# Patient Record
Sex: MALE | Race: WHITE | NOT HISPANIC OR LATINO
[De-identification: names, ages, dates, MRNs, and addresses within clinical notes are randomized per-mention and may not be internally consistent; named-entity substitution may affect disease eponyms.]

---

## 2017-05-03 ENCOUNTER — NON-APPOINTMENT (OUTPATIENT)
Age: 82
End: 2017-05-03

## 2017-05-03 ENCOUNTER — APPOINTMENT (OUTPATIENT)
Dept: CARDIOLOGY | Facility: CLINIC | Age: 82
End: 2017-05-03

## 2017-05-03 VITALS
WEIGHT: 188 LBS | HEIGHT: 70 IN | HEART RATE: 55 BPM | DIASTOLIC BLOOD PRESSURE: 67 MMHG | BODY MASS INDEX: 26.92 KG/M2 | SYSTOLIC BLOOD PRESSURE: 146 MMHG

## 2017-05-03 DIAGNOSIS — R00.1 BRADYCARDIA, UNSPECIFIED: ICD-10-CM

## 2017-05-03 DIAGNOSIS — I49.3 VENTRICULAR PREMATURE DEPOLARIZATION: ICD-10-CM

## 2017-05-03 DIAGNOSIS — R55 SYNCOPE AND COLLAPSE: ICD-10-CM

## 2017-05-03 DIAGNOSIS — Z82.49 FAMILY HISTORY OF ISCHEMIC HEART DISEASE AND OTHER DISEASES OF THE CIRCULATORY SYSTEM: ICD-10-CM

## 2017-05-03 RX ORDER — ENALAPRIL MALEATE 20 MG/1
20 TABLET ORAL DAILY
Qty: 30 | Refills: 5 | Status: ACTIVE | COMMUNITY

## 2017-05-07 PROBLEM — R55 PRE-SYNCOPE: Status: ACTIVE | Noted: 2017-05-03

## 2017-05-07 PROBLEM — R00.1 SINUS BRADYCARDIA: Status: ACTIVE | Noted: 2017-05-03

## 2018-04-19 ENCOUNTER — APPOINTMENT (OUTPATIENT)
Dept: CARDIOLOGY | Facility: CLINIC | Age: 83
End: 2018-04-19
Payer: MEDICARE

## 2018-04-19 ENCOUNTER — NON-APPOINTMENT (OUTPATIENT)
Age: 83
End: 2018-04-19

## 2018-04-19 VITALS
HEART RATE: 50 BPM | WEIGHT: 188 LBS | OXYGEN SATURATION: 99 % | SYSTOLIC BLOOD PRESSURE: 167 MMHG | BODY MASS INDEX: 26.92 KG/M2 | RESPIRATION RATE: 14 BRPM | HEIGHT: 70 IN | DIASTOLIC BLOOD PRESSURE: 91 MMHG

## 2018-04-19 DIAGNOSIS — Z01.810 ENCOUNTER FOR PREPROCEDURAL CARDIOVASCULAR EXAMINATION: ICD-10-CM

## 2018-04-19 DIAGNOSIS — I10 ESSENTIAL (PRIMARY) HYPERTENSION: ICD-10-CM

## 2018-04-19 PROCEDURE — 99214 OFFICE O/P EST MOD 30 MIN: CPT

## 2018-04-19 PROCEDURE — 93000 ELECTROCARDIOGRAM COMPLETE: CPT

## 2018-04-19 RX ORDER — BIMATOPROST 0.1 MG/ML
0.01 SOLUTION/ DROPS OPHTHALMIC
Qty: 2 | Refills: 0 | Status: ACTIVE | COMMUNITY
Start: 2018-01-19

## 2018-04-19 RX ORDER — TAMSULOSIN HYDROCHLORIDE 0.4 MG/1
0.4 CAPSULE ORAL
Qty: 30 | Refills: 0 | Status: ACTIVE | COMMUNITY
Start: 2017-11-28

## 2018-05-01 ENCOUNTER — APPOINTMENT (OUTPATIENT)
Dept: CV DIAGNOSITCS | Facility: HOSPITAL | Age: 83
End: 2018-05-01
Payer: MEDICARE

## 2018-05-01 ENCOUNTER — OUTPATIENT (OUTPATIENT)
Dept: OUTPATIENT SERVICES | Facility: HOSPITAL | Age: 83
LOS: 1 days | End: 2018-05-01

## 2018-05-01 DIAGNOSIS — I10 ESSENTIAL (PRIMARY) HYPERTENSION: ICD-10-CM

## 2018-05-01 PROCEDURE — 93306 TTE W/DOPPLER COMPLETE: CPT | Mod: 26

## 2018-05-08 ENCOUNTER — APPOINTMENT (OUTPATIENT)
Dept: CV DIAGNOSTICS | Facility: HOSPITAL | Age: 83
End: 2018-05-08
Payer: MEDICARE

## 2018-05-08 ENCOUNTER — OUTPATIENT (OUTPATIENT)
Dept: OUTPATIENT SERVICES | Facility: HOSPITAL | Age: 83
LOS: 1 days | End: 2018-05-08

## 2018-05-08 DIAGNOSIS — Z01.810 ENCOUNTER FOR PREPROCEDURAL CARDIOVASCULAR EXAMINATION: ICD-10-CM

## 2018-05-08 DIAGNOSIS — I10 ESSENTIAL (PRIMARY) HYPERTENSION: ICD-10-CM

## 2018-05-08 PROCEDURE — 78452 HT MUSCLE IMAGE SPECT MULT: CPT | Mod: 26

## 2018-05-08 PROCEDURE — 93018 CV STRESS TEST I&R ONLY: CPT | Mod: GC

## 2018-05-08 PROCEDURE — 93016 CV STRESS TEST SUPVJ ONLY: CPT | Mod: GC

## 2018-06-01 ENCOUNTER — RECORD ABSTRACTING (OUTPATIENT)
Age: 83
End: 2018-06-01

## 2018-06-01 DIAGNOSIS — M19.029 PRIMARY OSTEOARTHRITIS, UNSPECIFIED ELBOW: ICD-10-CM

## 2018-06-01 DIAGNOSIS — Z82.49 FAMILY HISTORY OF ISCHEMIC HEART DISEASE AND OTHER DISEASES OF THE CIRCULATORY SYSTEM: ICD-10-CM

## 2018-06-01 DIAGNOSIS — M75.40 IMPINGEMENT SYNDROME OF UNSPECIFIED SHOULDER: ICD-10-CM

## 2018-06-01 DIAGNOSIS — Z87.39 PERSONAL HISTORY OF OTHER DISEASES OF THE MUSCULOSKELETAL SYSTEM AND CONNECTIVE TISSUE: ICD-10-CM

## 2018-06-01 DIAGNOSIS — S46.219A STRAIN OF MUSCLE, FASCIA AND TENDON OF OTHER PARTS OF BICEPS, UNSPECIFIED ARM, INITIAL ENCOUNTER: ICD-10-CM

## 2018-07-05 ENCOUNTER — OUTPATIENT (OUTPATIENT)
Dept: OUTPATIENT SERVICES | Facility: HOSPITAL | Age: 83
LOS: 1 days | End: 2018-07-05
Payer: MEDICARE

## 2018-07-05 VITALS
OXYGEN SATURATION: 99 % | HEIGHT: 67 IN | RESPIRATION RATE: 16 BRPM | DIASTOLIC BLOOD PRESSURE: 69 MMHG | WEIGHT: 175.05 LBS | SYSTOLIC BLOOD PRESSURE: 127 MMHG | HEART RATE: 68 BPM | TEMPERATURE: 98 F

## 2018-07-05 DIAGNOSIS — M17.11 UNILATERAL PRIMARY OSTEOARTHRITIS, RIGHT KNEE: ICD-10-CM

## 2018-07-05 DIAGNOSIS — Z96.652 PRESENCE OF LEFT ARTIFICIAL KNEE JOINT: Chronic | ICD-10-CM

## 2018-07-05 DIAGNOSIS — Z98.890 OTHER SPECIFIED POSTPROCEDURAL STATES: Chronic | ICD-10-CM

## 2018-07-05 DIAGNOSIS — Z01.818 ENCOUNTER FOR OTHER PREPROCEDURAL EXAMINATION: ICD-10-CM

## 2018-07-05 DIAGNOSIS — R25.1 TREMOR, UNSPECIFIED: ICD-10-CM

## 2018-07-05 LAB
ALBUMIN SERPL ELPH-MCNC: 4.1 G/DL — SIGNIFICANT CHANGE UP (ref 3.3–5)
ALP SERPL-CCNC: 88 U/L — SIGNIFICANT CHANGE UP (ref 30–120)
ALT FLD-CCNC: 23 U/L DA — SIGNIFICANT CHANGE UP (ref 10–60)
ANION GAP SERPL CALC-SCNC: 7 MMOL/L — SIGNIFICANT CHANGE UP (ref 5–17)
APTT BLD: 33.3 SEC — SIGNIFICANT CHANGE UP (ref 27.5–37.4)
AST SERPL-CCNC: 22 U/L — SIGNIFICANT CHANGE UP (ref 10–40)
BILIRUB SERPL-MCNC: 1.2 MG/DL — SIGNIFICANT CHANGE UP (ref 0.2–1.2)
BLD GP AB SCN SERPL QL: SIGNIFICANT CHANGE UP
BUN SERPL-MCNC: 34 MG/DL — HIGH (ref 7–23)
CALCIUM SERPL-MCNC: 9.9 MG/DL — SIGNIFICANT CHANGE UP (ref 8.4–10.5)
CHLORIDE SERPL-SCNC: 106 MMOL/L — SIGNIFICANT CHANGE UP (ref 96–108)
CO2 SERPL-SCNC: 29 MMOL/L — SIGNIFICANT CHANGE UP (ref 22–31)
CREAT SERPL-MCNC: 1.37 MG/DL — HIGH (ref 0.5–1.3)
GLUCOSE SERPL-MCNC: 103 MG/DL — HIGH (ref 70–99)
HCT VFR BLD CALC: 47 % — SIGNIFICANT CHANGE UP (ref 39–50)
HGB BLD-MCNC: 16.1 G/DL — SIGNIFICANT CHANGE UP (ref 13–17)
INR BLD: 1.09 RATIO — SIGNIFICANT CHANGE UP (ref 0.88–1.16)
MCHC RBC-ENTMCNC: 32.1 PG — SIGNIFICANT CHANGE UP (ref 27–34)
MCHC RBC-ENTMCNC: 34.3 GM/DL — SIGNIFICANT CHANGE UP (ref 32–36)
MCV RBC AUTO: 93.8 FL — SIGNIFICANT CHANGE UP (ref 80–100)
NRBC # BLD: 0 /100 WBCS — SIGNIFICANT CHANGE UP (ref 0–0)
PLATELET # BLD AUTO: 190 K/UL — SIGNIFICANT CHANGE UP (ref 150–400)
POTASSIUM SERPL-MCNC: 5.5 MMOL/L — HIGH (ref 3.5–5.3)
POTASSIUM SERPL-SCNC: 5.5 MMOL/L — HIGH (ref 3.5–5.3)
PROT SERPL-MCNC: 8.1 G/DL — SIGNIFICANT CHANGE UP (ref 6–8.3)
PROTHROM AB SERPL-ACNC: 11.9 SEC — SIGNIFICANT CHANGE UP (ref 9.8–12.7)
RBC # BLD: 5.01 M/UL — SIGNIFICANT CHANGE UP (ref 4.2–5.8)
RBC # FLD: 12.3 % — SIGNIFICANT CHANGE UP (ref 10.3–14.5)
SODIUM SERPL-SCNC: 142 MMOL/L — SIGNIFICANT CHANGE UP (ref 135–145)
WBC # BLD: 8.84 K/UL — SIGNIFICANT CHANGE UP (ref 3.8–10.5)
WBC # FLD AUTO: 8.84 K/UL — SIGNIFICANT CHANGE UP (ref 3.8–10.5)

## 2018-07-05 PROCEDURE — 85730 THROMBOPLASTIN TIME PARTIAL: CPT

## 2018-07-05 PROCEDURE — 87640 STAPH A DNA AMP PROBE: CPT

## 2018-07-05 PROCEDURE — 86900 BLOOD TYPING SEROLOGIC ABO: CPT

## 2018-07-05 PROCEDURE — 93005 ELECTROCARDIOGRAM TRACING: CPT

## 2018-07-05 PROCEDURE — 86850 RBC ANTIBODY SCREEN: CPT

## 2018-07-05 PROCEDURE — 80053 COMPREHEN METABOLIC PANEL: CPT

## 2018-07-05 PROCEDURE — 87641 MR-STAPH DNA AMP PROBE: CPT

## 2018-07-05 PROCEDURE — 85027 COMPLETE CBC AUTOMATED: CPT

## 2018-07-05 PROCEDURE — G0463: CPT

## 2018-07-05 PROCEDURE — 93010 ELECTROCARDIOGRAM REPORT: CPT | Mod: NC

## 2018-07-05 PROCEDURE — 86901 BLOOD TYPING SEROLOGIC RH(D): CPT

## 2018-07-05 PROCEDURE — 85610 PROTHROMBIN TIME: CPT

## 2018-07-05 NOTE — H&P PST ADULT - PSH
History of hernia surgery  X3 1950's-1970's  History of knee replacement, total, left  2007  History of shoulder surgery  right, 2012

## 2018-07-05 NOTE — H&P PST ADULT - PROBLEM SELECTOR PLAN 1
"Right total knee replacement" on 7/24/18  Pre op instructions were reviewed and signed  patient will obtain medical and cardiac clearance

## 2018-07-05 NOTE — H&P PST ADULT - MUSCULOSKELETAL
details… detailed exam no joint swelling/no joint erythema/no joint warmth/no calf tenderness/decreased ROM due to pain

## 2018-07-05 NOTE — H&P PST ADULT - HISTORY OF PRESENT ILLNESS
87 yo male is scheduled for "Right total knee replacement" on 7/24/18 with Bry Cuellar MD.  Patient with longstanding right knee pain for which he has tried HA injections without relief.  Complains of pain with swelling and locking, worst with activity that rates 9/10.

## 2018-07-05 NOTE — H&P PST ADULT - NEGATIVE ALLERGY TYPES
no reactions to medicines/no indoor environmental allergies/no outdoor environmental allergies/no reactions to food

## 2018-07-05 NOTE — H&P PST ADULT - NSANTHOSAYNRD_GEN_A_CORE
No. JAYRO screening performed.  STOP BANG Legend: 0-2 = LOW Risk; 3-4 = INTERMEDIATE Risk; 5-8 = HIGH Risk

## 2018-07-05 NOTE — H&P PST ADULT - NEGATIVE ENMT SYMPTOMS
no abnormal taste sensation/no dry mouth/no hearing difficulty/no sinus symptoms/no nasal discharge/no recurrent cold sores/no throat pain/no ear pain/no tinnitus/no gum bleeding/no dysphagia/no vertigo/no nasal congestion/no nasal obstruction/no post-nasal discharge/no nose bleeds

## 2018-07-06 LAB
MRSA PCR RESULT.: SIGNIFICANT CHANGE UP
S AUREUS DNA NOSE QL NAA+PROBE: SIGNIFICANT CHANGE UP

## 2018-07-17 PROBLEM — M17.11 UNILATERAL PRIMARY OSTEOARTHRITIS, RIGHT KNEE: Chronic | Status: ACTIVE | Noted: 2018-07-05

## 2018-07-17 PROBLEM — I10 ESSENTIAL (PRIMARY) HYPERTENSION: Chronic | Status: ACTIVE | Noted: 2018-07-05

## 2018-07-17 PROBLEM — R25.1 TREMOR, UNSPECIFIED: Chronic | Status: ACTIVE | Noted: 2018-07-05

## 2018-07-17 RX ORDER — CHLORHEXIDINE GLUCONATE 213 G/1000ML
1 SOLUTION TOPICAL ONCE
Qty: 0 | Refills: 0 | Status: COMPLETED | OUTPATIENT
Start: 2018-07-24 | End: 2018-07-24

## 2018-07-17 RX ORDER — APREPITANT 80 MG/1
40 CAPSULE ORAL ONCE
Qty: 0 | Refills: 0 | Status: COMPLETED | OUTPATIENT
Start: 2018-07-24 | End: 2018-07-24

## 2018-07-17 RX ORDER — SODIUM CHLORIDE 9 MG/ML
1000 INJECTION, SOLUTION INTRAVENOUS
Qty: 0 | Refills: 0 | Status: DISCONTINUED | OUTPATIENT
Start: 2018-07-24 | End: 2018-07-24

## 2018-07-19 ENCOUNTER — APPOINTMENT (OUTPATIENT)
Dept: ORTHOPEDIC SURGERY | Facility: CLINIC | Age: 83
End: 2018-07-19
Payer: MEDICARE

## 2018-07-19 VITALS — WEIGHT: 188 LBS | HEIGHT: 70 IN | BODY MASS INDEX: 26.92 KG/M2

## 2018-07-19 PROCEDURE — 99214 OFFICE O/P EST MOD 30 MIN: CPT

## 2018-07-23 ENCOUNTER — TRANSCRIPTION ENCOUNTER (OUTPATIENT)
Age: 83
End: 2018-07-23

## 2018-07-23 RX ORDER — DOCUSATE SODIUM 100 MG
100 CAPSULE ORAL THREE TIMES A DAY
Qty: 0 | Refills: 0 | Status: DISCONTINUED | OUTPATIENT
Start: 2018-07-24 | End: 2018-07-27

## 2018-07-23 RX ORDER — ONDANSETRON 8 MG/1
4 TABLET, FILM COATED ORAL EVERY 6 HOURS
Qty: 0 | Refills: 0 | Status: DISCONTINUED | OUTPATIENT
Start: 2018-07-24 | End: 2018-08-04

## 2018-07-23 RX ORDER — MAGNESIUM HYDROXIDE 400 MG/1
30 TABLET, CHEWABLE ORAL DAILY
Qty: 0 | Refills: 0 | Status: DISCONTINUED | OUTPATIENT
Start: 2018-07-24 | End: 2018-07-27

## 2018-07-23 RX ORDER — POLYETHYLENE GLYCOL 3350 17 G/17G
17 POWDER, FOR SOLUTION ORAL DAILY
Qty: 0 | Refills: 0 | Status: DISCONTINUED | OUTPATIENT
Start: 2018-07-24 | End: 2018-08-04

## 2018-07-23 RX ORDER — SODIUM CHLORIDE 9 MG/ML
1000 INJECTION, SOLUTION INTRAVENOUS
Qty: 0 | Refills: 0 | Status: DISCONTINUED | OUTPATIENT
Start: 2018-07-24 | End: 2018-07-25

## 2018-07-23 RX ORDER — SENNA PLUS 8.6 MG/1
2 TABLET ORAL AT BEDTIME
Qty: 0 | Refills: 0 | Status: DISCONTINUED | OUTPATIENT
Start: 2018-07-24 | End: 2018-07-27

## 2018-07-23 NOTE — PATIENT PROFILE ADULT. - PMH
Echo M-Mode/2D/Doppler (Routine)   Order: [id number]   Status:  Final result   Visible to patient:  No (Not Released) Dx:  Benign essential HTN   Notes Recorded by Mike Escobedo MD on 8/2/2017 at 8:12 PM CDT  Let patient know tests are normal       Details     Reading Physician Reading Date Result Priority   Vane Hughes MD 8/2/2017     Ref Range & Units 3d ago   Left Ventricular Internal Dimension in Diastole cm 4.3   Left Internal Dimenson in Systole cm 2.9   Ejection Fraction % 65.0   Interventricular Septum in End Diastole cm 0.8   Left Ventricular Posterior Wall in End Diastole cm 0.9   Ascending Aorta cm 2.7   LVOT 2D cm 2.0   AV Peak Velocity m/s 1.3   AV Peak Gradient mmHg 7.2   AV Mean Gradient mmHg 4.0   MV Peak E Velocity m/s 1.0   MV Peak A Velocity m/s 0.4   DOP Calc LVOT Peak Rian m/s 1.2   E Wave Decelaration Time ms 208.0   LVOT VTI cm 24.0   MV E Tissue Rian Med cm/s 13.8   MV E Wave Rian/E Tissue Rian Med  7.0   Tricuspid Valve Peak Regurgitation Velocity m/s 2.3   Aortic Valve Area cm2 2.6   TV Estimated Right Arterial Pressure mmHg 10.0   Est Right Vent Systolic Pressure by Tricuspid Regurgitation Jet mmHg 31.3   PV Peak Velocity m/s 1.0   Resulting Agency  [facility]           Left message for patient to return call to office   Hypertension    Osteoarthritis of right knee    Tremor of both hands

## 2018-07-23 NOTE — PATIENT PROFILE ADULT. - VISION (WITH CORRECTIVE LENSES IF THE PATIENT USUALLY WEARS THEM):
Normal vision: sees adequately in most situations; can see medication labels, newsprint to pacu/Normal vision: sees adequately in most situations; can see medication labels, newsprint

## 2018-07-24 ENCOUNTER — INPATIENT (INPATIENT)
Facility: HOSPITAL | Age: 83
LOS: 10 days | Discharge: NURSING, REHAB OR LONG-TERM CARE | DRG: 470 | End: 2018-08-04
Attending: SPECIALIST | Admitting: SPECIALIST
Payer: MEDICARE

## 2018-07-24 ENCOUNTER — RESULT REVIEW (OUTPATIENT)
Age: 83
End: 2018-07-24

## 2018-07-24 ENCOUNTER — APPOINTMENT (OUTPATIENT)
Dept: ORTHOPEDIC SURGERY | Facility: HOSPITAL | Age: 83
End: 2018-07-24

## 2018-07-24 VITALS
OXYGEN SATURATION: 100 % | RESPIRATION RATE: 9 BRPM | HEIGHT: 70 IN | DIASTOLIC BLOOD PRESSURE: 77 MMHG | SYSTOLIC BLOOD PRESSURE: 160 MMHG | TEMPERATURE: 98 F | HEART RATE: 53 BPM | WEIGHT: 174.83 LBS

## 2018-07-24 DIAGNOSIS — Z01.818 ENCOUNTER FOR OTHER PREPROCEDURAL EXAMINATION: ICD-10-CM

## 2018-07-24 DIAGNOSIS — M17.11 UNILATERAL PRIMARY OSTEOARTHRITIS, RIGHT KNEE: ICD-10-CM

## 2018-07-24 DIAGNOSIS — Z96.652 PRESENCE OF LEFT ARTIFICIAL KNEE JOINT: Chronic | ICD-10-CM

## 2018-07-24 DIAGNOSIS — Z98.890 OTHER SPECIFIED POSTPROCEDURAL STATES: Chronic | ICD-10-CM

## 2018-07-24 LAB
ABO RH CONFIRMATION: SIGNIFICANT CHANGE UP
ANION GAP SERPL CALC-SCNC: 8 MMOL/L — SIGNIFICANT CHANGE UP (ref 5–17)
BUN SERPL-MCNC: 22 MG/DL — SIGNIFICANT CHANGE UP (ref 7–23)
CALCIUM SERPL-MCNC: 9.1 MG/DL — SIGNIFICANT CHANGE UP (ref 8.4–10.5)
CHLORIDE SERPL-SCNC: 103 MMOL/L — SIGNIFICANT CHANGE UP (ref 96–108)
CO2 SERPL-SCNC: 27 MMOL/L — SIGNIFICANT CHANGE UP (ref 22–31)
CREAT SERPL-MCNC: 1.28 MG/DL — SIGNIFICANT CHANGE UP (ref 0.5–1.3)
GLUCOSE SERPL-MCNC: 237 MG/DL — HIGH (ref 70–99)
HCT VFR BLD CALC: 38.8 % — LOW (ref 39–50)
HGB BLD-MCNC: 13.8 G/DL — SIGNIFICANT CHANGE UP (ref 13–17)
POTASSIUM SERPL-MCNC: 5 MMOL/L — SIGNIFICANT CHANGE UP (ref 3.5–5.3)
POTASSIUM SERPL-MCNC: 5 MMOL/L — SIGNIFICANT CHANGE UP (ref 3.5–5.3)
POTASSIUM SERPL-SCNC: 5 MMOL/L — SIGNIFICANT CHANGE UP (ref 3.5–5.3)
POTASSIUM SERPL-SCNC: 5 MMOL/L — SIGNIFICANT CHANGE UP (ref 3.5–5.3)
SODIUM SERPL-SCNC: 138 MMOL/L — SIGNIFICANT CHANGE UP (ref 135–145)

## 2018-07-24 PROCEDURE — 99222 1ST HOSP IP/OBS MODERATE 55: CPT

## 2018-07-24 PROCEDURE — 73562 X-RAY EXAM OF KNEE 3: CPT | Mod: 26,RT

## 2018-07-24 PROCEDURE — 88305 TISSUE EXAM BY PATHOLOGIST: CPT | Mod: 26

## 2018-07-24 PROCEDURE — 12345: CPT

## 2018-07-24 PROCEDURE — 88311 DECALCIFY TISSUE: CPT | Mod: 26

## 2018-07-24 PROCEDURE — 27447 TOTAL KNEE ARTHROPLASTY: CPT | Mod: RT

## 2018-07-24 PROCEDURE — 93010 ELECTROCARDIOGRAM REPORT: CPT

## 2018-07-24 RX ORDER — ACETAMINOPHEN 500 MG
1000 TABLET ORAL ONCE
Qty: 0 | Refills: 0 | Status: COMPLETED | OUTPATIENT
Start: 2018-07-24 | End: 2018-07-24

## 2018-07-24 RX ORDER — HYDROMORPHONE HYDROCHLORIDE 2 MG/ML
0.5 INJECTION INTRAMUSCULAR; INTRAVENOUS; SUBCUTANEOUS
Qty: 0 | Refills: 0 | Status: DISCONTINUED | OUTPATIENT
Start: 2018-07-24 | End: 2018-07-26

## 2018-07-24 RX ORDER — OXYCODONE HYDROCHLORIDE 5 MG/1
5 TABLET ORAL
Qty: 0 | Refills: 0 | Status: DISCONTINUED | OUTPATIENT
Start: 2018-07-24 | End: 2018-07-26

## 2018-07-24 RX ORDER — TRANEXAMIC ACID 100 MG/ML
1000 INJECTION, SOLUTION INTRAVENOUS ONCE
Qty: 0 | Refills: 0 | Status: COMPLETED | OUTPATIENT
Start: 2018-07-24 | End: 2018-07-24

## 2018-07-24 RX ORDER — HYDROMORPHONE HYDROCHLORIDE 2 MG/ML
0.4 INJECTION INTRAMUSCULAR; INTRAVENOUS; SUBCUTANEOUS
Qty: 0 | Refills: 0 | Status: DISCONTINUED | OUTPATIENT
Start: 2018-07-24 | End: 2018-07-24

## 2018-07-24 RX ORDER — CELECOXIB 200 MG/1
200 CAPSULE ORAL
Qty: 0 | Refills: 0 | Status: DISCONTINUED | OUTPATIENT
Start: 2018-07-24 | End: 2018-07-26

## 2018-07-24 RX ORDER — CEFAZOLIN SODIUM 1 G
2000 VIAL (EA) INJECTION ONCE
Qty: 0 | Refills: 0 | Status: COMPLETED | OUTPATIENT
Start: 2018-07-24 | End: 2018-07-24

## 2018-07-24 RX ORDER — ONDANSETRON 8 MG/1
4 TABLET, FILM COATED ORAL ONCE
Qty: 0 | Refills: 0 | Status: DISCONTINUED | OUTPATIENT
Start: 2018-07-24 | End: 2018-07-24

## 2018-07-24 RX ORDER — OXYCODONE HYDROCHLORIDE 5 MG/1
10 TABLET ORAL
Qty: 0 | Refills: 0 | Status: DISCONTINUED | OUTPATIENT
Start: 2018-07-24 | End: 2018-07-26

## 2018-07-24 RX ORDER — ACETAMINOPHEN 500 MG
1000 TABLET ORAL EVERY 8 HOURS
Qty: 0 | Refills: 0 | Status: DISCONTINUED | OUTPATIENT
Start: 2018-07-25 | End: 2018-08-04

## 2018-07-24 RX ORDER — ACETAMINOPHEN 500 MG
1000 TABLET ORAL EVERY 6 HOURS
Qty: 0 | Refills: 0 | Status: COMPLETED | OUTPATIENT
Start: 2018-07-24 | End: 2018-07-25

## 2018-07-24 RX ORDER — PANTOPRAZOLE SODIUM 20 MG/1
40 TABLET, DELAYED RELEASE ORAL
Qty: 0 | Refills: 0 | Status: DISCONTINUED | OUTPATIENT
Start: 2018-07-24 | End: 2018-07-27

## 2018-07-24 RX ORDER — ASPIRIN/CALCIUM CARB/MAGNESIUM 324 MG
162 TABLET ORAL EVERY 12 HOURS
Qty: 0 | Refills: 0 | Status: DISCONTINUED | OUTPATIENT
Start: 2018-07-25 | End: 2018-08-04

## 2018-07-24 RX ORDER — CEFAZOLIN SODIUM 1 G
2000 VIAL (EA) INJECTION EVERY 8 HOURS
Qty: 0 | Refills: 0 | Status: COMPLETED | OUTPATIENT
Start: 2018-07-24 | End: 2018-07-25

## 2018-07-24 RX ORDER — SODIUM CHLORIDE 9 MG/ML
1000 INJECTION, SOLUTION INTRAVENOUS
Qty: 0 | Refills: 0 | Status: DISCONTINUED | OUTPATIENT
Start: 2018-07-24 | End: 2018-07-24

## 2018-07-24 RX ADMIN — HYDROMORPHONE HYDROCHLORIDE 0.5 MILLIGRAM(S): 2 INJECTION INTRAMUSCULAR; INTRAVENOUS; SUBCUTANEOUS at 22:06

## 2018-07-24 RX ADMIN — Medication 100 MILLIGRAM(S): at 19:09

## 2018-07-24 RX ADMIN — Medication 100 MILLIGRAM(S): at 22:11

## 2018-07-24 RX ADMIN — SENNA PLUS 2 TABLET(S): 8.6 TABLET ORAL at 22:11

## 2018-07-24 RX ADMIN — Medication 1000 MILLIGRAM(S): at 19:10

## 2018-07-24 RX ADMIN — APREPITANT 40 MILLIGRAM(S): 80 CAPSULE ORAL at 09:21

## 2018-07-24 RX ADMIN — HYDROMORPHONE HYDROCHLORIDE 0.5 MILLIGRAM(S): 2 INJECTION INTRAMUSCULAR; INTRAVENOUS; SUBCUTANEOUS at 22:31

## 2018-07-24 RX ADMIN — SODIUM CHLORIDE 75 MILLILITER(S): 9 INJECTION, SOLUTION INTRAVENOUS at 09:22

## 2018-07-24 RX ADMIN — Medication 400 MILLIGRAM(S): at 19:09

## 2018-07-24 RX ADMIN — CHLORHEXIDINE GLUCONATE 1 APPLICATION(S): 213 SOLUTION TOPICAL at 08:50

## 2018-07-24 NOTE — CONSULT NOTE ADULT - ASSESSMENT
The patient is an 86 year old male with a history of HTN, OA who is s/p right TKR.    Plan:  - Likely there is mild sinus node and AV node dysfunction  - Continue to monitor on telemetry for bradyarrhythmias  - Avoid rate lowering agents  - Minimize narcotics  - Assess heart rates during physical therapy

## 2018-07-24 NOTE — CHART NOTE - NSCHARTNOTEFT_GEN_A_CORE
Called for sinus bradycardia and frequent pauses (<3sec).  Patient is asymptomatic from the bradycardia and pauses.  Cardiology was consulted earlier in the day for bradycardia.  Likely has sinus +/- AV node dysfunction.  No further intervention needed at this time.  Continue to monitor.

## 2018-07-24 NOTE — CONSULT NOTE ADULT - SUBJECTIVE AND OBJECTIVE BOX
CC.  S/P Right Total knee replacement   HPI.  Patient reports right knee pain is controlled.  Offers no other complaints    87 yo male is s/p  "Right total knee replacement" Patient with longstanding right knee pain for which he has tried HA injections without relief.  Complains of pain with swelling and locking, worst with activity that rates 9/10.       Constitutional: No fever, fatigue or weight loss.  Skin: No rash.  Eyes: No recent vision problems or eye pain.  ENT: No congestion, ear pain, or sore throat.  Endocrine: No thyroid problems.  Cardiovascular: No chest pain or palpation.  Respiratory: No cough, shortness of breath, congestion, or wheezing.  Gastrointestinal: No abdominal pain, nausea, vomiting, or diarrhea.  Genitourinary: No dysuria.  Musculoskeletal: No joint swelling.  Neurologic: No headache.    Allergies/Medications:   Allergies:        Allergies:  	No Known Allergies:     Home Medications:   * Patient Currently Takes Medications as of 05-Jul-2018 12:46 documented in Structured Notes  · 	enalapril 20 mg oral tablet: Last Dose Taken:  , 1 tab(s) orally once a day    .    PMH/PSH/FH/SH:    Past Medical History:  Hypertension    Osteoarthritis of right knee    Tremor of both hands.     Past Surgical History:  History of hernia surgery  X3 1950's-1970's  History of knee replacement, total, left  2007  History of shoulder surgery  right, 2012.     Family History:  Father  Still living? No  Family history of heart disease, Age at diagnosis: Age Unknown.     Social History:  · Marital Status	  · Lives With	alone  · Notes	7 children  denies any ETOH/Tob/Illicit drug usage    Vital Signs Last 24 Hrs  T(C): 36.2 (24 Jul 2018 13:40), Max: 36.7 (24 Jul 2018 08:22)  T(F): 97.2 (24 Jul 2018 13:40), Max: 98.1 (24 Jul 2018 08:22)  HR: 42 (24 Jul 2018 14:40) (34 - 53)  BP: 116/56 (24 Jul 2018 14:40) (97/51 - 160/77)  BP(mean): --  RR: 18 (24 Jul 2018 14:40) (9 - 18)  SpO2: 100% (24 Jul 2018 14:40) (98% - 100%)      PHYSICAL EXAM-  GENERAL: NAD, well-groomed, well-developed  HEAD:  Atraumatic, Normocephalic  EYES: EOMI, PERRLA, conjunctiva and sclera clear  NECK: Supple, No JVD, Normal thyroid  NERVOUS SYSTEM:  Alert & Oriented X3, Motor Strength 5/5 B/L upper and lower extremities; DTRs 2+ intact and symmetric  CHEST/LUNG: Clear to percussion bilaterally; No rales, rhonchi, wheezing, or rubs  HEART: Regular rate and rhythm; No murmurs, rubs, or gallops  ABDOMEN: Soft, Nontender, Nondistended; Bowel sounds present  EXTREMITIES:  2+ Peripheral Pulses, No clubbing, cyanosis, or edema  SKIN: No rashes or lesions

## 2018-07-24 NOTE — BRIEF OPERATIVE NOTE - PROCEDURE
<<-----Click on this checkbox to enter Procedure Knee replacement  07/24/2018  Right  Active  Patrick San

## 2018-07-24 NOTE — CONSULT NOTE ADULT - SUBJECTIVE AND OBJECTIVE BOX
History of Present Illness: The patient is an 86 year old male with a history of HTN, OA who presents for scheduled right TKR. Post-operatively, he was noted to be bradycardic to 30s/40s. He feels well and denies dizziness, palpitations, chest pain, shortness of breath. He notes issues with bradycardia to the 40s at times. While he has had intermittent dizziness, he states he underwent event monitoring and never met indication for pacemaker.    Past Medical/Surgical History:  HTN, OA     Medications:  MEDICATIONS  (STANDING):  acetaminophen  IVPB. 1000 milliGRAM(s) IV Intermittent every 6 hours  ceFAZolin   IVPB 2000 milliGRAM(s) IV Intermittent every 8 hours  celecoxib 200 milliGRAM(s) Oral two times a day  docusate sodium 100 milliGRAM(s) Oral three times a day  lactated ringers. 1000 milliLiter(s) (100 mL/Hr) IV Continuous <Continuous>  pantoprazole    Tablet 40 milliGRAM(s) Oral before breakfast  senna 2 Tablet(s) Oral at bedtime    MEDICATIONS  (PRN):  aluminum hydroxide/magnesium hydroxide/simethicone Suspension 30 milliLiter(s) Oral four times a day PRN Indigestion  HYDROmorphone  Injectable 0.5 milliGRAM(s) IV Push every 3 hours PRN Severe Pain (7 - 10)  magnesium hydroxide Suspension 30 milliLiter(s) Oral daily PRN Constipation  ondansetron Injectable 4 milliGRAM(s) IV Push every 6 hours PRN Nausea and/or Vomiting  oxyCODONE    IR 5 milliGRAM(s) Oral every 3 hours PRN Mild Pain (1 - 3)  oxyCODONE    IR 10 milliGRAM(s) Oral every 3 hours PRN Moderate Pain (4 - 6)  polyethylene glycol 3350 17 Gram(s) Oral daily PRN Constipation      Family History: Non-contributory family history of premature cardiovascular atherosclerotic disease    Social History: No tobacco, alcohol or drug use    Review of Systems:  General: No fevers, chills, weight loss or gain  Skin: No rashes, color changes  Cardiovascular: No chest pain, orthopnea  Respiratory: No shortness of breath, cough  Gastrointestinal: No nausea, abdominal pain  Genitourinary: No incontinence, pain with urination  Musculoskeletal: No pain, swelling, decreased range of motion  Neurological: No headache, weakness  Psychiatric: No depression, anxiety  Endocrine: No weight loss or gain, increased thirst  All other systems are comprehensively negative.    Physical Exam:  Vitals:        Vital Signs Last 24 Hrs  T(C): 36.6 (24 Jul 2018 18:27), Max: 36.7 (24 Jul 2018 08:22)  T(F): 97.8 (24 Jul 2018 18:27), Max: 98.1 (24 Jul 2018 08:22)  HR: 47 (24 Jul 2018 18:27) (34 - 53)  BP: 129/57 (24 Jul 2018 18:27) (97/51 - 160/77)  BP(mean): --  RR: 16 (24 Jul 2018 18:27) (9 - 20)  SpO2: 99% (24 Jul 2018 18:27) (98% - 100%)  General: NAD  HEENT: MMM  Neck: No JVD, no carotid bruit  Lungs: CTAB  CV: RRR, nl S1/S2, no M/R/G  Abdomen: S/NT/ND, +BS  Extremities: No LE edema, no cyanosis  Neuro: AAOx3, non-focal  Skin: No rash    Labs:  Pending                ECG: Sinus bradycardia, LAD, 1st deg AVB, blocked PAC

## 2018-07-24 NOTE — CONSULT NOTE ADULT - ASSESSMENT
Patient is 85 yo male presenting with     1. S/P right Total knee replacement.  Continue with pain management, DVT proph, and wound care as per Ortho.  PT/OT  2. HTN.  Continue with home medications, and Monitor BP    Plan of care was discussed with patient in great details, All questions were answered to their satisfaction  Seems to understand, and in agreement Patient is 85 yo male presenting with     1. S/P right Total knee replacement.  Continue with pain management, DVT proph, and wound care as per Ortho.  PT/OT  2. HTN.  Continue with home medications, and Monitor BP  3.  Bradycardia.  Noticed in the PACU.  Asymptomatic.  HR increases to 68 with activity.  pre-op ekg shows Bradycardia.  Will check TSH, Continue with telemonitored and cardiology consult    Plan of care was discussed with patient in great details, All questions were answered to their satisfaction  Seems to understand, and in agreement

## 2018-07-24 NOTE — PHYSICAL THERAPY INITIAL EVALUATION ADULT - ADDITIONAL COMMENTS
Pt lives alone in a house w/ 4 steps to enter with rail.  There are 2 steps inside with no rail, 13 steps to bedroom with rail.

## 2018-07-25 ENCOUNTER — TRANSCRIPTION ENCOUNTER (OUTPATIENT)
Age: 83
End: 2018-07-25

## 2018-07-25 LAB
ANION GAP SERPL CALC-SCNC: 8 MMOL/L — SIGNIFICANT CHANGE UP (ref 5–17)
BUN SERPL-MCNC: 20 MG/DL — SIGNIFICANT CHANGE UP (ref 7–23)
CALCIUM SERPL-MCNC: 9.1 MG/DL — SIGNIFICANT CHANGE UP (ref 8.4–10.5)
CHLORIDE SERPL-SCNC: 103 MMOL/L — SIGNIFICANT CHANGE UP (ref 96–108)
CO2 SERPL-SCNC: 26 MMOL/L — SIGNIFICANT CHANGE UP (ref 22–31)
CREAT SERPL-MCNC: 1.15 MG/DL — SIGNIFICANT CHANGE UP (ref 0.5–1.3)
GLUCOSE SERPL-MCNC: 181 MG/DL — HIGH (ref 70–99)
HCT VFR BLD CALC: 35.1 % — LOW (ref 39–50)
HGB BLD-MCNC: 12.4 G/DL — LOW (ref 13–17)
MCHC RBC-ENTMCNC: 32.9 PG — SIGNIFICANT CHANGE UP (ref 27–34)
MCHC RBC-ENTMCNC: 35.3 GM/DL — SIGNIFICANT CHANGE UP (ref 32–36)
MCV RBC AUTO: 93.1 FL — SIGNIFICANT CHANGE UP (ref 80–100)
NRBC # BLD: 0 /100 WBCS — SIGNIFICANT CHANGE UP (ref 0–0)
PLATELET # BLD AUTO: 154 K/UL — SIGNIFICANT CHANGE UP (ref 150–400)
POTASSIUM SERPL-MCNC: 4.2 MMOL/L — SIGNIFICANT CHANGE UP (ref 3.5–5.3)
POTASSIUM SERPL-SCNC: 4.2 MMOL/L — SIGNIFICANT CHANGE UP (ref 3.5–5.3)
RBC # BLD: 3.77 M/UL — LOW (ref 4.2–5.8)
RBC # FLD: 12.2 % — SIGNIFICANT CHANGE UP (ref 10.3–14.5)
SODIUM SERPL-SCNC: 137 MMOL/L — SIGNIFICANT CHANGE UP (ref 135–145)
WBC # BLD: 13.37 K/UL — HIGH (ref 3.8–10.5)
WBC # FLD AUTO: 13.37 K/UL — HIGH (ref 3.8–10.5)

## 2018-07-25 PROCEDURE — 93971 EXTREMITY STUDY: CPT | Mod: 26,RT

## 2018-07-25 PROCEDURE — 12345: CPT

## 2018-07-25 PROCEDURE — 99233 SBSQ HOSP IP/OBS HIGH 50: CPT

## 2018-07-25 RX ORDER — LATANOPROST 0.05 MG/ML
1 SOLUTION/ DROPS OPHTHALMIC; TOPICAL AT BEDTIME
Qty: 0 | Refills: 0 | Status: DISCONTINUED | OUTPATIENT
Start: 2018-07-25 | End: 2018-08-04

## 2018-07-25 RX ORDER — SODIUM CHLORIDE 9 MG/ML
1000 INJECTION INTRAMUSCULAR; INTRAVENOUS; SUBCUTANEOUS ONCE
Qty: 0 | Refills: 0 | Status: COMPLETED | OUTPATIENT
Start: 2018-07-25 | End: 2018-07-26

## 2018-07-25 RX ADMIN — Medication 400 MILLIGRAM(S): at 01:10

## 2018-07-25 RX ADMIN — Medication 1000 MILLIGRAM(S): at 14:29

## 2018-07-25 RX ADMIN — Medication 100 MILLIGRAM(S): at 14:29

## 2018-07-25 RX ADMIN — LATANOPROST 1 DROP(S): 0.05 SOLUTION/ DROPS OPHTHALMIC; TOPICAL at 23:09

## 2018-07-25 RX ADMIN — Medication 1000 MILLIGRAM(S): at 01:10

## 2018-07-25 RX ADMIN — Medication 1000 MILLIGRAM(S): at 14:30

## 2018-07-25 RX ADMIN — Medication 162 MILLIGRAM(S): at 10:21

## 2018-07-25 RX ADMIN — OXYCODONE HYDROCHLORIDE 5 MILLIGRAM(S): 5 TABLET ORAL at 14:29

## 2018-07-25 RX ADMIN — OXYCODONE HYDROCHLORIDE 5 MILLIGRAM(S): 5 TABLET ORAL at 06:20

## 2018-07-25 RX ADMIN — Medication 1000 MILLIGRAM(S): at 22:49

## 2018-07-25 RX ADMIN — Medication 162 MILLIGRAM(S): at 22:48

## 2018-07-25 RX ADMIN — CELECOXIB 200 MILLIGRAM(S): 200 CAPSULE ORAL at 18:58

## 2018-07-25 RX ADMIN — CELECOXIB 200 MILLIGRAM(S): 200 CAPSULE ORAL at 18:43

## 2018-07-25 RX ADMIN — Medication 100 MILLIGRAM(S): at 05:30

## 2018-07-25 RX ADMIN — PANTOPRAZOLE SODIUM 40 MILLIGRAM(S): 20 TABLET, DELAYED RELEASE ORAL at 05:31

## 2018-07-25 RX ADMIN — Medication 400 MILLIGRAM(S): at 06:44

## 2018-07-25 RX ADMIN — Medication 1000 MILLIGRAM(S): at 06:45

## 2018-07-25 RX ADMIN — CELECOXIB 200 MILLIGRAM(S): 200 CAPSULE ORAL at 05:29

## 2018-07-25 RX ADMIN — Medication 100 MILLIGRAM(S): at 03:11

## 2018-07-25 RX ADMIN — SODIUM CHLORIDE 100 MILLILITER(S): 9 INJECTION, SOLUTION INTRAVENOUS at 03:12

## 2018-07-25 RX ADMIN — CELECOXIB 200 MILLIGRAM(S): 200 CAPSULE ORAL at 05:32

## 2018-07-25 RX ADMIN — OXYCODONE HYDROCHLORIDE 5 MILLIGRAM(S): 5 TABLET ORAL at 15:28

## 2018-07-25 RX ADMIN — Medication 1000 MILLIGRAM(S): at 22:51

## 2018-07-25 RX ADMIN — OXYCODONE HYDROCHLORIDE 5 MILLIGRAM(S): 5 TABLET ORAL at 05:30

## 2018-07-25 NOTE — PROGRESS NOTE ADULT - ASSESSMENT
The patient is an 86 year old male with a history of HTN, OA who is s/p right TKR.    Plan:  - Likely there is mild sinus node and AV node dysfunction  - Continue to monitor on telemetry for bradyarrhythmias  - Avoid rate lowering agents  - No significant pauses noted on telemetry  - No indication for pacemaker  - Minimize narcotics  - Assess heart rates during physical therapy

## 2018-07-25 NOTE — DISCHARGE NOTE ADULT - HOSPITAL COURSE
This patient was admitted to [facility] with a history of severe degenerative joint disease of the right knee  Patient went to Pre-Surgical Testing at [facility] and was medically cleared to undergo elective procedure.  Patient had right total knee replacement by Dr. Cuellar 7/24/18. No operative or thomas-operative complications arose during patients hospital course.  Patient received antibiotic according to SCIP guidelines for infection prevention.  Aspirin was given for DVT prophylaxis.  Anesthesia, Medical Hospitalist, Physical Therapy and Occupational Therapy were consulted. Patient is stable for discharge with a good prognosis.  Appropriate discharge instructions and medications are provided in this document. This patient was admitted to [facility] with a history of severe degenerative joint disease of the right knee  Patient went to Pre-Surgical Testing at [facility] and was medically cleared to undergo elective procedure.  Patient had right total knee replacement by Dr. Cuellar 7/24/18. No operative or thomas-operative complications arose during patients hospital course.  Patient received antibiotic according to SCIP guidelines for infection prevention.  Aspirin was given for DVT prophylaxis.  Anesthesia, Medical Hospitalist, Physical Therapy and Occupational Therapy were consulted. Patient is stable for discharge with a good prognosis.  Appropriate discharge instructions and medications are provided in this document.      Medical Attending note   85 yo male presenting with right total knee replacement day, complicated with diarrhea/dehydration/ARF secondary to c.diff collitis    Pancolitis secondary to c.diff collitis,    continue on po vanco tolerating regular    diarrhea subsiding    S/P right Total knee replacement.  Continue with pain management, DVT proph, and wound care as per Ortho.    Continue with PT/OT.      HTN.  resume enalipril     Bradycardia with few pauses.  Resolved.   Cardiology follow up appreciated.   Hypotension with acute renal failure.   secondary to volume depletion from c.diff collitis/diarrhea  Anemia.  due to post-operative blood loss.  Asymptomatic.  Monitor H/H.  Iron supplement  Urinary retention , passed TOV      PE  nad  chest s1, s2  lungs decrease air entr audrey the bases  abd:BS+  ext: no pedal agueda or cyanosis

## 2018-07-25 NOTE — OCCUPATIONAL THERAPY INITIAL EVALUATION ADULT - ADDITIONAL COMMENTS
Pt lives alone in a house w/ 4 steps to enter with rail.  There are 2 steps inside with no rail, 13 steps to bedroom with rail. + SAC Pt lives alone in a house w/ 4 steps to enter with rail.  There are 2 steps inside with no rail, 13 steps to bedroom with rail. + stall shower, + SAC

## 2018-07-25 NOTE — CHART NOTE - NSCHARTNOTEFT_GEN_A_CORE
Again had multiple asymptomatic sinus pauses (<3 sec) with bradycardia overnight.  No urgent interventions were needed.

## 2018-07-25 NOTE — DISCHARGE NOTE ADULT - NS AS ACTIVITY OBS
No Heavy lifting/straining/Showering allowed after post-op day 6 if no drainage/Do not make important decisions/Do not drive or operate machinery/Showering allowed

## 2018-07-25 NOTE — OCCUPATIONAL THERAPY INITIAL EVALUATION ADULT - TRANSFER TRAINING, PT EVAL
Patient will transfer to toilet with DME as needed with minimal assistance within 3-5 sessions Patient will transfer to toilet with DME as needed with CS within 3-5 sessions

## 2018-07-25 NOTE — DISCHARGE NOTE ADULT - CARE PROVIDER_API CALL
Bry Cuellar), Orthopaedic Surgery  [address] [place] [address]  Phone: [phone]  Fax: [phone]

## 2018-07-25 NOTE — PROGRESS NOTE ADULT - SUBJECTIVE AND OBJECTIVE BOX
ORTHOPEDIC ATTENDING PROGRESS NOTE  KAUSHIK SEARS      86y Male                                                                                                                               POD #   1     STATUS POST:               Pre-Op Dx: DJD (degenerative joint disease) of knee: Right    Post-Op Dx:  DJD (degenerative joint disease) of knee: Right    Procedure: Knee replacement: Right                                                Pain (0-10):  Pt reports  Current Pain Management:  [ ] PCA   [x ] Po Analgesics [ ] IM /IV Anagesics     T(F): 97.5  HR: 45  BP: 106/57  RR: 18  SpO2: 98%                         12.4   13.37 )-----------( 154      ( 25 Jul 2018 07:22 )             35.1         07-25    137  |  103  |  20  ----------------------------<  181<H>  4.2   |  26  |  1.15    Ca    9.1      25 Jul 2018 07:24      Physical Exam :    -   Dressing changed sterile.   -   Wound C/D/I.   -   Distal Neurvascular status intact grossly.   -   Warm well perfused; capillary refill <3 seconds   -   (+)EHL/FHL   -   (+) Sensation to light touch  -   (-) Calf tenderness Bilaterally    A/P: 86y Male s/p Knee replacement: Right     -   Ortho Stable  -   Pain control   -   Medicine to follow  -   DVT ppx:     [ ]SCDs     [x ] ASA     [ ] Eliquis     [ ] Lovenox  -   Weight bearing status:  WBAT [x ]        PWB    [ ]     TTWB  [ ]      NWB  [ ]   -  Dispo:     Home [x ]     Acute Rehab [ ]     GLORY [ ]     TBD [ ]

## 2018-07-25 NOTE — PROGRESS NOTE ADULT - SUBJECTIVE AND OBJECTIVE BOX
CC.  S/P Right Total knee replacement   HPI.  Patient reports right knee pain is controlled.  Offers no other complaints  Cardiac pauses noticed, discussed with cardiology.  Asymptomatic              Constitutional: No fever, fatigue or weight loss.  Skin: No rash.  Eyes: No recent vision problems or eye pain.  ENT: No congestion, ear pain, or sore throat.  Endocrine: No thyroid problems.  Cardiovascular: No chest pain or palpation.  Respiratory: No cough, shortness of breath, congestion, or wheezing.  Gastrointestinal: No abdominal pain, nausea, vomiting, or diarrhea.  Genitourinary: No dysuria.  Musculoskeletal: No joint swelling.  Neurologic: No headache.      Vital Signs Last 24 Hrs  T(C): 36.4 (07-25-18 @ 07:49), Max: 36.7 (07-25-18 @ 03:17)  T(F): 97.5 (07-25-18 @ 07:49), Max: 98 (07-25-18 @ 03:17)  HR: 45 (07-25-18 @ 07:49) (34 - 52)  BP: 106/57 (07-25-18 @ 07:49) (97/51 - 138/70)  BP(mean): --  RR: 18 (07-25-18 @ 07:49) (9 - 20)  SpO2: 98% (07-25-18 @ 07:49) (98% - 100%)        PHYSICAL EXAM-  GENERAL: NAD, well-groomed, well-developed  HEAD:  Atraumatic, Normocephalic  EYES: EOMI, PERRLA, conjunctiva and sclera clear  NECK: Supple, No JVD, Normal thyroid  NERVOUS SYSTEM:  Alert & Oriented X3, Motor Strength 5/5 B/L upper and lower extremities; DTRs 2+ intact and symmetric  CHEST/LUNG: Clear to percussion bilaterally; No rales, rhonchi, wheezing, or rubs  HEART: Regular rate and rhythm; No murmurs, rubs, or gallops  ABDOMEN: Soft, Nontender, Nondistended; Bowel sounds present  EXTREMITIES:  2+ Peripheral Pulses, No clubbing, cyanosis, or edema  SKIN: No rashes or lesions                                  12.4   13.37 )-----------( 154      ( 25 Jul 2018 07:22 )             35.1     07-25    137  |  103  |  20  ----------------------------<  181<H>  4.2   |  26  |  1.15    Ca    9.1      25 Jul 2018 07:24                      MEDICATIONS  (STANDING):  acetaminophen   Tablet. 1000 milliGRAM(s) Oral every 8 hours  aspirin enteric coated 162 milliGRAM(s) Oral every 12 hours  celecoxib 200 milliGRAM(s) Oral two times a day  docusate sodium 100 milliGRAM(s) Oral three times a day  lactated ringers. 1000 milliLiter(s) (100 mL/Hr) IV Continuous <Continuous>  pantoprazole    Tablet 40 milliGRAM(s) Oral before breakfast  senna 2 Tablet(s) Oral at bedtime    MEDICATIONS  (PRN):  aluminum hydroxide/magnesium hydroxide/simethicone Suspension 30 milliLiter(s) Oral four times a day PRN Indigestion  HYDROmorphone  Injectable 0.5 milliGRAM(s) IV Push every 3 hours PRN Severe Pain (7 - 10)  magnesium hydroxide Suspension 30 milliLiter(s) Oral daily PRN Constipation  ondansetron Injectable 4 milliGRAM(s) IV Push every 6 hours PRN Nausea and/or Vomiting  oxyCODONE    IR 5 milliGRAM(s) Oral every 3 hours PRN Mild Pain (1 - 3)  oxyCODONE    IR 10 milliGRAM(s) Oral every 3 hours PRN Moderate Pain (4 - 6)  polyethylene glycol 3350 17 Gram(s) Oral daily PRN Constipation          RADIOLOGY RESULTS:

## 2018-07-25 NOTE — DISCHARGE NOTE ADULT - PLAN OF CARE
Improvement of Activities of Daily Living Physical Therapy/Occupational Therapy for ambulation, transfers, stairs, ADL's, Range of Motion Exercises, Isometrics.  Full weight bearing as tolerated with rolling walker  Range of Motion Goals: Flexion 120 degrees; Extension 0 degrees  Keep incision clean and dry.  Suture/prineo dressing removal 14 days after surgery at rehab facility or Surgeon's office  May shower post-op day #6 if no drainage from incision secondary to c.diff colitis resolving, tolerating deit secondary to recent abx use prior to admission complete po vanco on linsinpril resolving, tolerating diet complete po vanco for full 10 day course as per ID  follow up with cardiologist as per medicine after discharge from rehab

## 2018-07-25 NOTE — DISCHARGE NOTE ADULT - INSTRUCTIONS
For Constipation :   • Increase your water intake. Drink at least 8 glasses of water daily.  • Try adding fiber to your diet by eating fruits, vegetables and foods that are rich in grains.  • If you do experience constipation, you may take an over-the-counter stool softener/laxative such as Fani Colace, Senekot or  Milk of Magnesia.

## 2018-07-25 NOTE — DISCHARGE NOTE ADULT - PATIENT PORTAL LINK FT
You can access the [facility] Portal, offered by [facility], by registering with the following website: [email or web address]

## 2018-07-25 NOTE — PROGRESS NOTE ADULT - SUBJECTIVE AND OBJECTIVE BOX
Chief Complaint: TKR    Interval Events: Episodes of bradycardia and short pauses while sleeping    Review of Systems:  General: No fevers, chills, weight loss or gain  Skin: No rashes, color changes  Cardiovascular: No chest pain, orthopnea  Respiratory: No shortness of breath, cough  Gastrointestinal: No nausea, abdominal pain  Genitourinary: No incontinence, pain with urination  Musculoskeletal: No pain, swelling, decreased range of motion  Neurological: No headache, weakness  Psychiatric: No depression, anxiety  Endocrine: No weight loss or gain, increased thirst  All other systems are comprehensively negative.    Physical Exam:  Vitals:        Vital Signs Last 24 Hrs  T(C): 36.4 (25 Jul 2018 07:49), Max: 36.7 (25 Jul 2018 03:17)  T(F): 97.5 (25 Jul 2018 07:49), Max: 98 (25 Jul 2018 03:17)  HR: 45 (25 Jul 2018 07:49) (34 - 52)  BP: 106/57 (25 Jul 2018 07:49) (97/51 - 138/70)  BP(mean): --  RR: 18 (25 Jul 2018 07:49) (9 - 20)  SpO2: 98% (25 Jul 2018 07:49) (98% - 100%)  General: NAD  HEENT: MMM  Neck: No JVD, no carotid bruit  Lungs: CTAB  CV: RRR, nl S1/S2, no M/R/G  Abdomen: S/NT/ND, +BS  Extremities: No LE edema, no cyanosis  Neuro: AAOx3, non-focal  Skin: No rash    Labs:                        12.4   13.37 )-----------( 154      ( 25 Jul 2018 07:22 )             35.1     07-25    137  |  103  |  20  ----------------------------<  181<H>  4.2   |  26  |  1.15    Ca    9.1      25 Jul 2018 07:24              Telemetry: Sinus bradycardia, 1st deg AVB, Mobitz 1 at times, blocked PACs

## 2018-07-25 NOTE — DISCHARGE NOTE ADULT - CARE PLAN
Principal Discharge DX:	History of knee replacement, total, left  Goal:	Improvement of Activities of Daily Living  Assessment and plan of treatment:	Physical Therapy/Occupational Therapy for ambulation, transfers, stairs, ADL's, Range of Motion Exercises, Isometrics.  Full weight bearing as tolerated with rolling walker  Range of Motion Goals: Flexion 120 degrees; Extension 0 degrees  Keep incision clean and dry.  Suture/prineo dressing removal 14 days after surgery at rehab facility or Surgeon's office  May shower post-op day #6 if no drainage from incision Principal Discharge DX:	History of knee replacement, total, left  Goal:	Improvement of Activities of Daily Living  Assessment and plan of treatment:	Physical Therapy/Occupational Therapy for ambulation, transfers, stairs, ADL's, Range of Motion Exercises, Isometrics.  Full weight bearing as tolerated with rolling walker  Range of Motion Goals: Flexion 120 degrees; Extension 0 degrees  Keep incision clean and dry.  Suture/prineo dressing removal 14 days after surgery at rehab facility or Surgeon's office  May shower post-op day #6 if no drainage from incision  Secondary Diagnosis:	Abdominal pain  Goal:	secondary to c.diff colitis  Assessment and plan of treatment:	resolving, tolerating deit  Secondary Diagnosis:	Clostridium difficile colitis  Goal:	secondary to recent abx use prior to admission  Assessment and plan of treatment:	complete po vanco  Secondary Diagnosis:	Hypertension  Goal:	on linsinpril Principal Discharge DX:	History of knee replacement, total, left  Goal:	Improvement of Activities of Daily Living  Assessment and plan of treatment:	Physical Therapy/Occupational Therapy for ambulation, transfers, stairs, ADL's, Range of Motion Exercises, Isometrics.  Full weight bearing as tolerated with rolling walker  Range of Motion Goals: Flexion 120 degrees; Extension 0 degrees  Keep incision clean and dry.  Suture/prineo dressing removal 14 days after surgery at rehab facility or Surgeon's office  May shower post-op day #6 if no drainage from incision  Secondary Diagnosis:	Abdominal pain  Goal:	secondary to c.diff colitis  Assessment and plan of treatment:	resolving, tolerating diet  Secondary Diagnosis:	Clostridium difficile colitis  Goal:	secondary to recent abx use prior to admission  Assessment and plan of treatment:	complete po vanco for full 10 day course as per ID  follow up with cardiologist as per medicine after discharge from rehab  Secondary Diagnosis:	Hypertension  Goal:	on linsinpril

## 2018-07-25 NOTE — DISCHARGE NOTE ADULT - ADDITIONAL INSTRUCTIONS
Follow up with Dr. Cuellar 10-14 days after surgery Follow up with Dr. Cuellar 10-14 days after surgery.   patient needs repeat blood test CBC , BMP next week in Rehab.   Follow up with cardiology as an outpatient.

## 2018-07-25 NOTE — OCCUPATIONAL THERAPY INITIAL EVALUATION ADULT - RANGE OF MOTION EXAMINATION, UPPER EXTREMITY
bilateral UE Active ROM was WFL  (within functional limits) bilateral UE Active Assistive ROM was WNL (within normal limits)

## 2018-07-25 NOTE — OCCUPATIONAL THERAPY INITIAL EVALUATION ADULT - ORIENTATION, REHAB EVAL
oriented to person, place, time and situation/Patient educated verbally regarding role of OT, d/c plan, DME needs, LE weight bearing status & pt. provided with education folder including functional exercises, TKR education & caregiver guide pamphlet.

## 2018-07-25 NOTE — PROGRESS NOTE ADULT - ASSESSMENT
Patient is 85 yo male presenting with     1. S/P right Total knee replacement.  Continue with pain management, DVT proph, and wound care as per Ortho.  PT/OT  2. HTN.  Continue with home medications, and Monitor BP  3.  Bradycardia with few pauses.  Asymptomatic.  HR increases to 68 with activity.  pre-op ekg shows Bradycardia.  TSH level pending, Continue with telemonitored and cardiology to follow up     Plan of care was discussed with patient in great details, All questions were answered to their satisfaction  Seems to understand, and in agreement

## 2018-07-25 NOTE — DISCHARGE NOTE ADULT - MEDICATION SUMMARY - MEDICATIONS TO TAKE
I will START or STAY ON the medications listed below when I get home from the hospital:    aspirin 81 mg oral delayed release tablet  -- 2 tab(s) by mouth every 12 hours  -- Indication: For DVT    enalapril 20 mg oral tablet  -- 1 tab(s) by mouth once a day  -- Indication: For Htn    aluminum hydroxide-magnesium hydroxide 200 mg-200 mg/5 mL oral suspension  -- 30 milliliter(s) by mouth 4 times a day, As needed, Indigestion  -- Indication: For Constipation    tamsulosin 0.4 mg oral capsule  -- 1 cap(s) by mouth once a day (at bedtime)  -- Indication: For bph    dicyclomine 10 mg oral capsule  -- 1 cap(s) by mouth 3 times a day (before meals)  -- Indication: For Collitis    vancomycin 125 mg oral capsule  -- 1 cap(s) by mouth every 6 hours   -- Finish all this medication unless otherwise directed by prescriber.    -- Indication: For Collitis    famotidine 20 mg oral tablet  -- Indication: For gerd    simethicone 80 mg oral tablet, chewable  -- 1 tab(s) by mouth every 6 hours, As needed, Gas  -- Indication: For Collitis    latanoprost 0.005% ophthalmic solution  -- 1 drop(s) to each affected eye once a day (at bedtime)  -- Indication: For eye drop    lactobacillus acidophilus oral capsule  -- 1 tab(s) by mouth 3 times a day  -- Indication: For Probiotic

## 2018-07-25 NOTE — OCCUPATIONAL THERAPY INITIAL EVALUATION ADULT - ASSISTIVE DEVICE FOR TRANSFER: SIT/STAND, REHAB EVAL
Pt performed functional mobility with RW  in hospital room & hallway, assist x 1 plus 1 inc time/rolling walker

## 2018-07-26 DIAGNOSIS — N17.9 ACUTE KIDNEY FAILURE, UNSPECIFIED: ICD-10-CM

## 2018-07-26 DIAGNOSIS — M17.11 UNILATERAL PRIMARY OSTEOARTHRITIS, RIGHT KNEE: ICD-10-CM

## 2018-07-26 DIAGNOSIS — J98.11 ATELECTASIS: ICD-10-CM

## 2018-07-26 DIAGNOSIS — I95.9 HYPOTENSION, UNSPECIFIED: ICD-10-CM

## 2018-07-26 DIAGNOSIS — D64.9 ANEMIA, UNSPECIFIED: ICD-10-CM

## 2018-07-26 LAB
ALBUMIN SERPL ELPH-MCNC: 2.3 G/DL — LOW (ref 3.3–5)
ALP SERPL-CCNC: 61 U/L — SIGNIFICANT CHANGE UP (ref 30–120)
ALT FLD-CCNC: 26 U/L DA — SIGNIFICANT CHANGE UP (ref 10–60)
ANION GAP SERPL CALC-SCNC: 11 MMOL/L — SIGNIFICANT CHANGE UP (ref 5–17)
ANION GAP SERPL CALC-SCNC: 6 MMOL/L — SIGNIFICANT CHANGE UP (ref 5–17)
ANION GAP SERPL CALC-SCNC: 7 MMOL/L — SIGNIFICANT CHANGE UP (ref 5–17)
ANION GAP SERPL CALC-SCNC: 9 MMOL/L — SIGNIFICANT CHANGE UP (ref 5–17)
APPEARANCE UR: CLEAR — SIGNIFICANT CHANGE UP
AST SERPL-CCNC: 31 U/L — SIGNIFICANT CHANGE UP (ref 10–40)
BASE EXCESS BLDV CALC-SCNC: -8.2 MMOL/L — LOW (ref -2–2)
BILIRUB SERPL-MCNC: 1.3 MG/DL — HIGH (ref 0.2–1.2)
BILIRUB UR-MCNC: NEGATIVE — SIGNIFICANT CHANGE UP
BUN SERPL-MCNC: 26 MG/DL — HIGH (ref 7–23)
BUN SERPL-MCNC: 29 MG/DL — HIGH (ref 7–23)
CALCIUM SERPL-MCNC: 7.7 MG/DL — LOW (ref 8.4–10.5)
CALCIUM SERPL-MCNC: 7.7 MG/DL — LOW (ref 8.4–10.5)
CALCIUM SERPL-MCNC: 7.9 MG/DL — LOW (ref 8.4–10.5)
CALCIUM SERPL-MCNC: 8.6 MG/DL — SIGNIFICANT CHANGE UP (ref 8.4–10.5)
CHLORIDE SERPL-SCNC: 106 MMOL/L — SIGNIFICANT CHANGE UP (ref 96–108)
CHLORIDE SERPL-SCNC: 107 MMOL/L — SIGNIFICANT CHANGE UP (ref 96–108)
CHLORIDE SERPL-SCNC: 108 MMOL/L — SIGNIFICANT CHANGE UP (ref 96–108)
CHLORIDE SERPL-SCNC: 109 MMOL/L — HIGH (ref 96–108)
CO2 SERPL-SCNC: 19 MMOL/L — LOW (ref 22–31)
CO2 SERPL-SCNC: 21 MMOL/L — LOW (ref 22–31)
CO2 SERPL-SCNC: 24 MMOL/L — SIGNIFICANT CHANGE UP (ref 22–31)
CO2 SERPL-SCNC: 24 MMOL/L — SIGNIFICANT CHANGE UP (ref 22–31)
COLOR SPEC: YELLOW — SIGNIFICANT CHANGE UP
CREAT SERPL-MCNC: 1.52 MG/DL — HIGH (ref 0.5–1.3)
CREAT SERPL-MCNC: 1.54 MG/DL — HIGH (ref 0.5–1.3)
CREAT SERPL-MCNC: 1.56 MG/DL — HIGH (ref 0.5–1.3)
CREAT SERPL-MCNC: 1.58 MG/DL — HIGH (ref 0.5–1.3)
DIFF PNL FLD: NEGATIVE — SIGNIFICANT CHANGE UP
GAS PNL BLDV: SIGNIFICANT CHANGE UP
GLUCOSE BLDC GLUCOMTR-MCNC: 103 MG/DL — HIGH (ref 70–99)
GLUCOSE SERPL-MCNC: 100 MG/DL — HIGH (ref 70–99)
GLUCOSE SERPL-MCNC: 101 MG/DL — HIGH (ref 70–99)
GLUCOSE SERPL-MCNC: 101 MG/DL — HIGH (ref 70–99)
GLUCOSE SERPL-MCNC: 98 MG/DL — SIGNIFICANT CHANGE UP (ref 70–99)
GLUCOSE UR QL: NEGATIVE MG/DL — SIGNIFICANT CHANGE UP
HCO3 BLDV-SCNC: 17 MMOL/L — LOW (ref 21–29)
HCT VFR BLD CALC: 27.7 % — LOW (ref 39–50)
HCT VFR BLD CALC: 28.1 % — LOW (ref 39–50)
HCT VFR BLD CALC: 28.7 % — LOW (ref 39–50)
HCT VFR BLD CALC: 31.8 % — LOW (ref 39–50)
HGB BLD-MCNC: 10.8 G/DL — LOW (ref 13–17)
HGB BLD-MCNC: 9.5 G/DL — LOW (ref 13–17)
HGB BLD-MCNC: 9.6 G/DL — LOW (ref 13–17)
HGB BLD-MCNC: 9.7 G/DL — LOW (ref 13–17)
HOROWITZ INDEX BLDV+IHG-RTO: 21 — SIGNIFICANT CHANGE UP
KETONES UR-MCNC: NEGATIVE — SIGNIFICANT CHANGE UP
LACTATE SERPL-SCNC: 2.4 MMOL/L — HIGH (ref 0.7–2)
LACTATE SERPL-SCNC: 3 MMOL/L — HIGH (ref 0.7–2)
LACTATE SERPL-SCNC: 3 MMOL/L — HIGH (ref 0.7–2)
LEUKOCYTE ESTERASE UR-ACNC: ABNORMAL
MAGNESIUM SERPL-MCNC: 1.2 MG/DL — LOW (ref 1.6–2.6)
MCHC RBC-ENTMCNC: 31.6 PG — SIGNIFICANT CHANGE UP (ref 27–34)
MCHC RBC-ENTMCNC: 31.8 PG — SIGNIFICANT CHANGE UP (ref 27–34)
MCHC RBC-ENTMCNC: 31.9 PG — SIGNIFICANT CHANGE UP (ref 27–34)
MCHC RBC-ENTMCNC: 31.9 PG — SIGNIFICANT CHANGE UP (ref 27–34)
MCHC RBC-ENTMCNC: 33.8 GM/DL — SIGNIFICANT CHANGE UP (ref 32–36)
MCHC RBC-ENTMCNC: 34 GM/DL — SIGNIFICANT CHANGE UP (ref 32–36)
MCHC RBC-ENTMCNC: 34.2 GM/DL — SIGNIFICANT CHANGE UP (ref 32–36)
MCHC RBC-ENTMCNC: 34.3 GM/DL — SIGNIFICANT CHANGE UP (ref 32–36)
MCV RBC AUTO: 92.6 FL — SIGNIFICANT CHANGE UP (ref 80–100)
MCV RBC AUTO: 93.4 FL — SIGNIFICANT CHANGE UP (ref 80–100)
MCV RBC AUTO: 93.5 FL — SIGNIFICANT CHANGE UP (ref 80–100)
MCV RBC AUTO: 93.8 FL — SIGNIFICANT CHANGE UP (ref 80–100)
NITRITE UR-MCNC: NEGATIVE — SIGNIFICANT CHANGE UP
NRBC # BLD: 0 /100 WBCS — SIGNIFICANT CHANGE UP (ref 0–0)
PCO2 BLDV: 42 MMHG — SIGNIFICANT CHANGE UP (ref 35–50)
PH BLDV: 7.25 — LOW (ref 7.35–7.45)
PH UR: 5 — SIGNIFICANT CHANGE UP (ref 5–8)
PHOSPHATE SERPL-MCNC: 3.4 MG/DL — SIGNIFICANT CHANGE UP (ref 2.5–4.5)
PLATELET # BLD AUTO: 115 K/UL — LOW (ref 150–400)
PLATELET # BLD AUTO: 92 K/UL — LOW (ref 150–400)
PLATELET # BLD AUTO: 94 K/UL — LOW (ref 150–400)
PLATELET # BLD AUTO: 96 K/UL — LOW (ref 150–400)
PO2 BLDV: 42 MMHG — SIGNIFICANT CHANGE UP (ref 25–45)
POTASSIUM SERPL-MCNC: 3.8 MMOL/L — SIGNIFICANT CHANGE UP (ref 3.5–5.3)
POTASSIUM SERPL-MCNC: 3.9 MMOL/L — SIGNIFICANT CHANGE UP (ref 3.5–5.3)
POTASSIUM SERPL-MCNC: 4 MMOL/L — SIGNIFICANT CHANGE UP (ref 3.5–5.3)
POTASSIUM SERPL-MCNC: 4.4 MMOL/L — SIGNIFICANT CHANGE UP (ref 3.5–5.3)
POTASSIUM SERPL-SCNC: 3.8 MMOL/L — SIGNIFICANT CHANGE UP (ref 3.5–5.3)
POTASSIUM SERPL-SCNC: 3.9 MMOL/L — SIGNIFICANT CHANGE UP (ref 3.5–5.3)
POTASSIUM SERPL-SCNC: 4 MMOL/L — SIGNIFICANT CHANGE UP (ref 3.5–5.3)
POTASSIUM SERPL-SCNC: 4.4 MMOL/L — SIGNIFICANT CHANGE UP (ref 3.5–5.3)
PROT SERPL-MCNC: 5.1 G/DL — LOW (ref 6–8.3)
PROT UR-MCNC: 30 MG/DL
RBC # BLD: 2.99 M/UL — LOW (ref 4.2–5.8)
RBC # BLD: 3.01 M/UL — LOW (ref 4.2–5.8)
RBC # BLD: 3.07 M/UL — LOW (ref 4.2–5.8)
RBC # BLD: 3.39 M/UL — LOW (ref 4.2–5.8)
RBC # FLD: 12.4 % — SIGNIFICANT CHANGE UP (ref 10.3–14.5)
RBC # FLD: 12.5 % — SIGNIFICANT CHANGE UP (ref 10.3–14.5)
RBC # FLD: 12.7 % — SIGNIFICANT CHANGE UP (ref 10.3–14.5)
RBC # FLD: 12.7 % — SIGNIFICANT CHANGE UP (ref 10.3–14.5)
SAO2 % BLDV: 66 % — LOW (ref 67–88)
SODIUM SERPL-SCNC: 137 MMOL/L — SIGNIFICANT CHANGE UP (ref 135–145)
SODIUM SERPL-SCNC: 137 MMOL/L — SIGNIFICANT CHANGE UP (ref 135–145)
SODIUM SERPL-SCNC: 138 MMOL/L — SIGNIFICANT CHANGE UP (ref 135–145)
SODIUM SERPL-SCNC: 139 MMOL/L — SIGNIFICANT CHANGE UP (ref 135–145)
SP GR SPEC: 1.02 — SIGNIFICANT CHANGE UP (ref 1.01–1.02)
TROPONIN I SERPL-MCNC: 0.02 NG/ML — SIGNIFICANT CHANGE UP (ref 0.02–0.06)
TROPONIN I SERPL-MCNC: 0.06 NG/ML — HIGH (ref 0.02–0.06)
TSH SERPL-MCNC: 0.45 UIU/ML — SIGNIFICANT CHANGE UP (ref 0.27–4.2)
UROBILINOGEN FLD QL: 1 MG/DL
WBC # BLD: 14.22 K/UL — HIGH (ref 3.8–10.5)
WBC # BLD: 15.64 K/UL — HIGH (ref 3.8–10.5)
WBC # BLD: 16.21 K/UL — HIGH (ref 3.8–10.5)
WBC # BLD: 18.69 K/UL — HIGH (ref 3.8–10.5)
WBC # FLD AUTO: 14.22 K/UL — HIGH (ref 3.8–10.5)
WBC # FLD AUTO: 15.64 K/UL — HIGH (ref 3.8–10.5)
WBC # FLD AUTO: 16.21 K/UL — HIGH (ref 3.8–10.5)
WBC # FLD AUTO: 18.69 K/UL — HIGH (ref 3.8–10.5)

## 2018-07-26 PROCEDURE — 71045 X-RAY EXAM CHEST 1 VIEW: CPT | Mod: 26,59

## 2018-07-26 PROCEDURE — 99233 SBSQ HOSP IP/OBS HIGH 50: CPT

## 2018-07-26 PROCEDURE — 76770 US EXAM ABDO BACK WALL COMP: CPT | Mod: 26

## 2018-07-26 PROCEDURE — 93306 TTE W/DOPPLER COMPLETE: CPT | Mod: 26

## 2018-07-26 PROCEDURE — 12345: CPT

## 2018-07-26 PROCEDURE — 74022 RADEX COMPL AQT ABD SERIES: CPT | Mod: 26

## 2018-07-26 RX ORDER — SODIUM CHLORIDE 9 MG/ML
1000 INJECTION INTRAMUSCULAR; INTRAVENOUS; SUBCUTANEOUS ONCE
Qty: 0 | Refills: 0 | Status: COMPLETED | OUTPATIENT
Start: 2018-07-26 | End: 2018-07-26

## 2018-07-26 RX ORDER — TRAMADOL HYDROCHLORIDE 50 MG/1
50 TABLET ORAL EVERY 4 HOURS
Qty: 0 | Refills: 0 | Status: DISCONTINUED | OUTPATIENT
Start: 2018-07-26 | End: 2018-08-02

## 2018-07-26 RX ORDER — SODIUM CHLORIDE 9 MG/ML
2000 INJECTION INTRAMUSCULAR; INTRAVENOUS; SUBCUTANEOUS ONCE
Qty: 0 | Refills: 0 | Status: COMPLETED | OUTPATIENT
Start: 2018-07-26 | End: 2018-07-26

## 2018-07-26 RX ORDER — SODIUM CHLORIDE 9 MG/ML
500 INJECTION INTRAMUSCULAR; INTRAVENOUS; SUBCUTANEOUS ONCE
Qty: 0 | Refills: 0 | Status: COMPLETED | OUTPATIENT
Start: 2018-07-26 | End: 2018-07-26

## 2018-07-26 RX ORDER — FERROUS SULFATE 325(65) MG
325 TABLET ORAL DAILY
Qty: 0 | Refills: 0 | Status: DISCONTINUED | OUTPATIENT
Start: 2018-07-26 | End: 2018-08-04

## 2018-07-26 RX ORDER — MIDODRINE HYDROCHLORIDE 2.5 MG/1
5 TABLET ORAL THREE TIMES A DAY
Qty: 0 | Refills: 0 | Status: DISCONTINUED | OUTPATIENT
Start: 2018-07-26 | End: 2018-07-26

## 2018-07-26 RX ORDER — SODIUM CHLORIDE 9 MG/ML
1000 INJECTION INTRAMUSCULAR; INTRAVENOUS; SUBCUTANEOUS
Qty: 0 | Refills: 0 | Status: DISCONTINUED | OUTPATIENT
Start: 2018-07-26 | End: 2018-07-26

## 2018-07-26 RX ORDER — TRAMADOL HYDROCHLORIDE 50 MG/1
25 TABLET ORAL EVERY 4 HOURS
Qty: 0 | Refills: 0 | Status: DISCONTINUED | OUTPATIENT
Start: 2018-07-26 | End: 2018-08-02

## 2018-07-26 RX ORDER — MAGNESIUM SULFATE 500 MG/ML
2 VIAL (ML) INJECTION ONCE
Qty: 0 | Refills: 0 | Status: COMPLETED | OUTPATIENT
Start: 2018-07-26 | End: 2018-07-26

## 2018-07-26 RX ADMIN — SODIUM CHLORIDE 1000 MILLILITER(S): 9 INJECTION INTRAMUSCULAR; INTRAVENOUS; SUBCUTANEOUS at 10:31

## 2018-07-26 RX ADMIN — SODIUM CHLORIDE 100 MILLILITER(S): 9 INJECTION INTRAMUSCULAR; INTRAVENOUS; SUBCUTANEOUS at 17:00

## 2018-07-26 RX ADMIN — Medication 50 GRAM(S): at 22:17

## 2018-07-26 RX ADMIN — PANTOPRAZOLE SODIUM 40 MILLIGRAM(S): 20 TABLET, DELAYED RELEASE ORAL at 05:21

## 2018-07-26 RX ADMIN — LATANOPROST 1 DROP(S): 0.05 SOLUTION/ DROPS OPHTHALMIC; TOPICAL at 22:17

## 2018-07-26 RX ADMIN — SODIUM CHLORIDE 500 MILLILITER(S): 9 INJECTION INTRAMUSCULAR; INTRAVENOUS; SUBCUTANEOUS at 17:00

## 2018-07-26 RX ADMIN — Medication 1000 MILLIGRAM(S): at 05:26

## 2018-07-26 RX ADMIN — Medication 100 MILLIGRAM(S): at 22:18

## 2018-07-26 RX ADMIN — CELECOXIB 200 MILLIGRAM(S): 200 CAPSULE ORAL at 05:26

## 2018-07-26 RX ADMIN — SODIUM CHLORIDE 2000 MILLILITER(S): 9 INJECTION INTRAMUSCULAR; INTRAVENOUS; SUBCUTANEOUS at 08:36

## 2018-07-26 RX ADMIN — Medication 30 MILLILITER(S): at 12:19

## 2018-07-26 RX ADMIN — CELECOXIB 200 MILLIGRAM(S): 200 CAPSULE ORAL at 05:21

## 2018-07-26 RX ADMIN — SODIUM CHLORIDE 1000 MILLILITER(S): 9 INJECTION INTRAMUSCULAR; INTRAVENOUS; SUBCUTANEOUS at 03:01

## 2018-07-26 RX ADMIN — Medication 1000 MILLIGRAM(S): at 21:04

## 2018-07-26 RX ADMIN — Medication 162 MILLIGRAM(S): at 21:04

## 2018-07-26 RX ADMIN — Medication 100 MILLIGRAM(S): at 05:23

## 2018-07-26 RX ADMIN — Medication 1000 MILLIGRAM(S): at 22:00

## 2018-07-26 RX ADMIN — SODIUM CHLORIDE 100 MILLILITER(S): 9 INJECTION INTRAMUSCULAR; INTRAVENOUS; SUBCUTANEOUS at 10:32

## 2018-07-26 RX ADMIN — Medication 1000 MILLIGRAM(S): at 05:21

## 2018-07-26 RX ADMIN — Medication 162 MILLIGRAM(S): at 09:22

## 2018-07-26 RX ADMIN — MIDODRINE HYDROCHLORIDE 5 MILLIGRAM(S): 2.5 TABLET ORAL at 13:10

## 2018-07-26 RX ADMIN — Medication 1000 MILLIGRAM(S): at 13:12

## 2018-07-26 RX ADMIN — Medication 325 MILLIGRAM(S): at 17:14

## 2018-07-26 RX ADMIN — SENNA PLUS 2 TABLET(S): 8.6 TABLET ORAL at 22:18

## 2018-07-26 RX ADMIN — SODIUM CHLORIDE 500 MILLILITER(S): 9 INJECTION INTRAMUSCULAR; INTRAVENOUS; SUBCUTANEOUS at 10:45

## 2018-07-26 RX ADMIN — SODIUM CHLORIDE 1000 MILLILITER(S): 9 INJECTION INTRAMUSCULAR; INTRAVENOUS; SUBCUTANEOUS at 00:00

## 2018-07-26 RX ADMIN — Medication 1000 MILLIGRAM(S): at 13:10

## 2018-07-26 NOTE — CHART NOTE - NSCHARTNOTEFT_GEN_A_CORE
Patient is complaining of abdominal discomfort now, reports a lot of gas.  Had similar episode to this after the [place] the [place], which resolved on it's own.  Patient reports poor appetide.  Denies any N/V/D, fever, chills, cough, chest pain, SOB, numbness, weakness, headache, blurry vision, dizziness, or blood per stool    BP improved with Multiple NS bolus currently BP is 128/71 with HR of 76  + multiple BS.  + Passing gas    Constitutional: No fever, fatigue or weight loss.  Skin: No rash.  Eyes: No recent vision problems or eye pain.  ENT: No congestion, ear pain, or sore throat.  Endocrine: No thyroid problems.  Cardiovascular: No chest pain or palpation.  Respiratory: No cough, shortness of breath, congestion, or wheezing.  Gastrointestinal: No nausea, vomiting, or diarrhea.  Genitourinary: No dysuria.  Musculoskeletal: No joint swelling.  Neurologic: No headache.      Vital Signs Last 24 Hrs  T(C): 36.4 (2018 16:10), Max: 37.2 (2018 19:00)  T(F): 97.6 (2018 16:10), Max: 98.9 (2018 19:00)  HR: 81 (2018 18:00) (55 - 81)  BP: 118/98 (2018 18:00) (76/52 - 118/98)  BP(mean): 105 (2018 18:00) (57 - 105)  RR: 28 (2018 18:00) (13 - 29)  SpO2: 99% (2018 18:00) (93% - 100%)        PHYSICAL EXAM-  GENERAL: NAD  HEAD:  Atraumatic, Normocephalic  EYES: EOMI, PERRLA, conjunctiva and sclera clear  NECK: Supple, No JVD, Normal thyroid  NERVOUS SYSTEM:  Alert & Oriented X3, Motor Strength 5/5 B/L upper and lower extremities; DTRs 2+ intact and symmetric  CHEST/LUNG: Clear to percussion bilaterally; No rales, rhonchi, wheezing, or rubs  HEART: Regular rate and rhythm; No murmurs, rubs, or gallops  ABDOMEN: Hyperactive BS.  Mild diffuse tenderness.  No rebounds or guarding.    EXTREMITIES:  2+ Peripheral Pulses, No clubbing, cyanosis, or edema  SKIN: No rashes or lesions                              9.6    15.64 )-----------( 92       ( 2018 16:55 )             28.1         138  |  108  |  29<H>  ----------------------------<  100<H>  3.8   |  19<L>  |  1.58<H>    Ca    7.7<L>      2018 16:55      CARDIAC MARKERS ( 2018 16:55 )  .059 ng/mL / x     / x     / x     / x      CARDIAC MARKERS ( 2018 07:12 )  .019 ng/mL / x     / x     / x     / x            Urinalysis Basic - ( 2018 14:25 )    Color: Yellow / Appearance: Clear / S.020 / pH: x  Gluc: x / Ketone: Negative  / Bili: Negative / Urobili: 1 mg/dL   Blood: x / Protein: 30 mg/dL / Nitrite: Negative   Leuk Esterase: Trace / RBC: 0-2 /HPF / WBC 3-5   Sq Epi: x / Non Sq Epi: Few / Bacteria: Few      A/P case of 85 yo male s/p right Total knee replacement presenting with hypotension, and now abdominal pain.  lactate level elevated.  X-ray of the abdm shows no acute process.  BP improved currently is 127/71.  No sign of infection.  BC/UC pending.  Will continue with IVF, and consult surgery to evaluate the patient.  Unable to obtain Abdm CT scan for now due to mechanical issues.  If needed as might be foreseen as per surgery, patient might need to be transferred  Will sign out the case to ICU pA, and nocturist.  Discussed case with Surgery who will evaluate the patient shortly  Left a message for Dr. Cuellar

## 2018-07-26 NOTE — CONSULT NOTE ADULT - PROBLEM SELECTOR RECOMMENDATION 3
anemia  post op  serial Hgb, expect drop due to blood loss and hemodillution  will consider transfusion if Hgb is less than 8.5  I and O  serial labs  monitor HD and VS and Sat

## 2018-07-26 NOTE — CHART NOTE - NSCHARTNOTEFT_GEN_A_CORE
RR was activated as patient got hypotensive on sitting on the commode, BP was 89/48 last night, received 2000 ml NS total bolus, BP improved to 98/66, but tis am when patient was seated on the bedside commode felt "little bit" dizzy, BP was found to be 66/35, bradycardic. seen & examined at the bedside, AAO X3, stating that he feels ok, pale, no diaphoresis, no JVD, Heart: normal S1 & S2, no murmurs or extra sounds Lungs: fair air entry B/L equal, no adventitious sounds, Abdomen: soft, lax, NT, ND, BS (+), no palpable masses or organomegaly, no abnormal swelling of knee or thigh muscles tenderness, urine output was 50 ml this am, bladder scan revealed another 150 ml, ordered 2000 ml NS bolus, CBC, BMP, and Troponin stat, endorsed to day time team hospitalist for follow up.

## 2018-07-26 NOTE — CONSULT NOTE ADULT - PROBLEM SELECTOR RECOMMENDATION 9
post op  wound care  I alexus  ortho follow up  PT as tolerated  pain regimen, caution with opioids,

## 2018-07-26 NOTE — PROGRESS NOTE ADULT - SUBJECTIVE AND OBJECTIVE BOX
CC.  S/P Right Total knee replacement   HPI.  Patient reports right knee pain is controlled.  Offers no other complaints  Overnight event noticed.  BP is still on the low side this morning.  Patient is asymptomatic              Constitutional: No fever, fatigue or weight loss.  Skin: No rash.  Eyes: No recent vision problems or eye pain.  ENT: No congestion, ear pain, or sore throat.  Endocrine: No thyroid problems.  Cardiovascular: No chest pain or palpation.  Respiratory: No cough, shortness of breath, congestion, or wheezing.  Gastrointestinal: No abdominal pain, nausea, vomiting, or diarrhea.  Genitourinary: No dysuria.  Musculoskeletal: No joint swelling.  Neurologic: No headache.      Vital Signs Last 24 Hrs  T(C): 36.4 (26 Jul 2018 03:15), Max: 37.2 (25 Jul 2018 19:00)  T(F): 97.5 (26 Jul 2018 03:15), Max: 98.9 (25 Jul 2018 19:00)  HR: 75 (26 Jul 2018 12:00) (55 - 79)  BP: 104/52 (26 Jul 2018 12:00) (76/52 - 126/63)  BP(mean): 68 (26 Jul 2018 12:00) (58 - 73)  RR: 16 (26 Jul 2018 12:00) (13 - 29)  SpO2: 95% (26 Jul 2018 12:00) (93% - 100%)        PHYSICAL EXAM-  GENERAL: NAD  HEAD:  Atraumatic, Normocephalic  EYES: EOMI, PERRLA, conjunctiva and sclera clear  NECK: Supple, No JVD, Normal thyroid  NERVOUS SYSTEM:  Alert & Oriented X3, Motor Strength 5/5 B/L upper and lower extremities; DTRs 2+ intact and symmetric  CHEST/LUNG: Clear to percussion bilaterally; No rales, rhonchi, wheezing, or rubs  HEART: Regular rate and rhythm; No murmurs, rubs, or gallops  ABDOMEN: Soft, Nontender, Nondistended; Bowel sounds present  EXTREMITIES:  2+ Peripheral Pulses, No clubbing, cyanosis, or edema  SKIN: No rashes or lesions                            9.7    14.22 )-----------( 94       ( 26 Jul 2018 10:09 )             28.7     07-26    137  |  107  |  29<H>  ----------------------------<  101<H>  3.9   |  24  |  1.56<H>    Ca    7.9<L>      26 Jul 2018 10:09      CARDIAC MARKERS ( 26 Jul 2018 07:12 )  .019 ng/mL / x     / x     / x     / x        MEDICATIONS  (STANDING):  acetaminophen   Tablet. 1000 milliGRAM(s) Oral every 8 hours  aspirin enteric coated 162 milliGRAM(s) Oral every 12 hours  docusate sodium 100 milliGRAM(s) Oral three times a day  latanoprost 0.005% Ophthalmic Solution 1 Drop(s) Both EYES at bedtime  midodrine 5 milliGRAM(s) Oral three times a day  pantoprazole    Tablet 40 milliGRAM(s) Oral before breakfast  senna 2 Tablet(s) Oral at bedtime  sodium chloride 0.9%. 1000 milliLiter(s) (100 mL/Hr) IV Continuous <Continuous>    MEDICATIONS  (PRN):  aluminum hydroxide/magnesium hydroxide/simethicone Suspension 30 milliLiter(s) Oral four times a day PRN Indigestion  bisacodyl Suppository 10 milliGRAM(s) Rectal daily PRN If no bowel movement by postoperative day #2  magnesium hydroxide Suspension 30 milliLiter(s) Oral daily PRN Constipation  ondansetron Injectable 4 milliGRAM(s) IV Push every 6 hours PRN Nausea and/or Vomiting  polyethylene glycol 3350 17 Gram(s) Oral daily PRN Constipation  traMADol 25 milliGRAM(s) Oral every 4 hours PRN Mild Pain (1 - 3)  traMADol 50 milliGRAM(s) Oral every 4 hours PRN Moderate Pain (4 - 6)                      RADIOLOGY RESULTS:

## 2018-07-26 NOTE — CONSULT NOTE ADULT - PROBLEM SELECTOR RECOMMENDATION 2
hypotension  etiology unclear  CE noted  TTE done, prelim no acute findings  will check Lactic Acid  will check VBG  will add Midodrine 5 mg PO TID  keep MAP > 60  I and O  IVF  SPCU monitoring for now  will check cortisol am level  discussed with daughter and PMD and Ortho  clinically - no signs of shock, mentating, making urine

## 2018-07-26 NOTE — PROGRESS NOTE ADULT - SUBJECTIVE AND OBJECTIVE BOX
Chief Complaint: TKR    Interval Events: Patient was noted to be hypotensive overnight. Transferred to [facility].    Review of Systems:  General: No fevers, chills, weight loss or gain  Skin: No rashes, color changes  Cardiovascular: No chest pain, orthopnea  Respiratory: No shortness of breath, cough  Gastrointestinal: No nausea, abdominal pain  Genitourinary: No incontinence, pain with urination  Musculoskeletal: No pain, swelling, decreased range of motion  Neurological: No headache, weakness  Psychiatric: No depression, anxiety  Endocrine: No weight loss or gain, increased thirst  All other systems are comprehensively negative.    Physical Exam:  Vital Signs Last 24 Hrs  T(C): 36.4 (26 Jul 2018 03:15), Max: 37.2 (25 Jul 2018 19:00)  T(F): 97.5 (26 Jul 2018 03:15), Max: 98.9 (25 Jul 2018 19:00)  HR: 66 (26 Jul 2018 08:12) (52 - 78)  BP: 82/52 (26 Jul 2018 08:42) (82/52 - 132/60)  BP(mean): 62 (26 Jul 2018 08:42) (58 - 62)  RR: 17 (26 Jul 2018 08:42) (16 - 18)  SpO2: 96% (26 Jul 2018 08:42) (94% - 100%)  General: NAD  HEENT: dry MM  Neck: No JVD, no carotid bruit  Lungs: CTAB  CV: RRR, nl S1/S2, no M/R/G  Abdomen: S/NT/ND, +BS  Extremities: No LE edema, no cyanosis  Neuro: AAOx3, non-focal  Skin: No rash    Labs:    07-26    137  |  106  |  29<H>  ----------------------------<  101<H>  4.0   |  24  |  1.52<H>    Ca    8.6      26 Jul 2018 07:11                          10.8   16.21 )-----------( 115      ( 26 Jul 2018 07:11 )             31.8       Telemetry: Sinus rhythm, PACs, PVCs, ventricular bigeminy

## 2018-07-26 NOTE — PROVIDER CONTACT NOTE (CRITICAL VALUE NOTIFICATION) - PERSON GIVING RESULT:
Dear Gillian, your recent test results are attached.  Influenza is negative  Feel free to contact me via the office or My Chart if you have any questions regarding the above.  Sincerely,  DO SATHISH [facility]  /[email or web address]

## 2018-07-26 NOTE — CONSULT NOTE ADULT - PROBLEM SELECTOR RECOMMENDATION 5
KARLENE  monitor labs  IVF  I and O  replete lytes  will follow  may need to consider US renal and or eval for obstruction

## 2018-07-26 NOTE — PROGRESS NOTE ADULT - SUBJECTIVE AND OBJECTIVE BOX
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   -   Wound C/D/I.   -   Distal Neurvascular status intact grossly.   -   Warm well perfused; capillary refill <3 seconds   -   (+)EHL/FHL   -   (+) Sensation to light touch  -   (-) Calf tenderness Bilaterally    A/P: 86y Male s/p Knee replacement: Right     -   Ortho Stable  -   Pain control   -   Medicine to follow  -   DVT ppx:     [ ]SCDs     [x ] ASA     [ ] Eliquis     [ ] Lovenox  -   Weight bearing status:  WBAT [x ]        PWB    [ ]     TTWB  [ ]      NWB  [ ]   -  Dispo:     Home [x ]     Acute Rehab [ ]     GLORY [ ]     TBD [ ]

## 2018-07-26 NOTE — CONSULT NOTE ADULT - ASSESSMENT
85 yo s/p R knee surgery 2 days ago with hypotension, increased lactate.  PT had decreased appetite and likely dehydrated at this time.       Unlikely to need acute surgical intervention      cont fluid hydration, possible lasix for xray becoming wet      repeat labs      will cont to follow

## 2018-07-26 NOTE — CONSULT NOTE ADULT - SUBJECTIVE AND OBJECTIVE BOX
87 yo male s/p R knee surgery 2 days ago presents with lactic acidosis.  PT states since surgery he has been having lost of appetite and some nausea. Denies vomiting but currently burping.  States passing a lot of gas and 2 BM today.  States a similar episode happened 11 years when had L knee.  No other complaints at this time    REVIEW OF SYSTEMS:    CONSTITUTIONAL: decreased appetite  EYES: No visual changes; No double vision,  No vertigo, eye pain  Ears: no otalgia, no otorhea, no hearing loss, tinnitus  Nose: no epistaxis, rhinorrhea, sinus pressure  Throat: no throat pain, no oral lesions  NECK: No pain or stiffness  RESPIRATORY: No cough (productive or dry), wheezing, hemoptysis; No shortness of breath  CARDIOVASCULAR: No chest pain or palpitations,    GASTROINTESTINAL: as above  GENITOURINARY: No dysuria, frequency, urgency or hematuria,    NEUROLOGICAL: No numbness or weakness, headache, memory loss,   SKIN: R knee pain  Psych: No anxiety, sadness, insomnia,    Endocrine: No Heat or Cold intolerance, polydipsia, polyphagia  Heme/Lymph: no LN enlargement, no easy bruising or bleeding       PAST MEDICAL & SURGICAL HISTORY:  Osteoarthritis of right knee  Hypertension  Tremor of both hands  History of hernia surgery: X3   History of shoulder surgery: right, 2012  History of knee replacement, total, left: 2007    Allergies    No Known Allergies    Intolerances    MEDICATIONS  (STANDING):  acetaminophen   Tablet. 1000 milliGRAM(s) Oral every 8 hours  aspirin enteric coated 162 milliGRAM(s) Oral every 12 hours  docusate sodium 100 milliGRAM(s) Oral three times a day  ferrous    sulfate 325 milliGRAM(s) Oral daily  latanoprost 0.005% Ophthalmic Solution 1 Drop(s) Both EYES at bedtime  midodrine 5 milliGRAM(s) Oral three times a day  pantoprazole    Tablet 40 milliGRAM(s) Oral before breakfast  senna 2 Tablet(s) Oral at bedtime  sodium chloride 0.9%. 1000 milliLiter(s) (80 mL/Hr) IV Continuous <Continuous>    MEDICATIONS  (PRN):  aluminum hydroxide/magnesium hydroxide/simethicone Suspension 30 milliLiter(s) Oral four times a day PRN Indigestion  bisacodyl Suppository 10 milliGRAM(s) Rectal daily PRN If no bowel movement by postoperative day #2  magnesium hydroxide Suspension 30 milliLiter(s) Oral daily PRN Constipation  ondansetron Injectable 4 milliGRAM(s) IV Push every 6 hours PRN Nausea and/or Vomiting  polyethylene glycol 3350 17 Gram(s) Oral daily PRN Constipation  traMADol 25 milliGRAM(s) Oral every 4 hours PRN Mild Pain (1 - 3)  traMADol 50 milliGRAM(s) Oral every 4 hours PRN Moderate Pain (4 - 6)      SH-negative x3, retired     .  VITAL SIGNS:  T(C): 36.4 (07-26-18 @ 16:10), Max: 36.7 (07-25-18 @ 23:21)  T(F): 97.6 (07-26-18 @ 16:10), Max: 98.1 (07-25-18 @ 23:21)  HR: 74 (07-26-18 @ 19:00) (55 - 81)  BP: 93/49 (07-26-18 @ 19:00) (76/52 - 128/71)  BP(mean): 63 (07-26-18 @ 19:00) (57 - 105)  RR: 20 (07-26-18 @ 19:00) (13 - 29)  SpO2: 99% (07-26-18 @ 19:00) (93% - 100%)  Wt(kg): --    PHYSICAL EXAM:    Constitutional:  NAD, resting comfortably in bed  Head: NC/AT  Eyes: PERRL b/l  ENT: MMM  Neck: supple; no JVD or thyromegaly  Respiratory: CTA B/L   Cardiac: +S1/S2; RRR   Gastrointestinal: soft, slight distension, nontender  Back: no CVA B/L  Extremities: WWP, no clubbing or cyanosis; no peripheral edema  Musculoskeletal: R knee wrapped  Vascular: 2+ radial, femoral, DP/PT pulses B/L  Dermatologic: skin warm, dry and intact; no rashes, wounds, or scars  Lymphatic: no submandibular, cervical or  LAD  Neurologic: AAOx3; CNII-XII grossly intact; no focal deficits  Psychiatric: affect and characteristics of appearance, verbalizations, behaviors are appropriate                            9.6    15.64 )-----------( 92       ( 26 Jul 2018 16:55 )             28.1   07-26    138  |  108  |  29<H>  ----------------------------<  100<H>  3.8   |  19<L>  |  1.58<H>    Ca    7.7<L>      26 Jul 2018 16:55

## 2018-07-26 NOTE — CHART NOTE - NSCHARTNOTEFT_GEN_A_CORE
Seen & examined at the bedside, AAO X3, offer no complaints, lying down 30 degrees supine without orthopnea, no JVD, Abdomen soft, mildly distended, lax, BS (++) allover, no palpable masses. afebrile, urine output 175 over the past 1 hour, normal urine colour, BP 97/52, HR 69/min, RR 22/min, SPO2 100% on NC at 2 L/min, labs are pending, will follow. Discussed with patient, agreed on blood transfusion if needed.

## 2018-07-26 NOTE — CONSULT NOTE ADULT - SUBJECTIVE AND OBJECTIVE BOX
Date/Time Patient Seen:  		  Referring MD:   Data Reviewed	       Patient is a 86y old  Male who presents with a chief complaint of "Dr Polo is going to replace my RIGHT knee" (25 Jul 2018 12:46)      Subjective/HPI    vs and meds reviewed  imaging and labs reviewed  alert  verbal  records reviewed  spoke with [facility]o [facility]    Consult Note:   · Provider Specialty	Cardiology    Referral/Consultation:    Initial Consult:  · Requested by Name:	MD  · Date/Time:	24-Jul-2018 18:47  · Reason for Referral/Consultation:	Bradycardia      · Subjective and Objective:   History of Present Illness: The patient is an 86 year old male with a history of HTN, OA who presents for scheduled right TKR. Post-operatively, he was noted to be bradycardic to 30s/40s. He feels well and denies dizziness, palpitations, chest pain, shortness of breath. He notes issues with bradycardia to the 40s at times. While he has had intermittent dizziness, he states he underwent event monitoring and never met indication for pacemaker.     PAST MEDICAL & SURGICAL HISTORY:  Osteoarthritis of right knee  Hypertension  Tremor of both hands  History of hernia surgery: X3 1950&#x27;s-1970&#x27;s  History of shoulder surgery: right, 2012  History of knee replacement, total, left: 2007        Medication list         MEDICATIONS  (STANDING):  acetaminophen   Tablet. 1000 milliGRAM(s) Oral every 8 hours  aspirin enteric coated 162 milliGRAM(s) Oral every 12 hours  docusate sodium 100 milliGRAM(s) Oral three times a day  latanoprost 0.005% Ophthalmic Solution 1 Drop(s) Both EYES at bedtime  midodrine 5 milliGRAM(s) Oral three times a day  pantoprazole    Tablet 40 milliGRAM(s) Oral before breakfast  senna 2 Tablet(s) Oral at bedtime  sodium chloride 0.9% Bolus 500 milliLiter(s) IV Bolus once  sodium chloride 0.9%. 1000 milliLiter(s) (100 mL/Hr) IV Continuous <Continuous>    MEDICATIONS  (PRN):  aluminum hydroxide/magnesium hydroxide/simethicone Suspension 30 milliLiter(s) Oral four times a day PRN Indigestion  bisacodyl Suppository 10 milliGRAM(s) Rectal daily PRN If no bowel movement by postoperative day #2  magnesium hydroxide Suspension 30 milliLiter(s) Oral daily PRN Constipation  ondansetron Injectable 4 milliGRAM(s) IV Push every 6 hours PRN Nausea and/or Vomiting  polyethylene glycol 3350 17 Gram(s) Oral daily PRN Constipation  traMADol 25 milliGRAM(s) Oral every 4 hours PRN Mild Pain (1 - 3)  traMADol 50 milliGRAM(s) Oral every 4 hours PRN Moderate Pain (4 - 6)         Vitals log        ICU Vital Signs Last 24 Hrs  T(C): 36.4 (26 Jul 2018 03:15), Max: 37.2 (25 Jul 2018 19:00)  T(F): 97.5 (26 Jul 2018 03:15), Max: 98.9 (25 Jul 2018 19:00)  HR: 69 (26 Jul 2018 10:30) (52 - 78)  BP: 85/46 (26 Jul 2018 10:30) (76/52 - 132/60)  BP(mean): 59 (26 Jul 2018 10:30) (58 - 62)  ABP: --  ABP(mean): --  RR: 19 (26 Jul 2018 10:30) (13 - 19)  SpO2: 98% (26 Jul 2018 10:30) (94% - 100%)           Input and Output:  I&O's Detail    25 Jul 2018 07:01  -  26 Jul 2018 07:00  --------------------------------------------------------  IN:  Total IN: 0 mL    OUT:    Accordian: 290 mL    Voided: 1350 mL  Total OUT: 1640 mL    Total NET: -1640 mL          Lab Data                        9.7    14.22 )-----------( x        ( 26 Jul 2018 10:09 )             28.7     07-26    137  |  107  |  29<H>  ----------------------------<  101<H>  3.9   |  24  |  1.56<H>    Ca    7.9<L>      26 Jul 2018 10:09        CARDIAC MARKERS ( 26 Jul 2018 07:12 )  .019 ng/mL / x     / x     / x     / x        non smoker  non drinker  lives at home  retired       Review of Systems	      Objective     Physical Examination    heart s1s2  lung dec BS  abd soft      Pertinent Lab findings & Imaging      [place]:  NO   Adequate UO     I&O's Detail    25 Jul 2018 07:01  -  26 Jul 2018 07:00  --------------------------------------------------------  IN:  Total IN: 0 mL    OUT:    Accordian: 290 mL    Voided: 1350 mL  Total OUT: 1640 mL    Total NET: -1640 mL               Discussed with:     Cultures:	        Radiology          cxr  atelectasis

## 2018-07-26 NOTE — PROGRESS NOTE ADULT - ASSESSMENT
The patient is an 86 year old male with a history of HTN, OA who is s/p right TKR.    Plan:  - Continue to monitor on telemetry  - Avoid rate lowering agents  - No significant pauses noted on telemetry  - No indication for pacemaker  - Minimize narcotics  - Likely dehydrated as noted by worsening renal function and dry on exam  - Continue IVF  - Hold antihypertensives

## 2018-07-26 NOTE — PROGRESS NOTE ADULT - ASSESSMENT
Patient is 85 yo male presenting with     1. S/P right Total knee replacement.  Continue with pain management, DVT proph, and wound care as per Ortho.  PT/OT  2. HTN.  Hold BP medication and Monitor BP  3.  Bradycardia with few pauses.  Resolved.    4. Hypotension with acute renal failure.  ??Possibly due to dehydration, and decrease po intake.  Will obtain TSH level, Blood culture, CXR, UA, TTE, renal US, critical care consult, and nephrology consult.  Continue with IVF, and Monitor renal function, and BP.  -Midodrine for BP support as per CC    -Continue with telemonitored and cardiology to follow up     5.  Anemia.  due to post-operative blood loss.  Asymptomatic.  Monitor H/H.  Iron supplement    Plan of care was discussed with patient in great details, All questions were answered to their satisfaction  Seems to understand, and in agreement

## 2018-07-26 NOTE — CONSULT NOTE ADULT - ASSESSMENT
86 white male with a history of HTN, OA, S/P RT TKR now hypotension and KARLENE with decreased urine out put.   KARLENE is most likely due to pre renal azotemia complicated by ATN due to hypotension.   will continue the IVF at the present rate and maintain a SBP of over a 100.  will repeat blood work and also a bladder scan to rule out retention.   medications reviewed. will follow.

## 2018-07-27 ENCOUNTER — OUTPATIENT (OUTPATIENT)
Dept: OUTPATIENT SERVICES | Facility: HOSPITAL | Age: 83
LOS: 1 days | End: 2018-07-27
Payer: COMMERCIAL

## 2018-07-27 DIAGNOSIS — Z98.890 OTHER SPECIFIED POSTPROCEDURAL STATES: Chronic | ICD-10-CM

## 2018-07-27 DIAGNOSIS — Z96.652 PRESENCE OF LEFT ARTIFICIAL KNEE JOINT: Chronic | ICD-10-CM

## 2018-07-27 DIAGNOSIS — Z96.652 PRESENCE OF LEFT ARTIFICIAL KNEE JOINT: ICD-10-CM

## 2018-07-27 DIAGNOSIS — M17.11 UNILATERAL PRIMARY OSTEOARTHRITIS, RIGHT KNEE: ICD-10-CM

## 2018-07-27 LAB
ALBUMIN SERPL ELPH-MCNC: 2.5 G/DL — LOW (ref 3.3–5)
ALP SERPL-CCNC: 81 U/L — SIGNIFICANT CHANGE UP (ref 30–120)
ALT FLD-CCNC: 23 U/L DA — SIGNIFICANT CHANGE UP (ref 10–60)
ANION GAP SERPL CALC-SCNC: 11 MMOL/L — SIGNIFICANT CHANGE UP (ref 5–17)
ANION GAP SERPL CALC-SCNC: 11 MMOL/L — SIGNIFICANT CHANGE UP (ref 5–17)
APPEARANCE UR: ABNORMAL
AST SERPL-CCNC: 30 U/L — SIGNIFICANT CHANGE UP (ref 10–40)
BACTERIA # UR AUTO: ABNORMAL
BASE EXCESS BLDV CALC-SCNC: -5.3 MMOL/L — LOW (ref -2–2)
BILIRUB SERPL-MCNC: 1.1 MG/DL — SIGNIFICANT CHANGE UP (ref 0.2–1.2)
BILIRUB UR-MCNC: NEGATIVE — SIGNIFICANT CHANGE UP
BUN SERPL-MCNC: 24 MG/DL — HIGH (ref 7–23)
BUN SERPL-MCNC: 24 MG/DL — HIGH (ref 7–23)
CALCIUM SERPL-MCNC: 8.5 MG/DL — SIGNIFICANT CHANGE UP (ref 8.4–10.5)
CALCIUM SERPL-MCNC: 8.6 MG/DL — SIGNIFICANT CHANGE UP (ref 8.4–10.5)
CHLORIDE SERPL-SCNC: 105 MMOL/L — SIGNIFICANT CHANGE UP (ref 96–108)
CHLORIDE SERPL-SCNC: 108 MMOL/L — SIGNIFICANT CHANGE UP (ref 96–108)
CO2 SERPL-SCNC: 20 MMOL/L — LOW (ref 22–31)
CO2 SERPL-SCNC: 21 MMOL/L — LOW (ref 22–31)
COLOR SPEC: YELLOW — SIGNIFICANT CHANGE UP
CREAT SERPL-MCNC: 1.3 MG/DL — SIGNIFICANT CHANGE UP (ref 0.5–1.3)
CREAT SERPL-MCNC: 1.33 MG/DL — HIGH (ref 0.5–1.3)
DIFF PNL FLD: ABNORMAL
EPI CELLS # UR: SIGNIFICANT CHANGE UP
GAS PNL BLDV: SIGNIFICANT CHANGE UP
GLUCOSE SERPL-MCNC: 106 MG/DL — HIGH (ref 70–99)
GLUCOSE SERPL-MCNC: 108 MG/DL — HIGH (ref 70–99)
GLUCOSE UR QL: NEGATIVE MG/DL — SIGNIFICANT CHANGE UP
HCO3 BLDV-SCNC: 19 MMOL/L — LOW (ref 21–29)
HCT VFR BLD CALC: 29.4 % — LOW (ref 39–50)
HCT VFR BLD CALC: 32.5 % — LOW (ref 39–50)
HGB BLD-MCNC: 10 G/DL — LOW (ref 13–17)
HGB BLD-MCNC: 11.2 G/DL — LOW (ref 13–17)
HOROWITZ INDEX BLDV+IHG-RTO: 21 — SIGNIFICANT CHANGE UP
KETONES UR-MCNC: ABNORMAL
LACTATE SERPL-SCNC: 2.3 MMOL/L — HIGH (ref 0.7–2)
LEUKOCYTE ESTERASE UR-ACNC: ABNORMAL
MAGNESIUM SERPL-MCNC: 2 MG/DL — SIGNIFICANT CHANGE UP (ref 1.6–2.6)
MCHC RBC-ENTMCNC: 31.7 PG — SIGNIFICANT CHANGE UP (ref 27–34)
MCHC RBC-ENTMCNC: 32.6 PG — SIGNIFICANT CHANGE UP (ref 27–34)
MCHC RBC-ENTMCNC: 34 GM/DL — SIGNIFICANT CHANGE UP (ref 32–36)
MCHC RBC-ENTMCNC: 34.5 GM/DL — SIGNIFICANT CHANGE UP (ref 32–36)
MCV RBC AUTO: 93.3 FL — SIGNIFICANT CHANGE UP (ref 80–100)
MCV RBC AUTO: 94.5 FL — SIGNIFICANT CHANGE UP (ref 80–100)
NITRITE UR-MCNC: NEGATIVE — SIGNIFICANT CHANGE UP
NRBC # BLD: 0 /100 WBCS — SIGNIFICANT CHANGE UP (ref 0–0)
NRBC # BLD: 0 /100 WBCS — SIGNIFICANT CHANGE UP (ref 0–0)
PCO2 BLDV: 35 MMHG — SIGNIFICANT CHANGE UP (ref 35–50)
PH BLDV: 7.36 — SIGNIFICANT CHANGE UP (ref 7.35–7.45)
PH UR: 5 — SIGNIFICANT CHANGE UP (ref 5–8)
PHOSPHATE SERPL-MCNC: 2.4 MG/DL — LOW (ref 2.5–4.5)
PLATELET # BLD AUTO: 112 K/UL — LOW (ref 150–400)
PLATELET # BLD AUTO: 131 K/UL — LOW (ref 150–400)
PO2 BLDV: 20 — LOW (ref 25–45)
POTASSIUM SERPL-MCNC: 3.7 MMOL/L — SIGNIFICANT CHANGE UP (ref 3.5–5.3)
POTASSIUM SERPL-MCNC: 4.4 MMOL/L — SIGNIFICANT CHANGE UP (ref 3.5–5.3)
POTASSIUM SERPL-SCNC: 3.7 MMOL/L — SIGNIFICANT CHANGE UP (ref 3.5–5.3)
POTASSIUM SERPL-SCNC: 4.4 MMOL/L — SIGNIFICANT CHANGE UP (ref 3.5–5.3)
PROCALCITONIN SERPL-MCNC: 3.21 NG/ML — HIGH (ref 0.02–0.1)
PROT SERPL-MCNC: 6.2 G/DL — SIGNIFICANT CHANGE UP (ref 6–8.3)
PROT UR-MCNC: 30 MG/DL
RBC # BLD: 3.15 M/UL — LOW (ref 4.2–5.8)
RBC # BLD: 3.44 M/UL — LOW (ref 4.2–5.8)
RBC # FLD: 12.7 % — SIGNIFICANT CHANGE UP (ref 10.3–14.5)
RBC # FLD: 12.8 % — SIGNIFICANT CHANGE UP (ref 10.3–14.5)
RBC CASTS # UR COMP ASSIST: ABNORMAL /HPF (ref 0–4)
SAO2 % BLDV: 25 % — LOW (ref 67–88)
SODIUM SERPL-SCNC: 137 MMOL/L — SIGNIFICANT CHANGE UP (ref 135–145)
SODIUM SERPL-SCNC: 139 MMOL/L — SIGNIFICANT CHANGE UP (ref 135–145)
SP GR SPEC: 1.02 — SIGNIFICANT CHANGE UP (ref 1.01–1.02)
TROPONIN I SERPL-MCNC: 0.21 NG/ML — HIGH (ref 0.02–0.06)
TSH SERPL-MCNC: 2.83 UIU/ML — SIGNIFICANT CHANGE UP (ref 0.27–4.2)
UROBILINOGEN FLD QL: NEGATIVE MG/DL — SIGNIFICANT CHANGE UP
WBC # BLD: 18.46 K/UL — HIGH (ref 3.8–10.5)
WBC # BLD: 20.46 K/UL — HIGH (ref 3.8–10.5)
WBC # FLD AUTO: 18.46 K/UL — HIGH (ref 3.8–10.5)
WBC # FLD AUTO: 20.46 K/UL — HIGH (ref 3.8–10.5)
WBC UR QL: SIGNIFICANT CHANGE UP

## 2018-07-27 PROCEDURE — 74176 CT ABD & PELVIS W/O CONTRAST: CPT

## 2018-07-27 PROCEDURE — 74176 CT ABD & PELVIS W/O CONTRAST: CPT | Mod: 26

## 2018-07-27 PROCEDURE — 99233 SBSQ HOSP IP/OBS HIGH 50: CPT

## 2018-07-27 PROCEDURE — 71250 CT THORAX DX C-: CPT | Mod: 26

## 2018-07-27 PROCEDURE — 71250 CT THORAX DX C-: CPT

## 2018-07-27 RX ORDER — PIPERACILLIN AND TAZOBACTAM 4; .5 G/20ML; G/20ML
3.38 INJECTION, POWDER, LYOPHILIZED, FOR SOLUTION INTRAVENOUS ONCE
Qty: 0 | Refills: 0 | Status: COMPLETED | OUTPATIENT
Start: 2018-07-27 | End: 2018-07-27

## 2018-07-27 RX ORDER — PIPERACILLIN AND TAZOBACTAM 4; .5 G/20ML; G/20ML
3.38 INJECTION, POWDER, LYOPHILIZED, FOR SOLUTION INTRAVENOUS EVERY 8 HOURS
Qty: 0 | Refills: 0 | Status: DISCONTINUED | OUTPATIENT
Start: 2018-07-27 | End: 2018-07-27

## 2018-07-27 RX ORDER — VANCOMYCIN HCL 1 G
125 VIAL (EA) INTRAVENOUS EVERY 6 HOURS
Qty: 0 | Refills: 0 | Status: DISCONTINUED | OUTPATIENT
Start: 2018-07-27 | End: 2018-08-04

## 2018-07-27 RX ORDER — VANCOMYCIN HCL 1 G
1000 VIAL (EA) INTRAVENOUS ONCE
Qty: 0 | Refills: 0 | Status: COMPLETED | OUTPATIENT
Start: 2018-07-27 | End: 2018-07-27

## 2018-07-27 RX ORDER — LACTOBACILLUS ACIDOPHILUS 100MM CELL
1 CAPSULE ORAL
Qty: 0 | Refills: 0 | Status: DISCONTINUED | OUTPATIENT
Start: 2018-07-27 | End: 2018-08-04

## 2018-07-27 RX ORDER — FAMOTIDINE 10 MG/ML
20 INJECTION INTRAVENOUS DAILY
Qty: 0 | Refills: 0 | Status: DISCONTINUED | OUTPATIENT
Start: 2018-07-27 | End: 2018-08-04

## 2018-07-27 RX ORDER — ALPRAZOLAM 0.25 MG
0.25 TABLET ORAL ONCE
Qty: 0 | Refills: 0 | Status: DISCONTINUED | OUTPATIENT
Start: 2018-07-27 | End: 2018-07-27

## 2018-07-27 RX ORDER — PIPERACILLIN AND TAZOBACTAM 4; .5 G/20ML; G/20ML
3.38 INJECTION, POWDER, LYOPHILIZED, FOR SOLUTION INTRAVENOUS EVERY 8 HOURS
Qty: 0 | Refills: 0 | Status: DISCONTINUED | OUTPATIENT
Start: 2018-07-27 | End: 2018-07-28

## 2018-07-27 RX ORDER — SODIUM,POTASSIUM PHOSPHATES 278-250MG
1 POWDER IN PACKET (EA) ORAL
Qty: 0 | Refills: 0 | Status: COMPLETED | OUTPATIENT
Start: 2018-07-27 | End: 2018-07-28

## 2018-07-27 RX ADMIN — Medication 1000 MILLIGRAM(S): at 16:50

## 2018-07-27 RX ADMIN — Medication 125 MILLIGRAM(S): at 23:41

## 2018-07-27 RX ADMIN — Medication 1000 MILLIGRAM(S): at 06:27

## 2018-07-27 RX ADMIN — Medication 1000 MILLIGRAM(S): at 15:50

## 2018-07-27 RX ADMIN — PIPERACILLIN AND TAZOBACTAM 200 GRAM(S): 4; .5 INJECTION, POWDER, LYOPHILIZED, FOR SOLUTION INTRAVENOUS at 11:01

## 2018-07-27 RX ADMIN — Medication 1 TABLET(S): at 22:06

## 2018-07-27 RX ADMIN — Medication 162 MILLIGRAM(S): at 21:01

## 2018-07-27 RX ADMIN — Medication 1000 MILLIGRAM(S): at 21:02

## 2018-07-27 RX ADMIN — Medication 0.25 MILLIGRAM(S): at 12:12

## 2018-07-27 RX ADMIN — PIPERACILLIN AND TAZOBACTAM 25 GRAM(S): 4; .5 INJECTION, POWDER, LYOPHILIZED, FOR SOLUTION INTRAVENOUS at 22:06

## 2018-07-27 RX ADMIN — Medication 325 MILLIGRAM(S): at 15:50

## 2018-07-27 RX ADMIN — Medication 1 TABLET(S): at 21:01

## 2018-07-27 RX ADMIN — Medication 1000 MILLIGRAM(S): at 05:53

## 2018-07-27 RX ADMIN — FAMOTIDINE 20 MILLIGRAM(S): 10 INJECTION INTRAVENOUS at 18:23

## 2018-07-27 RX ADMIN — Medication 125 MILLIGRAM(S): at 18:23

## 2018-07-27 RX ADMIN — ONDANSETRON 4 MILLIGRAM(S): 8 TABLET, FILM COATED ORAL at 15:38

## 2018-07-27 RX ADMIN — Medication 1000 MILLIGRAM(S): at 22:06

## 2018-07-27 RX ADMIN — LATANOPROST 1 DROP(S): 0.05 SOLUTION/ DROPS OPHTHALMIC; TOPICAL at 22:06

## 2018-07-27 RX ADMIN — PANTOPRAZOLE SODIUM 40 MILLIGRAM(S): 20 TABLET, DELAYED RELEASE ORAL at 06:27

## 2018-07-27 RX ADMIN — Medication 250 MILLIGRAM(S): at 11:01

## 2018-07-27 RX ADMIN — ONDANSETRON 4 MILLIGRAM(S): 8 TABLET, FILM COATED ORAL at 21:21

## 2018-07-27 NOTE — DIETITIAN INITIAL EVALUATION ADULT. - OTHER INFO
Pt assessed as per [facility] length of stay policy:  Hx htn, DJD s/p Knee sx and sent to [facility] after rapid response and found to have  lactic acidosis and r/o sepsis. Pt reports he doesn't have much appetite: He denies N/V or pain. Diet rx currently only full liquids. Pt states he had other knee replaced yrs ago and reports he had similar experience at that time. He reports about 10# weight loss but over course of about a year: No significant weight loss and pt does not appear underweight/malnourished. Abdominal MRI pending at [place].  Decreased intake appears to be result of surgery, hypotension. Once diet advanced to regular, pt will have much more options. If pt continues to have decreased appetite/suboptimal po intake then would consider supplements.

## 2018-07-27 NOTE — PROGRESS NOTE ADULT - SUBJECTIVE AND OBJECTIVE BOX
Date/Time Patient Seen:  		  Referring MD:   Data Reviewed	       Patient is a 86y old  Male who presents with a chief complaint of "Dr Cuellar is going to replace my RIGHT knee" (25 Jul 2018 12:46)  vs and meds reviewed  off IVF  s/p multiple Liters of IVF      Subjective/HPI     PAST MEDICAL & SURGICAL HISTORY:  Osteoarthritis of right knee  Hypertension  Tremor of both hands  History of hernia surgery: X3 1950&#x27;s-1970&#x27;s  History of shoulder surgery: right, 2012  History of knee replacement, total, left: 2007        Medication list         MEDICATIONS  (STANDING):  acetaminophen   Tablet. 1000 milliGRAM(s) Oral every 8 hours  aspirin enteric coated 162 milliGRAM(s) Oral every 12 hours  docusate sodium 100 milliGRAM(s) Oral three times a day  ferrous    sulfate 325 milliGRAM(s) Oral daily  latanoprost 0.005% Ophthalmic Solution 1 Drop(s) Both EYES at bedtime  pantoprazole    Tablet 40 milliGRAM(s) Oral before breakfast  senna 2 Tablet(s) Oral at bedtime    MEDICATIONS  (PRN):  aluminum hydroxide/magnesium hydroxide/simethicone Suspension 30 milliLiter(s) Oral four times a day PRN Indigestion  bisacodyl Suppository 10 milliGRAM(s) Rectal daily PRN If no bowel movement by postoperative day #2  magnesium hydroxide Suspension 30 milliLiter(s) Oral daily PRN Constipation  ondansetron Injectable 4 milliGRAM(s) IV Push every 6 hours PRN Nausea and/or Vomiting  polyethylene glycol 3350 17 Gram(s) Oral daily PRN Constipation  traMADol 25 milliGRAM(s) Oral every 4 hours PRN Mild Pain (1 - 3)  traMADol 50 milliGRAM(s) Oral every 4 hours PRN Moderate Pain (4 - 6)         Vitals log        ICU Vital Signs Last 24 Hrs  T(C): 37.2 (27 Jul 2018 04:27), Max: 37.2 (27 Jul 2018 04:27)  T(F): 98.9 (27 Jul 2018 04:27), Max: 98.9 (27 Jul 2018 04:27)  HR: 74 (27 Jul 2018 06:00) (59 - 81)  BP: 110/83 (27 Jul 2018 06:00) (76/52 - 137/66)  BP(mean): 89 (27 Jul 2018 06:00) (57 - 105)  ABP: --  ABP(mean): --  RR: 24 (27 Jul 2018 06:00) (11 - 29)  SpO2: 99% (27 Jul 2018 06:00) (93% - 100%)           Input and Output:  I&O's Detail    25 Jul 2018 07:01  -  26 Jul 2018 07:00  --------------------------------------------------------  IN:  Total IN: 0 mL    OUT:    Accordian: 290 mL    Voided: 1350 mL  Total OUT: 1640 mL    Total NET: -1640 mL      26 Jul 2018 07:01  -  27 Jul 2018 06:40  --------------------------------------------------------  IN:    IV PiggyBack: 50 mL    Oral Fluid: 100 mL    sodium chloride 0.9%: 250 mL    sodium chloride 0.9%: 700 mL    Sodium Chloride 0.9% IV Bolus: 4000 mL  Total IN: 5100 mL    OUT:    Indwelling Catheter - Urethral: 920 mL  Total OUT: 920 mL    Total NET: 4180 mL          Lab Data                        9.5    18.69 )-----------( 96       ( 26 Jul 2018 20:49 )             27.7     07-26    139  |  109<H>  |  26<H>  ----------------------------<  98  4.4   |  21<L>  |  1.54<H>    Ca    7.7<L>      26 Jul 2018 20:49  Phos  3.4     07-26  Mg     1.2     07-26    TPro  5.1<L>  /  Alb  2.3<L>  /  TBili  1.3<H>  /  DBili  x   /  AST  31  /  ALT  26  /  AlkPhos  61  07-26      CARDIAC MARKERS ( 26 Jul 2018 16:55 )  .059 ng/mL / x     / x     / x     / x      CARDIAC MARKERS ( 26 Jul 2018 07:12 )  .019 ng/mL / x     / x     / x     / x            Review of Systems	      Objective     Physical Examination    heart s1s2  lung dec BS  abd soft  cn grossly int  verbal      Pertinent Lab findings & Imaging      [place]:  EZEKIEL   Adequate UO     I&O's Detail    25 Jul 2018 07:01  -  26 Jul 2018 07:00  --------------------------------------------------------  IN:  Total IN: 0 mL    OUT:    Accordian: 290 mL    Voided: 1350 mL  Total OUT: 1640 mL    Total NET: -1640 mL      26 Jul 2018 07:01  -  27 Jul 2018 06:40  --------------------------------------------------------  IN:    IV PiggyBack: 50 mL    Oral Fluid: 100 mL    sodium chloride 0.9%: 250 mL    sodium chloride 0.9%: 700 mL    Sodium Chloride 0.9% IV Bolus: 4000 mL  Total IN: 5100 mL    OUT:    Indwelling Catheter - Urethral: 920 mL  Total OUT: 920 mL    Total NET: 4180 mL               Discussed with:     Cultures:	        Radiology

## 2018-07-27 NOTE — PROGRESS NOTE ADULT - SUBJECTIVE AND OBJECTIVE BOX
Chief Complaint: TKR    Interval Events: Abdominal pain yesterday. No appetite. BP improved.    Review of Systems:  General: No fevers, chills, weight loss or gain  Skin: No rashes, color changes  Cardiovascular: No chest pain, orthopnea  Respiratory: No shortness of breath, cough  Gastrointestinal: No nausea, abdominal pain  Genitourinary: No incontinence, pain with urination  Musculoskeletal: No pain, swelling, decreased range of motion  Neurological: No headache, weakness  Psychiatric: No depression, anxiety  Endocrine: No weight loss or gain, increased thirst  All other systems are comprehensively negative.    Physical Exam:  Vital Signs Last 24 Hrs  T(C): 36.9 (27 Jul 2018 08:25), Max: 37.2 (27 Jul 2018 04:27)  T(F): 98.5 (27 Jul 2018 08:25), Max: 98.9 (27 Jul 2018 04:27)  HR: 58 (27 Jul 2018 08:00) (58 - 81)  BP: 98/56 (27 Jul 2018 08:00) (78/50 - 137/66)  BP(mean): 70 (27 Jul 2018 08:00) (57 - 105)  RR: 16 (27 Jul 2018 08:00) (11 - 29)  SpO2: 99% (27 Jul 2018 08:00) (93% - 100%)  General: NAD  HEENT: dry MM  Neck: No JVD, no carotid bruit  Lungs: CTAB  CV: RRR, nl S1/S2, no M/R/G  Abdomen: S/NT/ND, +BS  Extremities: No LE edema, no cyanosis  Neuro: AAOx3, non-focal  Skin: No rash    Labs:    07-27    139  |  108  |  24<H>  ----------------------------<  106<H>  4.4   |  20<L>  |  1.33<H>    Ca    8.5      27 Jul 2018 06:52  Phos  3.4     07-26  Mg     1.2     07-26    TPro  5.1<L>  /  Alb  2.3<L>  /  TBili  1.3<H>  /  DBili  x   /  AST  31  /  ALT  26  /  AlkPhos  61  07-26                        10.0   18.46 )-----------( 112      ( 27 Jul 2018 06:52 )             29.4       Telemetry: Sinus rhythm, PACs, PVCs, bradycardia

## 2018-07-27 NOTE — PROGRESS NOTE ADULT - SUBJECTIVE AND OBJECTIVE BOX
POST OPERATIVE DAY #: 3    86y Male  s/p    Right  TKA         SUBJECTIVE: Patient seen and examined at bedside. yesterday was c/o abdominal pain, BM x4 over night, no diarrhea, + belching. some improvement of abdominal pain today. WBC increased.     Pain:  well controlled   Pain scale:  2 /10  Denies CP, SOB, N/V/D, weakness, numbness   No new complains     OBJECTIVE:     Vital Signs Last 24 Hrs  T(C): 36.9 (27 Jul 2018 08:25), Max: 37.2 (27 Jul 2018 04:27)  T(F): 98.5 (27 Jul 2018 08:25), Max: 98.9 (27 Jul 2018 04:27)  HR: 56 (27 Jul 2018 10:00) (56 - 81)  BP: 103/62 (27 Jul 2018 10:00) (78/50 - 137/66)  BP(mean): 75 (27 Jul 2018 10:00) (57 - 105)  RR: 19 (27 Jul 2018 10:00) (11 - 29)  SpO2: 100% (27 Jul 2018 10:00) (93% - 100%)    Affected extremity: Right LE         Dressing: clean/dry/intact            Sensation: intact to light touch          Motor exam:  5 / 5 Tibialis Anterior/Gastrocnemius-Soleus, EHL/FHL         warm, well-perfused; capillary refill < 3 seconds         negative calf tenderness B/L LE    LABS:                        10.0   18.46 ^ )-----------( 112      ( 27 Jul 2018 06:52 )             29.4     07-27    139  |  108  |  24<H>  ----------------------------<  106<H>  4.4   |  20<L>  |  1.33<H>    Ca    8.5      27 Jul 2018 06:52  Phos  3.4     07-26  Mg     1.2     07-26    TPro  5.1<L>  /  Alb  2.3<L>  /  TBili  1.3<H>  /  DBili  x   /  AST  31  /  ALT  26  /  AlkPhos  61  07-26        MEDICATIONS:  Infection prophylaxis:  piperacillin/tazobactam IVPB. 3.375 Gram(s) IV Intermittent once  piperacillin/tazobactam IVPB. 3.375 Gram(s) IV Intermittent every 8 hours  vancomycin  IVPB 1000 milliGRAM(s) IV Intermittent once    Pain management:  acetaminophen   Tablet. 1000 milliGRAM(s) Oral every 8 hours  ALPRAZolam 0.25 milliGRAM(s) Oral once PRN  ondansetron Injectable 4 milliGRAM(s) IV Push every 6 hours PRN  traMADol 25 milliGRAM(s) Oral every 4 hours PRN  traMADol 50 milliGRAM(s) Oral every 4 hours PRN    DVT prophylaxis:   aspirin enteric coated 162 milliGRAM(s) Oral every 12 hours      RADIOLOGY & ADDITIONAL STUDIES:    ASSESSMENT AND PLAN:     - Analgesic pain control  - DVT prophylaxis:  mg twice a day  SCDs       - Antibiotics: Zosyn/Vancomycin for elevated WBC   - PT/OT: Weight Bearing Status:  Weight bearing as tolerated, OOBTC         CPM: start 0-55 degrees, advance 5-10 degrees, max 110, continue exercises in bed.   -  Incentive spirometry  - IVF  - Bartholomew for close I&O  - Advance diet as tolerated  - Hospitalist is following  - CT chest/abdomen   - Anemia: improving   - Hypotension: Improving   - Hold stool softeners   -  Follow up labs  -  Disposition:GLORY

## 2018-07-27 NOTE — PROGRESS NOTE ADULT - SUBJECTIVE AND OBJECTIVE BOX
Patient is a 86y old  Male who presents with a chief complaint of "Dr Cuellar is going to replace my RIGHT knee" (2018 12:46)    PAST MEDICAL & SURGICAL HISTORY:  Osteoarthritis of right knee  Hypertension  Tremor of both hands  History of hernia surgery: X3 1950&#x27;s-1970&#x27;s  History of shoulder surgery: right, 2012  History of knee replacement, total, left:     KAUSHIK SEARS   86y    Male    BRIEF HOSPITAL COURSE:  R TKR    hypotensive pancolitis karlene yesterday.        Review of Systems:   crampy abdominal pain anorexia nausea      All other ROS are negative.    Allergies    No Known Allergies    Intolerance    ICU Vital Signs Last 24 Hrs  T(C): 37.7 (2018 16:08), Max: 37.7 (2018 16:08)  T(F): 99.8 (2018 16:08), Max: 99.8 (2018 16:08)  HR: 59 (2018 18:00) (56 - 74)  BP: 112/55 (2018 18:00) (90/56 - 160/59)  BP(mean): 73 (2018 18:00) (61 - 89)  ABP: --  ABP(mean): --  RR: 12 (2018 18:00) (11 - 24)  SpO2: 100% (2018 18:00) (96% - 100%)    Physical Examination:    General:     HEENT:  no JVD    PULM: bilateral BS     CVS: s1 s2 reg     ABD:   distended soft tender in lower abd to palpation.      EXT: no edema     SKIN: warm    Neuro:  non focal weak          LABS:                        11.2   20.46 )-----------( 131      ( 2018 16:59 )             32.5     07-27    137  |  105  |  24<H>  ----------------------------<  108<H>  3.7   |  21<L>  |  1.30    Ca    8.6      2018 16:59  Phos  2.4     07-27  Mg     2.0     -27    TPro  6.2  /  Alb  2.5<L>  /  TBili  1.1  /  DBili  x   /  AST  30  /  ALT  23  /  AlkPhos  81  07-27      CARDIAC MARKERS ( 2018 06:52 )  .214 ng/mL / x     / x     / x     / x      CARDIAC MARKERS ( 2018 16:55 )  .059 ng/mL / x     / x     / x     / x      CARDIAC MARKERS ( 2018 07:12 )  .019 ng/mL / x     / x     / x     / x          CAPILLARY BLOOD GLUCOSE      Urinalysis Basic - ( 2018 14:25 )    Color: Yellow / Appearance: Clear / S.020 / pH: x  Gluc: x / Ketone: Negative  / Bili: Negative / Urobili: 1 mg/dL   Blood: x / Protein: 30 mg/dL / Nitrite: Negative   Leuk Esterase: Trace / RBC: 0-2 /HPF / WBC 3-5   Sq Epi: x / Non Sq Epi: Few / Bacteria: Few      CULTURES:  Culture Results:   No growth to date. ( @ 15:20)  Culture Results:   No growth to date. ( @ 15:20)    Medications:  MEDICATIONS  (STANDING):  acetaminophen   Tablet. 1000 milliGRAM(s) Oral every 8 hours  aspirin enteric coated 162 milliGRAM(s) Oral every 12 hours  famotidine    Tablet 20 milliGRAM(s) Oral daily  ferrous    sulfate 325 milliGRAM(s) Oral daily  lactobacillus acidophilus 1 Tablet(s) Oral two times a day with meals  latanoprost 0.005% Ophthalmic Solution 1 Drop(s) Both EYES at bedtime  piperacillin/tazobactam IVPB. 3.375 Gram(s) IV Intermittent every 8 hours  potassium acid phosphate/sodium acid phosphate tablet (K-PHOS No. 2) 1 Tablet(s) Oral four times a day with meals  vancomycin    Solution 125 milliGRAM(s) Oral every 6 hours    MEDICATIONS  (PRN):  aluminum hydroxide/magnesium hydroxide/simethicone Suspension 30 milliLiter(s) Oral four times a day PRN Indigestion  magnesium hydroxide Suspension 30 milliLiter(s) Oral daily PRN Constipation  ondansetron Injectable 4 milliGRAM(s) IV Push every 6 hours PRN Nausea and/or Vomiting  polyethylene glycol 3350 17 Gram(s) Oral daily PRN Constipation  traMADol 25 milliGRAM(s) Oral every 4 hours PRN Mild Pain (1 - 3)  traMADol 50 milliGRAM(s) Oral every 4 hours PRN Moderate Pain (4 - 6)       @ 07:01  -   @ 07:00  --------------------------------------------------------  IN: 5100 mL / OUT: 920 mL / NET: 4180 mL     @ 07:01  -   @ 20:05  --------------------------------------------------------  IN: 350 mL / OUT: 650 mL / NET: -300 mL        RADIOLOGY/IMAGING/ECHO    < from: CT Chest No Cont (18 @ 14:16) >    Bibasilar dependent consolidation/atelectasis and small effusions.   Correlate clinically for active infection.  Mild pretracheal adenopathy.  Cholelithiasis.  Nonobstructive left nephrolithiasis.  Acute uncomplicated pancolitis.  Markedly enlarged prostate.  Additional findings as discussed.          Assessment/Plan:    86M hx HTN OA POD#3 R TKR.  Post op sinus pauses resolved.  Yesterday developed abd pain and volume responsive hypotension     Loose stools with pancolitis, leukemoid reaction,  KARLENE (improved and due to volume depletion from 3rd spacing +/- ATN when he was hypotensive), and lactic acidosis.  R/O C diff.  Recent ABX exposure.   Had amoxicillin last week with the dentist and clindamycin a few weeks ago for a toe infection.     Off IVF now, got 5 liters yesterday UOP OK now.  Sx following     Zosyn started by hospitalist.  Do not think patient has clinical PNA.  Defer to ID but would d/c zosyn.     Oral vancomycin              CRITICAL CARE TIME SPENT: 32 minutes assessing presenting problems of acute illness, which pose high probability of life threatening deterioration or end organ damage/dysfunction, as well as medical decision making including initiating plan of care, reviewing data, reviewing radiologic exams, discussing with multidisciplinary team,  discussing goals of care with patient/family, and writing this note.  Non-inclusive of procedures performed,

## 2018-07-27 NOTE — PROGRESS NOTE ADULT - ASSESSMENT
The patient is an 86 year old male with a history of HTN, OA who is s/p right TKR.    Plan:  - Continue to monitor on telemetry  - Avoid rate lowering agents  - No significant pauses noted on telemetry  - No indication for pacemaker  - Minimize narcotics  - Off IVF for now due to concern for volume overload  - Hold antihypertensives  - CT C/A/P ordered

## 2018-07-27 NOTE — CONSULT NOTE ADULT - SUBJECTIVE AND OBJECTIVE BOX
HPI  Patient is an 86 year old male with a history of HTN, OA who presents for scheduled right TKR  sp surgery on 7/24/18. Post-operatively, he was noted to be bradycardic while sleeping and   hypotension. Bradycardia resolved, Pt was transferred to [facility] last night due to hypotension.   Responded to IVF. Noted to have rising wbc. no fever chills n/v.  had diarrhea today, CT CAP  with atelectasis poss pna and acute pancolitis. Per daughter and pt he recent took amoxicillin  for dental procedure a week or two ago. + abd discomfort. + SOB.     Infectious Disease consult was called to evaluate pt and for antibiotic management.    Past Medical & Surgical Hx:  PAST MEDICAL & SURGICAL HISTORY:  Osteoarthritis of right knee  Hypertension  Tremor of both hands  History of hernia surgery: X3 1950&#x27;s-1970&#x27;s  History of shoulder surgery: right, 2012  History of knee replacement, total, left: 2007      Social History--  EtOH: denies   Tobacco: denies   Drug Use: denies       FAMILY HISTORY:  Family history of heart disease (Father)      Allergies  No Known Allergies    Home/ Out patient  Medications :    Current Inpatient Medications :    ANTIBIOTICS:   piperacillin/tazobactam IVPB. 3.375 Gram(s) IV Intermittent every 8 hours  vancomycin    Solution 125 milliGRAM(s) Oral every 6 hours      OTHER RELEVANT MEDICATIONS :  acetaminophen   Tablet. 1000 milliGRAM(s) Oral every 8 hours  aluminum hydroxide/magnesium hydroxide/simethicone Suspension 30 milliLiter(s) Oral four times a day PRN  aspirin enteric coated 162 milliGRAM(s) Oral every 12 hours  famotidine    Tablet 20 milliGRAM(s) Oral daily  ferrous    sulfate 325 milliGRAM(s) Oral daily  latanoprost 0.005% Ophthalmic Solution 1 Drop(s) Both EYES at bedtime  ondansetron Injectable 4 milliGRAM(s) IV Push every 6 hours PRN  polyethylene glycol 3350 17 Gram(s) Oral daily PRN  potassium acid phosphate/sodium acid phosphate tablet (K-PHOS No. 2) 1 Tablet(s) Oral four times a day with meals  traMADol 25 milliGRAM(s) Oral every 4 hours PRN  traMADol 50 milliGRAM(s) Oral every 4 hours PRN      ROS:  CONSTITUTIONAL:  Negative fever or chills  EYES:  Negative  blurry vision or double vision  CARDIOVASCULAR:  Negative for chest pain or palpitations  RESPIRATORY:  Negative for cough, wheezing, +SOB   GASTROINTESTINAL:  Negative for nausea, vomiting, constipation, + diarrhea, abdominal pain  GENITOURINARY:  Negative frequency, urgency , dysuria or hematuria   NEUROLOGIC:  No headache, confusion, dizziness, lightheadedness  All other systems were reviewed and are negative      I&O's Detail    26 Jul 2018 07:01  -  27 Jul 2018 07:00  --------------------------------------------------------  IN:    IV PiggyBack: 50 mL    Oral Fluid: 100 mL    sodium chloride 0.9%: 250 mL    sodium chloride 0.9%: 700 mL    Sodium Chloride 0.9% IV Bolus: 4000 mL  Total IN: 5100 mL    OUT:    Indwelling Catheter - Urethral: 920 mL  Total OUT: 920 mL    Total NET: 4180 mL      27 Jul 2018 07:01  -  27 Jul 2018 23:08  --------------------------------------------------------  IN:    IV PiggyBack: 450 mL  Total IN: 450 mL    OUT:    Indwelling Catheter - Urethral: 650 mL  Total OUT: 650 mL    Total NET: -200 mL    Physical Exam:  Vital Signs Last 24 Hrs  T(C): 36.8 (27 Jul 2018 20:03), Max: 37.7 (27 Jul 2018 16:08)  T(F): 98.3 (27 Jul 2018 20:03), Max: 99.8 (27 Jul 2018 16:08)  HR: 63 (27 Jul 2018 22:00) (56 - 76)  BP: 100/57 (27 Jul 2018 22:00) (96/54 - 160/59)  BP(mean): 70 (27 Jul 2018 22:00) (61 - 100)  RR: 14 (27 Jul 2018 22:00) (11 - 24)  SpO2: 100% (27 Jul 2018 22:00) (96% - 100%)      General: Acutely ill +SOB  Eyes: sclera anicteric, pupils equal and reactive to light  ENMT: buccal mucosa moist, pharynx not injected  Neck: supple, trachea midline  Lungs: Decreased no wheeze/rhonchi  Cardiovascular: regular rate and rhythm, S1 S2  Abdomen: soft, mildly echo, midl distention  bowel sounds normal  Neurological:  alert and oriented x3, Cranial Nerves II-XII grossly intact  Skin: no rash  Extremities: Right Knee surgical site c/d/i    Labs:                         11.2   20.46 )-----------( 131      ( 27 Jul 2018 16:59 )             32.5   07-27    137  |  105  |  24<H>  ----------------------------<  108<H>  3.7   |  21<L>  |  1.30    Ca    8.6      27 Jul 2018 16:59  Phos  2.4     07-27  Mg     2.0     07-27    TPro  6.2  /  Alb  2.5<L>  /  TBili  1.1  /  DBili  x   /  AST  30  /  ALT  23  /  AlkPhos  81  07-27    Procalcitonin, Serum (07.27.18 @ 18:33)    Procalcitonin, Serum: 3.21: This assay is intended for use to determine the change of PCT over time  as an aid in assessing the cumulative 28-day risk of all-cause mortality  for patients diagnosed with severe sepsis or septic shock in the ICU, or  when obtained in the emergency department or other medical wards prior to  ICU admission. This assay was performed by the Roche iGuiderss YumitS PCT  assay. ng/mL    	  RECENT CULTURES:    Culture - Blood (collected 26 Jul 2018 15:20)  Source: .Blood Blood-Peripheral  Preliminary Report (27 Jul 2018 16:01):    No growth to date.    Culture - Blood (collected 26 Jul 2018 15:20)  Source: .Blood Blood-Peripheral  Preliminary Report (27 Jul 2018 16:01):    No growth to date.      RADIOLOGY & ADDITIONAL STUDIES:    EXAM:  CT CHEST                            PROCEDURE DATE:  07/27/2018          INTERPRETATION:  History: Postop, high white count.    CT chest abdomen and pelvis no oral or IV contrast. Patchy bibasilar   dependent consolidation/atelectasis. Correlate clinically for active   infection. Small bilateral layering pleural effusions right slightly   larger than left.  Central airways patent. Enlarged pretracheal lymph node measures up to 2   cm . Nonaneurysmal thoracic aorta.  Mild cardiomegaly with coronary artery calcination. Decrease in cardiac   chamber blood pool attenuation suggests an anemic state. Trace   pericardial effusion.  Calcified gallstone. No biliary dilatation. Unenhanced liver pancreas   spleen adrenals not remarkable.  Nonaneurysmal abdominal aorta.  No suspicious retroperitoneal adenopathy.  There is a 2 mm nonobstructing left renal calculus. Multiple left renal   cortical hypodensities cannot be definitively characterized probably   represent cysts. Bilateral nonspecific perirenal stranding.  No evidence of appendicitis.  There is mild diffuse colonic wall thickening with associated fat   stranding consistent with pancolitis. Differential diagnosis would have   to include but is not limited to C. difficile colitis.Colonic   diverticula but no definite diverticulitis. No bowel obstruction or   extraluminal fluid or gas.  Prostate significantly enlarged (6.3 x 6 cm). Bladder decompressed with   Bartholomew. Clips left inguinal region.  Degenerative change lower lumbarspine. Nonspecific sclerotic foci right   ilium and sacrum.    Impression:    Bibasilar dependent consolidation/atelectasis and small effusions.   Correlate clinically for active infection.  Mild pretracheal adenopathy.  Cholelithiasis.  Nonobstructive left nephrolithiasis.  Acute uncomplicated pancolitis.  Markedly enlarged prostate.  Additional findings as discussed.    Assessment :     Patient is an 86 year old male with a history of HTN, OA sp right TKR 7/24/18  complicated by sepsis  Acute pancolitis  Rule out Cdiff  Doubt pna likely atelectasis  Leukocytosis   KARLENE resolved  +troponins      Plan :   Zosyn and po vanc   Fu cultures  Stool for Cdiff   Trend temp and wbc  If Cdiff positive dc Zosyn    D/w Dr Hernández    Continue with present regime .  Approptiate use of antibiotics and adverse effects reviewed.      I have discussed the above plan of care with patientt's family in detail. They expressed understanding of the treatment plan . Risks, benefits and alternatives discussed in detail. I have asked if they have any questions or concerns and appropriately addressed them to the best of my ability .      Critical care> 55 minutes spent in direct patient care reviewing  the notes, lab data/ imaging , discussion with multidisciplinary team. All questions were addressed and answered to the best of my capacity .    Thank you for allowing me to participate in the care of your patient .      Yoon Sepulveda MD  Infectious Disease  [phone]

## 2018-07-27 NOTE — PROVIDER CONTACT NOTE (CRITICAL VALUE NOTIFICATION) - ASSESSMENT
asymptomatic
pt axox3, denied chest discomforts  afebrile. bp 90/56, pulse 60, o2 sat 99% w/ 2liter nasal   no acute distress noted

## 2018-07-27 NOTE — PROGRESS NOTE ADULT - SUBJECTIVE AND OBJECTIVE BOX
pt seen  sleeping comfortable  CT done today  ICU Vital Signs Last 24 Hrs  T(C): 37.7 (27 Jul 2018 16:08), Max: 37.7 (27 Jul 2018 16:08)  T(F): 99.8 (27 Jul 2018 16:08), Max: 99.8 (27 Jul 2018 16:08)  HR: 59 (27 Jul 2018 18:00) (56 - 75)  BP: 112/55 (27 Jul 2018 18:00) (90/56 - 160/59)  BP(mean): 73 (27 Jul 2018 18:00) (61 - 89)  ABP: --  ABP(mean): --  RR: 12 (27 Jul 2018 18:00) (11 - 28)  SpO2: 100% (27 Jul 2018 18:00) (96% - 100%)  gen-NAD  resp-bases wet  RRR  abd-soft NT/ND                            11.2   20.46 )-----------( 131      ( 27 Jul 2018 16:59 )             32.5     < from: CT Chest No Cont (07.27.18 @ 14:16) >    Impression:    Bibasilar dependent consolidation/atelectasis and small effusions.   Correlate clinically for active infection.  Mild pretracheal adenopathy.  Cholelithiasis.  Nonobstructive left nephrolithiasis.  Acute uncomplicated pancolitis.  Markedly enlarged prostate.  Additional findings as discussed.    < end of copied text >  < from: CT Chest No Cont (07.27.18 @ 14:16) >    Impression:    Bibasilar dependent consolidation/atelectasis and small effusions.   Correlate clinically for active infection.  Mild pretracheal adenopathy.  Cholelithiasis.  Nonobstructive left nephrolithiasis.  Acute uncomplicated pancolitis.  Markedly enlarged prostate.  Additional findings as discussed.    < end of copied text >

## 2018-07-27 NOTE — PROGRESS NOTE ADULT - ASSESSMENT
87 yo s/p R knee surgery, now with pancolitis.  Poss c diff.         cont abx       am labs       no acute surgical intervention at this time.  Will follow

## 2018-07-27 NOTE — PROGRESS NOTE ADULT - ASSESSMENT
86 white male with a history of HTN, OA, S/P RT TKR now hypotension and KARLENE with decreased urine out put.   KARLENE is most likely due to pre renal azotemia complicated by ATN due to hypotension.   renal sonogram is negative.   off of IVF now.  renal indices are improving.   scheduled for CT scan of the abdomen and  pelvis.   spoke to family yesterday and today at bed side.   medications reviewed. will follow.

## 2018-07-27 NOTE — PROGRESS NOTE ADULT - ASSESSMENT
Patient is 85 yo male presenting with     1. S/P right Total knee replacement.  Continue with pain management, DVT proph, and wound care as per Ortho.  PT/OT  2. HTN.  Hold BP medication and Monitor BP  3.  Bradycardia with few pauses.  Resolved.    4. Hypotension with acute renal failure.  Improved with IVF.  BP stable.  Nephrology to follow up   5.  Pancolitis/Possible PNA.  Will send for Stool culture, sputum culture, C-diff.  BC/UC pending.  Will start patient on PO vanco, and IV zosyn as per ID.  Monitor clinical Pic.    -Critical care to follow up   -TSH level pending     6.  Anemia.  due to post-operative blood loss.  Asymptomatic.  Monitor H/H.  Iron supplement    Plan of care was discussed with patient and daughter/HCP in great details, All questions were answered to their satisfaction  Seems to understand, and in agreement

## 2018-07-27 NOTE — PROGRESS NOTE ADULT - SUBJECTIVE AND OBJECTIVE BOX
KAUSHIK SEARS  86y  Male    Patient is a 86y old  Male who presents with a chief complaint of " Polo is going to replace my RIGHT knee" (2018 12:46)      awake and alert.   family at bed side.  off of IVF now.  urine out put has improved.      PAST MEDICAL & SURGICAL HISTORY:  Osteoarthritis of right knee  Hypertension  Tremor of both hands  History of hernia surgery: X3 1950&#x27;s-1970&#x27;s  History of shoulder surgery: right, 2012  History of knee replacement, total, left:     PHYSICAL EXAM:    T(C): 37 (18 @ 13:06), Max: 37.2 (18 @ 04:27)  HR: 56 (18 @ 12:00) (56 - 81)  BP: 104/64 (18 @ 12:00) (78/50 - 137/66)  RR: 16 (18 @ 12:00) (11 - 28)  SpO2: 98% (18 @ 12:00) (96% - 100%)  Wt(kg): --    I&O's Detail    2018 07:  -  2018 07:00  --------------------------------------------------------  IN:    IV PiggyBack: 50 mL    Oral Fluid: 100 mL    sodium chloride 0.9%: 250 mL    sodium chloride 0.9%: 700 mL    Sodium Chloride 0.9% IV Bolus: 4000 mL  Total IN: 5100 mL    OUT:    Indwelling Catheter - Urethral: 920 mL  Total OUT: 920 mL    Total NET: 4180 mL      2018 07:  -  2018 13:09  --------------------------------------------------------  IN:    IV PiggyBack: 350 mL  Total IN: 350 mL    OUT:  Total OUT: 0 mL    Total NET: 350 mL    Respiratory: clear anteriorly, decreased BS at bases  Cardiovascular: S1 S2  Gastrointestinal: soft NT ND +BS  Extremities: edema   Neuro: Awake and alert    MEDICATIONS  (STANDING):  acetaminophen   Tablet. 1000 milliGRAM(s) Oral every 8 hours  aspirin enteric coated 162 milliGRAM(s) Oral every 12 hours  docusate sodium 100 milliGRAM(s) Oral three times a day  ferrous    sulfate 325 milliGRAM(s) Oral daily  latanoprost 0.005% Ophthalmic Solution 1 Drop(s) Both EYES at bedtime  pantoprazole    Tablet 40 milliGRAM(s) Oral before breakfast  piperacillin/tazobactam IVPB. 3.375 Gram(s) IV Intermittent every 8 hours  senna 2 Tablet(s) Oral at bedtime    MEDICATIONS  (PRN):  aluminum hydroxide/magnesium hydroxide/simethicone Suspension 30 milliLiter(s) Oral four times a day PRN Indigestion  bisacodyl Suppository 10 milliGRAM(s) Rectal daily PRN If no bowel movement by postoperative day #2  magnesium hydroxide Suspension 30 milliLiter(s) Oral daily PRN Constipation  ondansetron Injectable 4 milliGRAM(s) IV Push every 6 hours PRN Nausea and/or Vomiting  polyethylene glycol 3350 17 Gram(s) Oral daily PRN Constipation  traMADol 25 milliGRAM(s) Oral every 4 hours PRN Mild Pain (1 - 3)  traMADol 50 milliGRAM(s) Oral every 4 hours PRN Moderate Pain (4 - 6)                            10.0   18.46 )-----------( 112      ( 2018 06:52 )             29.4       07-27    139  |  108  |  24<H>  ----------------------------<  106<H>  4.4   |  20<L>  |  1.33<H>    Ca    8.5      2018 06:52  Phos  3.4     07-  Mg     1.2     -    TPro  5.1<L>  /  Alb  2.3<L>  /  TBili  1.3<H>  /  DBili  x   /  AST  31  /  ALT  26  /  AlkPhos  61  07-      Urinalysis Basic - ( 2018 14:25 )    Color: Yellow / Appearance: Clear / S.020 / pH: x  Gluc: x / Ketone: Negative  / Bili: Negative / Urobili: 1 mg/dL   Blood: x / Protein: 30 mg/dL / Nitrite: Negative   Leuk Esterase: Trace / RBC: 0-2 /HPF / WBC 3-5   Sq Epi: x / Non Sq Epi: Few / Bacteria: Few      < from: US Kidney and Bladder (18 @ 13:28) >  EXAM:  US KIDNEYS AND BLADDER                                  PROCEDURE DATE:  2018          INTERPRETATION:  History: Acute kidney injury.    Bilateral renal ultrasound.    Right kidney 12.3 cm long dimension the left 12.5 cm. No hydronephrosis,   shadowing calculus or solid/suspicious renal lesion bilaterally. There is   a simple cortical cyst left lower pole 2.6 x 3 cm.  Bladder appears unremarkable. Prostate is enlarged 5.3 x 5.1 cm.   Correlate with PSA levels, and findings on digital rectal exam.    Impression: No hydronephrosis. Simple left renal cortical cyst. Enlarged   prostate gland.

## 2018-07-27 NOTE — PROGRESS NOTE ADULT - SUBJECTIVE AND OBJECTIVE BOX
CC.  S/P Right Total knee replacement   HPI.  Patient reports right knee pain is controlled.  BP improved with hydration.  Currently Off fluid.  + watery diarrhea this morning.  Recent antibiotic for root canal.  + abdm discomfort.  Denies any Chest pain, SOB, palpitation, headache, dizziness, blurry vision, numbness or weakness                  Constitutional: No fever, fatigue or weight loss.  Skin: No rash.  Eyes: No recent vision problems or eye pain.  ENT: No congestion, ear pain, or sore throat.  Endocrine: No thyroid problems.  Cardiovascular: No chest pain or palpation.  Respiratory: No cough, shortness of breath, congestion, or wheezing.  Gastrointestinal: + N/D.  No Vomiting  Genitourinary: No dysuria.  Musculoskeletal: No joint swelling.  Neurologic: No headache.      Vital Signs Last 24 Hrs  T(C): 37.7 (2018 16:08), Max: 37.7 (2018 16:08)  T(F): 99.8 (2018 16:08), Max: 99.8 (2018 16:08)  HR: 74 (2018 16:00) (56 - 81)  BP: 160/59 (2018 16:00) (90/56 - 160/59)  BP(mean): 87 (2018 16:00) (61 - 105)  RR: 20 (2018 16:00) (11 - 28)  SpO2: 100% (2018 16:00) (96% - 100%)        PHYSICAL EXAM-  GENERAL: NAD  HEAD:  Atraumatic, Normocephalic  EYES: EOMI, PERRLA, conjunctiva and sclera clear  NECK: Supple, No JVD, Normal thyroid  NERVOUS SYSTEM:  Alert & Oriented X3, Moving all extremities  CHEST/LUNG: Clear to percussion bilaterally; No rales, rhonchi, wheezing, or rubs  HEART: Regular rate and rhythm; No murmurs, rubs, or gallops  ABDOMEN: + diffuse tenderness.  + distended.  hyperactive BS.  No rebound or guarding  EXTREMITIES:  No clubbing, cyanosis, or edema  SKIN: No rashes or lesions                                     10.0   18.46 )-----------( 112      ( 2018 06:52 )             29.4     07-27    139  |  108  |  24<H>  ----------------------------<  106<H>  4.4   |  20<L>  |  1.33<H>    Ca    8.5      2018 06:52  Phos  3.4       Mg     1.2         TPro  5.1<L>  /  Alb  2.3<L>  /  TBili  1.3<H>  /  DBili  x   /  AST  31  /  ALT  26  /  AlkPhos  61      CARDIAC MARKERS ( 2018 06:52 )  .214 ng/mL / x     / x     / x     / x      CARDIAC MARKERS ( 2018 16:55 )  .059 ng/mL / x     / x     / x     / x      CARDIAC MARKERS ( 2018 07:12 )  .019 ng/mL / x     / x     / x     / x            Urinalysis Basic - ( 2018 14:25 )    Color: Yellow / Appearance: Clear / S.020 / pH: x  Gluc: x / Ketone: Negative  / Bili: Negative / Urobili: 1 mg/dL   Blood: x / Protein: 30 mg/dL / Nitrite: Negative   Leuk Esterase: Trace / RBC: 0-2 /HPF / WBC 3-5   Sq Epi: x / Non Sq Epi: Few / Bacteria: Few      MEDICATIONS  (STANDING):  acetaminophen   Tablet. 1000 milliGRAM(s) Oral every 8 hours  aspirin enteric coated 162 milliGRAM(s) Oral every 12 hours  famotidine    Tablet 20 milliGRAM(s) Oral daily  ferrous    sulfate 325 milliGRAM(s) Oral daily  latanoprost 0.005% Ophthalmic Solution 1 Drop(s) Both EYES at bedtime  piperacillin/tazobactam IVPB. 3.375 Gram(s) IV Intermittent every 8 hours  vancomycin    Solution 125 milliGRAM(s) Oral every 6 hours    MEDICATIONS  (PRN):  aluminum hydroxide/magnesium hydroxide/simethicone Suspension 30 milliLiter(s) Oral four times a day PRN Indigestion  magnesium hydroxide Suspension 30 milliLiter(s) Oral daily PRN Constipation  ondansetron Injectable 4 milliGRAM(s) IV Push every 6 hours PRN Nausea and/or Vomiting  polyethylene glycol 3350 17 Gram(s) Oral daily PRN Constipation  traMADol 25 milliGRAM(s) Oral every 4 hours PRN Mild Pain (1 - 3)  traMADol 50 milliGRAM(s) Oral every 4 hours PRN Moderate Pain (4 - 6)

## 2018-07-27 NOTE — PROVIDER CONTACT NOTE (CRITICAL VALUE NOTIFICATION) - ACTION/TREATMENT ORDERED:
no new orders.fluid in progress.will repeat lactate level
no orders made
see new order
no order made
continue to current tx

## 2018-07-27 NOTE — PROVIDER CONTACT NOTE (CRITICAL VALUE NOTIFICATION) - BACKGROUND
s/p right total knee replacement
s/p Rt. TKR on july 24, 2018  transferred from  [facility] for hypotension today  previous serum lactate level was 3.0  total 5 liter of iv bolus was given

## 2018-07-28 DIAGNOSIS — R10.9 UNSPECIFIED ABDOMINAL PAIN: ICD-10-CM

## 2018-07-28 DIAGNOSIS — A04.72 ENTEROCOLITIS DUE TO CLOSTRIDIUM DIFFICILE, NOT SPECIFIED AS RECURRENT: ICD-10-CM

## 2018-07-28 LAB
ALBUMIN SERPL ELPH-MCNC: 2.2 G/DL — LOW (ref 3.3–5)
ALP SERPL-CCNC: 73 U/L — SIGNIFICANT CHANGE UP (ref 30–120)
ALT FLD-CCNC: 23 U/L DA — SIGNIFICANT CHANGE UP (ref 10–60)
ANION GAP SERPL CALC-SCNC: 9 MMOL/L — SIGNIFICANT CHANGE UP (ref 5–17)
AST SERPL-CCNC: 24 U/L — SIGNIFICANT CHANGE UP (ref 10–40)
BILIRUB SERPL-MCNC: 0.9 MG/DL — SIGNIFICANT CHANGE UP (ref 0.2–1.2)
BUN SERPL-MCNC: 20 MG/DL — SIGNIFICANT CHANGE UP (ref 7–23)
C DIFF BY PCR RESULT: DETECTED
C DIFF TOX GENS STL QL NAA+PROBE: SIGNIFICANT CHANGE UP
CALCIUM SERPL-MCNC: 8.3 MG/DL — LOW (ref 8.4–10.5)
CHLORIDE SERPL-SCNC: 108 MMOL/L — SIGNIFICANT CHANGE UP (ref 96–108)
CO2 SERPL-SCNC: 22 MMOL/L — SIGNIFICANT CHANGE UP (ref 22–31)
CORTIS AM PEAK SERPL-MCNC: 22.4 UG/DL — HIGH (ref 6–18.4)
CREAT SERPL-MCNC: 1.22 MG/DL — SIGNIFICANT CHANGE UP (ref 0.5–1.3)
CRP SERPL-MCNC: 31.12 MG/DL — HIGH (ref 0–0.4)
GLUCOSE SERPL-MCNC: 106 MG/DL — HIGH (ref 70–99)
HCT VFR BLD CALC: 31.8 % — LOW (ref 39–50)
HGB BLD-MCNC: 10.8 G/DL — LOW (ref 13–17)
MAGNESIUM SERPL-MCNC: 2.5 MG/DL — SIGNIFICANT CHANGE UP (ref 1.6–2.6)
MCHC RBC-ENTMCNC: 32.1 PG — SIGNIFICANT CHANGE UP (ref 27–34)
MCHC RBC-ENTMCNC: 34 GM/DL — SIGNIFICANT CHANGE UP (ref 32–36)
MCV RBC AUTO: 94.6 FL — SIGNIFICANT CHANGE UP (ref 80–100)
NRBC # BLD: 0 /100 WBCS — SIGNIFICANT CHANGE UP (ref 0–0)
PHOSPHATE SERPL-MCNC: 2.9 MG/DL — SIGNIFICANT CHANGE UP (ref 2.5–4.5)
PLATELET # BLD AUTO: 142 K/UL — LOW (ref 150–400)
POTASSIUM SERPL-MCNC: 3.6 MMOL/L — SIGNIFICANT CHANGE UP (ref 3.5–5.3)
POTASSIUM SERPL-SCNC: 3.6 MMOL/L — SIGNIFICANT CHANGE UP (ref 3.5–5.3)
PROT SERPL-MCNC: 5.5 G/DL — LOW (ref 6–8.3)
RBC # BLD: 3.36 M/UL — LOW (ref 4.2–5.8)
RBC # FLD: 12.6 % — SIGNIFICANT CHANGE UP (ref 10.3–14.5)
SODIUM SERPL-SCNC: 139 MMOL/L — SIGNIFICANT CHANGE UP (ref 135–145)
TROPONIN I SERPL-MCNC: 0.15 NG/ML — HIGH (ref 0.02–0.06)
WBC # BLD: 19.95 K/UL — HIGH (ref 3.8–10.5)
WBC # FLD AUTO: 19.95 K/UL — HIGH (ref 3.8–10.5)

## 2018-07-28 PROCEDURE — 99233 SBSQ HOSP IP/OBS HIGH 50: CPT

## 2018-07-28 RX ORDER — TAMSULOSIN HYDROCHLORIDE 0.4 MG/1
0.4 CAPSULE ORAL AT BEDTIME
Qty: 0 | Refills: 0 | Status: DISCONTINUED | OUTPATIENT
Start: 2018-07-28 | End: 2018-08-04

## 2018-07-28 RX ORDER — METRONIDAZOLE 500 MG
500 TABLET ORAL EVERY 8 HOURS
Qty: 0 | Refills: 0 | Status: COMPLETED | OUTPATIENT
Start: 2018-07-28 | End: 2018-08-01

## 2018-07-28 RX ORDER — ACETAMINOPHEN 500 MG
1000 TABLET ORAL ONCE
Qty: 0 | Refills: 0 | Status: COMPLETED | OUTPATIENT
Start: 2018-07-28 | End: 2018-07-28

## 2018-07-28 RX ORDER — SODIUM CHLORIDE 9 MG/ML
1000 INJECTION, SOLUTION INTRAVENOUS
Qty: 0 | Refills: 0 | Status: DISCONTINUED | OUTPATIENT
Start: 2018-07-28 | End: 2018-08-02

## 2018-07-28 RX ADMIN — Medication 1 TABLET(S): at 12:35

## 2018-07-28 RX ADMIN — Medication 1 TABLET(S): at 17:09

## 2018-07-28 RX ADMIN — Medication 125 MILLIGRAM(S): at 06:01

## 2018-07-28 RX ADMIN — Medication 125 MILLIGRAM(S): at 17:10

## 2018-07-28 RX ADMIN — Medication 100 MILLIGRAM(S): at 22:06

## 2018-07-28 RX ADMIN — Medication 400 MILLIGRAM(S): at 21:05

## 2018-07-28 RX ADMIN — Medication 325 MILLIGRAM(S): at 12:35

## 2018-07-28 RX ADMIN — Medication 1000 MILLIGRAM(S): at 21:35

## 2018-07-28 RX ADMIN — Medication 1000 MILLIGRAM(S): at 06:01

## 2018-07-28 RX ADMIN — Medication 125 MILLIGRAM(S): at 23:05

## 2018-07-28 RX ADMIN — FAMOTIDINE 20 MILLIGRAM(S): 10 INJECTION INTRAVENOUS at 12:35

## 2018-07-28 RX ADMIN — PIPERACILLIN AND TAZOBACTAM 25 GRAM(S): 4; .5 INJECTION, POWDER, LYOPHILIZED, FOR SOLUTION INTRAVENOUS at 06:01

## 2018-07-28 RX ADMIN — Medication 1000 MILLIGRAM(S): at 13:30

## 2018-07-28 RX ADMIN — SODIUM CHLORIDE 50 MILLILITER(S): 9 INJECTION, SOLUTION INTRAVENOUS at 17:09

## 2018-07-28 RX ADMIN — LATANOPROST 1 DROP(S): 0.05 SOLUTION/ DROPS OPHTHALMIC; TOPICAL at 21:06

## 2018-07-28 RX ADMIN — Medication 162 MILLIGRAM(S): at 21:06

## 2018-07-28 RX ADMIN — TAMSULOSIN HYDROCHLORIDE 0.4 MILLIGRAM(S): 0.4 CAPSULE ORAL at 21:06

## 2018-07-28 RX ADMIN — Medication 10 MILLIGRAM(S): at 16:23

## 2018-07-28 RX ADMIN — Medication 100 MILLIGRAM(S): at 13:20

## 2018-07-28 RX ADMIN — Medication 1 TABLET(S): at 08:10

## 2018-07-28 RX ADMIN — Medication 1 TABLET(S): at 21:06

## 2018-07-28 RX ADMIN — Medication 125 MILLIGRAM(S): at 12:35

## 2018-07-28 RX ADMIN — Medication 162 MILLIGRAM(S): at 08:10

## 2018-07-28 RX ADMIN — Medication 1000 MILLIGRAM(S): at 05:07

## 2018-07-28 RX ADMIN — Medication 1000 MILLIGRAM(S): at 12:35

## 2018-07-28 NOTE — PROGRESS NOTE ADULT - SUBJECTIVE AND OBJECTIVE BOX
HISTORY      24 HOUR EVENTS:  Patient C Diff positive. Still with complaints of abdominal pain minimally relieved with pain medications. Minimal appetite/PO intake. NO fevers. No CP, SOB, N/V. pain to leg is minimal. Complaining of dry mouth.     SUBJECTIVE/ROS:  [x ] A ten-point review of systems was otherwise negative except as noted.  [ ] Due to altered mental status/intubation, subjective information were not able to be obtained from the patient. History was obtained, to the extent possible, from review of the chart and collateral sources of information.      NEURO  RASS:0    GCS:  15   CAM ICU:Neg  Exam: A&O x 3, no focal deficits LOUIS. Incision to LLE c/d (staples)  Meds: acetaminophen   Tablet. 1000 milliGRAM(s) Oral every 8 hours  ondansetron Injectable 4 milliGRAM(s) IV Push every 6 hours PRN Nausea and/or Vomiting  traMADol 25 milliGRAM(s) Oral every 4 hours PRN Mild Pain (1 - 3)  traMADol 50 milliGRAM(s) Oral every 4 hours PRN Moderate Pain (4 - 6)    [x] Adequacy of sedation and pain control has been assessed and adjusted      RESPIRATORY  RR: 18 (07-28-18 @ 22:00) (9 - 19)  SpO2: 94% (07-28-18 @ 22:00) (94% - 100%)  Wt(kg): --  Exam: unlabored, clear to auscultation bilaterally  Mechanical Ventilation: NA  ABG - ( 27 Jul 2018 06:52 )  pH: x     /  pCO2: x     /  pO2: x     / HCO3: x     / Base Excess: x     /  SaO2: x       Lactate: 2.3              [ NA] Extubation Readiness Assessed  Meds:       CARDIOVASCULAR  HR: 55 (07-28-18 @ 22:00) (51 - 73)  BP: 111/53 (07-28-18 @ 22:00) (96/75 - 137/54)  BP(mean): 72 (07-28-18 @ 22:00) (70 - 91)  ABP: --  ABP(mean): --  Wt(kg): --  CVP(cm H2O): --  VBG - ( 27 Jul 2018 19:58 )  pH: 7.36  /  pCO2: 35    /  pO2: 20    / HCO3: 19    / Base Excess: -5.3  /  SaO2: 25     Lactate: x          Exam: S1S2  Cardiac Rhythm: NSR  Perfusion     [x ]Adequate   [ ]Inadequate  Mentation   [ x]Normal       [ ]Reduced  Extremities  [ x]Warm         [ ]Cool  Volume Status [ ]Hypervolemic [x ]Euvolemic [ ]Hypovolemic  Meds: tamsulosin 0.4 milliGRAM(s) Oral at bedtime        GI/NUTRITION  Exam: Soft, Moderately distended. No guarding or rebound.   Diet: Minimal PO  Meds: aluminum hydroxide/magnesium hydroxide/simethicone Suspension 30 milliLiter(s) Oral four times a day PRN Indigestion  dicyclomine 10 milliGRAM(s) Oral three times a day before meals  famotidine    Tablet 20 milliGRAM(s) Oral daily  polyethylene glycol 3350 17 Gram(s) Oral daily PRN Constipation      GENITOURINARY  I&O's Detail    07-27 @ 07:01  -  07-28 @ 07:00  --------------------------------------------------------  IN:    IV PiggyBack: 550 mL    Oral Fluid: 150 mL  Total IN: 700 mL    OUT:    Indwelling Catheter - Urethral: 1300 mL  Total OUT: 1300 mL    Total NET: -600 mL      07-28 @ 07:01 - 07-28 @ 23:23  --------------------------------------------------------  IN:    dextrose 5% + sodium chloride 0.45%.: 350 mL    IV PiggyBack: 200 mL    Oral Fluid: 120 mL  Total IN: 670 mL    OUT:    Indwelling Catheter - Urethral: 150 mL    Voided: 300 mL  Total OUT: 450 mL    Total NET: 220 mL          07-28    139  |  108  |  20  ----------------------------<  106<H>  3.6   |  22  |  1.22    Ca    8.3<L>      28 Jul 2018 06:43  Phos  2.9     07-28  Mg     2.5     07-28    TPro  5.5<L>  /  Alb  2.2<L>  /  TBili  0.9  /  DBili  x   /  AST  24  /  ALT  23  /  AlkPhos  73  07-28    [x ] Bartholomew catheter, indication: N/A  Meds: dextrose 5% + sodium chloride 0.45%. 1000 milliLiter(s) IV Continuous <Continuous>  ferrous    sulfate 325 milliGRAM(s) Oral daily        HEMATOLOGIC  Meds: aspirin enteric coated 162 milliGRAM(s) Oral every 12 hours    [x] VTE Prophylaxis                        10.8   19.95 )-----------( 142      ( 28 Jul 2018 06:43 )             31.8       Transfusion     [ ] PRBC   [ ] Platelets   [ ] FFP   [ ] Cryoprecipitate      INFECTIOUS DISEASES  T(C): 36.2 (07-28-18 @ 23:21), Max: 37.7 (07-27-18 @ 23:42)  Wt(kg): --  WBC Count: 19.95 K/uL (07-28 @ 06:43)    Recent Cultures:  Specimen Source: .Stool Feces, 07-27 @ 21:49; Results   No enteric pathogens to date: Final culture pending; Gram Stain: --; Organism: --  Specimen Source: .Blood Blood-Peripheral, 07-26 @ 15:20; Results   No growth to date.; Gram Stain: --; Organism: --    Meds: metroNIDAZOLE  IVPB 500 milliGRAM(s) IV Intermittent every 8 hours  vancomycin    Solution 125 milliGRAM(s) Oral every 6 hours        ENDOCRINE  Capillary Blood Glucose    Meds:       ACCESS DEVICES:  [x ] Peripheral IV  [ ] Central Venous Line	[ ] R	[ ] L	[ ] IJ	[ ] Fem	[ ] SC	Placed:   [ ] Arterial Line		[ ] R	[ ] L	[ ] Fem	[ ] Rad	[ ] Ax	Placed:   [ ] PICC:					[ ] Mediport  [ ] Urinary Catheter, Date Placed:   [ ] Necessity of urinary, arterial, and venous catheters discussed    OTHER MEDICATIONS:  lactobacillus acidophilus 1 Tablet(s) Oral two times a day with meals  latanoprost 0.005% Ophthalmic Solution 1 Drop(s) Both EYES at bedtime      CODE STATUS: Full    IMAGING:

## 2018-07-28 NOTE — PROGRESS NOTE ADULT - ASSESSMENT
87 yo M s/p R TKR now with :    1. C Diff colitis  2. Abdominal pain  3. ATN    -Continue with antibiotics for C Diff. Serial abdominal exams. No signs of toxic megacolon. No need for surgical intervention needed at present. Conservative therapy with Judicious use of IVF. Diet as tolerated. Will give IV Tylenol in lieu of PO Tylenol as patient having tough time tolerating by mouth.     -Avoid nephrotoxic agents. Follow up creatinine in the AM.     -WIll try and avoid narcotics with concern it may worsen GI symptoms.

## 2018-07-28 NOTE — PROGRESS NOTE ADULT - ASSESSMENT
86 white male with a history of HTN, OA, S/P RT TKR now hypotension and KARLENE with decreased urine out put.   KARLENE is most likely due to pre renal azotemia complicated by ATN due to hypotension.   renal sonogram is negative. Patient is now C. Diff positive. Continue supportive care.

## 2018-07-28 NOTE — PROGRESS NOTE ADULT - SUBJECTIVE AND OBJECTIVE BOX
pt seen  states feels worse today  more abdominal pain  burping, -n-v  +F+BM,   C diff +  ICU Vital Signs Last 24 Hrs  T(C): 37.1 (28 Jul 2018 08:13), Max: 37.7 (27 Jul 2018 16:08)  T(F): 98.7 (28 Jul 2018 08:13), Max: 99.9 (27 Jul 2018 23:42)  HR: 59 (28 Jul 2018 09:40) (57 - 76)  BP: 108/53 (28 Jul 2018 09:40) (100/57 - 160/59)  BP(mean): 80 (28 Jul 2018 08:00) (70 - 100)  ABP: --  ABP(mean): --  RR: 19 (28 Jul 2018 08:00) (9 - 21)  SpO2: 100% (28 Jul 2018 09:40) (100% - 100%)  gen-NAD  resp-clear  abd-soft, mild distension , tender throughout, -rebound, -guarding                          10.8   19.95 )-----------( 142      ( 28 Jul 2018 06:43 )             31.8   07-28    139  |  108  |  20  ----------------------------<  106<H>  3.6   |  22  |  1.22    Ca    8.3<L>      28 Jul 2018 06:43  Phos  2.9     07-28  Mg     2.5     07-28    TPro  5.5<L>  /  Alb  2.2<L>  /  TBili  0.9  /  DBili  x   /  AST  24  /  ALT  23  /  AlkPhos  73  07-28

## 2018-07-28 NOTE — PROGRESS NOTE ADULT - SUBJECTIVE AND OBJECTIVE BOX
Date/Time Patient Seen:  		  Referring MD:   Data Reviewed	       Patient is a 86y old  Male who presents with a chief complaint of " Polo is going to replace my RIGHT knee" (25 Jul 2018 12:46)  vs and meds reviewed  on emp ABX  ID eval noted  ct imaging reviewed        Subjective/HPI     PAST MEDICAL & SURGICAL HISTORY:  Osteoarthritis of right knee  Hypertension  Tremor of both hands  History of hernia surgery: X3 1950&#x27;s-1970&#x27;s  History of shoulder surgery: right, 2012  History of knee replacement, total, left: 2007        Medication list         MEDICATIONS  (STANDING):  acetaminophen   Tablet. 1000 milliGRAM(s) Oral every 8 hours  aspirin enteric coated 162 milliGRAM(s) Oral every 12 hours  famotidine    Tablet 20 milliGRAM(s) Oral daily  ferrous    sulfate 325 milliGRAM(s) Oral daily  lactobacillus acidophilus 1 Tablet(s) Oral two times a day with meals  latanoprost 0.005% Ophthalmic Solution 1 Drop(s) Both EYES at bedtime  piperacillin/tazobactam IVPB. 3.375 Gram(s) IV Intermittent every 8 hours  potassium acid phosphate/sodium acid phosphate tablet (K-PHOS No. 2) 1 Tablet(s) Oral four times a day with meals  vancomycin    Solution 125 milliGRAM(s) Oral every 6 hours    MEDICATIONS  (PRN):  aluminum hydroxide/magnesium hydroxide/simethicone Suspension 30 milliLiter(s) Oral four times a day PRN Indigestion  ondansetron Injectable 4 milliGRAM(s) IV Push every 6 hours PRN Nausea and/or Vomiting  polyethylene glycol 3350 17 Gram(s) Oral daily PRN Constipation  traMADol 25 milliGRAM(s) Oral every 4 hours PRN Mild Pain (1 - 3)  traMADol 50 milliGRAM(s) Oral every 4 hours PRN Moderate Pain (4 - 6)         Vitals log        ICU Vital Signs Last 24 Hrs  T(C): 36.7 (28 Jul 2018 04:46), Max: 37.7 (27 Jul 2018 16:08)  T(F): 98.1 (28 Jul 2018 04:46), Max: 99.9 (27 Jul 2018 23:42)  HR: 61 (28 Jul 2018 06:00) (56 - 76)  BP: 113/52 (28 Jul 2018 06:00) (98/56 - 160/59)  BP(mean): 71 (28 Jul 2018 06:00) (70 - 100)  ABP: --  ABP(mean): --  RR: 19 (28 Jul 2018 06:00) (9 - 21)  SpO2: 100% (28 Jul 2018 06:00) (98% - 100%)           Input and Output:  I&O's Detail    26 Jul 2018 07:01  -  27 Jul 2018 07:00  --------------------------------------------------------  IN:    IV PiggyBack: 50 mL    Oral Fluid: 100 mL    sodium chloride 0.9%: 250 mL    sodium chloride 0.9%: 700 mL    Sodium Chloride 0.9% IV Bolus: 4000 mL  Total IN: 5100 mL    OUT:    Indwelling Catheter - Urethral: 920 mL  Total OUT: 920 mL    Total NET: 4180 mL      27 Jul 2018 07:01  -  28 Jul 2018 06:45  --------------------------------------------------------  IN:    IV PiggyBack: 550 mL    Oral Fluid: 150 mL  Total IN: 700 mL    OUT:    Indwelling Catheter - Urethral: 1300 mL  Total OUT: 1300 mL    Total NET: -600 mL          Lab Data                        11.2   20.46 )-----------( 131      ( 27 Jul 2018 16:59 )             32.5     07-27    137  |  105  |  24<H>  ----------------------------<  108<H>  3.7   |  21<L>  |  1.30    Ca    8.6      27 Jul 2018 16:59  Phos  2.4     07-27  Mg     2.0     07-27    TPro  6.2  /  Alb  2.5<L>  /  TBili  1.1  /  DBili  x   /  AST  30  /  ALT  23  /  AlkPhos  81  07-27      CARDIAC MARKERS ( 27 Jul 2018 06:52 )  .214 ng/mL / x     / x     / x     / x      CARDIAC MARKERS ( 26 Jul 2018 16:55 )  .059 ng/mL / x     / x     / x     / x      CARDIAC MARKERS ( 26 Jul 2018 07:12 )  .019 ng/mL / x     / x     / x     / x            Review of Systems	      Objective     Physical Examination    heart s1s2  lung dec BS    Pertinent Lab findings & Imaging      [place]:  NO   Adequate UO     I&O's Detail    26 Jul 2018 07:01  -  27 Jul 2018 07:00  --------------------------------------------------------  IN:    IV PiggyBack: 50 mL    Oral Fluid: 100 mL    sodium chloride 0.9%: 250 mL    sodium chloride 0.9%: 700 mL    Sodium Chloride 0.9% IV Bolus: 4000 mL  Total IN: 5100 mL    OUT:    Indwelling Catheter - Urethral: 920 mL  Total OUT: 920 mL    Total NET: 4180 mL      27 Jul 2018 07:01  -  28 Jul 2018 06:45  --------------------------------------------------------  IN:    IV PiggyBack: 550 mL    Oral Fluid: 150 mL  Total IN: 700 mL    OUT:    Indwelling Catheter - Urethral: 1300 mL  Total OUT: 1300 mL    Total NET: -600 mL               Discussed with:     Cultures:	        Radiology

## 2018-07-28 NOTE — PROGRESS NOTE ADULT - ASSESSMENT
87 yo s/p R knee replacement, now with c diff      cont IVF      am labs      cont abx, consider dificid

## 2018-07-28 NOTE — PROGRESS NOTE ADULT - SUBJECTIVE AND OBJECTIVE BOX
ORTHOPEDIC PA PROGRESS NOTE  KAUSHIK SEARS      86y Male                                                                                                                               POD #    4d SPCU    STATUS POST:       Procedure: Knee replacement: Right           Complaints of abd pain, +BM, +Flatus, +Cdiff pcr  Pain (0-10):  Pt reports 3 in knee  Current Pain Management:  PRN    T(F): 98.7  HR: 59  BP: 108/53  RR: 19  SpO2: 100%                         10.8   19.95 )-----------( 142      ( 28 Jul 2018 06:43 )             31.8         07-28    139  |  108  |  20  ----------------------------<  106<H>  3.6   |  22  |  1.22    Ca    8.3<L>      28 Jul 2018 06:43  Phos  2.9     07-28  Mg     2.5     07-28    TPro  5.5<L>  /  Alb  2.2<L>  /  TBili  0.9  /  DBili  x   /  AST  24  /  ALT  23  /  AlkPhos  73  07-28    Physical Exam :    -   Dressing changed sterile.   -   Wound C/D/I.   -   Distal Neurvascular status intact grossly.   -   Warm well perfused; capillary refill <3 seconds   -   (+)EHL/FHL   -   (+) Sensation to light touch  -   (-) Calf tenderness Bilaterally    A/P: 86y Male s/p Knee replacement: Right     -   Ortho Stable  -   Pain control:  PRN  -   Medicine to follow CDIFF - Iso  -   DVT ppx:    PAS +  aspirin enteric coated: 162 milliGRAM(s) Oral, aspirin enteric coated: 162 milliGRAM(s) Oral  -   Weight bearing status: WBAT

## 2018-07-28 NOTE — PROGRESS NOTE ADULT - ASSESSMENT
The patient is an 86 year old male with a history of HTN, OA who is s/p right TKR.    7/28/18  Patient sitting in chair.  No cardiac complaints.  Complaining of some mild abdominal pain, diarrhea, lethargy    Plan:  - Continue to monitor on telemetry  - Avoid rate lowering agents  - No significant pauses noted on telemetry  - No indication for pacemaker  - Minimize narcotics  - Hold antihypertensives  - On IV antibiotics for C. diff.  - Being followed by ID, Pulmonary, Nephrology, Orthopedics and surgery.

## 2018-07-28 NOTE — PROGRESS NOTE ADULT - SUBJECTIVE AND OBJECTIVE BOX
Patient is a 86y old  Male who presents with a chief complaint of "Dr Cuellar is going to replace my RIGHT knee" (2018 12:46)        HPI: 87 y/o m with pmh of htn, prior to OR was taking abx for root canal, then presents for right total knee replacement started to have profuse diarrhea/ARF   found to have c.diff collitis,       SUBJECTIVE & OBJECTIVE: Pt seen and examined at bedside. complaining of dry retching     PHYSICAL EXAM:  T(C): 36.7 (18 @ 04:46), Max: 37.7 (18 @ 16:08)  HR: 61 (18 @ 06:00) (56 - 76)  BP: 113/52 (18 @ 06:00) (98/56 - 160/59)  RR: 19 (18 @ 06:00) (9 - 21)  SpO2: 100% (18 @ 06:00) (98% - 100%)  Wt(kg): --   GENERAL: NAD, well-groomed, well-developed  HEAD:  Atraumatic, Normocephalic  EYES: EOMI, PERRLA, conjunctiva and sclera clear  ENMT: Moist mucous membranes  NECK: Supple, No JVD  NERVOUS SYSTEM:  Alert & Oriented X3,  CHEST/LUNG: decrease air entry at the bases  HEART: Regular rate and rhythm; No murmurs, rubs, or gallops  ABDOMEN: Soft, Nontender, Nondistended; Bowel sounds present  EXTREMITIES:  2+ Peripheral Pulses, No clubbing, cyanosis, or edema        MEDICATIONS  (STANDING):  acetaminophen   Tablet. 1000 milliGRAM(s) Oral every 8 hours  aspirin enteric coated 162 milliGRAM(s) Oral every 12 hours  famotidine    Tablet 20 milliGRAM(s) Oral daily  ferrous    sulfate 325 milliGRAM(s) Oral daily  lactobacillus acidophilus 1 Tablet(s) Oral two times a day with meals  latanoprost 0.005% Ophthalmic Solution 1 Drop(s) Both EYES at bedtime  piperacillin/tazobactam IVPB. 3.375 Gram(s) IV Intermittent every 8 hours  potassium acid phosphate/sodium acid phosphate tablet (K-PHOS No. 2) 1 Tablet(s) Oral four times a day with meals  tamsulosin 0.4 milliGRAM(s) Oral at bedtime  vancomycin    Solution 125 milliGRAM(s) Oral every 6 hours    MEDICATIONS  (PRN):  aluminum hydroxide/magnesium hydroxide/simethicone Suspension 30 milliLiter(s) Oral four times a day PRN Indigestion  ondansetron Injectable 4 milliGRAM(s) IV Push every 6 hours PRN Nausea and/or Vomiting  polyethylene glycol 3350 17 Gram(s) Oral daily PRN Constipation  traMADol 25 milliGRAM(s) Oral every 4 hours PRN Mild Pain (1 - 3)  traMADol 50 milliGRAM(s) Oral every 4 hours PRN Moderate Pain (4 - 6)      LABS:                        10.8   19.95 )-----------( 142      ( 2018 06:43 )             31.8         139  |  108  |  20  ----------------------------<  106<H>  3.6   |  22  |  1.22    Ca    8.3<L>      2018 06:43  Phos  2.9       Mg     2.5         TPro  5.5<L>  /  Alb  2.2<L>  /  TBili  0.9  /  DBili  x   /  AST  24  /  ALT  23  /  AlkPhos  73        Urinalysis Basic - ( 2018 21:30 )    Color: Yellow / Appearance: Slightly Turbid / S.020 / pH: x  Gluc: x / Ketone: Small  / Bili: Negative / Urobili: Negative mg/dL   Blood: x / Protein: 30 mg/dL / Nitrite: Negative   Leuk Esterase: Trace / RBC: 3-5 /HPF / WBC 0-2   Sq Epi: x / Non Sq Epi: Few / Bacteria: Moderate      Magnesium, Serum: 2.5 mg/dL ( @ 06:43)  Magnesium, Serum: 2.0 mg/dL ( @ 16:59)    CAPILLARY BLOOD GLUCOSE          CAPILLARY BLOOD GLUCOSE        CAPILLARY BLOOD GLUCOSE          CARDIAC MARKERS ( 2018 06:43 )  .154 ng/mL / x     / x     / x     / x      CARDIAC MARKERS ( 2018 06:52 )  .214 ng/mL / x     / x     / x     / x      CARDIAC MARKERS ( 2018 16:55 )  .059 ng/mL / x     / x     / x     / x            RECENT CULTURES:      RADIOLOGY & ADDITIONAL TESTS:                        DVT/GI ppx  Discussed with pt @ bedside

## 2018-07-28 NOTE — PROGRESS NOTE ADULT - SUBJECTIVE AND OBJECTIVE BOX
KAUSHIK SEARS is a 86yMale , patient examined and chart reviewed.     INTERVAL HPI/ OVERNIGHT EVENTS:  Cdiff +. C/o abd cramping  + diarrhea. Afebrile.    Past Medical History--  PAST MEDICAL & SURGICAL HISTORY:  Osteoarthritis of right knee  Hypertension  Tremor of both hands  History of hernia surgery: X3 1950&#x27;s-1970&#x27;s  History of shoulder surgery: right, 2012  History of knee replacement, total, left:       For details regarding the patient's social history, family history, and other miscellaneous elements, please refer the initial infectious diseases consultation and/or the admitting history and physical examination for this admission.       ROS:  CONSTITUTIONAL:  Negative fever or chills  EYES:  Negative  blurry vision or double vision  CARDIOVASCULAR:  Negative for chest pain or palpitations  RESPIRATORY:  Negative for cough, wheezing, or SOB   GASTROINTESTINAL:  Negative for nausea, vomiting, constipation +abdominal pain diarrhea,  GENITOURINARY:  Negative frequency, urgency , dysuria or hematuria   NEUROLOGIC:  No headache, confusion, dizziness, lightheadedness  All other systems were reviewed and are negative     Current inpatient medications :    ANTIBIOTICS/RELEVANT:  lactobacillus acidophilus 1 Tablet(s) Oral two times a day with meals  metroNIDAZOLE  IVPB 500 milliGRAM(s) IV Intermittent every 8 hours  vancomycin    Solution 125 milliGRAM(s) Oral every 6 hours      acetaminophen   Tablet. 1000 milliGRAM(s) Oral every 8 hours  aluminum hydroxide/magnesium hydroxide/simethicone Suspension 30 milliLiter(s) Oral four times a day PRN  aspirin enteric coated 162 milliGRAM(s) Oral every 12 hours  dextrose 5% + sodium chloride 0.45%. 1000 milliLiter(s) IV Continuous <Continuous>  dicyclomine 10 milliGRAM(s) Oral three times a day before meals  famotidine    Tablet 20 milliGRAM(s) Oral daily  ferrous    sulfate 325 milliGRAM(s) Oral daily  latanoprost 0.005% Ophthalmic Solution 1 Drop(s) Both EYES at bedtime  ondansetron Injectable 4 milliGRAM(s) IV Push every 6 hours PRN  polyethylene glycol 3350 17 Gram(s) Oral daily PRN  tamsulosin 0.4 milliGRAM(s) Oral at bedtime  traMADol 25 milliGRAM(s) Oral every 4 hours PRN  traMADol 50 milliGRAM(s) Oral every 4 hours PRN      Objective:     @ 07:  -   @ 07:00  --------------------------------------------------------  IN: 700 mL / OUT: 1300 mL / NET: -600 mL     @ 07:01  -   @ 22:55  --------------------------------------------------------  IN: 620 mL / OUT: 450 mL / NET: 170 mL      T(C): 36.7 (18 @ 20:01), Max: 37.7 (18 @ 23:42)  HR: 55 (18 @ 22:00) (51 - 73)  BP: 111/53 (18 @ 22:00) (96/75 - 137/54)  RR: 18 (18 @ 22:00) (9 - 19)  SpO2: 94% (18 @ 22:00) (94% - 100%)  Wt(kg): --      Physical Exam:  GEN: NAD, pleasant  HEENT: normocephalic and atraumatic. EOMI. MIAH. Moist mucosa. Clear Posterior pharynx.  NECK: Supple. No carotid bruits.  No lymphadenopathy or thyromegaly.  LUNGS: Clear to auscultation.  HEART: Regular rate and rhythm without murmur.  ABDOMEN: Soft, tender, and mildly distended.  Positive bowel sounds.    EXTREMITIES: Right knee drsg c/d/i  NEUROLOGIC: A & O x3, No focal neurological deficits   SKIN: No ulceration or induration present.      LABS:                        10.8   19.95 )-----------( 142      ( 2018 06:43 )             31.8           139  |  108  |  20  ----------------------------<  106<H>  3.6   |  22  |  1.22    Ca    8.3<L>      2018 06:43  Phos  2.9       Mg     2.5         TPro  5.5<L>  /  Alb  2.2<L>  /  TBili  0.9  /  DBili  x   /  AST  24  /  ALT  23  /  AlkPhos  73          Urinalysis Basic - ( 2018 21:30 )    Color: Yellow / Appearance: Slightly Turbid / S.020 / pH: x  Gluc: x / Ketone: Small  / Bili: Negative / Urobili: Negative mg/dL   Blood: x / Protein: 30 mg/dL / Nitrite: Negative   Leuk Esterase: Trace / RBC: 3-5 /HPF / WBC 0-2   Sq Epi: x / Non Sq Epi: Few / Bacteria: Moderate    MICROBIOLOGY:    Culture - Stool (collected 2018 21:49)  Source: .Stool Feces  Preliminary Report (2018 18:12):    No enteric pathogens to date: Final culture pending    Culture - Blood (collected 2018 15:20)  Source: .Blood Blood-Peripheral  Preliminary Report (2018 16:01):    No growth to date.    Culture - Blood (collected 2018 15:20)  Source: .Blood Blood-Peripheral  Preliminary Report (2018 16:01):    No growth to date.    Clostridium difficile Toxin by PCR (18 @ 22:54)    C Diff by PCR Result: Detected      RADIOLOGY & ADDITIONAL STUDIES:  EXAM:  CT CHEST                            PROCEDURE DATE:  2018          INTERPRETATION:  History: Postop, high white count.    CT chest abdomen and pelvis no oral or IV contrast. Patchy bibasilar   dependent consolidation/atelectasis. Correlate clinically for active   infection. Small bilateral layering pleural effusions right slightly   larger than left.  Central airways patent. Enlarged pretracheal lymph node measures up to 2   cm . Nonaneurysmal thoracic aorta.  Mild cardiomegaly with coronary artery calcination. Decrease in cardiac   chamber blood pool attenuation suggests an anemic state. Trace   pericardial effusion.  Calcified gallstone. No biliary dilatation. Unenhanced liver pancreas   spleen adrenals not remarkable.  Nonaneurysmal abdominal aorta.  No suspicious retroperitoneal adenopathy.  There is a 2 mm nonobstructing left renal calculus. Multiple left renal   cortical hypodensities cannot be definitively characterized probably   represent cysts. Bilateral nonspecific perirenal stranding.  No evidence of appendicitis.  There is mild diffuse colonic wall thickening with associated fat   stranding consistent with pancolitis. Differential diagnosis would have   to include but is not limited to C. difficile colitis.Colonic   diverticula but no definite diverticulitis. No bowel obstruction or   extraluminal fluid or gas.  Prostate significantly enlarged (6.3 x 6 cm). Bladder decompressed with   Bartholomew. Clips left inguinal region.  Degenerative change lower lumbarspine. Nonspecific sclerotic foci right   ilium and sacrum.    Impression:    Bibasilar dependent consolidation/atelectasis and small effusions.   Correlate clinically for active infection.  Mild pretracheal adenopathy.  Cholelithiasis.  Nonobstructive left nephrolithiasis.  Acute uncomplicated pancolitis.  Markedly enlarged prostate.  Additional findings as discussed.    Assessment :   Patient is an 86 year old male with a history of HTN, OA sp right TKR 18  complicated by sepsis with acute pancolitis sec Cdiff colitis  Doubt pna likely atelectasis  Leukocytosis sec Cdiff  KARLENE resolved  +troponins      Plan :   Off Zosyn   Cont po vanc and IV Flagyl  Serial abd exams  Trend temp and wbc    Continue with present regiment.  Appropriate use of antibiotics and adverse effects reviewed.    I have discussed the above plan of care with patient/ family in detail. They expressed understanding of the the treatment plan . Risks, benefits and alternatives discussed in detail. I have asked if they have any questions or concerns and appropriately addressed them to the best of my ability .      Critical care time greater then 35 minutes reviewing notes, labs data/ imaging , discussion with multidisciplinary team.    Thank you for allowing me to participate in care of your patient .        Yoon Sepulveda MD  Infectious Disease  [phone]

## 2018-07-28 NOTE — PROGRESS NOTE ADULT - SUBJECTIVE AND OBJECTIVE BOX
ORTHOPEDIC ATTENDING PROGRESS NOTE  KAUSHIK SEARS      86y Male                                                                                                                               POD #        STATUS POST:               Pre-Op Dx: DJD (degenerative joint disease) of knee: Right    Post-Op Dx:  DJD (degenerative joint disease) of knee: Right    Procedure: Knee replacement: Right                                                Pain (0-10):  Pt reports  Current Pain Management:  [ ] PCA   [ ] Po Analgesics [ ] IM /IV Anagesics     T(F): 98.7  HR: 60  BP: 115/62  RR: 19  SpO2: 100%                         10.8   19.95 )-----------( 142      ( 28 Jul 2018 06:43 )             31.8         07-28    139  |  108  |  20  ----------------------------<  106<H>  3.6   |  22  |  1.22    Ca    8.3<L>      28 Jul 2018 06:43  Phos  2.9     07-28  Mg     2.5     07-28    TPro  5.5<L>  /  Alb  2.2<L>  /  TBili  0.9  /  DBili  x   /  AST  24  /  ALT  23  /  AlkPhos  73  07-28    Physical Exam :    -   Dressing changed sterile.   -   Wound C/D/I.   -   Distal Neurvascular status intact grossly.   -   Warm well perfused; capillary refill <3 seconds   -   (+)EHL/FHL   -   (+) Sensation to light touch  -   (-) Calf tenderness Bilaterally    A/P: 86y Male s/p Knee replacement: Right     -   Ortho Stable  -   Pain control   -   Medicine to follow  -   DVT ppx:     [ ]SCDs     [x ASA     [ ] Eliquis     [ ] Lovenox  -   Weight bearing status:  WBAT [x ]        PWB    [ ]     TTWB  [ ]      NWB  [ ]   -  Dispo:     Home [ ]     Acute Rehab [x ]     GLORY [ ]     TBD [ ]

## 2018-07-28 NOTE — PROGRESS NOTE ADULT - SUBJECTIVE AND OBJECTIVE BOX
KAUSHIK SEARS  86y  Male    Patient is a 86y old  Male who presents with a chief complaint of " Polo is going to replace my RIGHT knee" (2018 12:46)      feels much better.   denies any chest pain or shortness of breath.       PAST MEDICAL & SURGICAL HISTORY:  Osteoarthritis of right knee  Hypertension  Tremor of both hands  History of hernia surgery: X3 1950&#x27;s-1970&#x27;s  History of shoulder surgery: right, 2012  History of knee replacement, total, left:     PHYSICAL EXAM:    T(C): 37.2 (18 @ 12:41), Max: 37.7 (18 @ 16:08)  HR: 51 (18 @ 14:00) (51 - 76)  BP: 105/68 (18 @ 14:00) (96/75 - 160/59)  RR: 9 (18 @ 14:00) (9 - 21)  SpO2: 100% (18 @ 14:00) (100% - 100%)  Wt(kg): --    I&O's Detail    2018 07:  -  2018 07:00  --------------------------------------------------------  IN:    IV PiggyBack: 550 mL    Oral Fluid: 150 mL  Total IN: 700 mL    OUT:    Indwelling Catheter - Urethral: 1300 mL  Total OUT: 1300 mL    Total NET: -600 mL      2018 07:  -  2018 15:42  --------------------------------------------------------  IN:  Total IN: 0 mL    OUT:    Indwelling Catheter - Urethral: 150 mL  Total OUT: 150 mL    Total NET: -150 mL    Respiratory: clear anteriorly, decreased BS at bases  Cardiovascular: S1 S2  Gastrointestinal: soft NT ND +BS  Extremities: edema   Neuro: Awake and alert    MEDICATIONS  (STANDING):  acetaminophen   Tablet. 1000 milliGRAM(s) Oral every 8 hours  aspirin enteric coated 162 milliGRAM(s) Oral every 12 hours  famotidine    Tablet 20 milliGRAM(s) Oral daily  ferrous    sulfate 325 milliGRAM(s) Oral daily  lactobacillus acidophilus 1 Tablet(s) Oral two times a day with meals  latanoprost 0.005% Ophthalmic Solution 1 Drop(s) Both EYES at bedtime  metroNIDAZOLE  IVPB 500 milliGRAM(s) IV Intermittent every 8 hours  potassium acid phosphate/sodium acid phosphate tablet (K-PHOS No. 2) 1 Tablet(s) Oral four times a day with meals  tamsulosin 0.4 milliGRAM(s) Oral at bedtime  vancomycin    Solution 125 milliGRAM(s) Oral every 6 hours    MEDICATIONS  (PRN):  aluminum hydroxide/magnesium hydroxide/simethicone Suspension 30 milliLiter(s) Oral four times a day PRN Indigestion  ondansetron Injectable 4 milliGRAM(s) IV Push every 6 hours PRN Nausea and/or Vomiting  polyethylene glycol 3350 17 Gram(s) Oral daily PRN Constipation  traMADol 25 milliGRAM(s) Oral every 4 hours PRN Mild Pain (1 - 3)  traMADol 50 milliGRAM(s) Oral every 4 hours PRN Moderate Pain (4 - 6)                            10.8   19.95 )-----------( 142      ( 2018 06:43 )             31.8           139  |  108  |  20  ----------------------------<  106<H>  3.6   |  22  |  1.22    Ca    8.3<L>      2018 06:43  Phos  2.9       Mg     2.5         TPro  5.5<L>  /  Alb  2.2<L>  /  TBili  0.9  /  DBili  x   /  AST  24  /  ALT  23  /  AlkPhos  73        Urinalysis Basic - ( 2018 21:30 )    Color: Yellow / Appearance: Slightly Turbid / S.020 / pH: x  Gluc: x / Ketone: Small  / Bili: Negative / Urobili: Negative mg/dL   Blood: x / Protein: 30 mg/dL / Nitrite: Negative   Leuk Esterase: Trace / RBC: 3-5 /HPF / WBC 0-2   Sq Epi: x / Non Sq Epi: Few / Bacteria: Moderate

## 2018-07-28 NOTE — PROGRESS NOTE ADULT - SUBJECTIVE AND OBJECTIVE BOX
Patient is a 86y Male with a known history of :  History of knee replacement, total, left (Z96.652)  KARLENE (acute kidney injury) (N17.9)  Atelectasis (J98.11)  Anemia (D64.9)  Hypotension (I95.9)  Osteoarthritis of right knee (M17.11)    HPI:      REVIEW OF SYSTEMS:    CONSTITUTIONAL: No fever, weight loss, or fatigue  EYES: No eye pain, visual disturbances, or discharge  ENMT:  No difficulty hearing, tinnitus, vertigo; No sinus or throat pain  NECK: No pain or stiffness  BREASTS: No pain, masses, or nipple discharge  RESPIRATORY: No cough, wheezing, chills or hemoptysis; No shortness of breath  CARDIOVASCULAR: No chest pain, palpitations, dizziness, or leg swelling  GASTROINTESTINAL: No abdominal or epigastric pain. No nausea, vomiting, or hematemesis; No diarrhea or constipation. No melena or hematochezia.  GENITOURINARY: No dysuria, frequency, hematuria, or incontinence  NEUROLOGICAL: No headaches, memory loss, loss of strength, numbness, or tremors  SKIN: No itching, burning, rashes, or lesions   LYMPH NODES: No enlarged glands  ENDOCRINE: No heat or cold intolerance; No hair loss  MUSCULOSKELETAL: No joint pain or swelling; No muscle, back, or extremity pain  PSYCHIATRIC: No depression, anxiety, mood swings, or difficulty sleeping  HEME/LYMPH: No easy bruising, or bleeding gums  ALLERGY AND IMMUNOLOGIC: No hives or eczema    MEDICATIONS  (STANDING):  acetaminophen   Tablet. 1000 milliGRAM(s) Oral every 8 hours  aspirin enteric coated 162 milliGRAM(s) Oral every 12 hours  famotidine    Tablet 20 milliGRAM(s) Oral daily  ferrous    sulfate 325 milliGRAM(s) Oral daily  lactobacillus acidophilus 1 Tablet(s) Oral two times a day with meals  latanoprost 0.005% Ophthalmic Solution 1 Drop(s) Both EYES at bedtime  piperacillin/tazobactam IVPB. 3.375 Gram(s) IV Intermittent every 8 hours  potassium acid phosphate/sodium acid phosphate tablet (K-PHOS No. 2) 1 Tablet(s) Oral four times a day with meals  tamsulosin 0.4 milliGRAM(s) Oral at bedtime  vancomycin    Solution 125 milliGRAM(s) Oral every 6 hours    MEDICATIONS  (PRN):  aluminum hydroxide/magnesium hydroxide/simethicone Suspension 30 milliLiter(s) Oral four times a day PRN Indigestion  ondansetron Injectable 4 milliGRAM(s) IV Push every 6 hours PRN Nausea and/or Vomiting  polyethylene glycol 3350 17 Gram(s) Oral daily PRN Constipation  traMADol 25 milliGRAM(s) Oral every 4 hours PRN Mild Pain (1 - 3)  traMADol 50 milliGRAM(s) Oral every 4 hours PRN Moderate Pain (4 - 6)      ALLERGIES: No Known Allergies      FAMILY HISTORY:  Family history of heart disease (Father)      Social history:  Alochol:   Smoking:   Drug Use:   Marital Status:     PHYSICAL EXAMINATION:  -----------------------------  T(C): 37.1 (07-28-18 @ 08:13), Max: 37.7 (07-27-18 @ 16:08)  HR: 59 (07-28-18 @ 09:40) (56 - 76)  BP: 108/53 (07-28-18 @ 09:40) (100/57 - 160/59)  RR: 19 (07-28-18 @ 08:00) (9 - 21)  SpO2: 100% (07-28-18 @ 09:40) (98% - 100%)  Wt(kg): --    07-27 @ 07:01  -  07-28 @ 07:00  --------------------------------------------------------  IN:    IV PiggyBack: 550 mL    Oral Fluid: 150 mL  Total IN: 700 mL    OUT:    Indwelling Catheter - Urethral: 1300 mL  Total OUT: 1300 mL    Total NET: -600 mL      07-28 @ 07:01  -  07-28 @ 10:18  --------------------------------------------------------  IN:  Total IN: 0 mL    OUT:    Indwelling Catheter - Urethral: 150 mL  Total OUT: 150 mL    Total NET: -150 mL            Constitutional: well developed, normal appearance, well groomed, well nourished, no deformities and no acute distress.   Eyes: the conjunctiva exhibited no abnormalities and the eyelids demonstrated no xanthelasmas.   HEENT: normal oral mucosa, no oral pallor and no oral cyanosis.   Neck: normal jugular venous A waves present, normal jugular venous V waves present and no jugular venous guardado A waves.   Pulmonary: no respiratory distress, normal respiratory rhythm and effort, no accessory muscle use and lungs were clear to auscultation bilaterally. Anteriorly  Cardiovascular: heart rate and rhythm were normal, normal S1 and S2 and no murmur, gallop, rub, heave or thrill are present.    Musculoskeletal: the gait could not be assessed.   Extremities: no clubbing of the fingernails, no localized cyanosis, no petechial hemorrhages and no ischemic changes.   Skin: normal skin color and pigmentation, no rash, no venous stasis, no skin lesions, no skin ulcer and no xanthoma was observed.   Psychiatric: oriented to person, place, and time, the affect was normal, the mood was normal and not feeling anxious.     LABS:   --------  07-28    139  |  108  |  20  ----------------------------<  106<H>  3.6   |  22  |  1.22    Ca    8.3<L>      28 Jul 2018 06:43  Phos  2.9     07-28  Mg     2.5     07-28    TPro  5.5<L>  /  Alb  2.2<L>  /  TBili  0.9  /  DBili  x   /  AST  24  /  ALT  23  /  AlkPhos  73  07-28                         10.8   19.95 )-----------( 142      ( 28 Jul 2018 06:43 )             31.8         07-28 @ 06:43 CPK total:--, CKMB --, Troponin I - .154 ng/mL<H>  07-27 @ 06:52 CPK total:--, CKMB --, Troponin I - .214 ng/mL<H>  07-26 @ 16:55 CPK total:--, CKMB --, Troponin I - .059 ng/mL<H>      Culture Results:   No growth to date. (07-26 @ 15:20)  Culture Results:   No growth to date. (07-26 @ 15:20)    07-26 @ 15:20    Organism --   Gram Stain Blood -- Gram Stain --  Specimen Source .Blood Blood-Peripheral  Culture-Blood --        Radiology:    < from: US Transthoracic Echocardiogram w/Doppler Complete (07.26.18 @ 11:17) >    EXAM:  US TTE W DOPPLER COMPLETE                                  PROCEDURE DATE:  07/26/2018          INTERPRETATION:  Ordering Physician: CECIL VERONICA [id number]    Indication: Hypotension    Technician: Tin Rodriguez    Study Quality: Suboptimal; technically difficult study     Height: 5 feet 10 inches  Weight: 174 pounds  Blood Pressure: 85/46    MEASUREMENTS  IVS: 1.1 cm  PWT: 1.2 cm  LA: 5.5 cm  Aortic Root: 4.0 cm  LVIDd: 4.9 cm  LVIDs: 3.1 cm    LVEF: Approximately 65%    FINDINGS  Left Ventricle: Endocardium not well visualized. Grossly, normal left   ventricular size and overall systolic function. LVEF estimated at 65%.   Abnormal septal motion..  Right Ventricle: Grossly normal right ventricular size and function.  Left Atrium: Severe left atrial enlargement. Mildly aneurysmal atrial   septum.  Right Atrium: Normal right atrium. The IVC is normal in size but   collapses less than 50% with inspiration, suggesting elevated right   atrial pressure.  Mitral Valve: Normal mitral valve. Minimal mitral regurgitation.  Aortic Valve: Calcified aortic valve with decreased opening. The peak   transaortic velocity is 1.9 m/s, consistent with aortic valve sclerosis.  Tricuspid Valve: Normal tricuspid valve. Minimal tricuspid regurgitation.   Pulmonary artery systolic pressure estimated at 41 mmHg, assuming a right   atrial pressure of 8 mmHg, consistent with mild pulmonary hypertension.  Pulmonic Valve: Pulmonic valve leaflets not well visualized.  Pericardium/Pleura: No pericardial effusion.      CONCLUSIONS:  1. Endocardium not well visualized. Grossly, normal left ventricular size   and overall systolic function. LVEF estimated at 65%.   2. Severe left atrial enlargement.   3. Minimal mitral regurgitation.  4. Aortic valve sclerosis.  5. Mild pulmonary hypertension.                        KERRY MOREIRA M.D., ATTENDING CARDIOLOGIST  This document has been electronically signed. Jul 26 2018  1:30PM                < end of copied text >    < from: CT Chest No Cont (07.27.18 @ 14:16) >    EXAM:  CT CHEST                            PROCEDURE DATE:  07/27/2018          INTERPRETATION:  History: Postop, high white count.    CT chest abdomen and pelvis no oral or IV contrast. Patchy bibasilar   dependent consolidation/atelectasis. Correlate clinically for active   infection. Small bilateral layering pleural effusions right slightly   larger than left.  Central airways patent. Enlarged pretracheal lymph node measures up to 2   cm . Nonaneurysmal thoracic aorta.  Mild cardiomegaly with coronary artery calcination. Decrease in cardiac   chamber blood pool attenuation suggests an anemic state. Trace   pericardial effusion.  Calcified gallstone. No biliary dilatation. Unenhanced liver pancreas   spleen adrenals not remarkable.  Nonaneurysmal abdominal aorta.  No suspicious retroperitoneal adenopathy.  There is a 2 mm nonobstructing left renal calculus. Multiple left renal   cortical hypodensities cannot be definitively characterized probably   represent cysts. Bilateral nonspecific perirenal stranding.  No evidence of appendicitis.  There is mild diffuse colonic wall thickening with associated fat   stranding consistent with pancolitis. Differential diagnosis would have   to include but is not limited to C. difficile colitis.Colonic   diverticula but no definite diverticulitis. No bowel obstruction or   extraluminal fluid or gas.  Prostate significantly enlarged (6.3 x 6 cm). Bladder decompressed with   Bartholomew. Clips left inguinal region.  Degenerative change lower lumbarspine. Nonspecific sclerotic foci right   ilium and sacrum.    Impression:    Bibasilar dependent consolidation/atelectasis and small effusions.   Correlate clinically for active infection.  Mild pretracheal adenopathy.  Cholelithiasis.  Nonobstructive left nephrolithiasis.  Acute uncomplicated pancolitis.  Markedly enlarged prostate.  Additional findings as discussed.                VIVIENNE PEDRAZA M.D., ATTENDING RADIOLOGIST  This document has been electronically signed. Jul 27 2018  2:40PM                < end of copied text >

## 2018-07-29 PROCEDURE — 99233 SBSQ HOSP IP/OBS HIGH 50: CPT | Mod: GC

## 2018-07-29 RX ADMIN — Medication 10 MILLIGRAM(S): at 06:01

## 2018-07-29 RX ADMIN — Medication 125 MILLIGRAM(S): at 06:01

## 2018-07-29 RX ADMIN — Medication 1000 MILLIGRAM(S): at 06:30

## 2018-07-29 RX ADMIN — Medication 100 MILLIGRAM(S): at 13:08

## 2018-07-29 RX ADMIN — Medication 100 MILLIGRAM(S): at 06:01

## 2018-07-29 RX ADMIN — Medication 10 MILLIGRAM(S): at 17:25

## 2018-07-29 RX ADMIN — Medication 1 TABLET(S): at 17:25

## 2018-07-29 RX ADMIN — Medication 10 MILLIGRAM(S): at 12:06

## 2018-07-29 RX ADMIN — Medication 125 MILLIGRAM(S): at 12:06

## 2018-07-29 RX ADMIN — ONDANSETRON 4 MILLIGRAM(S): 8 TABLET, FILM COATED ORAL at 20:02

## 2018-07-29 RX ADMIN — SODIUM CHLORIDE 50 MILLILITER(S): 9 INJECTION, SOLUTION INTRAVENOUS at 12:04

## 2018-07-29 RX ADMIN — Medication 1000 MILLIGRAM(S): at 12:56

## 2018-07-29 RX ADMIN — Medication 125 MILLIGRAM(S): at 23:36

## 2018-07-29 RX ADMIN — Medication 100 MILLIGRAM(S): at 21:53

## 2018-07-29 RX ADMIN — FAMOTIDINE 20 MILLIGRAM(S): 10 INJECTION INTRAVENOUS at 12:05

## 2018-07-29 RX ADMIN — Medication 125 MILLIGRAM(S): at 17:25

## 2018-07-29 RX ADMIN — Medication 1000 MILLIGRAM(S): at 22:27

## 2018-07-29 RX ADMIN — Medication 325 MILLIGRAM(S): at 12:05

## 2018-07-29 RX ADMIN — Medication 1000 MILLIGRAM(S): at 12:09

## 2018-07-29 RX ADMIN — Medication 1 TABLET(S): at 08:36

## 2018-07-29 RX ADMIN — TAMSULOSIN HYDROCHLORIDE 0.4 MILLIGRAM(S): 0.4 CAPSULE ORAL at 21:53

## 2018-07-29 RX ADMIN — LATANOPROST 1 DROP(S): 0.05 SOLUTION/ DROPS OPHTHALMIC; TOPICAL at 21:55

## 2018-07-29 RX ADMIN — Medication 162 MILLIGRAM(S): at 08:36

## 2018-07-29 RX ADMIN — Medication 1000 MILLIGRAM(S): at 06:01

## 2018-07-29 RX ADMIN — Medication 162 MILLIGRAM(S): at 21:53

## 2018-07-29 RX ADMIN — Medication 1000 MILLIGRAM(S): at 21:53

## 2018-07-29 NOTE — PROGRESS NOTE ADULT - SUBJECTIVE AND OBJECTIVE BOX
Date/Time Patient Seen:  		  Referring MD:   Data Reviewed	       Patient is a 86y old  Male who presents with a chief complaint of " Polo is going to replace my RIGHT knee" (25 Jul 2018 12:46)  vs and meds reviewed      Subjective/HPI     PAST MEDICAL & SURGICAL HISTORY:  Osteoarthritis of right knee  Hypertension  Tremor of both hands  History of hernia surgery: X3 1950&#x27;s-1970&#x27;s  History of shoulder surgery: right, 2012  History of knee replacement, total, left: 2007        Medication list         MEDICATIONS  (STANDING):  acetaminophen   Tablet. 1000 milliGRAM(s) Oral every 8 hours  aspirin enteric coated 162 milliGRAM(s) Oral every 12 hours  dextrose 5% + sodium chloride 0.45%. 1000 milliLiter(s) (50 mL/Hr) IV Continuous <Continuous>  dicyclomine 10 milliGRAM(s) Oral three times a day before meals  famotidine    Tablet 20 milliGRAM(s) Oral daily  ferrous    sulfate 325 milliGRAM(s) Oral daily  lactobacillus acidophilus 1 Tablet(s) Oral two times a day with meals  latanoprost 0.005% Ophthalmic Solution 1 Drop(s) Both EYES at bedtime  metroNIDAZOLE  IVPB 500 milliGRAM(s) IV Intermittent every 8 hours  tamsulosin 0.4 milliGRAM(s) Oral at bedtime  vancomycin    Solution 125 milliGRAM(s) Oral every 6 hours    MEDICATIONS  (PRN):  aluminum hydroxide/magnesium hydroxide/simethicone Suspension 30 milliLiter(s) Oral four times a day PRN Indigestion  ondansetron Injectable 4 milliGRAM(s) IV Push every 6 hours PRN Nausea and/or Vomiting  polyethylene glycol 3350 17 Gram(s) Oral daily PRN Constipation  traMADol 25 milliGRAM(s) Oral every 4 hours PRN Mild Pain (1 - 3)  traMADol 50 milliGRAM(s) Oral every 4 hours PRN Moderate Pain (4 - 6)         Vitals log        ICU Vital Signs Last 24 Hrs  T(C): 36.4 (29 Jul 2018 04:43), Max: 37.2 (28 Jul 2018 12:41)  T(F): 97.6 (29 Jul 2018 04:43), Max: 98.9 (28 Jul 2018 12:41)  HR: 58 (29 Jul 2018 06:00) (51 - 73)  BP: 129/61 (29 Jul 2018 06:00) (96/75 - 137/54)  BP(mean): 82 (29 Jul 2018 06:00) (71 - 96)  ABP: --  ABP(mean): --  RR: 15 (29 Jul 2018 06:00) (9 - 19)  SpO2: 100% (29 Jul 2018 06:00) (93% - 100%)           Input and Output:  I&O's Detail    28 Jul 2018 07:01  -  29 Jul 2018 07:00  --------------------------------------------------------  IN:    dextrose 5% + sodium chloride 0.45%.: 750 mL    IV PiggyBack: 300 mL    Oral Fluid: 240 mL  Total IN: 1290 mL    OUT:    Indwelling Catheter - Urethral: 150 mL    Voided: 750 mL  Total OUT: 900 mL    Total NET: 390 mL          Lab Data                        10.8   19.95 )-----------( 142      ( 28 Jul 2018 06:43 )             31.8     07-28    139  |  108  |  20  ----------------------------<  106<H>  3.6   |  22  |  1.22    Ca    8.3<L>      28 Jul 2018 06:43  Phos  2.9     07-28  Mg     2.5     07-28    TPro  5.5<L>  /  Alb  2.2<L>  /  TBili  0.9  /  DBili  x   /  AST  24  /  ALT  23  /  AlkPhos  73  07-28      CARDIAC MARKERS ( 28 Jul 2018 06:43 )  .154 ng/mL / x     / x     / x     / x            Review of Systems	      Objective     Physical Examination    heart s1s2  lung dec BS    Pertinent Lab findings & Imaging      [place]:  NO   Adequate UO     I&O's Detail    28 Jul 2018 07:01  -  29 Jul 2018 07:00  --------------------------------------------------------  IN:    dextrose 5% + sodium chloride 0.45%.: 750 mL    IV PiggyBack: 300 mL    Oral Fluid: 240 mL  Total IN: 1290 mL    OUT:    Indwelling Catheter - Urethral: 150 mL    Voided: 750 mL  Total OUT: 900 mL    Total NET: 390 mL               Discussed with:     Cultures:	        Radiology

## 2018-07-29 NOTE — PROGRESS NOTE ADULT - SUBJECTIVE AND OBJECTIVE BOX
ORTHOPEDIC PA PROGRESS NOTE  KAUSHIK ORION      86y Male                                                                                                                               POD #    5d PACU    STATUS POST:       Procedure: Knee replacement: Right           Complaints of abdominal pain. +cDiff    Pain (0-10):  Pt reports 3 in Right Knee  Current Pain Management:  PRN    T(F): 97.9  HR: 52  BP: 125/61  RR: 20  SpO2: 100%               Physical Exam :    -   Dressing changed sterile.   -   Wound C/D/I.   -   Distal Neurvascular status intact grossly.   -   Warm well perfused; capillary refill <3 seconds   -   (+)EHL/FHL   -   (+) Sensation to light touch  -   (-) Calf tenderness Bilaterally    A/P: 86y Male s/p Knee replacement: Right     -   Ortho Stable  -   Pain control:  PRN  -   Medicine, ID, Surgery, ---IV Vanco and Flagyl  -   DVT ppx:    PAS +  aspirin enteric coated: 162 milliGRAM(s) Oral, aspirin enteric coated: 162 milliGRAM(s) Oral  -   Weight bearing status: WBAT

## 2018-07-29 NOTE — PROGRESS NOTE ADULT - ASSESSMENT
Pt is an 86yr old man with PMHx as above, now admitted with management of hypotension in the setting of c. diff.    -Per report from bedside RN, pt with 6 bowel movements during the day. Continue to closely monitor and document.  -Continue IV flagyl and po vanc  -KARLENE, likely from volume depletion, improving.   -Continue IVF and tolerated with continued I/Os.  -Continue Pt/Ot as able Pt is an 86yr old man with PMHx as above, now admitted with management of hypotension in the setting of c. diff.    -Per report from bedside RN, pt with 6 bowel movements during the day. Continue to closely monitor and document.  -Continue IV flagyl and po vanc  -KARLENE, likely from volume depletion, improving.   -Continue IVF and tolerated with continued I/Os.  -Continue Pt/Ot as able  -Monitor electrolytes and replete prn

## 2018-07-29 NOTE — PROGRESS NOTE ADULT - SUBJECTIVE AND OBJECTIVE BOX
pt seen  feeling slightly better  appetite slightly better, still pain on eating  2 loose BMs  ICU Vital Signs Last 24 Hrs  T(C): 36.6 (29 Jul 2018 08:08), Max: 37.2 (28 Jul 2018 12:41)  T(F): 97.9 (29 Jul 2018 08:08), Max: 98.9 (28 Jul 2018 12:41)  HR: 58 (29 Jul 2018 06:00) (51 - 73)  BP: 129/61 (29 Jul 2018 06:00) (96/75 - 137/54)  BP(mean): 82 (29 Jul 2018 06:00) (71 - 96)  ABP: --  ABP(mean): --  RR: 15 (29 Jul 2018 06:00) (9 - 18)  SpO2: 100% (29 Jul 2018 06:00) (93% - 100%)  gen-NAD  resp-clear   abd-minimal distension, soft, L sided abdominal pain  ext-soft, nontender      I&O's Detail    28 Jul 2018 07:01  -  29 Jul 2018 07:00  --------------------------------------------------------  IN:    dextrose 5% + sodium chloride 0.45%.: 750 mL    IV PiggyBack: 300 mL    Oral Fluid: 240 mL  Total IN: 1290 mL    OUT:    Indwelling Catheter - Urethral: 150 mL    Voided: 750 mL  Total OUT: 900 mL    Total NET: 390 mL

## 2018-07-29 NOTE — PROGRESS NOTE ADULT - ASSESSMENT
86 white male with a history of HTN, OA, S/P RT TKR now hypotension and KARLENE with decreased urine out put.   KARLENE is most likely due to pre renal azotemia complicated by ATN due to hypotension.   Renal indices are now improved.   renal sonogram is negative. Patient is now C. Diff positive. Continue supportive care.

## 2018-07-29 NOTE — PROGRESS NOTE ADULT - SUBJECTIVE AND OBJECTIVE BOX
ROS: Pt denies chest pain/headache/sob, endorses abdominal pain relieved with defecation     Gen: No apparent distress, resting in bed  Neuro: A&Ox3, alert, appropriately conversive  Pulm: Good inspiratory effort, no adventitious breath sounds appreciated  Cardiac: s1, s2, sbp 150s on tele, trace edema to extremities  Ab: Distended, hyperactive bowel sounds, diffusely tender  Ex: pt patella with overlaying sutures, abdominal pad/net dressing, clean/dry without malodor/purulent drainage    ICU Vital Signs Last 24 Hrs  T(C): 36.1 (2018 16:00), Max: 36.7 (2018 12:20)  T(F): 96.9 (2018 16:00), Max: 98.1 (2018 12:20)  HR: 50 (2018 18:00) (50 - 74)  BP: 150/73 (2018 18:00) (104/54 - 150/73)  BP(mean): 97 (2018 18:00) (71 - 108)  ABP: --  ABP(mean): --  RR: 18 (2018 18:00) (12 - 20)  SpO2: 99% (2018 18:00) (93% - 100%)    CBC Full  -  ( 2018 06:43 )  WBC Count : 19.95 K/uL  Hemoglobin : 10.8 g/dL  Hematocrit : 31.8 %  Platelet Count - Automated : 142 K/uL  Mean Cell Volume : 94.6 fl  Mean Cell Hemoglobin : 32.1 pg  Mean Cell Hemoglobin Concentration : 34.0 gm/dL  Auto Neutrophil # : x  Auto Lymphocyte # : x  Auto Monocyte # : x  Auto Eosinophil # : x  Auto Basophil # : x  Auto Neutrophil % : x  Auto Lymphocyte % : x  Auto Monocyte % : x  Auto Eosinophil % : x  Auto Basophil % : x    -    139  |  108  |  20  ----------------------------<  106<H>  3.6   |  22  |  1.22    Ca    8.3<L>      2018 06:43  Phos  2.9     07-  Mg     2.5     -28    TPro  5.5<L>  /  Alb  2.2<L>  /  TBili  0.9  /  DBili  x   /  AST  24  /  ALT  23  /  AlkPhos  73  07-28    CARDIAC MARKERS ( 2018 06:43 )  .154 ng/mL / x     / x     / x     / x          LIVER FUNCTIONS - ( 2018 06:43 )  Alb: 2.2 g/dL / Pro: 5.5 g/dL / ALK PHOS: 73 U/L / ALT: 23 U/L DA / AST: 24 U/L / GGT: x             Culture - Stool (collected 2018 21:49)  Source: .Stool Feces  Preliminary Report (2018 18:12):    No enteric pathogens to date: Final culture pending      Urinalysis Basic - ( 2018 21:30 )    Color: Yellow / Appearance: Slightly Turbid / S.020 / pH: x  Gluc: x / Ketone: Small  / Bili: Negative / Urobili: Negative mg/dL   Blood: x / Protein: 30 mg/dL / Nitrite: Negative   Leuk Esterase: Trace / RBC: 3-5 /HPF / WBC 0-2   Sq Epi: x / Non Sq Epi: Few / Bacteria: Moderate    MEDICATIONS  (STANDING):  acetaminophen   Tablet. 1000 milliGRAM(s) Oral every 8 hours  aspirin enteric coated 162 milliGRAM(s) Oral every 12 hours  dextrose 5% + sodium chloride 0.45%. 1000 milliLiter(s) (50 mL/Hr) IV Continuous <Continuous>  dicyclomine 10 milliGRAM(s) Oral three times a day before meals  famotidine    Tablet 20 milliGRAM(s) Oral daily  ferrous    sulfate 325 milliGRAM(s) Oral daily  lactobacillus acidophilus 1 Tablet(s) Oral two times a day with meals  latanoprost 0.005% Ophthalmic Solution 1 Drop(s) Both EYES at bedtime  metroNIDAZOLE  IVPB 500 milliGRAM(s) IV Intermittent every 8 hours  tamsulosin 0.4 milliGRAM(s) Oral at bedtime  vancomycin    Solution 125 milliGRAM(s) Oral every 6 hours    MEDICATIONS  (PRN):  aluminum hydroxide/magnesium hydroxide/simethicone Suspension 30 milliLiter(s) Oral four times a day PRN Indigestion  ondansetron Injectable 4 milliGRAM(s) IV Push every 6 hours PRN Nausea and/or Vomiting  polyethylene glycol 3350 17 Gram(s) Oral daily PRN Constipation  traMADol 25 milliGRAM(s) Oral every 4 hours PRN Mild Pain (1 - 3)  traMADol 50 milliGRAM(s) Oral every 4 hours PRN Moderate Pain (4 - 6) Pt is an 86yr old man with PMHx including HTN and osteoarthritis. Pt was admitted  for an elective right knee replacement. Pt hospital course was notable for asymptomatic sinus pauses with cardiology following, RRT on  for hypotension requiring transfer to the ICU with finding of C. Diff.     Pt with continued abdominal pain and diarrhea.     ROS: Pt denies chest pain/headache/sob, endorses abdominal pain relieved with defecation     Gen: No apparent distress, resting in bed  Neuro: A&Ox3, alert, appropriately conversive  Pulm: Good inspiratory effort, no adventitious breath sounds appreciated  Cardiac: s1, s2, sbp 150s on tele, trace edema to extremities  Ab: Distended, hyperactive bowel sounds, diffusely tender  Ex: Right patella with overlaying sutures, abdominal pad/net dressing, clean/dry without malodor/purulent drainage    ICU Vital Signs Last 24 Hrs  T(C): 36.1 (2018 16:00), Max: 36.7 (2018 12:20)  T(F): 96.9 (2018 16:00), Max: 98.1 (2018 12:20)  HR: 50 (2018 18:00) (50 - 74)  BP: 150/73 (2018 18:00) (104/54 - 150/73)  BP(mean): 97 (2018 18:00) (71 - 108)  ABP: --  ABP(mean): --  RR: 18 (2018 18:00) (12 - 20)  SpO2: 99% (2018 18:00) (93% - 100%)    CBC Full  -  ( 2018 06:43 )  WBC Count : 19.95 K/uL  Hemoglobin : 10.8 g/dL  Hematocrit : 31.8 %  Platelet Count - Automated : 142 K/uL  Mean Cell Volume : 94.6 fl  Mean Cell Hemoglobin : 32.1 pg  Mean Cell Hemoglobin Concentration : 34.0 gm/dL  Auto Neutrophil # : x  Auto Lymphocyte # : x  Auto Monocyte # : x  Auto Eosinophil # : x  Auto Basophil # : x  Auto Neutrophil % : x  Auto Lymphocyte % : x  Auto Monocyte % : x  Auto Eosinophil % : x  Auto Basophil % : x    07-28    139  |  108  |  20  ----------------------------<  106<H>  3.6   |  22  |  1.22    Ca    8.3<L>      2018 06:43  Phos  2.9     -  Mg     2.5     -    TPro  5.5<L>  /  Alb  2.2<L>  /  TBili  0.9  /  DBili  x   /  AST  24  /  ALT  23  /  AlkPhos  73  -    CARDIAC MARKERS ( 2018 06:43 )  .154 ng/mL / x     / x     / x     / x          LIVER FUNCTIONS - ( 2018 06:43 )  Alb: 2.2 g/dL / Pro: 5.5 g/dL / ALK PHOS: 73 U/L / ALT: 23 U/L DA / AST: 24 U/L / GGT: x             Culture - Stool (collected 2018 21:49)  Source: .Stool Feces  Preliminary Report (2018 18:12):    No enteric pathogens to date: Final culture pending      Urinalysis Basic - ( 2018 21:30 )    Color: Yellow / Appearance: Slightly Turbid / S.020 / pH: x  Gluc: x / Ketone: Small  / Bili: Negative / Urobili: Negative mg/dL   Blood: x / Protein: 30 mg/dL / Nitrite: Negative   Leuk Esterase: Trace / RBC: 3-5 /HPF / WBC 0-2   Sq Epi: x / Non Sq Epi: Few / Bacteria: Moderate    MEDICATIONS  (STANDING):  acetaminophen   Tablet. 1000 milliGRAM(s) Oral every 8 hours  aspirin enteric coated 162 milliGRAM(s) Oral every 12 hours  dextrose 5% + sodium chloride 0.45%. 1000 milliLiter(s) (50 mL/Hr) IV Continuous <Continuous>  dicyclomine 10 milliGRAM(s) Oral three times a day before meals  famotidine    Tablet 20 milliGRAM(s) Oral daily  ferrous    sulfate 325 milliGRAM(s) Oral daily  lactobacillus acidophilus 1 Tablet(s) Oral two times a day with meals  latanoprost 0.005% Ophthalmic Solution 1 Drop(s) Both EYES at bedtime  metroNIDAZOLE  IVPB 500 milliGRAM(s) IV Intermittent every 8 hours  tamsulosin 0.4 milliGRAM(s) Oral at bedtime  vancomycin    Solution 125 milliGRAM(s) Oral every 6 hours    MEDICATIONS  (PRN):  aluminum hydroxide/magnesium hydroxide/simethicone Suspension 30 milliLiter(s) Oral four times a day PRN Indigestion  ondansetron Injectable 4 milliGRAM(s) IV Push every 6 hours PRN Nausea and/or Vomiting  polyethylene glycol 3350 17 Gram(s) Oral daily PRN Constipation  traMADol 25 milliGRAM(s) Oral every 4 hours PRN Mild Pain (1 - 3)  traMADol 50 milliGRAM(s) Oral every 4 hours PRN Moderate Pain (4 - 6)

## 2018-07-29 NOTE — PROGRESS NOTE ADULT - SUBJECTIVE AND OBJECTIVE BOX
KAUSHIK SEARS  86y  Male    Patient is a 86y old  Male who presents with a chief complaint of " Polo is going to replace my RIGHT knee" (2018 12:46)      comfortable.     PAST MEDICAL & SURGICAL HISTORY:  Osteoarthritis of right knee  Hypertension  Tremor of both hands  History of hernia surgery: X3 1950&#x27;s-1970&#x27;s  History of shoulder surgery: right, 2012  History of knee replacement, total, left:     PHYSICAL EXAM:    T(C): 36.7 (18 @ 12:20), Max: 36.7 (18 @ 20:01)  HR: 74 (18 @ 12:00) (51 - 74)  BP: 108/65 (18 @ 12:00) (104/54 - 137/54)  RR: 17 (18 @ 12:00) (9 - 20)  SpO2: 100% (18 @ 12:00) (93% - 100%)  Wt(kg): --    I&O's Detail    2018 07:  -  2018 07:00  --------------------------------------------------------  IN:    dextrose 5% + sodium chloride 0.45%.: 750 mL    IV PiggyBack: 300 mL    Oral Fluid: 240 mL  Total IN: 1290 mL    OUT:    Indwelling Catheter - Urethral: 150 mL    Voided: 750 mL  Total OUT: 900 mL    Total NET: 390 mL      2018 07:  -  2018 13:56  --------------------------------------------------------  IN:    dextrose 5% + sodium chloride 0.45%.: 300 mL    IV PiggyBack: 100 mL    Oral Fluid: 240 mL  Total IN: 640 mL    OUT:    Voided: 200 mL  Total OUT: 200 mL    Total NET: 440 mL    Respiratory: clear anteriorly, decreased BS at bases  Cardiovascular: S1 S2  Gastrointestinal: soft NT ND +BS  Extremities: edema   Neuro: Awake and alert    MEDICATIONS  (STANDING):  acetaminophen   Tablet. 1000 milliGRAM(s) Oral every 8 hours  aspirin enteric coated 162 milliGRAM(s) Oral every 12 hours  dextrose 5% + sodium chloride 0.45%. 1000 milliLiter(s) (50 mL/Hr) IV Continuous <Continuous>  dicyclomine 10 milliGRAM(s) Oral three times a day before meals  famotidine    Tablet 20 milliGRAM(s) Oral daily  ferrous    sulfate 325 milliGRAM(s) Oral daily  lactobacillus acidophilus 1 Tablet(s) Oral two times a day with meals  latanoprost 0.005% Ophthalmic Solution 1 Drop(s) Both EYES at bedtime  metroNIDAZOLE  IVPB 500 milliGRAM(s) IV Intermittent every 8 hours  tamsulosin 0.4 milliGRAM(s) Oral at bedtime  vancomycin    Solution 125 milliGRAM(s) Oral every 6 hours    MEDICATIONS  (PRN):  aluminum hydroxide/magnesium hydroxide/simethicone Suspension 30 milliLiter(s) Oral four times a day PRN Indigestion  ondansetron Injectable 4 milliGRAM(s) IV Push every 6 hours PRN Nausea and/or Vomiting  polyethylene glycol 3350 17 Gram(s) Oral daily PRN Constipation  traMADol 25 milliGRAM(s) Oral every 4 hours PRN Mild Pain (1 - 3)  traMADol 50 milliGRAM(s) Oral every 4 hours PRN Moderate Pain (4 - 6)                            10.8   19.95 )-----------( 142      ( 2018 06:43 )             31.8       07-    139  |  108  |  20  ----------------------------<  106<H>  3.6   |  22  |  1.22    Ca    8.3<L>      2018 06:43  Phos  2.9     -  Mg     2.5         TPro  5.5<L>  /  Alb  2.2<L>  /  TBili  0.9  /  DBili  x   /  AST  24  /  ALT  23  /  AlkPhos  73        Urinalysis Basic - ( 2018 21:30 )    Color: Yellow / Appearance: Slightly Turbid / S.020 / pH: x  Gluc: x / Ketone: Small  / Bili: Negative / Urobili: Negative mg/dL   Blood: x / Protein: 30 mg/dL / Nitrite: Negative   Leuk Esterase: Trace / RBC: 3-5 /HPF / WBC 0-2   Sq Epi: x / Non Sq Epi: Few / Bacteria: Moderate

## 2018-07-29 NOTE — PROGRESS NOTE ADULT - SUBJECTIVE AND OBJECTIVE BOX
KAUSHIK SEARS is a 86yMale , patient examined and chart reviewed.    INTERVAL HPI/ OVERNIGHT EVENTS:  Diarrhea better. Abd pain improving.  Afebrile.    Past Medical History--  PAST MEDICAL & SURGICAL HISTORY:  Osteoarthritis of right knee  Hypertension  Tremor of both hands  History of hernia surgery: X3 1950&#x27;s-1970&#x27;s  History of shoulder surgery: right, 2012  History of knee replacement, total, left: 2007    For details regarding the patient's social history, family history, and other miscellaneous elements, please refer the initial infectious diseases consultation and/or the admitting history and physical examination for this admission.    ROS:  CONSTITUTIONAL:  Negative fever or chills  EYES:  Negative  blurry vision or double vision  CARDIOVASCULAR:  Negative for chest pain or palpitations  RESPIRATORY:  Negative for cough, wheezing, or SOB   GASTROINTESTINAL:  Negative for nausea, vomiting, constipation, +diarrhea, abdominal pain  GENITOURINARY:  Negative frequency, urgency , dysuria or hematuria   NEUROLOGIC:  No headache, confusion, dizziness, lightheadedness  All other systems were reviewed and are negative       Current inpatient medications :    ANTIBIOTICS/RELEVANT:  lactobacillus acidophilus 1 Tablet(s) Oral two times a day with meals  metroNIDAZOLE  IVPB 500 milliGRAM(s) IV Intermittent every 8 hours  vancomycin    Solution 125 milliGRAM(s) Oral every 6 hours      acetaminophen   Tablet. 1000 milliGRAM(s) Oral every 8 hours  aluminum hydroxide/magnesium hydroxide/simethicone Suspension 30 milliLiter(s) Oral four times a day PRN  aspirin enteric coated 162 milliGRAM(s) Oral every 12 hours  dextrose 5% + sodium chloride 0.45%. 1000 milliLiter(s) IV Continuous <Continuous>  dicyclomine 10 milliGRAM(s) Oral three times a day before meals  famotidine    Tablet 20 milliGRAM(s) Oral daily  ferrous    sulfate 325 milliGRAM(s) Oral daily  latanoprost 0.005% Ophthalmic Solution 1 Drop(s) Both EYES at bedtime  ondansetron Injectable 4 milliGRAM(s) IV Push every 6 hours PRN  polyethylene glycol 3350 17 Gram(s) Oral daily PRN  tamsulosin 0.4 milliGRAM(s) Oral at bedtime  traMADol 25 milliGRAM(s) Oral every 4 hours PRN  traMADol 50 milliGRAM(s) Oral every 4 hours PRN      Objective:    07-28 @ 07:01  -  07-29 @ 07:00  --------------------------------------------------------  IN: 1290 mL / OUT: 900 mL / NET: 390 mL    07-29 @ 07:01  -  07-30 @ 00:27  --------------------------------------------------------  IN: 1430 mL / OUT: 600 mL / NET: 830 mL      T(C): 36.9 (07-30-18 @ 00:07), Max: 37.1 (07-29-18 @ 20:43)  HR: 57 (07-29-18 @ 22:00) (50 - 74)  BP: 132/74 (07-29-18 @ 22:00) (104/54 - 155/70)  RR: 11 (07-29-18 @ 22:00) (11 - 20)  SpO2: 100% (07-29-18 @ 22:00) (93% - 100%)  Wt(kg): --      Physical Exam:  GEN: NAD,  HEENT: normocephalic and atraumatic. EOMI. MIAH. Moist mucosa. Clear Posterior pharynx.  NECK: Supple. No carotid bruits.  No lymphadenopathy or thyromegaly.  LUNGS: Clear to auscultation.  HEART: Regular rate and rhythm without murmur.  ABDOMEN: Soft, mildly tender, and nondistended.  Positive bowel sounds.   EXTREMITIES: Without any cyanosis, clubbing, rash, lesions or edema.  NEUROLOGIC: A & O x3, No focal neurological deficits   SKIN: No ulceration or induration present.      LABS:                        10.8   19.95 )-----------( 142      ( 28 Jul 2018 06:43 )             31.8       07-28    139  |  108  |  20  ----------------------------<  106<H>  3.6   |  22  |  1.22    Ca    8.3<L>      28 Jul 2018 06:43  Phos  2.9     07-28  Mg     2.5     07-28    TPro  5.5<L>  /  Alb  2.2<L>  /  TBili  0.9  /  DBili  x   /  AST  24  /  ALT  23  /  AlkPhos  73  07-28      MICROBIOLOGY:    Culture - Blood (collected 26 Jul 2018 15:20)  Source: .Blood Blood-Peripheral  Preliminary Report (27 Jul 2018 16:01):    No growth to date.    Culture - Blood (collected 26 Jul 2018 15:20)  Source: .Blood Blood-Peripheral  Preliminary Report (27 Jul 2018 16:01):    No growth to date.    Clostridium difficile Toxin by PCR (07.27.18 @ 22:54)    C Diff by PCR Result: Detected      RADIOLOGY & ADDITIONAL STUDIES:  EXAM:  CT CHEST                            PROCEDURE DATE:  07/27/2018          INTERPRETATION:  History: Postop, high white count.    CT chest abdomen and pelvis no oral or IV contrast. Patchy bibasilar   dependent consolidation/atelectasis. Correlate clinically for active   infection. Small bilateral layering pleural effusions right slightly   larger than left.  Central airways patent. Enlarged pretracheal lymph node measures up to 2   cm . Nonaneurysmal thoracic aorta.  Mild cardiomegaly with coronary artery calcination. Decrease in cardiac   chamber blood pool attenuation suggests an anemic state. Trace   pericardial effusion.  Calcified gallstone. No biliary dilatation. Unenhanced liver pancreas   spleen adrenals not remarkable.  Nonaneurysmal abdominal aorta.  No suspicious retroperitoneal adenopathy.  There is a 2 mm nonobstructing left renal calculus. Multiple left renal   cortical hypodensities cannot be definitively characterized probably   represent cysts. Bilateral nonspecific perirenal stranding.  No evidence of appendicitis.  There is mild diffuse colonic wall thickening with associated fat   stranding consistent with pancolitis. Differential diagnosis would have   to include but is not limited to C. difficile colitis.Colonic   diverticula but no definite diverticulitis. No bowel obstruction or   extraluminal fluid or gas.  Prostate significantly enlarged (6.3 x 6 cm). Bladder decompressed with   Bartholomew. Clips left inguinal region.  Degenerative change lower lumbarspine. Nonspecific sclerotic foci right   ilium and sacrum.    Impression:    Bibasilar dependent consolidation/atelectasis and small effusions.   Correlate clinically for active infection.  Mild pretracheal adenopathy.  Cholelithiasis.  Nonobstructive left nephrolithiasis.  Acute uncomplicated pancolitis.  Markedly enlarged prostate.  Additional findings as discussed.    Assessment :   Patient is an 86 year old male with a history of HTN, OA sp right TKR 7/24/18  complicated by sepsis with acute pancolitis sec Cdiff colitis  Doubt pna likely atelectasis  Leukocytosis sec Cdiff  KARLENE resolved  +troponins      Plan :   Cont po vanc and IV Flagyl  Cont Bacid  Serial abd exams  Trend temp and wbc  Labs in am    Continue with present regiment.  Appropriate use of antibiotics and adverse effects reviewed.    I have discussed the above plan of care with patient/ family in detail. They expressed understanding of the the treatment plan . Risks, benefits and alternatives discussed in detail. I have asked if they have any questions or concerns and appropriately addressed them to the best of my ability .      Critical care time greater then 35 minutes reviewing notes, labs data/ imaging , discussion with multidisciplinary team.    Thank you for allowing me to participate in care of your patient .        Yoon Sepulveda MD  Infectious Disease  [phone]

## 2018-07-29 NOTE — PROGRESS NOTE ADULT - SUBJECTIVE AND OBJECTIVE BOX
ORTHOPEDIC ATTENDING PROGRESS NOTE  KAUSHIK SEARS      86y Male                                                                                                                               POD #   5     STATUS POST:               Pre-Op Dx: DJD (degenerative joint disease) of knee: Right    Post-Op Dx:  DJD (degenerative joint disease) of knee: Right    Procedure: Knee replacement: Right                                                Pain (0-10):  Pt reports  Current Pain Management:  [ ] PCA   [x ] Po Analgesics [ ] IM /IV Anagesics     T(F): 98.8  HR: 72  BP: 155/70  RR: 18  SpO2: 96%               Physical Exam :    -   Dressing changed sterile.   -   Wound C/D/I.   -   Distal Neurvascular status intact grossly.   -   Warm well perfused; capillary refill <3 seconds   -   (+)EHL/FHL   -   (+) Sensation to light touch  -   (-) Calf tenderness Bilaterally    A/P: 86y Male s/p Knee replacement: Right     -   Ortho Stable  -   Pain control   -   Medicine to follow  -   DVT ppx:     [ ]SCDs     [x ] ASA     [ ] Eliquis     [ ] Lovenox  -   Weight bearing status:  WBAT [x ]        PWB    [ ]     TTWB  [ ]      NWB  [ ]   -  Dispo:     Home [x ]     Acute Rehab [ ]     GLORY [ ]     TBD [ ]

## 2018-07-29 NOTE — PROGRESS NOTE ADULT - ASSESSMENT
The patient is an 86 year old male with a history of HTN, OA who is s/p right TKR.    7/29/18  Patient lying flat, comfortably.  No cardiac complaints.  Still complaining of some mild abdominal pain, diarrhea.  Monitor compatible with sinus bradycardia, APC, VPC.  Patient appears to be getting frustrated.    Plan:  - Continue to monitor on telemetry  - Avoid rate lowering agents  - No significant pauses noted on telemetry  - No indication for pacemaker  - Minimize narcotics  - Hold antihypertensives  - On IV antibiotics for C. diff.  - Being followed by ID, Pulmonary, Nephrology, Orthopedics and surgery.

## 2018-07-29 NOTE — PROGRESS NOTE ADULT - SUBJECTIVE AND OBJECTIVE BOX
Patient is a 86y Male with a known history of :  Abdominal pain (R10.9)  Clostridium difficile colitis (A04.72)  History of knee replacement, total, left (Z96.652)  KARLENE (acute kidney injury) (N17.9)  Atelectasis (J98.11)  Anemia (D64.9)  Hypotension (I95.9)  Osteoarthritis of right knee (M17.11)    HPI:      REVIEW OF SYSTEMS:    CONSTITUTIONAL: No fever, weight loss, or fatigue  EYES: No eye pain, visual disturbances, or discharge  ENMT:  No difficulty hearing, tinnitus, vertigo; No sinus or throat pain  NECK: No pain or stiffness  BREASTS: No pain, masses, or nipple discharge  RESPIRATORY: No cough, wheezing, chills or hemoptysis; No shortness of breath  CARDIOVASCULAR: No chest pain, palpitations, dizziness, or leg swelling  GASTROINTESTINAL: No abdominal or epigastric pain. No nausea, vomiting, or hematemesis; No diarrhea or constipation. No melena or hematochezia.  GENITOURINARY: No dysuria, frequency, hematuria, or incontinence  NEUROLOGICAL: No headaches, memory loss, loss of strength, numbness, or tremors  SKIN: No itching, burning, rashes, or lesions   LYMPH NODES: No enlarged glands  ENDOCRINE: No heat or cold intolerance; No hair loss  MUSCULOSKELETAL: No joint pain or swelling; No muscle, back, or extremity pain  PSYCHIATRIC: No depression, anxiety, mood swings, or difficulty sleeping  HEME/LYMPH: No easy bruising, or bleeding gums  ALLERGY AND IMMUNOLOGIC: No hives or eczema    MEDICATIONS  (STANDING):  acetaminophen   Tablet. 1000 milliGRAM(s) Oral every 8 hours  aspirin enteric coated 162 milliGRAM(s) Oral every 12 hours  dextrose 5% + sodium chloride 0.45%. 1000 milliLiter(s) (50 mL/Hr) IV Continuous <Continuous>  dicyclomine 10 milliGRAM(s) Oral three times a day before meals  famotidine    Tablet 20 milliGRAM(s) Oral daily  ferrous    sulfate 325 milliGRAM(s) Oral daily  lactobacillus acidophilus 1 Tablet(s) Oral two times a day with meals  latanoprost 0.005% Ophthalmic Solution 1 Drop(s) Both EYES at bedtime  metroNIDAZOLE  IVPB 500 milliGRAM(s) IV Intermittent every 8 hours  tamsulosin 0.4 milliGRAM(s) Oral at bedtime  vancomycin    Solution 125 milliGRAM(s) Oral every 6 hours    MEDICATIONS  (PRN):  aluminum hydroxide/magnesium hydroxide/simethicone Suspension 30 milliLiter(s) Oral four times a day PRN Indigestion  ondansetron Injectable 4 milliGRAM(s) IV Push every 6 hours PRN Nausea and/or Vomiting  polyethylene glycol 3350 17 Gram(s) Oral daily PRN Constipation  traMADol 25 milliGRAM(s) Oral every 4 hours PRN Mild Pain (1 - 3)  traMADol 50 milliGRAM(s) Oral every 4 hours PRN Moderate Pain (4 - 6)      ALLERGIES: No Known Allergies      FAMILY HISTORY:  Family history of heart disease (Father)      Social history:  Alochol:   Smoking:   Drug Use:   Marital Status:     PHYSICAL EXAMINATION:  -----------------------------  T(C): 36.6 (07-29-18 @ 08:08), Max: 37.2 (07-28-18 @ 12:41)  HR: 52 (07-29-18 @ 08:00) (51 - 63)  BP: 125/61 (07-29-18 @ 08:00) (104/54 - 137/54)  RR: 20 (07-29-18 @ 08:00) (9 - 20)  SpO2: 100% (07-29-18 @ 08:00) (93% - 100%)  Wt(kg): --    07-28 @ 07:01  -  07-29 @ 07:00  --------------------------------------------------------  IN:    dextrose 5% + sodium chloride 0.45%.: 750 mL    IV PiggyBack: 300 mL    Oral Fluid: 240 mL  Total IN: 1290 mL    OUT:    Indwelling Catheter - Urethral: 150 mL    Voided: 750 mL  Total OUT: 900 mL    Total NET: 390 mL      07-29 @ 07:01  -  07-29 @ 10:10  --------------------------------------------------------  IN:    dextrose 5% + sodium chloride 0.45%.: 100 mL    Oral Fluid: 120 mL  Total IN: 220 mL    OUT:    Voided: 200 mL  Total OUT: 200 mL    Total NET: 20 mL            Constitutional: well developed, normal appearance, well groomed, well nourished, no deformities and no acute distress.   Eyes: the conjunctiva exhibited no abnormalities and the eyelids demonstrated no xanthelasmas.   HEENT: normal oral mucosa, no oral pallor and no oral cyanosis.   Neck: normal jugular venous A waves present, normal jugular venous V waves present and no jugular venous guardado A waves.   Pulmonary: no respiratory distress, normal respiratory rhythm and effort, no accessory muscle use and lungs were clear to auscultation bilaterally. Anteriorly  Cardiovascular: heart rate and rhythm were normal, normal S1 and S2 and no murmur, gallop, rub, heave or thrill are present.    Musculoskeletal: the gait could not be assessed.   Extremities: no clubbing of the fingernails, no localized cyanosis, no petechial hemorrhages and no ischemic changes.   Skin: normal skin color and pigmentation, no rash, no venous stasis, no skin lesions, no skin ulcer and no xanthoma was observed.   Psychiatric: oriented to person, place, and time, the affect was normal, the mood was normal and not feeling anxious.     LABS:   --------  07-28    139  |  108  |  20  ----------------------------<  106<H>  3.6   |  22  |  1.22    Ca    8.3<L>      28 Jul 2018 06:43  Phos  2.9     07-28  Mg     2.5     07-28    TPro  5.5<L>  /  Alb  2.2<L>  /  TBili  0.9  /  DBili  x   /  AST  24  /  ALT  23  /  AlkPhos  73  07-28                         10.8   19.95 )-----------( 142      ( 28 Jul 2018 06:43 )             31.8         07-28 @ 06:43 CPK total:--, CKMB --, Troponin I - .154 ng/mL<H>      Culture Results:   No enteric pathogens to date: Final culture pending (07-27 @ 21:49)    07-27 @ 21:49    Organism --   Gram Stain Blood -- Gram Stain --  Specimen Source .Stool Feces  Culture-Blood --        Radiology:

## 2018-07-29 NOTE — PROGRESS NOTE ADULT - ASSESSMENT
Patient is 85 yo male presenting with right total knee replacement day, complicated with diarrhea/dehydration/ARF secondary to c.diff collitis      1. Pancolitis secondary to c.diff collitis,   still has diarrhea, continue on po vanco,  and IV Metronidazole.   Continue with full liquids.  Continue with Contact isolation.       2. S/P right Total knee replacement.  Continue with pain management, DVT proph, and wound care as per Ortho.    Continue with PT/OT.    3. HTN.  Hold BP medication and Monitor BP  4.  Bradycardia with few pauses.  Resolved.   Cardiology follow up appreciated.   5. Hypotension with acute renal failure.  Improved with IVF.  BP stable.    Nephrology  follow up  appreciated.   6.  Anemia.  due to post-operative blood loss.  Asymptomatic.  Monitor H/H.  Iron supplement  7. Urinary retention , s/p bueno, will d/c bueno and do ToV and start on flomax    Plan of care was discussed with patient and RN .

## 2018-07-29 NOTE — PROGRESS NOTE ADULT - SUBJECTIVE AND OBJECTIVE BOX
Patient is a 86y old  Male who presents with a chief complaint of "Dr Cuellar is going to replace my RIGHT knee" (2018 12:46)      INTERVAL HPI/OVERNIGHT EVENTS: no acute overnight events. Pt seems frustated.     MEDICATIONS  (STANDING):  acetaminophen   Tablet. 1000 milliGRAM(s) Oral every 8 hours  aspirin enteric coated 162 milliGRAM(s) Oral every 12 hours  dextrose 5% + sodium chloride 0.45%. 1000 milliLiter(s) (50 mL/Hr) IV Continuous <Continuous>  dicyclomine 10 milliGRAM(s) Oral three times a day before meals  famotidine    Tablet 20 milliGRAM(s) Oral daily  ferrous    sulfate 325 milliGRAM(s) Oral daily  lactobacillus acidophilus 1 Tablet(s) Oral two times a day with meals  latanoprost 0.005% Ophthalmic Solution 1 Drop(s) Both EYES at bedtime  metroNIDAZOLE  IVPB 500 milliGRAM(s) IV Intermittent every 8 hours  tamsulosin 0.4 milliGRAM(s) Oral at bedtime  vancomycin    Solution 125 milliGRAM(s) Oral every 6 hours    MEDICATIONS  (PRN):  aluminum hydroxide/magnesium hydroxide/simethicone Suspension 30 milliLiter(s) Oral four times a day PRN Indigestion  ondansetron Injectable 4 milliGRAM(s) IV Push every 6 hours PRN Nausea and/or Vomiting  polyethylene glycol 3350 17 Gram(s) Oral daily PRN Constipation  traMADol 25 milliGRAM(s) Oral every 4 hours PRN Mild Pain (1 - 3)  traMADol 50 milliGRAM(s) Oral every 4 hours PRN Moderate Pain (4 - 6)      Allergies    No Known Allergies    Intolerances        REVIEW OF SYSTEMS:  CONSTITUTIONAL: No fever or chills  HEENT:  No headache, no sore throat  RESPIRATORY: No cough, wheezing, or shortness of breath  CARDIOVASCULAR: No chest pain, palpitations, or leg swelling  GASTROINTESTINAL: No nausea, vomiting, or diarrhea  GENITOURINARY: No dysuria, frequency, or hematuria  NEUROLOGICAL: no focal weakness or dizziness  SKIN:  No rashes or lesions   MUSCULOSKELETAL: no myalgias   PSYCHIATRIC: No depression or anxiety      Vital Signs Last 24 Hrs  T(C): 36.7 (2018 12:20), Max: 36.7 (2018 20:01)  T(F): 98.1 (2018 12:20), Max: 98.1 (2018 20:01)  HR: 63 (2018 14:00) (52 - 74)  BP: 110/59 (2018 14:00) (104/54 - 137/54)  BP(mean): 75 (2018 14:00) (71 - 108)  RR: 18 (2018 14:00) (12 - 20)  SpO2: 97% (2018 14:00) (93% - 100%)    PHYSICAL EXAM:  GENERAL: NAD  HEENT:  EOMI, mmm  CHEST/LUNG:  CTA b/l, no rales, wheezes, or rhonchi  HEART:  RRR, S1, S2, []murmur  ABDOMEN:  BS+, soft, NT, ND  EXTREMITIES: no edema or calf tenderness .   NERVOUS SYSTEM: AA&Ox3    DIET:     Cultures: Culture Results:   No enteric pathogens to date: Final culture pending ( @ 21:49)  Culture Results:   No growth to date. ( @ 15:20)  Culture Results:   No growth to date. ( @ 15:20)      LABS:    CBC Full  -  ( 2018 06:43 )  WBC Count : 19.95 K/uL  Hemoglobin : 10.8 g/dL  Hematocrit : 31.8 %  Platelet Count - Automated : 142 K/uL  Mean Cell Volume : 94.6 fl  Mean Cell Hemoglobin : 32.1 pg  Mean Cell Hemoglobin Concentration : 34.0 gm/dL  Auto Neutrophil # : x  Auto Lymphocyte # : x  Auto Monocyte # : x  Auto Eosinophil # : x  Auto Basophil # : x  Auto Neutrophil % : x  Auto Lymphocyte % : x  Auto Monocyte % : x  Auto Eosinophil % : x  Auto Basophil % : x      Ca    8.3        2018 06:43        Urinalysis Basic - ( 2018 21:30 )    Color: Yellow / Appearance: Slightly Turbid / S.020 / pH: x  Gluc: x / Ketone: Small  / Bili: Negative / Urobili: Negative mg/dL   Blood: x / Protein: 30 mg/dL / Nitrite: Negative   Leuk Esterase: Trace / RBC: 3-5 /HPF / WBC 0-2   Sq Epi: x / Non Sq Epi: Few / Bacteria: Moderate      CAPILLARY BLOOD GLUCOSE              RADIOLOGY & ADDITIONAL TESTS:    Personally reviewed.     Consultant(s) Notes Reviewed:  [x] YES  [ ] NO    Care Discussed with [ ] Consultants  [x] Patient  [ ] Family  [x]      [ ] Other; RN  DVT ppx  Advanced directive

## 2018-07-30 LAB
ANION GAP SERPL CALC-SCNC: 11 MMOL/L — SIGNIFICANT CHANGE UP (ref 5–17)
BUN SERPL-MCNC: 16 MG/DL — SIGNIFICANT CHANGE UP (ref 7–23)
CALCIUM SERPL-MCNC: 8 MG/DL — LOW (ref 8.4–10.5)
CHLORIDE SERPL-SCNC: 105 MMOL/L — SIGNIFICANT CHANGE UP (ref 96–108)
CO2 SERPL-SCNC: 24 MMOL/L — SIGNIFICANT CHANGE UP (ref 22–31)
CREAT SERPL-MCNC: 1.06 MG/DL — SIGNIFICANT CHANGE UP (ref 0.5–1.3)
CULTURE RESULTS: SIGNIFICANT CHANGE UP
GLUCOSE SERPL-MCNC: 120 MG/DL — HIGH (ref 70–99)
HCT VFR BLD CALC: 31.8 % — LOW (ref 39–50)
HGB BLD-MCNC: 11.1 G/DL — LOW (ref 13–17)
MAGNESIUM SERPL-MCNC: 1.6 MG/DL — SIGNIFICANT CHANGE UP (ref 1.6–2.6)
MCHC RBC-ENTMCNC: 31.9 PG — SIGNIFICANT CHANGE UP (ref 27–34)
MCHC RBC-ENTMCNC: 34.9 GM/DL — SIGNIFICANT CHANGE UP (ref 32–36)
MCV RBC AUTO: 91.4 FL — SIGNIFICANT CHANGE UP (ref 80–100)
NRBC # BLD: 0 /100 WBCS — SIGNIFICANT CHANGE UP (ref 0–0)
PHOSPHATE SERPL-MCNC: 3 MG/DL — SIGNIFICANT CHANGE UP (ref 2.5–4.5)
PLATELET # BLD AUTO: 177 K/UL — SIGNIFICANT CHANGE UP (ref 150–400)
POTASSIUM SERPL-MCNC: 3.4 MMOL/L — LOW (ref 3.5–5.3)
POTASSIUM SERPL-SCNC: 3.4 MMOL/L — LOW (ref 3.5–5.3)
RBC # BLD: 3.48 M/UL — LOW (ref 4.2–5.8)
RBC # FLD: 12.5 % — SIGNIFICANT CHANGE UP (ref 10.3–14.5)
SODIUM SERPL-SCNC: 140 MMOL/L — SIGNIFICANT CHANGE UP (ref 135–145)
SPECIMEN SOURCE: SIGNIFICANT CHANGE UP
WBC # BLD: 14.29 K/UL — HIGH (ref 3.8–10.5)
WBC # FLD AUTO: 14.29 K/UL — HIGH (ref 3.8–10.5)

## 2018-07-30 PROCEDURE — 99233 SBSQ HOSP IP/OBS HIGH 50: CPT

## 2018-07-30 RX ORDER — SIMETHICONE 80 MG/1
80 TABLET, CHEWABLE ORAL EVERY 6 HOURS
Qty: 0 | Refills: 0 | Status: DISCONTINUED | OUTPATIENT
Start: 2018-07-30 | End: 2018-08-04

## 2018-07-30 RX ORDER — POTASSIUM CHLORIDE 20 MEQ
40 PACKET (EA) ORAL ONCE
Qty: 0 | Refills: 0 | Status: COMPLETED | OUTPATIENT
Start: 2018-07-30 | End: 2018-07-30

## 2018-07-30 RX ORDER — POTASSIUM CHLORIDE 20 MEQ
40 PACKET (EA) ORAL ONCE
Qty: 0 | Refills: 0 | Status: DISCONTINUED | OUTPATIENT
Start: 2018-07-30 | End: 2018-07-30

## 2018-07-30 RX ADMIN — Medication 162 MILLIGRAM(S): at 21:23

## 2018-07-30 RX ADMIN — Medication 125 MILLIGRAM(S): at 05:48

## 2018-07-30 RX ADMIN — LATANOPROST 1 DROP(S): 0.05 SOLUTION/ DROPS OPHTHALMIC; TOPICAL at 21:23

## 2018-07-30 RX ADMIN — Medication 125 MILLIGRAM(S): at 23:25

## 2018-07-30 RX ADMIN — Medication 1000 MILLIGRAM(S): at 14:30

## 2018-07-30 RX ADMIN — Medication 10 MILLIGRAM(S): at 17:15

## 2018-07-30 RX ADMIN — Medication 1000 MILLIGRAM(S): at 05:48

## 2018-07-30 RX ADMIN — SIMETHICONE 80 MILLIGRAM(S): 80 TABLET, CHEWABLE ORAL at 18:03

## 2018-07-30 RX ADMIN — Medication 100 MILLIGRAM(S): at 05:48

## 2018-07-30 RX ADMIN — Medication 162 MILLIGRAM(S): at 08:33

## 2018-07-30 RX ADMIN — Medication 10 MILLIGRAM(S): at 11:30

## 2018-07-30 RX ADMIN — Medication 1000 MILLIGRAM(S): at 22:28

## 2018-07-30 RX ADMIN — Medication 1000 MILLIGRAM(S): at 21:22

## 2018-07-30 RX ADMIN — Medication 100 MILLIGRAM(S): at 21:24

## 2018-07-30 RX ADMIN — Medication 1 TABLET(S): at 17:15

## 2018-07-30 RX ADMIN — FAMOTIDINE 20 MILLIGRAM(S): 10 INJECTION INTRAVENOUS at 11:30

## 2018-07-30 RX ADMIN — Medication 1000 MILLIGRAM(S): at 13:40

## 2018-07-30 RX ADMIN — Medication 125 MILLIGRAM(S): at 11:30

## 2018-07-30 RX ADMIN — Medication 40 MILLIEQUIVALENT(S): at 09:51

## 2018-07-30 RX ADMIN — Medication 1000 MILLIGRAM(S): at 06:17

## 2018-07-30 RX ADMIN — TAMSULOSIN HYDROCHLORIDE 0.4 MILLIGRAM(S): 0.4 CAPSULE ORAL at 21:23

## 2018-07-30 RX ADMIN — SODIUM CHLORIDE 50 MILLILITER(S): 9 INJECTION, SOLUTION INTRAVENOUS at 10:36

## 2018-07-30 RX ADMIN — Medication 125 MILLIGRAM(S): at 17:16

## 2018-07-30 RX ADMIN — Medication 100 MILLIGRAM(S): at 13:40

## 2018-07-30 RX ADMIN — Medication 1 TABLET(S): at 08:33

## 2018-07-30 RX ADMIN — Medication 10 MILLIGRAM(S): at 06:05

## 2018-07-30 RX ADMIN — Medication 325 MILLIGRAM(S): at 11:30

## 2018-07-30 NOTE — PROGRESS NOTE ADULT - SUBJECTIVE AND OBJECTIVE BOX
Patient is a 86y old  Male who presents with a chief complaint of "Dr Cuellar is going to replace my RIGHT knee" (25 Jul 2018 12:46)    24 hour events: ***  PAST MEDICAL & SURGICAL HISTORY:  Osteoarthritis of right knee  Hypertension  Tremor of both hands  History of hernia surgery: X3 1950&#x27;s-1970&#x27;s  History of shoulder surgery: right, 2012  History of knee replacement, total, left: 2007      Review of Systems:  Constitutional: No fever, chills, fatigue  Neuro: No headache, numbness, weakness  Resp: No cough, wheezing, shortness of breath  CVS: No chest pain, palpitations, leg swelling  GI: No abdominal pain, nausea, vomiting, diarrhea   : No dysuria, frequency, incontinence  Skin: No itching, burning, rashes, or lesions   Msk: No joint pain or swelling  Psych: No depression, anxiety, mood swings    T(F): 97.1 (07-31-18 @ 04:13), Max: 98.3 (07-30-18 @ 07:30)  HR: 55 (07-31-18 @ 04:00) (48 - 64)  BP: 119/61 (07-31-18 @ 04:00) (100/66 - 151/81)  RR: 17 (07-31-18 @ 04:00) (14 - 20)  SpO2: 97% (07-31-18 @ 04:00) (91% - 99%)  Wt(kg): --        CAPILLARY BLOOD GLUCOSE          I&O's Summary    29 Jul 2018 07:01  -  30 Jul 2018 07:00  --------------------------------------------------------  IN: 2100 mL / OUT: 750 mL / NET: 1350 mL    30 Jul 2018 07:01  -  31 Jul 2018 05:39  --------------------------------------------------------  IN: 1450 mL / OUT: 0 mL / NET: 1450 mL        Physical Exam:     Gen:   Neuro: A&Ox3, non-focal  HEENT: NC/AT  Resp: CTA b/l  CVS: nl S1/S2, RRR  Abd: soft, nt, nd, +bs  Ext: no edema, +pulses  Skin: well perfused, warm    Meds:  metroNIDAZOLE  IVPB IV Intermittent  vancomycin    Solution Oral    tamsulosin Oral        acetaminophen   Tablet. Oral  ondansetron Injectable IV Push PRN  traMADol Oral PRN  traMADol Oral PRN      aspirin enteric coated Oral    aluminum hydroxide/magnesium hydroxide/simethicone Suspension Oral PRN  dicyclomine Oral  famotidine    Tablet Oral  polyethylene glycol 3350 Oral PRN  simethicone Chew PRN      dextrose 5% + sodium chloride 0.45%. IV Continuous  ferrous    sulfate Oral      latanoprost 0.005% Ophthalmic Solution Both EYES    lactobacillus acidophilus Oral                            11.1   14.29 )-----------( 177      ( 30 Jul 2018 06:46 )             31.8       07-30    140  |  105  |  16  ----------------------------<  120<H>  3.4<L>   |  24  |  1.06    Ca    8.0<L>      30 Jul 2018 06:46  Phos  3.0     07-30  Mg     1.6     07-30                .Stool Feces   No enteric pathogens isolated.  (Stool culture examined for Salmonella,  Shigella, Campylobacter, Aeromonas, Plesiomonas,  Vibrio, E.coli O157 and Yersinia) -- 07-27 @ 21:49  .Blood Blood-Peripheral   No growth to date. -- 07-26 @ 15:20    C Diff by PCR Result: Detected (07-27 @ 22:54)        Radiology: ***    Bedside lung ultrasound: ***    Bedside ECHO: ***    CENTRAL LINE: Y/N          DATE INSERTED:              REMOVE: Y/N    KAUFMAN: Y/N                        DATE INSERTED:              REMOVE: Y/N    A-LINE: Y/N                       DATE INSERTED:              REMOVE: Y/N    GLOBAL ISSUE/BEST PRACTICE:  Analgesia:  Sedation:  HOB elevation: yes  Stress ulcer prophylaxis:  VTE prophylaxis:  Glycemic control:  Nutrition:      CODE STATUS: ***  GOC discussion: Y  Critical care time spent (mins): ***  (Reviewing data, imaging, discussing with multidisciplinary team, non inclusive of procedures, discussing goals of care with patient/family) 86M with PMHx of OA right knee, htn, Bilateral hand tremors, Hernia s/p repair x3, s/p right shoulder repair, s/p left TKR, now s/p right TKR POD 6. Hospital course significant for asymptomatic sinus pauses. RRT called for hypotension in setting of C. Diff colitis, KARLENE. Pt upgraded to ICU for further management    24 hour events: Pt with 6 BM's in 24 hours. BP stable. No events     PAST MEDICAL & SURGICAL HISTORY:  Osteoarthritis of right knee  Hypertension  Tremor of both hands  History of hernia surgery: X3 1950&#x27;s-1970&#x27;s  History of shoulder surgery: right, 2012  History of knee replacement, total, left: 2007      Review of Systems:  Constitutional: No fever, chills, fatigue  Neuro: No headache, numbness, weakness  Resp: No cough, wheezing, shortness of breath  CVS: No chest pain, palpitations, leg swelling  GI: +abdominal pain, No nausea, vomiting, diarrhea   : No dysuria, frequency, incontinence  Skin: No itching, burning, rashes, or lesions   Msk: No joint pain or swelling  Psych: No depression, anxiety, mood swings    T(F): 97.1   HR: 55   BP: 119/61   RR: 17   SpO2: 97%   Wt(kg): --        CAPILLARY BLOOD GLUCOSE          I&O's Summary    29 Jul 2018 07:01  -  30 Jul 2018 07:00  --------------------------------------------------------  IN: 2100 mL / OUT: 750 mL / NET: 1350 mL            Physical Exam:     Gen: well developed, well nourished  Neuro: A&Ox3, non-focal  HEENT: NC/AT  Resp: CTA b/l  CVS: nl S1/S2, RRR  Abd: Diffuse tenderness, +BS, soft  Ext: R knee dressing C/D/I. No edema, +pulses  Skin: well perfused, warm    Meds:    metroNIDAZOLE  IVPB IV Intermittent  vancomycin    Solution Oral  tamsulosin Oral  acetaminophen   Tablet. Oral  ondansetron Injectable IV Push PRN  traMADol Oral PRN  traMADol Oral PRN  aspirin enteric coated Oral  aluminum hydroxide/magnesium hydroxide/simethicone Suspension Oral PRN  dicyclomine Oral  famotidine    Tablet Oral  polyethylene glycol 3350 Oral PRN  simethicone Chew PRN  dextrose 5% + sodium chloride 0.45%. IV Continuous  ferrous    sulfate Oral  latanoprost 0.005% Ophthalmic Solution Both EYES  lactobacillus acidophilus Oral                            11.1   14.29 )-----------( 177      ( 30 Jul 2018 06:46 )             31.8       07-30    140  |  105  |  16  ----------------------------<  120<H>  3.4<L>   |  24  |  1.06    Ca    8.0<L>      30 Jul 2018 06:46  Phos  3.0     07-30  Mg     1.6     07-30                .Stool Feces   No enteric pathogens isolated.  (Stool culture examined for Salmonella,  Shigella, Campylobacter, Aeromonas, Plesiomonas,  Vibrio, E.coli O157 and Yersinia) -- 07-27 @ 21:49  .Blood Blood-Peripheral   No growth to date. -- 07-26 @ 15:20    C Diff by PCR Result: Detected (07-27 @ 22:54)        CT Chest/Abd/P:  Impression:     Bibasilar dependent consolidation/atelectasis and small effusions. Correlate   clinically for active infection.   Mild pretracheal adenopathy.   Cholelithiasis.   Nonobstructive left nephrolithiasis.   Acute uncomplicated pancolitis.   Markedly enlarged prostate.   Additional findings as discussed.         CENTRAL LINE: no    KAUFMAN: no    A-LINE: no    GLOBAL ISSUE/BEST PRACTICE:  Analgesia: yes  Sedation: no  HOB elevation: yes  Stress ulcer prophylaxis: yes  VTE prophylaxis: yes  Glycemic control: no  Nutrition: yes      CODE STATUS: Full  GOC discussion: Y  Critical care time spent (mins): 35  (Reviewing data, imaging, discussing with multidisciplinary team, non inclusive of procedures, discussing goals of care with patient/family)

## 2018-07-30 NOTE — PROGRESS NOTE ADULT - SUBJECTIVE AND OBJECTIVE BOX
KAUSHIK SAERS is a 86yMale , patient examined and chart reviewed.     INTERVAL HPI/ OVERNIGHT EVENTS:  Afebrile. Diarrhea resolved.  No events.    Past Medical History--  PAST MEDICAL & SURGICAL HISTORY:  Osteoarthritis of right knee  Hypertension  Tremor of both hands  History of hernia surgery: X3 1950&#x27;s-1970&#x27;s  History of shoulder surgery: right, 2012  History of knee replacement, total, left: 2007      For details regarding the patient's social history, family history, and other miscellaneous elements, please refer the initial infectious diseases consultation and/or the admitting history and physical examination for this admission.      ROS:  CONSTITUTIONAL:  Negative fever or chills  EYES:  Negative  blurry vision or double vision  CARDIOVASCULAR:  Negative for chest pain or palpitations  RESPIRATORY:  Negative for cough, wheezing, or SOB   GASTROINTESTINAL:  Negative for nausea, vomiting, diarrhea, constipation, or abdominal pain  GENITOURINARY:  Negative frequency, urgency , dysuria or hematuria   NEUROLOGIC:  No headache, confusion, dizziness, lightheadedness  All other systems were reviewed and are negative     Current inpatient medications :    ANTIBIOTICS/RELEVANT:  lactobacillus acidophilus 1 Tablet(s) Oral two times a day with meals  metroNIDAZOLE  IVPB 500 milliGRAM(s) IV Intermittent every 8 hours  vancomycin    Solution 125 milliGRAM(s) Oral every 6 hours      acetaminophen   Tablet. 1000 milliGRAM(s) Oral every 8 hours  aluminum hydroxide/magnesium hydroxide/simethicone Suspension 30 milliLiter(s) Oral four times a day PRN  aspirin enteric coated 162 milliGRAM(s) Oral every 12 hours  dextrose 5% + sodium chloride 0.45%. 1000 milliLiter(s) IV Continuous <Continuous>  dicyclomine 10 milliGRAM(s) Oral three times a day before meals  famotidine    Tablet 20 milliGRAM(s) Oral daily  ferrous    sulfate 325 milliGRAM(s) Oral daily  latanoprost 0.005% Ophthalmic Solution 1 Drop(s) Both EYES at bedtime  ondansetron Injectable 4 milliGRAM(s) IV Push every 6 hours PRN  polyethylene glycol 3350 17 Gram(s) Oral daily PRN  simethicone 80 milliGRAM(s) Chew every 6 hours PRN  tamsulosin 0.4 milliGRAM(s) Oral at bedtime  traMADol 25 milliGRAM(s) Oral every 4 hours PRN  traMADol 50 milliGRAM(s) Oral every 4 hours PRN      Objective:    07-29 @ 07:01  -  07-30 @ 07:00  --------------------------------------------------------  IN: 2100 mL / OUT: 750 mL / NET: 1350 mL    07-30 @ 07:01  -  07-30 @ 17:55  --------------------------------------------------------  IN: 850 mL / OUT: 0 mL / NET: 850 mL      T(C): 36.1 (07-30-18 @ 16:00), Max: 37.4 (07-30-18 @ 04:15)  HR: 58 (07-30-18 @ 16:00) (50 - 72)  BP: 133/67 (07-30-18 @ 16:00) (100/66 - 155/70)  RR: 17 (07-30-18 @ 16:00) (11 - 19)  SpO2: 98% (07-30-18 @ 16:00) (95% - 100%)  Wt(kg): --      Physical Exam:  GEN: NAD, pleasant  HEENT: normocephalic and atraumatic. EOMI. MIAH. Moist mucosa. Clear Posterior pharynx.  NECK: Supple. No carotid bruits.  No lymphadenopathy or thyromegaly.  LUNGS: Clear to auscultation.  HEART: Regular rate and rhythm without murmur.  ABDOMEN: Soft, nontender, and nondistended.  Positive bowel sounds.  No hepatosplenomegaly was noted.  EXTREMITIES: Without any cyanosis, clubbing, rash, lesions or edema.  NEUROLOGIC: A & O x3, No focal neurological deficits   SKIN: No ulceration or induration present.    LABS:                        11.1   14.29 )-----------( 177      ( 30 Jul 2018 06:46 )             31.8       07-30    140  |  105  |  16  ----------------------------<  120<H>  3.4<L>   |  24  |  1.06    Ca    8.0<L>      30 Jul 2018 06:46  Phos  3.0     07-30  Mg     1.6     07-30        MICROBIOLOGY:  Culture - Blood (collected 26 Jul 2018 15:20)  Source: .Blood Blood-Peripheral  Preliminary Report (27 Jul 2018 16:01):    No growth to date.    Culture - Blood (collected 26 Jul 2018 15:20)  Source: .Blood Blood-Peripheral  Preliminary Report (27 Jul 2018 16:01):    No growth to date.    Clostridium difficile Toxin by PCR (07.27.18 @ 22:54)    C Diff by PCR Result: Detected      RADIOLOGY & ADDITIONAL STUDIES:  EXAM:  CT CHEST                            PROCEDURE DATE:  07/27/2018          INTERPRETATION:  History: Postop, high white count.    CT chest abdomen and pelvis no oral or IV contrast. Patchy bibasilar   dependent consolidation/atelectasis. Correlate clinically for active   infection. Small bilateral layering pleural effusions right slightly   larger than left.  Central airways patent. Enlarged pretracheal lymph node measures up to 2   cm . Nonaneurysmal thoracic aorta.  Mild cardiomegaly with coronary artery calcination. Decrease in cardiac   chamber blood pool attenuation suggests an anemic state. Trace   pericardial effusion.  Calcified gallstone. No biliary dilatation. Unenhanced liver pancreas   spleen adrenals not remarkable.  Nonaneurysmal abdominal aorta.  No suspicious retroperitoneal adenopathy.  There is a 2 mm nonobstructing left renal calculus. Multiple left renal   cortical hypodensities cannot be definitively characterized probably   represent cysts. Bilateral nonspecific perirenal stranding.  No evidence of appendicitis.  There is mild diffuse colonic wall thickening with associated fat   stranding consistent with pancolitis. Differential diagnosis would have   to include but is not limited to C. difficile colitis.Colonic   diverticula but no definite diverticulitis. No bowel obstruction or   extraluminal fluid or gas.  Prostate significantly enlarged (6.3 x 6 cm). Bladder decompressed with   Bartholomew. Clips left inguinal region.  Degenerative change lower lumbarspine. Nonspecific sclerotic foci right   ilium and sacrum.    Impression:    Bibasilar dependent consolidation/atelectasis and small effusions.   Correlate clinically for active infection.  Mild pretracheal adenopathy.  Cholelithiasis.  Nonobstructive left nephrolithiasis.  Acute uncomplicated pancolitis.  Markedly enlarged prostate.  Additional findings as discussed.    Assessment :   Patient is an 86 year old male with a history of HTN, OA sp right TKR 7/24/18  complicated by sepsis with acute pancolitis sec Cdiff colitis  Doubt pna likely atelectasis  Leukocytosis sec Cdiff  KARLENE resolved  +troponins  Overall better      Plan :   Cont po vanc x 10 days  Dc IV Flagyl in 24 hours  Cont Bacid  Serial abd exams  Trend temp and wbc  Increase activity    Continue with present regiment.  Appropriate use of antibiotics and adverse effects reviewed.    I have discussed the above plan of care with patient/ family in detail. They expressed understanding of the the treatment plan . Risks, benefits and alternatives discussed in detail. I have asked if they have any questions or concerns and appropriately addressed them to the best of my ability .      Critical care time greater then 35 minutes reviewing notes, labs data/ imaging , discussion with multidisciplinary team.    Thank you for allowing me to participate in care of your patient .        Yoon Sepulveda MD  Infectious Disease  [phone]

## 2018-07-30 NOTE — PROGRESS NOTE ADULT - SUBJECTIVE AND OBJECTIVE BOX
pt seen  feeling about same  eating more  some abdominal discomfort after eating with diarrhea  ICU Vital Signs Last 24 Hrs  T(C): 36.8 (30 Jul 2018 07:30), Max: 37.4 (30 Jul 2018 04:15)  T(F): 98.3 (30 Jul 2018 07:30), Max: 99.3 (30 Jul 2018 04:15)  HR: 62 (30 Jul 2018 08:00) (50 - 74)  BP: 134/68 (30 Jul 2018 08:00) (104/70 - 155/70)  BP(mean): 89 (30 Jul 2018 08:00) (71 - 97)  ABP: --  ABP(mean): --  RR: 16 (30 Jul 2018 08:00) (11 - 19)  SpO2: 98% (30 Jul 2018 08:00) (95% - 100%)  gen-sleeping in chair  resp-clear b/l  abd-soft, mild distension, nontender                          11.1   14.29 )-----------( 177      ( 30 Jul 2018 06:46 )             31.8   07-30    140  |  105  |  16  ----------------------------<  120<H>  3.4<L>   |  24  |  1.06    Ca    8.0<L>      30 Jul 2018 06:46  Phos  3.0     07-30  Mg     1.6     07-30

## 2018-07-30 NOTE — PROGRESS NOTE ADULT - SUBJECTIVE AND OBJECTIVE BOX
Patient is a 86y old  Male who presents with a chief complaint of " Polo is going to replace my RIGHT knee" (25 Jul 2018 12:46)        HPI:      SUBJECTIVE & OBJECTIVE: Pt seen and examined at bedside.     PHYSICAL EXAM:  T(C): 36.8 (07-30-18 @ 07:30), Max: 37.4 (07-30-18 @ 04:15)  HR: 64 (07-30-18 @ 12:00) (50 - 72)  BP: 100/66 (07-30-18 @ 12:00) (100/66 - 155/70)  RR: 15 (07-30-18 @ 12:00) (11 - 19)  SpO2: 98% (07-30-18 @ 12:00) (95% - 100%)  Wt(kg): --   GENERAL: NAD, well-groomed, well-developed  HEAD:  Atraumatic, Normocephalic  EYES: EOMI, PERRLA, conjunctiva and sclera clear  ENMT: Moist mucous membranes  NECK: Supple, No JVD  NERVOUS SYSTEM:  Alert & Oriented X3,   CHEST/LUNGdecrease air entr audrey the bases   HEART: Regular rate and rhythm; No murmurs, rubs, or gallops  ABDOMEN: Soft, Nontender, Nondistended; Bowel sounds present  EXTREMITIES:  2+ Peripheral Pulses, No clubbing, cyanosis, or edema        MEDICATIONS  (STANDING):  acetaminophen   Tablet. 1000 milliGRAM(s) Oral every 8 hours  aspirin enteric coated 162 milliGRAM(s) Oral every 12 hours  dextrose 5% + sodium chloride 0.45%. 1000 milliLiter(s) (50 mL/Hr) IV Continuous <Continuous>  dicyclomine 10 milliGRAM(s) Oral three times a day before meals  famotidine    Tablet 20 milliGRAM(s) Oral daily  ferrous    sulfate 325 milliGRAM(s) Oral daily  lactobacillus acidophilus 1 Tablet(s) Oral two times a day with meals  latanoprost 0.005% Ophthalmic Solution 1 Drop(s) Both EYES at bedtime  metroNIDAZOLE  IVPB 500 milliGRAM(s) IV Intermittent every 8 hours  tamsulosin 0.4 milliGRAM(s) Oral at bedtime  vancomycin    Solution 125 milliGRAM(s) Oral every 6 hours    MEDICATIONS  (PRN):  aluminum hydroxide/magnesium hydroxide/simethicone Suspension 30 milliLiter(s) Oral four times a day PRN Indigestion  ondansetron Injectable 4 milliGRAM(s) IV Push every 6 hours PRN Nausea and/or Vomiting  polyethylene glycol 3350 17 Gram(s) Oral daily PRN Constipation  traMADol 25 milliGRAM(s) Oral every 4 hours PRN Mild Pain (1 - 3)  traMADol 50 milliGRAM(s) Oral every 4 hours PRN Moderate Pain (4 - 6)      LABS:                        11.1   14.29 )-----------( 177      ( 30 Jul 2018 06:46 )             31.8     07-30    140  |  105  |  16  ----------------------------<  120<H>  3.4<L>   |  24  |  1.06    Ca    8.0<L>      30 Jul 2018 06:46  Phos  3.0     07-30  Mg     1.6     07-30          Magnesium, Serum: 1.6 mg/dL (07-30 @ 07:58)    CAPILLARY BLOOD GLUCOSE          CAPILLARY BLOOD GLUCOSE        CAPILLARY BLOOD GLUCOSE                RECENT CULTURES:      RADIOLOGY & ADDITIONAL TESTS:                        DVT/GI ppx  Discussed with pt @ bedside

## 2018-07-30 NOTE — PROGRESS NOTE ADULT - SUBJECTIVE AND OBJECTIVE BOX
Date/Time Patient Seen:  		  Referring MD:   Data Reviewed	       Patient is a 86y old  Male who presents with a chief complaint of " Polo is going to replace my RIGHT knee" (25 Jul 2018 12:46)  vs and meds reviewed      Subjective/HPI     PAST MEDICAL & SURGICAL HISTORY:  Osteoarthritis of right knee  Hypertension  Tremor of both hands  History of hernia surgery: X3 1950&#x27;s-1970&#x27;s  History of shoulder surgery: right, 2012  History of knee replacement, total, left: 2007        Medication list         MEDICATIONS  (STANDING):  acetaminophen   Tablet. 1000 milliGRAM(s) Oral every 8 hours  aspirin enteric coated 162 milliGRAM(s) Oral every 12 hours  dextrose 5% + sodium chloride 0.45%. 1000 milliLiter(s) (50 mL/Hr) IV Continuous <Continuous>  dicyclomine 10 milliGRAM(s) Oral three times a day before meals  famotidine    Tablet 20 milliGRAM(s) Oral daily  ferrous    sulfate 325 milliGRAM(s) Oral daily  lactobacillus acidophilus 1 Tablet(s) Oral two times a day with meals  latanoprost 0.005% Ophthalmic Solution 1 Drop(s) Both EYES at bedtime  metroNIDAZOLE  IVPB 500 milliGRAM(s) IV Intermittent every 8 hours  tamsulosin 0.4 milliGRAM(s) Oral at bedtime  vancomycin    Solution 125 milliGRAM(s) Oral every 6 hours    MEDICATIONS  (PRN):  aluminum hydroxide/magnesium hydroxide/simethicone Suspension 30 milliLiter(s) Oral four times a day PRN Indigestion  ondansetron Injectable 4 milliGRAM(s) IV Push every 6 hours PRN Nausea and/or Vomiting  polyethylene glycol 3350 17 Gram(s) Oral daily PRN Constipation  traMADol 25 milliGRAM(s) Oral every 4 hours PRN Mild Pain (1 - 3)  traMADol 50 milliGRAM(s) Oral every 4 hours PRN Moderate Pain (4 - 6)         Vitals log        ICU Vital Signs Last 24 Hrs  T(C): 37.4 (30 Jul 2018 04:15), Max: 37.4 (30 Jul 2018 04:15)  T(F): 99.3 (30 Jul 2018 04:15), Max: 99.3 (30 Jul 2018 04:15)  HR: 60 (30 Jul 2018 06:00) (50 - 74)  BP: 134/67 (30 Jul 2018 06:00) (104/70 - 155/70)  BP(mean): 89 (30 Jul 2018 06:00) (71 - 108)  ABP: --  ABP(mean): --  RR: 19 (30 Jul 2018 06:00) (11 - 20)  SpO2: 99% (30 Jul 2018 06:00) (95% - 100%)           Input and Output:  I&O's Detail    29 Jul 2018 07:01  -  30 Jul 2018 07:00  --------------------------------------------------------  IN:    dextrose 5% + sodium chloride 0.45%.: 1200 mL    IV PiggyBack: 300 mL    Oral Fluid: 600 mL  Total IN: 2100 mL    OUT:    Voided: 750 mL  Total OUT: 750 mL    Total NET: 1350 mL          Lab Data                        11.1   14.29 )-----------( 177      ( 30 Jul 2018 06:46 )             31.8                   Review of Systems	      Objective     Physical Examination    heart s1s2  lung dec BS  abd soft      Pertinent Lab findings & Imaging      [place]:  NO   Adequate UO     I&O's Detail    29 Jul 2018 07:01  -  30 Jul 2018 07:00  --------------------------------------------------------  IN:    dextrose 5% + sodium chloride 0.45%.: 1200 mL    IV PiggyBack: 300 mL    Oral Fluid: 600 mL  Total IN: 2100 mL    OUT:    Voided: 750 mL  Total OUT: 750 mL    Total NET: 1350 mL               Discussed with:     Cultures:	        Radiology

## 2018-07-30 NOTE — PROGRESS NOTE ADULT - ASSESSMENT
Patient is 87 yo male presenting with right total knee replacement day, complicated with diarrhea/dehydration/ARF secondary to c.diff collitis      1. Pancolitis secondary to c.diff collitis,   still has diarrhea, continue on po vanco,  and IV Metronidazole.   advance diet as tolerated   Continue with Contact isolation.       2. S/P right Total knee replacement.  Continue with pain management, DVT proph, and wound care as per Ortho.    Continue with PT/OT.    3. HTN.  Hold BP medication and Monitor BP  4.  Bradycardia with few pauses.  Resolved.   Cardiology follow up appreciated.   5. Hypotension with acute renal failure.  Improved with IVF.  BP stable.    Nephrology  follow up  appreciated.   6.  Anemia.  due to post-operative blood loss.  Asymptomatic.  Monitor H/H.  Iron supplement  7. Urinary retention , passed [facility] wei to medical floor

## 2018-07-30 NOTE — PROGRESS NOTE ADULT - SUBJECTIVE AND OBJECTIVE BOX
POD  #:  6  S/P  Right TKR                       SUBJECTIVE: Patient seen and examined.  Sitting up in chair.  Knee feels fine, but c/o GI discomfort. Using CPM, ambulating with PT.  Reported Pain Score = 0    OBJECTIVE:     Vital Signs Last 24 Hrs  T(C): 36.8 (30 Jul 2018 07:30), Max: 37.4 (30 Jul 2018 04:15)  T(F): 98.3 (30 Jul 2018 07:30), Max: 99.3 (30 Jul 2018 04:15)  HR: 62 (30 Jul 2018 08:00) (50 - 74)  BP: 134/68 (30 Jul 2018 08:00) (104/70 - 155/70)  BP(mean): 89 (30 Jul 2018 08:00) (71 - 97)  RR: 16 (30 Jul 2018 08:00) (11 - 19)  SpO2: 98% (30 Jul 2018 08:00) (95% - 100%)    Right Knee:          Incision clean/dry/intact; sutures in place  Bilateral LEs:         Sensation:  intact to light touch          Motor exam:  5/5 dorsiflexion/plantarflexion/EHL          2+ DP pulses          calf supple, NT    LABS:                        11.1   14.29 )-----------( 177      ( 30 Jul 2018 06:46 )             31.8     07-30    140  |  105  |  16  ----------------------------<  120<H>  3.4<L>   |  24  |  1.06    Ca    8.0<L>      30 Jul 2018 06:46  Phos  3.0     07-30  Mg     1.6     07-30            MEDICATIONS:  Anticoagulation:  aspirin enteric coated 162 milliGRAM(s) Oral every 12 hours      Pain medications:   acetaminophen   Tablet. 1000 milliGRAM(s) Oral every 8 hours  traMADol 25 milliGRAM(s) Oral every 4 hours PRN  traMADol 50 milliGRAM(s) Oral every 4 hours PRN        A/P : Patient stable  s/p Right TKR POD # 6  -    has C. Difficile diarrhea on Vanco and Flagyl  -    Pain control  -    DVT ppx: aspirin  -    Weight bearing status: WBAT   -    Physical Therapy  -    Occupational Therapy  -    Discharge plan: rehab when C. Diff under control

## 2018-07-30 NOTE — PROGRESS NOTE ADULT - ASSESSMENT
86M with PMHx as above, admitted for elective Right TKR POD 6. Hospital course c/b asymptomatic sinus pauses, s/p RRT for hypotension and upgraded to ICU, C. DIff pancolitis, KARLENE, hypokalemia, hypomagnesemia    -Pain management  -Monitor HD's. Improved BP  -Cardiology following  -Respiratory stable  -PO diet. PPI  -KARLENE improving. Monitor UOP. Replete K+/Mg+  -Continue Oral vanco/IV flagyl  -WBC downtrending  -DVT ppx  -Surgical care as per Ortho  -Supportive care

## 2018-07-30 NOTE — PROGRESS NOTE ADULT - SUBJECTIVE AND OBJECTIVE BOX
Subjective: C/o diarrhea, lack of appetite      MEDICATIONS  (STANDING):  acetaminophen   Tablet. 1000 milliGRAM(s) Oral every 8 hours  aspirin enteric coated 162 milliGRAM(s) Oral every 12 hours  dextrose 5% + sodium chloride 0.45%. 1000 milliLiter(s) (50 mL/Hr) IV Continuous <Continuous>  dicyclomine 10 milliGRAM(s) Oral three times a day before meals  famotidine    Tablet 20 milliGRAM(s) Oral daily  ferrous    sulfate 325 milliGRAM(s) Oral daily  lactobacillus acidophilus 1 Tablet(s) Oral two times a day with meals  latanoprost 0.005% Ophthalmic Solution 1 Drop(s) Both EYES at bedtime  metroNIDAZOLE  IVPB 500 milliGRAM(s) IV Intermittent every 8 hours  tamsulosin 0.4 milliGRAM(s) Oral at bedtime  vancomycin    Solution 125 milliGRAM(s) Oral every 6 hours    MEDICATIONS  (PRN):  aluminum hydroxide/magnesium hydroxide/simethicone Suspension 30 milliLiter(s) Oral four times a day PRN Indigestion  ondansetron Injectable 4 milliGRAM(s) IV Push every 6 hours PRN Nausea and/or Vomiting  polyethylene glycol 3350 17 Gram(s) Oral daily PRN Constipation  traMADol 25 milliGRAM(s) Oral every 4 hours PRN Mild Pain (1 - 3)  traMADol 50 milliGRAM(s) Oral every 4 hours PRN Moderate Pain (4 - 6)          T(C): 36.8 (07-30-18 @ 07:30), Max: 37.4 (07-30-18 @ 04:15)  HR: 62 (07-30-18 @ 08:00) (50 - 74)  BP: 134/68 (07-30-18 @ 08:00) (104/70 - 155/70)  RR: 16 (07-30-18 @ 08:00) (11 - 19)  SpO2: 98% (07-30-18 @ 08:00) (95% - 100%)  Wt(kg): --        I&O's Detail    29 Jul 2018 07:01  -  30 Jul 2018 07:00  --------------------------------------------------------  IN:    dextrose 5% + sodium chloride 0.45%.: 1200 mL    IV PiggyBack: 300 mL    Oral Fluid: 600 mL  Total IN: 2100 mL    OUT:    Voided: 750 mL  Total OUT: 750 mL    Total NET: 1350 mL               PHYSICAL EXAM:    GENERAL: anxious  EYES: EOMI, PERRLA, conjunctiva and sclera clear  NECK: Supple, no inc in JVP  CHEST/LUNG: Clear  HEART: S1S2  ABDOMEN: mild diffuse tenderness.   EXTREMITIES:  no edema  NEURO: no asterixis      LABS:  CBC Full  -  ( 30 Jul 2018 06:46 )  WBC Count : 14.29 K/uL  Hemoglobin : 11.1 g/dL  Hematocrit : 31.8 %  Platelet Count - Automated : 177 K/uL  Mean Cell Volume : 91.4 fl  Mean Cell Hemoglobin : 31.9 pg  Mean Cell Hemoglobin Concentration : 34.9 gm/dL  Auto Neutrophil # : x  Auto Lymphocyte # : x  Auto Monocyte # : x  Auto Eosinophil # : x  Auto Basophil # : x  Auto Neutrophil % : x  Auto Lymphocyte % : x  Auto Monocyte % : x  Auto Eosinophil % : x  Auto Basophil % : x    07-30    140  |  105  |  16  ----------------------------<  120<H>  3.4<L>   |  24  |  1.06    Ca    8.0<L>      30 Jul 2018 06:46  Phos  3.0     07-30  Mg     1.6     07-30          Culture Results:   No enteric pathogens to date: Final culture pending (07-27 @ 21:49)        Impression:  * KARLENE -- ischemic ATN. Nearly resolved  * Mild hypoK  * C.diff diarrhea  * S/p R TKR    Recommendations:  * May dc IVFs if tolerates diet  * Avoid hypotension  * KCL Rxed

## 2018-07-30 NOTE — PROGRESS NOTE ADULT - ASSESSMENT
The patient is an 86 year old male with a history of HTN, OA who is s/p right TKR.    Plan:  - Avoid rate lowering agents  - No significant pauses noted on telemetry  - No indication for pacemaker  - Hold antihypertensives  - On flagyl and PO vancomycin for C. diff

## 2018-07-30 NOTE — PROGRESS NOTE ADULT - SUBJECTIVE AND OBJECTIVE BOX
Chief Complaint: TKR    Interval Events: Having diarrhea. Tested positive for C. diff over weekend.    Review of Systems:  General: No fevers, chills, weight loss or gain  Skin: No rashes, color changes  Cardiovascular: No chest pain, orthopnea  Respiratory: No shortness of breath, cough  Gastrointestinal: No nausea, abdominal pain  Genitourinary: No incontinence, pain with urination  Musculoskeletal: No pain, swelling, decreased range of motion  Neurological: No headache, weakness  Psychiatric: No depression, anxiety  Endocrine: No weight loss or gain, increased thirst  All other systems are comprehensively negative.    Physical Exam:  Vital Signs Last 24 Hrs  T(C): 36.8 (30 Jul 2018 07:30), Max: 37.4 (30 Jul 2018 04:15)  T(F): 98.3 (30 Jul 2018 07:30), Max: 99.3 (30 Jul 2018 04:15)  HR: 62 (30 Jul 2018 08:00) (50 - 74)  BP: 134/68 (30 Jul 2018 08:00) (104/70 - 155/70)  BP(mean): 89 (30 Jul 2018 08:00) (71 - 108)  RR: 16 (30 Jul 2018 08:00) (11 - 19)  SpO2: 98% (30 Jul 2018 08:00) (95% - 100%)  General: NAD  HEENT: MMM  Neck: No JVD, no carotid bruit  Lungs: CTAB  CV: RRR, nl S1/S2, no M/R/G  Abdomen: S/NT/ND, +BS  Extremities: No LE edema, no cyanosis  Neuro: AAOx3, non-focal  Skin: No rash    Labs:    07-27    139  |  108  |  24<H>  ----------------------------<  106<H>  4.4   |  20<L>  |  1.33<H>    Ca    8.5      27 Jul 2018 06:52  Phos  3.4     07-26  Mg     1.2     07-26    TPro  5.1<L>  /  Alb  2.3<L>  /  TBili  1.3<H>  /  DBili  x   /  AST  31  /  ALT  26  /  AlkPhos  61  07-26                        10.0   18.46 )-----------( 112      ( 27 Jul 2018 06:52 )             29.4       Telemetry: Sinus rhythm, PACs, PVCs, bradycardia

## 2018-07-31 LAB
CULTURE RESULTS: SIGNIFICANT CHANGE UP
CULTURE RESULTS: SIGNIFICANT CHANGE UP
HCT VFR BLD CALC: 32.4 % — LOW (ref 39–50)
HGB BLD-MCNC: 11.4 G/DL — LOW (ref 13–17)
MCHC RBC-ENTMCNC: 32.2 PG — SIGNIFICANT CHANGE UP (ref 27–34)
MCHC RBC-ENTMCNC: 35.2 GM/DL — SIGNIFICANT CHANGE UP (ref 32–36)
MCV RBC AUTO: 91.5 FL — SIGNIFICANT CHANGE UP (ref 80–100)
NRBC # BLD: 0 /100 WBCS — SIGNIFICANT CHANGE UP (ref 0–0)
PLATELET # BLD AUTO: 213 K/UL — SIGNIFICANT CHANGE UP (ref 150–400)
RBC # BLD: 3.54 M/UL — LOW (ref 4.2–5.8)
RBC # FLD: 12.5 % — SIGNIFICANT CHANGE UP (ref 10.3–14.5)
SPECIMEN SOURCE: SIGNIFICANT CHANGE UP
SPECIMEN SOURCE: SIGNIFICANT CHANGE UP
WBC # BLD: 13.11 K/UL — HIGH (ref 3.8–10.5)
WBC # FLD AUTO: 13.11 K/UL — HIGH (ref 3.8–10.5)

## 2018-07-31 PROCEDURE — 99233 SBSQ HOSP IP/OBS HIGH 50: CPT

## 2018-07-31 RX ORDER — SODIUM CHLORIDE 0.65 %
1 AEROSOL, SPRAY (ML) NASAL EVERY 4 HOURS
Qty: 0 | Refills: 0 | Status: DISCONTINUED | OUTPATIENT
Start: 2018-07-31 | End: 2018-08-04

## 2018-07-31 RX ADMIN — Medication 1 TABLET(S): at 08:15

## 2018-07-31 RX ADMIN — Medication 125 MILLIGRAM(S): at 23:35

## 2018-07-31 RX ADMIN — Medication 10 MILLIGRAM(S): at 17:24

## 2018-07-31 RX ADMIN — Medication 1000 MILLIGRAM(S): at 05:30

## 2018-07-31 RX ADMIN — SIMETHICONE 80 MILLIGRAM(S): 80 TABLET, CHEWABLE ORAL at 11:23

## 2018-07-31 RX ADMIN — Medication 125 MILLIGRAM(S): at 11:21

## 2018-07-31 RX ADMIN — Medication 10 MILLIGRAM(S): at 11:21

## 2018-07-31 RX ADMIN — TAMSULOSIN HYDROCHLORIDE 0.4 MILLIGRAM(S): 0.4 CAPSULE ORAL at 21:32

## 2018-07-31 RX ADMIN — Medication 162 MILLIGRAM(S): at 08:15

## 2018-07-31 RX ADMIN — Medication 100 MILLIGRAM(S): at 05:30

## 2018-07-31 RX ADMIN — TRAMADOL HYDROCHLORIDE 50 MILLIGRAM(S): 50 TABLET ORAL at 12:24

## 2018-07-31 RX ADMIN — Medication 100 MILLIGRAM(S): at 21:28

## 2018-07-31 RX ADMIN — LATANOPROST 1 DROP(S): 0.05 SOLUTION/ DROPS OPHTHALMIC; TOPICAL at 21:28

## 2018-07-31 RX ADMIN — Medication 125 MILLIGRAM(S): at 17:24

## 2018-07-31 RX ADMIN — Medication 100 MILLIGRAM(S): at 12:09

## 2018-07-31 RX ADMIN — Medication 125 MILLIGRAM(S): at 05:30

## 2018-07-31 RX ADMIN — Medication 1000 MILLIGRAM(S): at 12:17

## 2018-07-31 RX ADMIN — Medication 1 SPRAY(S): at 11:23

## 2018-07-31 RX ADMIN — Medication 1000 MILLIGRAM(S): at 21:29

## 2018-07-31 RX ADMIN — Medication 1000 MILLIGRAM(S): at 06:30

## 2018-07-31 RX ADMIN — Medication 1 TABLET(S): at 17:24

## 2018-07-31 RX ADMIN — Medication 162 MILLIGRAM(S): at 21:32

## 2018-07-31 RX ADMIN — Medication 325 MILLIGRAM(S): at 11:21

## 2018-07-31 RX ADMIN — TRAMADOL HYDROCHLORIDE 50 MILLIGRAM(S): 50 TABLET ORAL at 11:24

## 2018-07-31 RX ADMIN — FAMOTIDINE 20 MILLIGRAM(S): 10 INJECTION INTRAVENOUS at 11:22

## 2018-07-31 RX ADMIN — Medication 10 MILLIGRAM(S): at 06:14

## 2018-07-31 RX ADMIN — Medication 1000 MILLIGRAM(S): at 12:09

## 2018-07-31 RX ADMIN — SIMETHICONE 80 MILLIGRAM(S): 80 TABLET, CHEWABLE ORAL at 17:25

## 2018-07-31 RX ADMIN — Medication 1000 MILLIGRAM(S): at 22:00

## 2018-07-31 RX ADMIN — SODIUM CHLORIDE 50 MILLILITER(S): 9 INJECTION, SOLUTION INTRAVENOUS at 08:15

## 2018-07-31 NOTE — PROGRESS NOTE ADULT - SUBJECTIVE AND OBJECTIVE BOX
POD  #:  7  S/P  Right TKR                       SUBJECTIVE: Patient seen and examined. Sitting up in chair.  Still experiencing diarrhea. Reports he is not being progressed on CPM. Reported Pain Score = 0    OBJECTIVE:     Vital Signs Last 24 Hrs  T(C): 36.1 (31 Jul 2018 08:34), Max: 36.8 (30 Jul 2018 12:19)  T(F): 97 (31 Jul 2018 08:34), Max: 98.3 (30 Jul 2018 12:19)  HR: 62 (31 Jul 2018 10:02) (48 - 62)  BP: 143/77 (31 Jul 2018 10:02) (117/60 - 151/81)  BP(mean): 90 (31 Jul 2018 10:02) (73 - 122)  RR: 15 (31 Jul 2018 10:02) (14 - 20)  SpO2: 100% (31 Jul 2018 10:02) (91% - 100%)    Right Knee:          Incision clean/dry/intact    Bilateral LEs:         Sensation:  intact to light touch          Motor exam:  5/5 dorsiflexion/plantarflexion/EHL          2+ DP pulses          calf supple, NT    LABS:                        11.1   14.29 )-----------( 177      ( 30 Jul 2018 06:46 )             31.8     07-30    140  |  105  |  16  ----------------------------<  120<H>  3.4<L>   |  24  |  1.06    Ca    8.0<L>      30 Jul 2018 06:46  Phos  3.0     07-30  Mg     1.6     07-30            MEDICATIONS:  Anticoagulation:  aspirin enteric coated 162 milliGRAM(s) Oral every 12 hours      Pain medications:   acetaminophen   Tablet. 1000 milliGRAM(s) Oral every 8 hours  traMADol 25 milliGRAM(s) Oral every 4 hours PRN  traMADol 50 milliGRAM(s) Oral every 4 hours PRN        A/P : Patient stable  s/p Right TKR POD # 7  -    Pain control  -    DVT ppx: aspirin  -    Weight bearing status: WBAT   -    Physical Therapy  -    Occupational Therapy  -    increase CPM (discussed with nurse)  -    Discharge plan: rehab when medically stable

## 2018-07-31 NOTE — PROGRESS NOTE ADULT - SUBJECTIVE AND OBJECTIVE BOX
KAUSHIK SEARS is a 86yMale , patient examined and chart reviewed.     INTERVAL HPI/ OVERNIGHT EVENTS:  Feeling better.  Afebrile. Diarrhea resolving.    Past Medical History--  PAST MEDICAL & SURGICAL HISTORY:  Osteoarthritis of right knee  Hypertension  Tremor of both hands  History of hernia surgery: X3 1950&#x27;s-1970&#x27;s  History of shoulder surgery: right, 2012  History of knee replacement, total, left: 2007      For details regarding the patient's social history, family history, and other miscellaneous elements, please refer the initial infectious diseases consultation and/or the admitting history and physical examination for this admission.      ROS:  CONSTITUTIONAL:  Negative fever or chills  EYES:  Negative  blurry vision or double vision  CARDIOVASCULAR:  Negative for chest pain or palpitations  RESPIRATORY:  Negative for cough, wheezing, or SOB   GASTROINTESTINAL:  Negative for nausea, vomiting, diarrhea, constipation, or abdominal pain  GENITOURINARY:  Negative frequency, urgency , dysuria or hematuria   NEUROLOGIC:  No headache, confusion, dizziness, lightheadedness  All other systems were reviewed and are negative       Current inpatient medications :    ANTIBIOTICS/RELEVANT:  lactobacillus acidophilus 1 Tablet(s) Oral two times a day with meals  metroNIDAZOLE  IVPB 500 milliGRAM(s) IV Intermittent every 8 hours  vancomycin    Solution 125 milliGRAM(s) Oral every 6 hours      acetaminophen   Tablet. 1000 milliGRAM(s) Oral every 8 hours  aluminum hydroxide/magnesium hydroxide/simethicone Suspension 30 milliLiter(s) Oral four times a day PRN  aspirin enteric coated 162 milliGRAM(s) Oral every 12 hours  dextrose 5% + sodium chloride 0.45%. 1000 milliLiter(s) IV Continuous <Continuous>  dicyclomine 10 milliGRAM(s) Oral three times a day before meals  famotidine    Tablet 20 milliGRAM(s) Oral daily  ferrous    sulfate 325 milliGRAM(s) Oral daily  latanoprost 0.005% Ophthalmic Solution 1 Drop(s) Both EYES at bedtime  ondansetron Injectable 4 milliGRAM(s) IV Push every 6 hours PRN  polyethylene glycol 3350 17 Gram(s) Oral daily PRN  simethicone 80 milliGRAM(s) Chew every 6 hours PRN  sodium chloride 0.65% Nasal 1 Spray(s) Both Nostrils every 4 hours PRN  tamsulosin 0.4 milliGRAM(s) Oral at bedtime  traMADol 25 milliGRAM(s) Oral every 4 hours PRN  traMADol 50 milliGRAM(s) Oral every 4 hours PRN      Objective:    07-30 @ 07:01  -  07-31 @ 07:00  --------------------------------------------------------  IN: 1500 mL / OUT: 0 mL / NET: 1500 mL    07-31 @ 07:01  -  07-31 @ 21:12  --------------------------------------------------------  IN: 940 mL / OUT: 200 mL / NET: 740 mL      T(C): 36.3 (07-31-18 @ 20:14), Max: 36.3 (07-30-18 @ 23:59)  HR: 58 (07-31-18 @ 20:00) (43 - 68)  BP: 104/55 (07-31-18 @ 20:00) (96/50 - 143/77)  RR: 16 (07-31-18 @ 20:00) (13 - 20)  SpO2: 96% (07-31-18 @ 20:00) (96% - 100%)  Wt(kg): --      Physical Exam:  GEN: NAD, pleasant  HEENT: normocephalic and atraumatic. EOMI. MIAH. Moist mucosa. Clear Posterior pharynx.  NECK: Supple. No carotid bruits.  No lymphadenopathy or thyromegaly.  LUNGS: Clear to auscultation.  HEART: Regular rate and rhythm without murmur.  ABDOMEN: Soft, nontender, and distended.  Positive bowel sounds.   EXTREMITIES: No edema. right knee drsg c/d/i  NEUROLOGIC: A & O x3, No focal neurological deficits   SKIN: No ulceration or induration present.      LABS:                        11.4   13.11 )-----------( 213      ( 31 Jul 2018 12:07 )             32.4       07-30    140  |  105  |  16  ----------------------------<  120<H>  3.4<L>   |  24  |  1.06    Ca    8.0<L>      30 Jul 2018 06:46  Phos  3.0     07-30  Mg     1.6     07-30    Assessment :  Patient is an 86 year old male with a history of HTN, OA sp right TKR 7/24/18  complicated by sepsis with acute pancolitis sec Cdiff colitis  Wbc resolving  KARLENE resolved  +troponins  Overall better      Plan :   Cont po vanc x 10 days  Dc IV Flagyl in 24 hours  Cont Bacid  Serial abd exams  Trend temp and wbc  Increase activity      Continue with present regiment.  Appropriate use of antibiotics and adverse effects reviewed.    I have discussed the above plan of care with patient/ family in detail. They expressed understanding of the the treatment plan . Risks, benefits and alternatives discussed in detail. I have asked if they have any questions or concerns and appropriately addressed them to the best of my ability .      Critical care time greater then 35 minutes reviewing notes, labs data/ imaging , discussion with multidisciplinary team.    Thank you for allowing me to participate in care of your patient .        Yoon Sepulveda MD  Infectious Disease  [phone]

## 2018-07-31 NOTE — PROGRESS NOTE ADULT - ASSESSMENT
The patient is an 86 year old male with a history of HTN, OA who is s/p right TKR.    Plan:  - Avoid rate lowering agents  - No significant pauses noted on telemetry  - No indication for pacemaker  - Hold antihypertensives  - On flagyl and PO vancomycin for C. diff  - PT

## 2018-07-31 NOTE — PROGRESS NOTE ADULT - ASSESSMENT
87 yo s/p R knee replacement with c diff now. WBC slowly improving, diarrhea persists but now with distension      cont abx      monitor nausea/vomiting

## 2018-07-31 NOTE — PROGRESS NOTE ADULT - SUBJECTIVE AND OBJECTIVE BOX
pt seen  states feeling better  slight nausea-vomiting  +F+BM, diarrhea  ICU Vital Signs Last 24 Hrs  T(C): 36.2 (31 Jul 2018 12:00), Max: 36.4 (30 Jul 2018 20:00)  T(F): 97.2 (31 Jul 2018 12:00), Max: 97.5 (30 Jul 2018 20:00)  HR: 65 (31 Jul 2018 12:00) (48 - 65)  BP: 97/68 (31 Jul 2018 12:00) (97/68 - 151/81)  BP(mean): 79 (31 Jul 2018 12:00) (78 - 122)  ABP: --  ABP(mean): --  RR: 14 (31 Jul 2018 12:00) (14 - 20)  SpO2: 98% (31 Jul 2018 12:00) (91% - 100%)  gen-NAD  resp-clear  cvs-irregular rate  abd-slight more distended, soft, NT                          11.4   13.11 )-----------( 213      ( 31 Jul 2018 12:07 )             32.4     07-30    140  |  105  |  16  ----------------------------<  120<H>  3.4<L>   |  24  |  1.06    Ca    8.0<L>      30 Jul 2018 06:46  Phos  3.0     07-30  Mg     1.6     07-30

## 2018-07-31 NOTE — PROGRESS NOTE ADULT - ASSESSMENT
Patient is 85 yo male presenting with right total knee replacement day, complicated with diarrhea/dehydration/ARF secondary to c.diff collitis      1. Pancolitis secondary to c.diff collitis,    continue on po vanco,  and IV Metronidazole.   advance diet as tolerated   if tolerating regular diet, d/c planning   Continue with Contact isolation.       2. S/P right Total knee replacement.  Continue with pain management, DVT proph, and wound care as per Ortho.    Continue with PT/OT.    3. HTN.  Hold BP medication and Monitor BP  4.  Bradycardia with few pauses.  Resolved.   Cardiology follow up appreciated.   5. Hypotension with acute renal failure.  Improved with IVF.  BP stable.    Nephrology  follow up  appreciated.   6.  Anemia.  due to post-operative blood loss.  Asymptomatic.  Monitor H/H.  Iron supplement  7. Urinary retention , passed [facility] wei to medical floor

## 2018-07-31 NOTE — PROGRESS NOTE ADULT - SUBJECTIVE AND OBJECTIVE BOX
ORTHOPEDIC ATTENDING PROGRESS NOTE  KAUSHIK SEARS      86y Male                                                                                                                               POD #    2    STATUS POST:               Pre-Op Dx: DJD (degenerative joint disease) of knee: Right    Post-Op Dx:  DJD (degenerative joint disease) of knee: Right    Procedure: Knee replacement: Right                                                Pain (0-10):  Pt reports  Current Pain Management:  [ ] PCA   [x ] Po Analgesics [ ] IM /IV Anagesics     T(F): 97.2  HR: 65  BP: 97/68  RR: 14  SpO2: 98%                         11.4   13.11 )-----------( 213      ( 31 Jul 2018 12:07 )             32.4               Physical Exam :    -   Dressing changed sterile.   -   Wound C/D/I.   -   Distal Neurvascular status intact grossly.   -   Warm well perfused; capillary refill <3 seconds   -   (+)EHL/FHL   -   (+) Sensation to light touch  -   (-) Calf tenderness Bilaterally    A/P: 86y Male s/p Knee replacement: Right     -   Ortho Stable  -   Pain control   -   Medicine to follow  -   DVT ppx:     [ ]SCDs     x ] ASA     [ ] Eliquis     [ ] Lovenox  -   Weight bearing status:  WBAT [x ]        PWB    [ ]     TTWB  [ ]      NWB  [ ]   -  Dispo:     Home [x ]     Acute Rehab [ ]     GLORY [ ]     TBD [ ]

## 2018-07-31 NOTE — PROGRESS NOTE ADULT - ATTENDING COMMENTS
Resting comfortably. Less loose stools. Doing well in Pt.
CT  scan showed pan colitis. Cultures positive for C diff. On IV Bx and oral   Vancomycin. To continue Pt if possible while recovering for C diff. Also encourage incentive spirometry.
Patient doing much better. Continue Pt and CPM
Patient dropped his BP t o 70/40. To be transferred to [facility] for fluid management and blood transfusion.

## 2018-07-31 NOTE — PROGRESS NOTE ADULT - SUBJECTIVE AND OBJECTIVE BOX
Date/Time Patient Seen:  		  Referring MD:   Data Reviewed	       Patient is a 86y old  Male who presents with a chief complaint of " Polo is going to replace my RIGHT knee" (25 Jul 2018 12:46)  vs and meds reviewed      Subjective/HPI     PAST MEDICAL & SURGICAL HISTORY:  Osteoarthritis of right knee  Hypertension  Tremor of both hands  History of hernia surgery: X3 1950&#x27;s-1970&#x27;s  History of shoulder surgery: right, 2012  History of knee replacement, total, left: 2007        Medication list         MEDICATIONS  (STANDING):  acetaminophen   Tablet. 1000 milliGRAM(s) Oral every 8 hours  aspirin enteric coated 162 milliGRAM(s) Oral every 12 hours  dextrose 5% + sodium chloride 0.45%. 1000 milliLiter(s) (50 mL/Hr) IV Continuous <Continuous>  dicyclomine 10 milliGRAM(s) Oral three times a day before meals  famotidine    Tablet 20 milliGRAM(s) Oral daily  ferrous    sulfate 325 milliGRAM(s) Oral daily  lactobacillus acidophilus 1 Tablet(s) Oral two times a day with meals  latanoprost 0.005% Ophthalmic Solution 1 Drop(s) Both EYES at bedtime  metroNIDAZOLE  IVPB 500 milliGRAM(s) IV Intermittent every 8 hours  tamsulosin 0.4 milliGRAM(s) Oral at bedtime  vancomycin    Solution 125 milliGRAM(s) Oral every 6 hours    MEDICATIONS  (PRN):  aluminum hydroxide/magnesium hydroxide/simethicone Suspension 30 milliLiter(s) Oral four times a day PRN Indigestion  ondansetron Injectable 4 milliGRAM(s) IV Push every 6 hours PRN Nausea and/or Vomiting  polyethylene glycol 3350 17 Gram(s) Oral daily PRN Constipation  simethicone 80 milliGRAM(s) Chew every 6 hours PRN Gas  traMADol 25 milliGRAM(s) Oral every 4 hours PRN Mild Pain (1 - 3)  traMADol 50 milliGRAM(s) Oral every 4 hours PRN Moderate Pain (4 - 6)         Vitals log        ICU Vital Signs Last 24 Hrs  T(C): 36.2 (31 Jul 2018 04:13), Max: 36.8 (30 Jul 2018 07:30)  T(F): 97.1 (31 Jul 2018 04:13), Max: 98.3 (30 Jul 2018 07:30)  HR: 54 (31 Jul 2018 06:22) (48 - 64)  BP: 128/62 (31 Jul 2018 06:22) (100/66 - 151/81)  BP(mean): 79 (31 Jul 2018 06:22) (73 - 122)  ABP: --  ABP(mean): --  RR: 17 (31 Jul 2018 06:22) (14 - 20)  SpO2: 99% (31 Jul 2018 06:22) (91% - 100%)           Input and Output:  I&O's Detail    30 Jul 2018 07:01  -  31 Jul 2018 07:00  --------------------------------------------------------  IN:    dextrose 5% + sodium chloride 0.45%.: 1200 mL    IV PiggyBack: 100 mL    Oral Fluid: 200 mL  Total IN: 1500 mL    OUT:  Total OUT: 0 mL    Total NET: 1500 mL          Lab Data                        11.1   14.29 )-----------( 177      ( 30 Jul 2018 06:46 )             31.8     07-30    140  |  105  |  16  ----------------------------<  120<H>  3.4<L>   |  24  |  1.06    Ca    8.0<L>      30 Jul 2018 06:46  Phos  3.0     07-30  Mg     1.6     07-30              Review of Systems	      Objective     Physical Examination    heart s1s2  lung dec BS  abd soft      Pertinent Lab findings & Imaging      [place]:  NO   Adequate UO     I&O's Detail    30 Jul 2018 07:01  -  31 Jul 2018 07:00  --------------------------------------------------------  IN:    dextrose 5% + sodium chloride 0.45%.: 1200 mL    IV PiggyBack: 100 mL    Oral Fluid: 200 mL  Total IN: 1500 mL    OUT:  Total OUT: 0 mL    Total NET: 1500 mL               Discussed with:     Cultures:	        Radiology

## 2018-07-31 NOTE — PROGRESS NOTE ADULT - SUBJECTIVE AND OBJECTIVE BOX
Subjective: diarrhea persists.       MEDICATIONS  (STANDING):  acetaminophen   Tablet. 1000 milliGRAM(s) Oral every 8 hours  aspirin enteric coated 162 milliGRAM(s) Oral every 12 hours  dextrose 5% + sodium chloride 0.45%. 1000 milliLiter(s) (50 mL/Hr) IV Continuous <Continuous>  dicyclomine 10 milliGRAM(s) Oral three times a day before meals  famotidine    Tablet 20 milliGRAM(s) Oral daily  ferrous    sulfate 325 milliGRAM(s) Oral daily  lactobacillus acidophilus 1 Tablet(s) Oral two times a day with meals  latanoprost 0.005% Ophthalmic Solution 1 Drop(s) Both EYES at bedtime  metroNIDAZOLE  IVPB 500 milliGRAM(s) IV Intermittent every 8 hours  tamsulosin 0.4 milliGRAM(s) Oral at bedtime  vancomycin    Solution 125 milliGRAM(s) Oral every 6 hours    MEDICATIONS  (PRN):  aluminum hydroxide/magnesium hydroxide/simethicone Suspension 30 milliLiter(s) Oral four times a day PRN Indigestion  ondansetron Injectable 4 milliGRAM(s) IV Push every 6 hours PRN Nausea and/or Vomiting  polyethylene glycol 3350 17 Gram(s) Oral daily PRN Constipation  simethicone 80 milliGRAM(s) Chew every 6 hours PRN Gas  traMADol 25 milliGRAM(s) Oral every 4 hours PRN Mild Pain (1 - 3)  traMADol 50 milliGRAM(s) Oral every 4 hours PRN Moderate Pain (4 - 6)          T(C): 36.2 (07-31-18 @ 04:13), Max: 36.8 (07-30-18 @ 12:19)  HR: 54 (07-31-18 @ 08:00) (48 - 64)  BP: 126/72 (07-31-18 @ 08:00) (100/66 - 151/81)  RR: 17 (07-31-18 @ 08:00) (14 - 20)  SpO2: 99% (07-31-18 @ 08:00) (91% - 100%)  Wt(kg): --        I&O's Detail    30 Jul 2018 07:01  -  31 Jul 2018 07:00  --------------------------------------------------------  IN:    dextrose 5% + sodium chloride 0.45%.: 1200 mL    IV PiggyBack: 100 mL    Oral Fluid: 200 mL  Total IN: 1500 mL    OUT:  Total OUT: 0 mL    Total NET: 1500 mL               PHYSICAL EXAM:    GENERAL: anxious  EYES: EOMI, PERRLA, conjunctiva and sclera clear  NECK: Supple, no inc in JVP  CHEST/LUNG: Clear  HEART: S1S2  ABDOMEN: mild diffuse tenderness.   EXTREMITIES:  no edema. R knee dressed, metal hardware.   NEURO: no asterixis        LABS:  CBC Full  -  ( 30 Jul 2018 06:46 )  WBC Count : 14.29 K/uL  Hemoglobin : 11.1 g/dL  Hematocrit : 31.8 %  Platelet Count - Automated : 177 K/uL  Mean Cell Volume : 91.4 fl  Mean Cell Hemoglobin : 31.9 pg  Mean Cell Hemoglobin Concentration : 34.9 gm/dL  Auto Neutrophil # : x  Auto Lymphocyte # : x  Auto Monocyte # : x  Auto Eosinophil # : x  Auto Basophil # : x  Auto Neutrophil % : x  Auto Lymphocyte % : x  Auto Monocyte % : x  Auto Eosinophil % : x  Auto Basophil % : x    07-30    140  |  105  |  16  ----------------------------<  120<H>  3.4<L>   |  24  |  1.06    Ca    8.0<L>      30 Jul 2018 06:46  Phos  3.0     07-30  Mg     1.6     07-30          Impression:  * KARLENE -- ischemic ATN. Nearly resolved  * Mild hypoK  * C.diff diarrhea  * S/p R TKR    Recommendations:  * May dc IVFs if tolerates diet  * Avoid hypotension  * Follow repeat K, PRN supplementation  * BMP daily

## 2018-07-31 NOTE — PROGRESS NOTE ADULT - SUBJECTIVE AND OBJECTIVE BOX
Chief Complaint: TKR    Interval Events: No events overnight.    Review of Systems:  General: No fevers, chills, weight loss or gain  Skin: No rashes, color changes  Cardiovascular: No chest pain, orthopnea  Respiratory: No shortness of breath, cough  Gastrointestinal: No nausea, abdominal pain  Genitourinary: No incontinence, pain with urination  Musculoskeletal: No pain, swelling, decreased range of motion  Neurological: No headache, weakness  Psychiatric: No depression, anxiety  Endocrine: No weight loss or gain, increased thirst  All other systems are comprehensively negative.    Physical Exam:  Vital Signs Last 24 Hrs  T(C): 36.1 (31 Jul 2018 08:34), Max: 36.8 (30 Jul 2018 12:19)  T(F): 97 (31 Jul 2018 08:34), Max: 98.3 (30 Jul 2018 12:19)  HR: 54 (31 Jul 2018 08:00) (48 - 64)  BP: 126/72 (31 Jul 2018 08:00) (100/66 - 151/81)  BP(mean): 90 (31 Jul 2018 08:00) (73 - 122)  RR: 17 (31 Jul 2018 08:00) (14 - 20)  SpO2: 99% (31 Jul 2018 08:00) (91% - 100%)  General: NAD  HEENT: MMM  Neck: No JVD, no carotid bruit  Lungs: CTAB  CV: RRR, nl S1/S2, no M/R/G  Abdomen: S/NT/ND, +BS  Extremities: No LE edema, no cyanosis  Neuro: AAOx3, non-focal  Skin: No rash    Labs:    07-30    140  |  105  |  16  ----------------------------<  120<H>  3.4<L>   |  24  |  1.06    Ca    8.0<L>      30 Jul 2018 06:46  Phos  3.0     07-30  Mg     1.6     07-30                          11.1   14.29 )-----------( 177      ( 30 Jul 2018 06:46 )             31.8       Telemetry: Sinus rhythm, PACs with compensatory pauses, PVCs, bradycardia

## 2018-08-01 LAB
ALBUMIN SERPL ELPH-MCNC: 1.9 G/DL — LOW (ref 3.3–5)
ALP SERPL-CCNC: 67 U/L — SIGNIFICANT CHANGE UP (ref 30–120)
ALT FLD-CCNC: 22 U/L DA — SIGNIFICANT CHANGE UP (ref 10–60)
ANION GAP SERPL CALC-SCNC: 8 MMOL/L — SIGNIFICANT CHANGE UP (ref 5–17)
AST SERPL-CCNC: 25 U/L — SIGNIFICANT CHANGE UP (ref 10–40)
BILIRUB SERPL-MCNC: 0.8 MG/DL — SIGNIFICANT CHANGE UP (ref 0.2–1.2)
BUN SERPL-MCNC: 13 MG/DL — SIGNIFICANT CHANGE UP (ref 7–23)
CALCIUM SERPL-MCNC: 7.9 MG/DL — LOW (ref 8.4–10.5)
CHLORIDE SERPL-SCNC: 104 MMOL/L — SIGNIFICANT CHANGE UP (ref 96–108)
CO2 SERPL-SCNC: 26 MMOL/L — SIGNIFICANT CHANGE UP (ref 22–31)
CREAT SERPL-MCNC: 0.93 MG/DL — SIGNIFICANT CHANGE UP (ref 0.5–1.3)
GLUCOSE SERPL-MCNC: 112 MG/DL — HIGH (ref 70–99)
HCT VFR BLD CALC: 32.1 % — LOW (ref 39–50)
HGB BLD-MCNC: 11.3 G/DL — LOW (ref 13–17)
MCHC RBC-ENTMCNC: 32.1 PG — SIGNIFICANT CHANGE UP (ref 27–34)
MCHC RBC-ENTMCNC: 35.2 GM/DL — SIGNIFICANT CHANGE UP (ref 32–36)
MCV RBC AUTO: 91.2 FL — SIGNIFICANT CHANGE UP (ref 80–100)
NRBC # BLD: 0 /100 WBCS — SIGNIFICANT CHANGE UP (ref 0–0)
PLATELET # BLD AUTO: 223 K/UL — SIGNIFICANT CHANGE UP (ref 150–400)
POTASSIUM SERPL-MCNC: 3 MMOL/L — LOW (ref 3.5–5.3)
POTASSIUM SERPL-SCNC: 3 MMOL/L — LOW (ref 3.5–5.3)
PROT SERPL-MCNC: 5 G/DL — LOW (ref 6–8.3)
RBC # BLD: 3.52 M/UL — LOW (ref 4.2–5.8)
RBC # FLD: 12.6 % — SIGNIFICANT CHANGE UP (ref 10.3–14.5)
SODIUM SERPL-SCNC: 138 MMOL/L — SIGNIFICANT CHANGE UP (ref 135–145)
WBC # BLD: 11.31 K/UL — HIGH (ref 3.8–10.5)
WBC # FLD AUTO: 11.31 K/UL — HIGH (ref 3.8–10.5)

## 2018-08-01 PROCEDURE — 99233 SBSQ HOSP IP/OBS HIGH 50: CPT

## 2018-08-01 RX ORDER — POTASSIUM CHLORIDE 20 MEQ
40 PACKET (EA) ORAL ONCE
Qty: 0 | Refills: 0 | Status: COMPLETED | OUTPATIENT
Start: 2018-08-01 | End: 2018-08-01

## 2018-08-01 RX ADMIN — Medication 1000 MILLIGRAM(S): at 06:12

## 2018-08-01 RX ADMIN — TAMSULOSIN HYDROCHLORIDE 0.4 MILLIGRAM(S): 0.4 CAPSULE ORAL at 21:29

## 2018-08-01 RX ADMIN — Medication 1000 MILLIGRAM(S): at 13:46

## 2018-08-01 RX ADMIN — LATANOPROST 1 DROP(S): 0.05 SOLUTION/ DROPS OPHTHALMIC; TOPICAL at 21:32

## 2018-08-01 RX ADMIN — SIMETHICONE 80 MILLIGRAM(S): 80 TABLET, CHEWABLE ORAL at 21:56

## 2018-08-01 RX ADMIN — Medication 125 MILLIGRAM(S): at 05:43

## 2018-08-01 RX ADMIN — SODIUM CHLORIDE 50 MILLILITER(S): 9 INJECTION, SOLUTION INTRAVENOUS at 10:38

## 2018-08-01 RX ADMIN — SIMETHICONE 80 MILLIGRAM(S): 80 TABLET, CHEWABLE ORAL at 08:50

## 2018-08-01 RX ADMIN — Medication 100 MILLIGRAM(S): at 13:45

## 2018-08-01 RX ADMIN — Medication 325 MILLIGRAM(S): at 11:19

## 2018-08-01 RX ADMIN — Medication 162 MILLIGRAM(S): at 21:29

## 2018-08-01 RX ADMIN — Medication 10 MILLIGRAM(S): at 06:07

## 2018-08-01 RX ADMIN — Medication 10 MILLIGRAM(S): at 16:41

## 2018-08-01 RX ADMIN — FAMOTIDINE 20 MILLIGRAM(S): 10 INJECTION INTRAVENOUS at 11:19

## 2018-08-01 RX ADMIN — Medication 10 MILLIGRAM(S): at 11:19

## 2018-08-01 RX ADMIN — Medication 100 MILLIGRAM(S): at 05:43

## 2018-08-01 RX ADMIN — Medication 1000 MILLIGRAM(S): at 14:35

## 2018-08-01 RX ADMIN — Medication 40 MILLIEQUIVALENT(S): at 16:41

## 2018-08-01 RX ADMIN — Medication 125 MILLIGRAM(S): at 17:34

## 2018-08-01 RX ADMIN — Medication 1000 MILLIGRAM(S): at 22:00

## 2018-08-01 RX ADMIN — Medication 125 MILLIGRAM(S): at 23:43

## 2018-08-01 RX ADMIN — Medication 100 MILLIGRAM(S): at 21:29

## 2018-08-01 RX ADMIN — Medication 1 TABLET(S): at 16:41

## 2018-08-01 RX ADMIN — Medication 1000 MILLIGRAM(S): at 21:29

## 2018-08-01 RX ADMIN — Medication 125 MILLIGRAM(S): at 11:19

## 2018-08-01 RX ADMIN — Medication 40 MILLIEQUIVALENT(S): at 10:39

## 2018-08-01 RX ADMIN — Medication 1 TABLET(S): at 08:50

## 2018-08-01 RX ADMIN — Medication 1000 MILLIGRAM(S): at 05:43

## 2018-08-01 RX ADMIN — Medication 162 MILLIGRAM(S): at 08:50

## 2018-08-01 NOTE — PROGRESS NOTE ADULT - SUBJECTIVE AND OBJECTIVE BOX
POD  #:  8  S/P  Right TKR                       SUBJECTIVE: Patient seen and examined. Pt has no knee pain. Pt reports abdominal pain getting slightly better but still has discomfort and frequent bowel movements. Pt has no appetite and says that eating exacerbates his stomach pain.      OBJECTIVE:     Vital Signs Last 24 Hrs  T(C): 36.7 (01 Aug 2018 08:12), Max: 37 (01 Aug 2018 04:36)  T(F): 98.1 (01 Aug 2018 08:12), Max: 98.6 (01 Aug 2018 04:36)  HR: 59 (01 Aug 2018 10:00) (43 - 75)  BP: 112/58 (01 Aug 2018 10:00) (96/50 - 147/74)  BP(mean): 75 (01 Aug 2018 10:00) (64 - 96)  RR: 16 (01 Aug 2018 10:00) (13 - 22)  SpO2: 100% (01 Aug 2018 10:00) (94% - 100%)    Right Knee:          Dressing: clean/dry/intact    Bilateral LEs:         Sensation:  intact to light touch          Motor exam:  /5 dorsiflexion/plantarflexion/EHL          2+ DP pulses          calf supple, NT    LABS:                        11.3   11.31 )-----------( 223      ( 01 Aug 2018 06:52 )             32.1     08-01    138  |  104  |  13  ----------------------------<  112<H>  3.0<L>   |  26  |  0.93    Ca    7.9<L>      01 Aug 2018 06:52    TPro  5.0<L>  /  Alb  1.9<L>  /  TBili  0.8  /  DBili  x   /  AST  25  /  ALT  22  /  AlkPhos  67  08-01          MEDICATIONS:  Anticoagulation:  aspirin enteric coated 162 milliGRAM(s) Oral every 12 hours      Pain medications:   acetaminophen   Tablet. 1000 milliGRAM(s) Oral every 8 hours  ondansetron Injectable 4 milliGRAM(s) IV Push every 6 hours PRN  traMADol 25 milliGRAM(s) Oral every 4 hours PRN  traMADol 50 milliGRAM(s) Oral every 4 hours PRN        A/P : Patient stable  s/p Right TKR POD # 8  -    Pain control  -    DVT ppx: aspirin enteric coated 162 milliGRAMS(s)  -    Weight bearing status: WBAT   -    Discharge plan: Rehab when medically cleared

## 2018-08-01 NOTE — PROGRESS NOTE ADULT - SUBJECTIVE AND OBJECTIVE BOX
Patient is a 86y old  Male who presents with a chief complaint of "Dr Cuellar is going to replace my RIGHT knee" (25 Jul 2018 12:46)      Patient seen in follow up for KARLENE. Improved renal function.     PAST MEDICAL HISTORY:  Osteoarthritis of right knee  Hypertension  Tremor of both hands    MEDICATIONS  (STANDING):  acetaminophen   Tablet. 1000 milliGRAM(s) Oral every 8 hours  aspirin enteric coated 162 milliGRAM(s) Oral every 12 hours  dextrose 5% + sodium chloride 0.45%. 1000 milliLiter(s) (50 mL/Hr) IV Continuous <Continuous>  dicyclomine 10 milliGRAM(s) Oral three times a day before meals  famotidine    Tablet 20 milliGRAM(s) Oral daily  ferrous    sulfate 325 milliGRAM(s) Oral daily  lactobacillus acidophilus 1 Tablet(s) Oral two times a day with meals  latanoprost 0.005% Ophthalmic Solution 1 Drop(s) Both EYES at bedtime  metroNIDAZOLE  IVPB 500 milliGRAM(s) IV Intermittent every 8 hours  potassium chloride    Tablet ER 40 milliEquivalent(s) Oral once  tamsulosin 0.4 milliGRAM(s) Oral at bedtime  vancomycin    Solution 125 milliGRAM(s) Oral every 6 hours    MEDICATIONS  (PRN):  aluminum hydroxide/magnesium hydroxide/simethicone Suspension 30 milliLiter(s) Oral four times a day PRN Indigestion  ondansetron Injectable 4 milliGRAM(s) IV Push every 6 hours PRN Nausea and/or Vomiting  polyethylene glycol 3350 17 Gram(s) Oral daily PRN Constipation  simethicone 80 milliGRAM(s) Chew every 6 hours PRN Gas  sodium chloride 0.65% Nasal 1 Spray(s) Both Nostrils every 4 hours PRN Nasal Congestion  traMADol 25 milliGRAM(s) Oral every 4 hours PRN Mild Pain (1 - 3)  traMADol 50 milliGRAM(s) Oral every 4 hours PRN Moderate Pain (4 - 6)    T(C): 37 (08-01-18 @ 12:02), Max: 37 (08-01-18 @ 04:36)  HR: 60 (08-01-18 @ 14:00) (43 - 75)  BP: 128/69 (08-01-18 @ 14:00) (96/50 - 147/74)  RR: 15 (08-01-18 @ 12:00) (13 - 22)  SpO2: 100% (08-01-18 @ 14:00) (94% - 100%)  Wt(kg): --  I&O's Detail    31 Jul 2018 07:01  -  01 Aug 2018 07:00  --------------------------------------------------------  IN:    dextrose 5% + sodium chloride 0.45%.: 1200 mL    IV PiggyBack: 200 mL    Oral Fluid: 480 mL  Total IN: 1880 mL    OUT:    Voided: 600 mL  Total OUT: 600 mL    Total NET: 1280 mL      01 Aug 2018 07:01  -  01 Aug 2018 15:15  --------------------------------------------------------  IN:    dextrose 5% + sodium chloride 0.45%.: 350 mL    IV PiggyBack: 100 mL  Total IN: 450 mL    OUT:    Voided: 200 mL  Total OUT: 200 mL    Total NET: 250 mL          PHYSICAL EXAM:  General: NAD  Respiratory: b/l air entry  Cardiovascular: S1 S2  Gastrointestinal: soft  Extremities:  no edema                          11.3   11.31 )-----------( 223      ( 01 Aug 2018 06:52 )             32.1     08-01    138  |  104  |  13  ----------------------------<  112<H>  3.0<L>   |  26  |  0.93    Ca    7.9<L>      01 Aug 2018 06:52    TPro  5.0<L>  /  Alb  1.9<L>  /  TBili  0.8  /  DBili  x   /  AST  25  /  ALT  22  /  AlkPhos  67  08-01        LIVER FUNCTIONS - ( 01 Aug 2018 06:52 )  Alb: 1.9 g/dL / Pro: 5.0 g/dL / ALK PHOS: 67 U/L / ALT: 22 U/L DA / AST: 25 U/L / GGT: x               Sodium, Serum: 138 (08-01 @ 06:52)  Sodium, Serum: 140 (07-30 @ 06:46)    Creatinine, Serum: 0.93 (08-01 @ 06:52)  Creatinine, Serum: 1.06 (07-30 @ 06:46)    Potassium, Serum: 3.0 (08-01 @ 06:52)  Potassium, Serum: 3.4 (07-30 @ 06:46)    Hemoglobin: 11.3 (08-01 @ 06:52)  Hemoglobin: 11.4 (07-31 @ 12:07)  Hemoglobin: 11.1 (07-30 @ 06:46)

## 2018-08-01 NOTE — PROGRESS NOTE ADULT - SUBJECTIVE AND OBJECTIVE BOX
Chief Complaint: TKR    Interval Events: No events overnight.    Review of Systems:  General: No fevers, chills, weight loss or gain  Skin: No rashes, color changes  Cardiovascular: No chest pain, orthopnea  Respiratory: No shortness of breath, cough  Gastrointestinal: No nausea, abdominal pain  Genitourinary: No incontinence, pain with urination  Musculoskeletal: No pain, swelling, decreased range of motion  Neurological: No headache, weakness  Psychiatric: No depression, anxiety  Endocrine: No weight loss or gain, increased thirst  All other systems are comprehensively negative.    Physical Exam:  Vital Signs Last 24 Hrs  T(C): 36.7 (01 Aug 2018 08:12), Max: 37 (01 Aug 2018 04:36)  T(F): 98.1 (01 Aug 2018 08:12), Max: 98.6 (01 Aug 2018 04:36)  HR: 63 (01 Aug 2018 06:00) (43 - 75)  BP: 144/65 (01 Aug 2018 06:00) (96/50 - 147/74)  BP(mean): 86 (01 Aug 2018 06:00) (64 - 96)  RR: 15 (01 Aug 2018 06:00) (13 - 22)  SpO2: 95% (01 Aug 2018 06:00) (94% - 100%)  General: NAD  HEENT: MMM  Neck: No JVD, no carotid bruit  Lungs: CTAB  CV: RRR, nl S1/S2, no M/R/G  Abdomen: S/NT/ND, +BS  Extremities: No LE edema, no cyanosis  Neuro: AAOx3, non-focal  Skin: No rash    Labs:    08-01    138  |  104  |  13  ----------------------------<  112<H>  3.0<L>   |  26  |  0.93    Ca    7.9<L>      01 Aug 2018 06:52    TPro  5.0<L>  /  Alb  1.9<L>  /  TBili  0.8  /  DBili  x   /  AST  25  /  ALT  22  /  AlkPhos  67  08-01                        11.3   11.31 )-----------( 223      ( 01 Aug 2018 06:52 )             32.1     Telemetry: Sinus rhythm, PACs with compensatory pauses, PVCs, bradycardia

## 2018-08-01 NOTE — PROGRESS NOTE ADULT - SUBJECTIVE AND OBJECTIVE BOX
KAUSHIK SEARS is a 86yMale , patient examined and chart reviewed.      INTERVAL HPI/ OVERNIGHT EVENTS:  Feeling better. Afebrile.  No events.    Past Medical History--  PAST MEDICAL & SURGICAL HISTORY:  Osteoarthritis of right knee  Hypertension  Tremor of both hands  History of hernia surgery: X3 1950&#x27;s-1970&#x27;s  History of shoulder surgery: right, 2012  History of knee replacement, total, left: 2007    For details regarding the patient's social history, family history, and other miscellaneous elements, please refer the initial infectious diseases consultation and/or the admitting history and physical examination for this admission.      ROS:  CONSTITUTIONAL:  Negative fever or chills  EYES:  Negative  blurry vision or double vision  CARDIOVASCULAR:  Negative for chest pain or palpitations  RESPIRATORY:  Negative for cough, wheezing, or SOB   GASTROINTESTINAL:  Negative for nausea, vomiting, diarrhea, constipation, or abdominal pain  GENITOURINARY:  Negative frequency, urgency , dysuria or hematuria   NEUROLOGIC:  No headache, confusion, dizziness, lightheadedness  All other systems were reviewed and are negative         Current inpatient medications :    ANTIBIOTICS/RELEVANT:  lactobacillus acidophilus 1 Tablet(s) Oral two times a day with meals  metroNIDAZOLE  IVPB 500 milliGRAM(s) IV Intermittent every 8 hours  vancomycin    Solution 125 milliGRAM(s) Oral every 6 hours      acetaminophen   Tablet. 1000 milliGRAM(s) Oral every 8 hours  aluminum hydroxide/magnesium hydroxide/simethicone Suspension 30 milliLiter(s) Oral four times a day PRN  aspirin enteric coated 162 milliGRAM(s) Oral every 12 hours  dextrose 5% + sodium chloride 0.45%. 1000 milliLiter(s) IV Continuous <Continuous>  dicyclomine 10 milliGRAM(s) Oral three times a day before meals  famotidine    Tablet 20 milliGRAM(s) Oral daily  ferrous    sulfate 325 milliGRAM(s) Oral daily  latanoprost 0.005% Ophthalmic Solution 1 Drop(s) Both EYES at bedtime  ondansetron Injectable 4 milliGRAM(s) IV Push every 6 hours PRN  polyethylene glycol 3350 17 Gram(s) Oral daily PRN  simethicone 80 milliGRAM(s) Chew every 6 hours PRN  sodium chloride 0.65% Nasal 1 Spray(s) Both Nostrils every 4 hours PRN  tamsulosin 0.4 milliGRAM(s) Oral at bedtime  traMADol 25 milliGRAM(s) Oral every 4 hours PRN  traMADol 50 milliGRAM(s) Oral every 4 hours PRN      Objective:    07-31 @ 07:01  -  08-01 @ 07:00  --------------------------------------------------------  IN: 1880 mL / OUT: 600 mL / NET: 1280 mL    08-01 @ 07:01  -  08-01 @ 19:07  --------------------------------------------------------  IN: 700 mL / OUT: 200 mL / NET: 500 mL      T(C): 37.1 (08-01-18 @ 16:06), Max: 37.1 (08-01-18 @ 16:06)  HR: 60 (08-01-18 @ 18:00) (52 - 75)  BP: 128/64 (08-01-18 @ 18:00) (104/55 - 147/74)  RR: 15 (08-01-18 @ 12:00) (15 - 22)  SpO2: 100% (08-01-18 @ 18:00) (94% - 100%)  Wt(kg): --      Physical Exam:  GEN: NAD, pleasant  HEENT: normocephalic and atraumatic. EOMI. MIAH. Moist mucosa. Clear Posterior pharynx.  NECK: Supple. No carotid bruits.  No lymphadenopathy or thyromegaly.  LUNGS: Clear to auscultation.  HEART: Regular rate and rhythm without murmur.  ABDOMEN: Soft, nontender, and nondistended.  Positive bowel sounds.   EXTREMITIES: Without any cyanosis, clubbing, rash, lesions or edema.  NEUROLOGIC: A & O x3, No focal neurological deficits   SKIN: No ulceration or induration present.      LABS:                        11.3   11.31 )-----------( 223      ( 01 Aug 2018 06:52 )             32.1       08-01    138  |  104  |  13  ----------------------------<  112<H>  3.0<L>   |  26  |  0.93    Ca    7.9<L>      01 Aug 2018 06:52    TPro  5.0<L>  /  Alb  1.9<L>  /  TBili  0.8  /  DBili  x   /  AST  25  /  ALT  22  /  AlkPhos  67  08-01    Assessment :  Patient is an 86 year old male with a history of HTN, OA sp right TKR 7/24/18  complicated by sepsis with acute pancolitis sec Cdiff colitis  Wbc resolving  KARLENE resolved  +troponins  Overall better      Plan :   Cont po vanc x 10 days  Dc IV Flagyl after tonight's dose  Cont Bacid  Trend temp and wbc  Increase activity    Continue with present regiment.  Appropriate use of antibiotics and adverse effects reviewed.    I have discussed the above plan of care with patient/ family in detail. They expressed understanding of the the treatment plan . Risks, benefits and alternatives discussed in detail. I have asked if they have any questions or concerns and appropriately addressed them to the best of my ability .      Critical care time greater then 35 minutes reviewing notes, labs data/ imaging , discussion with multidisciplinary team.    Thank you for allowing me to participate in care of your patient .        Yoon Sepulveda MD  Infectious Disease  [phone]

## 2018-08-01 NOTE — PROGRESS NOTE ADULT - ASSESSMENT
·	KARLENE  ·	Hypokalemia    Improved renal function. Potassium supps. Encouarge PO intake.   Will check magnesium. Will follow electrolytes and renal function trend.   D/w family at bedside.

## 2018-08-01 NOTE — CHART NOTE - NSCHARTNOTEFT_GEN_A_CORE
Assessment: Pt s/p R TKR, doing well c PT, however, hospital course complicated by c.diff colitis.  Pt on full liquid diet x ~7 days, c overall decreased appetite and intake, consuming yogurt at time of visit. Pt supplemented c lactobacillus acidophilus to help support gut microflora.  Pt reports he still is having frequent diarrhea, also has abdominal distension.  Diet upgraded to DASH/TLC today, pt agreeable to turkey sandwich, chicken noodle soup and rice at lunch time; discussed lower fiber/easily to digest foods for better GI tolerance.  D5NaCl@50ml/hr also infusing.  WBC slowly improving.     Factors impacting intake: [ ] none [ ] nausea  [ ] vomiting [ ] diarrhea [ ] constipation  [ ]chewing problems [ ] swallowing issues  [ ] other:     Diet Presciption: Diet, DASH/TLC:   Sodium & Cholesterol Restricted (08-01-18 @ 11:26)    Intake: inadequate on full liquid diet    Current Weight:   % Weight Change 8/1 202#, appears inaccurate/pts adm wt ~174#    Pertinent Medications: MEDICATIONS  (STANDING):  acetaminophen   Tablet. 1000 milliGRAM(s) Oral every 8 hours  aspirin enteric coated 162 milliGRAM(s) Oral every 12 hours  dextrose 5% + sodium chloride 0.45%. 1000 milliLiter(s) (50 mL/Hr) IV Continuous <Continuous>  dicyclomine 10 milliGRAM(s) Oral three times a day before meals  famotidine    Tablet 20 milliGRAM(s) Oral daily  ferrous    sulfate 325 milliGRAM(s) Oral daily  lactobacillus acidophilus 1 Tablet(s) Oral two times a day with meals  latanoprost 0.005% Ophthalmic Solution 1 Drop(s) Both EYES at bedtime  metroNIDAZOLE  IVPB 500 milliGRAM(s) IV Intermittent every 8 hours  tamsulosin 0.4 milliGRAM(s) Oral at bedtime  vancomycin    Solution 125 milliGRAM(s) Oral every 6 hours    MEDICATIONS  (PRN):  aluminum hydroxide/magnesium hydroxide/simethicone Suspension 30 milliLiter(s) Oral four times a day PRN Indigestion  ondansetron Injectable 4 milliGRAM(s) IV Push every 6 hours PRN Nausea and/or Vomiting  polyethylene glycol 3350 17 Gram(s) Oral daily PRN Constipation  simethicone 80 milliGRAM(s) Chew every 6 hours PRN Gas  sodium chloride 0.65% Nasal 1 Spray(s) Both Nostrils every 4 hours PRN Nasal Congestion  traMADol 25 milliGRAM(s) Oral every 4 hours PRN Mild Pain (1 - 3)  traMADol 50 milliGRAM(s) Oral every 4 hours PRN Moderate Pain (4 - 6)    Pertinent Labs: 08-01 Na138 mmol/L Glu 112 mg/dL<H> K+ 3.0 mmol/L<L> Cr  0.93 mg/dL BUN 13 mg/dL 07-30 Phos 3.0 mg/dL 08-01 Alb 1.9 g/dL<L>     CAPILLARY BLOOD GLUCOSE        Skin: intact c surgical incision     Estimated Needs:   [x ] no change since previous assessment  [ ] recalculated:     Previous Nutrition Diagnosis:   [x ] Inadequate Energy Intake [ ]Inadequate Oral Intake [ ] Excessive Energy Intake   [ ] Underweight [ ] Increased Nutrient Needs [ ] Overweight/Obesity   [ ] Altered GI Function [ ] Unintended Weight Loss [ ] Food & Nutrition Related Knowledge Deficit [ ] Malnutrition     Nutrition Diagnosis is [ ] ongoing  [ ] resolved [ ] not applicable     New Nutrition Diagnosis: [ ] not applicable       Interventions: c/w dash/tlc diet, encourage po; probiotic   Recommend  [ ] Change Diet To:  [ ] Nutrition Supplement  [ ] Nutrition Support  [ ] Other:     Monitoring and Evaluation:   [x ] PO intake [ x ] Tolerance to diet prescription [ x ] weights [ x ] labs[ x ] follow up per protocol  [ x] other: GI tolerance

## 2018-08-01 NOTE — PROGRESS NOTE ADULT - SUBJECTIVE AND OBJECTIVE BOX
pt seen  feeling better  states appetite slightly better  still with diarrhea  ICU Vital Signs Last 24 Hrs  T(C): 36.7 (01 Aug 2018 08:12), Max: 37 (01 Aug 2018 04:36)  T(F): 98.1 (01 Aug 2018 08:12), Max: 98.6 (01 Aug 2018 04:36)  HR: 59 (01 Aug 2018 10:00) (43 - 75)  BP: 112/58 (01 Aug 2018 10:00) (96/50 - 147/74)  BP(mean): 75 (01 Aug 2018 10:00) (64 - 96)  ABP: --  ABP(mean): --  RR: 16 (01 Aug 2018 10:00) (13 - 22)  SpO2: 100% (01 Aug 2018 10:00) (94% - 100%)  NAD  cvs-reg rate and rhythm  resp-clear   abd-less distended, softer, less tender  LE-no edema, nontender                          11.3   11.31 )-----------( 223      ( 01 Aug 2018 06:52 )             32.1

## 2018-08-01 NOTE — PROGRESS NOTE ADULT - SUBJECTIVE AND OBJECTIVE BOX
Date/Time Patient Seen:  		  Referring MD:   Data Reviewed	       Patient is a 86y old  Male who presents with a chief complaint of " Polo is going to replace my RIGHT knee" (25 Jul 2018 12:46)  vs and meds reviewed      Subjective/HPI     PAST MEDICAL & SURGICAL HISTORY:  Osteoarthritis of right knee  Hypertension  Tremor of both hands  History of hernia surgery: X3 1950&#x27;s-1970&#x27;s  History of shoulder surgery: right, 2012  History of knee replacement, total, left: 2007        Medication list         MEDICATIONS  (STANDING):  acetaminophen   Tablet. 1000 milliGRAM(s) Oral every 8 hours  aspirin enteric coated 162 milliGRAM(s) Oral every 12 hours  dextrose 5% + sodium chloride 0.45%. 1000 milliLiter(s) (50 mL/Hr) IV Continuous <Continuous>  dicyclomine 10 milliGRAM(s) Oral three times a day before meals  famotidine    Tablet 20 milliGRAM(s) Oral daily  ferrous    sulfate 325 milliGRAM(s) Oral daily  lactobacillus acidophilus 1 Tablet(s) Oral two times a day with meals  latanoprost 0.005% Ophthalmic Solution 1 Drop(s) Both EYES at bedtime  metroNIDAZOLE  IVPB 500 milliGRAM(s) IV Intermittent every 8 hours  tamsulosin 0.4 milliGRAM(s) Oral at bedtime  vancomycin    Solution 125 milliGRAM(s) Oral every 6 hours    MEDICATIONS  (PRN):  aluminum hydroxide/magnesium hydroxide/simethicone Suspension 30 milliLiter(s) Oral four times a day PRN Indigestion  ondansetron Injectable 4 milliGRAM(s) IV Push every 6 hours PRN Nausea and/or Vomiting  polyethylene glycol 3350 17 Gram(s) Oral daily PRN Constipation  simethicone 80 milliGRAM(s) Chew every 6 hours PRN Gas  sodium chloride 0.65% Nasal 1 Spray(s) Both Nostrils every 4 hours PRN Nasal Congestion  traMADol 25 milliGRAM(s) Oral every 4 hours PRN Mild Pain (1 - 3)  traMADol 50 milliGRAM(s) Oral every 4 hours PRN Moderate Pain (4 - 6)         Vitals log        ICU Vital Signs Last 24 Hrs  T(C): 37 (01 Aug 2018 04:36), Max: 37 (01 Aug 2018 04:36)  T(F): 98.6 (01 Aug 2018 04:36), Max: 98.6 (01 Aug 2018 04:36)  HR: 63 (01 Aug 2018 06:00) (43 - 75)  BP: 144/65 (01 Aug 2018 06:00) (96/50 - 147/74)  BP(mean): 86 (01 Aug 2018 06:00) (64 - 96)  ABP: --  ABP(mean): --  RR: 15 (01 Aug 2018 06:00) (13 - 22)  SpO2: 95% (01 Aug 2018 06:00) (94% - 100%)           Input and Output:  I&O's Detail    30 Jul 2018 07:01  -  31 Jul 2018 07:00  --------------------------------------------------------  IN:    dextrose 5% + sodium chloride 0.45%.: 1200 mL    IV PiggyBack: 100 mL    Oral Fluid: 200 mL  Total IN: 1500 mL    OUT:  Total OUT: 0 mL    Total NET: 1500 mL      31 Jul 2018 07:01  -  01 Aug 2018 06:58  --------------------------------------------------------  IN:    dextrose 5% + sodium chloride 0.45%.: 1200 mL    IV PiggyBack: 200 mL    Oral Fluid: 480 mL  Total IN: 1880 mL    OUT:    Voided: 600 mL  Total OUT: 600 mL    Total NET: 1280 mL          Lab Data                        11.3   11.31 )-----------( 223      ( 01 Aug 2018 06:52 )             32.1       Phos  3.0     07-30  Mg     1.6     07-30              Review of Systems	      Objective     Physical Examination    heart s1s2  lung dec BS  abd dist.       Pertinent Lab findings & Imaging      [place]:  NO   Adequate UO     I&O's Detail    30 Jul 2018 07:01  -  31 Jul 2018 07:00  --------------------------------------------------------  IN:    dextrose 5% + sodium chloride 0.45%.: 1200 mL    IV PiggyBack: 100 mL    Oral Fluid: 200 mL  Total IN: 1500 mL    OUT:  Total OUT: 0 mL    Total NET: 1500 mL      31 Jul 2018 07:01  -  01 Aug 2018 06:58  --------------------------------------------------------  IN:    dextrose 5% + sodium chloride 0.45%.: 1200 mL    IV PiggyBack: 200 mL    Oral Fluid: 480 mL  Total IN: 1880 mL    OUT:    Voided: 600 mL  Total OUT: 600 mL    Total NET: 1280 mL               Discussed with:     Cultures:	        Radiology

## 2018-08-01 NOTE — PROGRESS NOTE ADULT - SUBJECTIVE AND OBJECTIVE BOX
Patient is a 86y old  Male who presents with a chief complaint of "Dr Cuellar is going to replace my RIGHT knee" (25 Jul 2018 12:46)        HPI:87 yo male presenting with right total knee replacement day, complicated with diarrhea/dehydration/ARF secondary to c.diff collitis        SUBJECTIVE & OBJECTIVE: Pt seen and examined at bedside, complaining of heart burns  PHYSICAL EXAM:  T(C): 36.7 (08-01-18 @ 08:12), Max: 37 (08-01-18 @ 04:36)  HR: 59 (08-01-18 @ 10:00) (43 - 75)  BP: 112/58 (08-01-18 @ 10:00) (96/50 - 147/74)  RR: 16 (08-01-18 @ 10:00) (13 - 22)  SpO2: 100% (08-01-18 @ 10:00) (94% - 100%)  Wt(kg): --   GENERAL: NAD, well-groomed, well-developed  HEAD:  Atraumatic, Normocephalic  EYES: EOMI, PERRLA, conjunctiva and sclera clear  ENMT: Moist mucous membranes  NECK: Supple, No JVD  NERVOUS SYSTEM:  Alert & Oriented X3  CHEST/LUNG: Clear to auscultation bilaterally; No rales, rhonchi, wheezing, or rubs  HEART: Regular rate and rhythm; No murmurs, rubs, or gallops  ABDOMEN: Soft, Nontender, Nondistended; Bowel sounds present  EXTREMITIES:  2+ Peripheral Pulses, No clubbing, cyanosis, or edema        MEDICATIONS  (STANDING):  acetaminophen   Tablet. 1000 milliGRAM(s) Oral every 8 hours  aspirin enteric coated 162 milliGRAM(s) Oral every 12 hours  dextrose 5% + sodium chloride 0.45%. 1000 milliLiter(s) (50 mL/Hr) IV Continuous <Continuous>  dicyclomine 10 milliGRAM(s) Oral three times a day before meals  famotidine    Tablet 20 milliGRAM(s) Oral daily  ferrous    sulfate 325 milliGRAM(s) Oral daily  lactobacillus acidophilus 1 Tablet(s) Oral two times a day with meals  latanoprost 0.005% Ophthalmic Solution 1 Drop(s) Both EYES at bedtime  metroNIDAZOLE  IVPB 500 milliGRAM(s) IV Intermittent every 8 hours  tamsulosin 0.4 milliGRAM(s) Oral at bedtime  vancomycin    Solution 125 milliGRAM(s) Oral every 6 hours    MEDICATIONS  (PRN):  aluminum hydroxide/magnesium hydroxide/simethicone Suspension 30 milliLiter(s) Oral four times a day PRN Indigestion  ondansetron Injectable 4 milliGRAM(s) IV Push every 6 hours PRN Nausea and/or Vomiting  polyethylene glycol 3350 17 Gram(s) Oral daily PRN Constipation  simethicone 80 milliGRAM(s) Chew every 6 hours PRN Gas  sodium chloride 0.65% Nasal 1 Spray(s) Both Nostrils every 4 hours PRN Nasal Congestion  traMADol 25 milliGRAM(s) Oral every 4 hours PRN Mild Pain (1 - 3)  traMADol 50 milliGRAM(s) Oral every 4 hours PRN Moderate Pain (4 - 6)      LABS:                        11.3   11.31 )-----------( 223      ( 01 Aug 2018 06:52 )             32.1     08-01    138  |  104  |  13  ----------------------------<  112<H>  3.0<L>   |  26  |  0.93    Ca    7.9<L>      01 Aug 2018 06:52    TPro  5.0<L>  /  Alb  1.9<L>  /  TBili  0.8  /  DBili  x   /  AST  25  /  ALT  22  /  AlkPhos  67  08-01          CAPILLARY BLOOD GLUCOSE          CAPILLARY BLOOD GLUCOSE        CAPILLARY BLOOD GLUCOSE                RECENT CULTURES:      RADIOLOGY & ADDITIONAL TESTS:                        DVT/GI ppx  Discussed with pt @ bedside

## 2018-08-02 LAB
ANION GAP SERPL CALC-SCNC: 11 MMOL/L — SIGNIFICANT CHANGE UP (ref 5–17)
BUN SERPL-MCNC: 11 MG/DL — SIGNIFICANT CHANGE UP (ref 7–23)
CALCIUM SERPL-MCNC: 7.7 MG/DL — LOW (ref 8.4–10.5)
CHLORIDE SERPL-SCNC: 102 MMOL/L — SIGNIFICANT CHANGE UP (ref 96–108)
CO2 SERPL-SCNC: 24 MMOL/L — SIGNIFICANT CHANGE UP (ref 22–31)
CREAT SERPL-MCNC: 0.9 MG/DL — SIGNIFICANT CHANGE UP (ref 0.5–1.3)
GLUCOSE SERPL-MCNC: 108 MG/DL — HIGH (ref 70–99)
HCT VFR BLD CALC: 31.3 % — LOW (ref 39–50)
HGB BLD-MCNC: 10.7 G/DL — LOW (ref 13–17)
MAGNESIUM SERPL-MCNC: 1.4 MG/DL — LOW (ref 1.6–2.6)
MCHC RBC-ENTMCNC: 31.4 PG — SIGNIFICANT CHANGE UP (ref 27–34)
MCHC RBC-ENTMCNC: 34.2 GM/DL — SIGNIFICANT CHANGE UP (ref 32–36)
MCV RBC AUTO: 91.8 FL — SIGNIFICANT CHANGE UP (ref 80–100)
NRBC # BLD: 0 /100 WBCS — SIGNIFICANT CHANGE UP (ref 0–0)
PLATELET # BLD AUTO: 230 K/UL — SIGNIFICANT CHANGE UP (ref 150–400)
POTASSIUM SERPL-MCNC: 3.4 MMOL/L — LOW (ref 3.5–5.3)
POTASSIUM SERPL-SCNC: 3.4 MMOL/L — LOW (ref 3.5–5.3)
RBC # BLD: 3.41 M/UL — LOW (ref 4.2–5.8)
RBC # FLD: 12.5 % — SIGNIFICANT CHANGE UP (ref 10.3–14.5)
SODIUM SERPL-SCNC: 137 MMOL/L — SIGNIFICANT CHANGE UP (ref 135–145)
WBC # BLD: 10.14 K/UL — SIGNIFICANT CHANGE UP (ref 3.8–10.5)
WBC # FLD AUTO: 10.14 K/UL — SIGNIFICANT CHANGE UP (ref 3.8–10.5)

## 2018-08-02 PROCEDURE — 99232 SBSQ HOSP IP/OBS MODERATE 35: CPT

## 2018-08-02 RX ORDER — POTASSIUM CHLORIDE 20 MEQ
20 PACKET (EA) ORAL ONCE
Qty: 0 | Refills: 0 | Status: COMPLETED | OUTPATIENT
Start: 2018-08-02 | End: 2018-08-02

## 2018-08-02 RX ORDER — SIMETHICONE 80 MG/1
1 TABLET, CHEWABLE ORAL
Qty: 0 | Refills: 0 | COMMUNITY
Start: 2018-08-02

## 2018-08-02 RX ORDER — VANCOMYCIN HCL 1 G
1 VIAL (EA) INTRAVENOUS
Qty: 28 | Refills: 0 | OUTPATIENT
Start: 2018-08-02 | End: 2018-08-08

## 2018-08-02 RX ORDER — MAGNESIUM SULFATE 500 MG/ML
2 VIAL (ML) INJECTION ONCE
Qty: 0 | Refills: 0 | Status: COMPLETED | OUTPATIENT
Start: 2018-08-02 | End: 2018-08-02

## 2018-08-02 RX ORDER — POTASSIUM CHLORIDE 20 MEQ
40 PACKET (EA) ORAL ONCE
Qty: 0 | Refills: 0 | Status: COMPLETED | OUTPATIENT
Start: 2018-08-02 | End: 2018-08-02

## 2018-08-02 RX ORDER — TAMSULOSIN HYDROCHLORIDE 0.4 MG/1
1 CAPSULE ORAL
Qty: 0 | Refills: 0 | COMMUNITY
Start: 2018-08-02

## 2018-08-02 RX ORDER — LATANOPROST 0.05 MG/ML
1 SOLUTION/ DROPS OPHTHALMIC; TOPICAL
Qty: 0 | Refills: 0 | COMMUNITY
Start: 2018-08-02

## 2018-08-02 RX ORDER — ASPIRIN/CALCIUM CARB/MAGNESIUM 324 MG
2 TABLET ORAL
Qty: 0 | Refills: 0 | COMMUNITY
Start: 2018-08-02

## 2018-08-02 RX ORDER — LACTOBACILLUS ACIDOPHILUS 100MM CELL
1 CAPSULE ORAL
Qty: 0 | Refills: 0 | COMMUNITY
Start: 2018-08-02

## 2018-08-02 RX ORDER — MAGNESIUM SULFATE 500 MG/ML
1 VIAL (ML) INJECTION ONCE
Qty: 0 | Refills: 0 | Status: COMPLETED | OUTPATIENT
Start: 2018-08-02 | End: 2018-08-02

## 2018-08-02 RX ADMIN — Medication 125 MILLIGRAM(S): at 05:57

## 2018-08-02 RX ADMIN — Medication 125 MILLIGRAM(S): at 17:26

## 2018-08-02 RX ADMIN — Medication 125 MILLIGRAM(S): at 11:22

## 2018-08-02 RX ADMIN — Medication 1000 MILLIGRAM(S): at 06:29

## 2018-08-02 RX ADMIN — TRAMADOL HYDROCHLORIDE 50 MILLIGRAM(S): 50 TABLET ORAL at 22:06

## 2018-08-02 RX ADMIN — Medication 100 GRAM(S): at 16:48

## 2018-08-02 RX ADMIN — SIMETHICONE 80 MILLIGRAM(S): 80 TABLET, CHEWABLE ORAL at 15:22

## 2018-08-02 RX ADMIN — LATANOPROST 1 DROP(S): 0.05 SOLUTION/ DROPS OPHTHALMIC; TOPICAL at 21:39

## 2018-08-02 RX ADMIN — Medication 10 MILLIGRAM(S): at 06:29

## 2018-08-02 RX ADMIN — SIMETHICONE 80 MILLIGRAM(S): 80 TABLET, CHEWABLE ORAL at 08:14

## 2018-08-02 RX ADMIN — Medication 125 MILLIGRAM(S): at 23:34

## 2018-08-02 RX ADMIN — Medication 10 MILLIGRAM(S): at 16:48

## 2018-08-02 RX ADMIN — Medication 162 MILLIGRAM(S): at 08:13

## 2018-08-02 RX ADMIN — SIMETHICONE 80 MILLIGRAM(S): 80 TABLET, CHEWABLE ORAL at 21:36

## 2018-08-02 RX ADMIN — Medication 1000 MILLIGRAM(S): at 21:36

## 2018-08-02 RX ADMIN — Medication 1000 MILLIGRAM(S): at 22:00

## 2018-08-02 RX ADMIN — Medication 1 TABLET(S): at 16:48

## 2018-08-02 RX ADMIN — TRAMADOL HYDROCHLORIDE 25 MILLIGRAM(S): 50 TABLET ORAL at 15:22

## 2018-08-02 RX ADMIN — Medication 20 MILLIEQUIVALENT(S): at 21:38

## 2018-08-02 RX ADMIN — Medication 40 MILLIEQUIVALENT(S): at 08:13

## 2018-08-02 RX ADMIN — Medication 325 MILLIGRAM(S): at 11:22

## 2018-08-02 RX ADMIN — Medication 1000 MILLIGRAM(S): at 13:34

## 2018-08-02 RX ADMIN — Medication 162 MILLIGRAM(S): at 21:37

## 2018-08-02 RX ADMIN — Medication 1000 MILLIGRAM(S): at 05:57

## 2018-08-02 RX ADMIN — Medication 10 MILLIGRAM(S): at 11:22

## 2018-08-02 RX ADMIN — Medication 50 GRAM(S): at 08:14

## 2018-08-02 RX ADMIN — FAMOTIDINE 20 MILLIGRAM(S): 10 INJECTION INTRAVENOUS at 11:22

## 2018-08-02 RX ADMIN — Medication 1 TABLET(S): at 08:13

## 2018-08-02 RX ADMIN — TRAMADOL HYDROCHLORIDE 50 MILLIGRAM(S): 50 TABLET ORAL at 21:38

## 2018-08-02 RX ADMIN — TAMSULOSIN HYDROCHLORIDE 0.4 MILLIGRAM(S): 0.4 CAPSULE ORAL at 21:37

## 2018-08-02 RX ADMIN — TRAMADOL HYDROCHLORIDE 25 MILLIGRAM(S): 50 TABLET ORAL at 16:15

## 2018-08-02 NOTE — PROGRESS NOTE ADULT - ASSESSMENT
Patient is 85 yo male presenting with right total knee replacement day, complicated with diarrhea/dehydration/ARF secondary to c.diff collitis      1. Pancolitis secondary to c.diff collitis,    continue on po vanco,  d/c flagyl   advance diet as tolerated   pt only eats about 20 % of poration of food, has anxoeria  discussed with kassie, concerned if discharged would came back for dehydration  would encourage to eat today, and will d/c in am       2. S/P right Total knee replacement.  Continue with pain management, DVT proph, and wound care as per Ortho.    Continue with PT/OT.    3. HTN.  Hold BP medication and Monitor BP  4.  Bradycardia with few pauses.  Resolved.   Cardiology follow up appreciated.   5. Hypotension with acute renal failure.  Improved with IVF.  BP stable.    Nephrology  follow up  appreciated.   6.  Anemia.  due to post-operative blood loss.  Asymptomatic.  Monitor H/H.  Iron supplement  7. Urinary retention , passed [facility] wei to medical floor

## 2018-08-02 NOTE — PROGRESS NOTE ADULT - SUBJECTIVE AND OBJECTIVE BOX
Chief Complaint: TKR    Interval Events: No events overnight.    Review of Systems:  General: No fevers, chills, weight loss or gain  Skin: No rashes, color changes  Cardiovascular: No chest pain, orthopnea  Respiratory: No shortness of breath, cough  Gastrointestinal: No nausea, abdominal pain  Genitourinary: No incontinence, pain with urination  Musculoskeletal: No pain, swelling, decreased range of motion  Neurological: No headache, weakness  Psychiatric: No depression, anxiety  Endocrine: No weight loss or gain, increased thirst  All other systems are comprehensively negative.    Physical Exam:  Vital Signs Last 24 Hrs  T(C): 36.7 (02 Aug 2018 08:55), Max: 37.2 (01 Aug 2018 23:59)  T(F): 98.1 (02 Aug 2018 08:55), Max: 98.9 (01 Aug 2018 23:59)  HR: 56 (02 Aug 2018 08:00) (53 - 64)  BP: 144/65 (02 Aug 2018 08:00) (112/58 - 166/78)  BP(mean): 89 (02 Aug 2018 08:00) (75 - 103)  RR: 20 (02 Aug 2018 08:00) (15 - 22)  SpO2: 99% (02 Aug 2018 08:00) (93% - 100%)  General: NAD  HEENT: MMM  Neck: No JVD, no carotid bruit  Lungs: CTAB  CV: RRR, nl S1/S2, no M/R/G  Abdomen: S/NT/ND, +BS  Extremities: No LE edema, no cyanosis  Neuro: AAOx3, non-focal  Skin: No rash    Labs:    08-02    137  |  102  |  11  ----------------------------<  108<H>  3.4<L>   |  24  |  0.90    Ca    7.7<L>      02 Aug 2018 06:45  Mg     1.4     08-02    TPro  5.0<L>  /  Alb  1.9<L>  /  TBili  0.8  /  DBili  x   /  AST  25  /  ALT  22  /  AlkPhos  67  08-01                        10.7   10.14 )-----------( 230      ( 02 Aug 2018 06:45 )             31.3       Telemetry: Sinus rhythm, PACs with compensatory pauses, PVCs, bradycardia

## 2018-08-02 NOTE — PROGRESS NOTE ADULT - SUBJECTIVE AND OBJECTIVE BOX
Subjective: Pt improving, decreased diarrhea and tolerating po    Objective:  Vital Signs Last 24 Hrs  T(C): 36.7 (02 Aug 2018 12:44), Max: 37.2 (01 Aug 2018 23:59)  T(F): 98 (02 Aug 2018 12:44), Max: 98.9 (01 Aug 2018 23:59)  HR: 52 (02 Aug 2018 12:00) (52 - 70)  BP: 108/63 (02 Aug 2018 12:00) (108/63 - 166/78)  BP(mean): 78 (02 Aug 2018 12:00) (78 - 103)  RR: 14 (02 Aug 2018 12:00) (14 - 22)  SpO2: 100% (02 Aug 2018 12:00) (93% - 100%)    Heent: [place] LOVE  Pul:clear  Cor: RSR, without murmurs  Abdomen: normal bowel sounds, without distension, minimal tenderness on deep palpation  Extremities: without edema, motor/sensory intact, without calf pain, Homans sign negative.                        10.7   10.14 )-----------( 230      ( 02 Aug 2018 06:45 )             31.3       08-02    137  |  102  |  11  ----------------------------<  108<H>  3.4<L>   |  24  |  0.90    Ca    7.7<L>      02 Aug 2018 06:45  Mg     1.4     08-02    TPro  5.0<L>  /  Alb  1.9<L>  /  TBili  0.8  /  DBili  x   /  AST  25  /  ALT  22  /  AlkPhos  67  08-01 08-01 @ 07:01  -  08-02 @ 07:00  --------------------------------------------------------  IN:    dextrose 5% + sodium chloride 0.45%.: 1200 mL    IV PiggyBack: 200 mL    Oral Fluid: 480 mL  Total IN: 1880 mL    OUT:    Voided: 800 mL  Total OUT: 800 mL    Total NET: 1080 mL      08-02 @ 07:01  -  08-02 @ 13:11  --------------------------------------------------------  IN:    dextrose 5% + sodium chloride 0.45%.: 50 mL    IV PiggyBack: 100 mL    Oral Fluid: 550 mL  Total IN: 700 mL    OUT:  Total OUT: 0 mL    Total NET: 700 mL

## 2018-08-02 NOTE — PROGRESS NOTE ADULT - SUBJECTIVE AND OBJECTIVE BOX
Date/Time Patient Seen:  		  Referring MD:   Data Reviewed	       Patient is a 86y old  Male who presents with a chief complaint of " Polo is going to replace my RIGHT knee" (25 Jul 2018 12:46)    vs and meds reviewed  on ABX    Subjective/HPI     PAST MEDICAL & SURGICAL HISTORY:  Osteoarthritis of right knee  Hypertension  Tremor of both hands  History of hernia surgery: X3 1950&#x27;s-1970&#x27;s  History of shoulder surgery: right, 2012  History of knee replacement, total, left: 2007        Medication list         MEDICATIONS  (STANDING):  acetaminophen   Tablet. 1000 milliGRAM(s) Oral every 8 hours  aspirin enteric coated 162 milliGRAM(s) Oral every 12 hours  dextrose 5% + sodium chloride 0.45%. 1000 milliLiter(s) (50 mL/Hr) IV Continuous <Continuous>  dicyclomine 10 milliGRAM(s) Oral three times a day before meals  famotidine    Tablet 20 milliGRAM(s) Oral daily  ferrous    sulfate 325 milliGRAM(s) Oral daily  lactobacillus acidophilus 1 Tablet(s) Oral two times a day with meals  latanoprost 0.005% Ophthalmic Solution 1 Drop(s) Both EYES at bedtime  tamsulosin 0.4 milliGRAM(s) Oral at bedtime  vancomycin    Solution 125 milliGRAM(s) Oral every 6 hours    MEDICATIONS  (PRN):  aluminum hydroxide/magnesium hydroxide/simethicone Suspension 30 milliLiter(s) Oral four times a day PRN Indigestion  ondansetron Injectable 4 milliGRAM(s) IV Push every 6 hours PRN Nausea and/or Vomiting  polyethylene glycol 3350 17 Gram(s) Oral daily PRN Constipation  simethicone 80 milliGRAM(s) Chew every 6 hours PRN Gas  sodium chloride 0.65% Nasal 1 Spray(s) Both Nostrils every 4 hours PRN Nasal Congestion  traMADol 25 milliGRAM(s) Oral every 4 hours PRN Mild Pain (1 - 3)  traMADol 50 milliGRAM(s) Oral every 4 hours PRN Moderate Pain (4 - 6)         Vitals log        ICU Vital Signs Last 24 Hrs  T(C): 37.2 (01 Aug 2018 23:59), Max: 37.2 (01 Aug 2018 23:59)  T(F): 98.9 (01 Aug 2018 23:59), Max: 98.9 (01 Aug 2018 23:59)  HR: 56 (02 Aug 2018 04:00) (53 - 64)  BP: 138/65 (02 Aug 2018 04:00) (112/58 - 166/78)  BP(mean): 87 (02 Aug 2018 04:00) (73 - 103)  ABP: --  ABP(mean): --  RR: 19 (02 Aug 2018 04:00) (15 - 22)  SpO2: 98% (02 Aug 2018 04:00) (93% - 100%)           Input and Output:  I&O's Detail    31 Jul 2018 07:01  -  01 Aug 2018 07:00  --------------------------------------------------------  IN:    dextrose 5% + sodium chloride 0.45%.: 1200 mL    IV PiggyBack: 200 mL    Oral Fluid: 480 mL  Total IN: 1880 mL    OUT:    Voided: 600 mL  Total OUT: 600 mL    Total NET: 1280 mL      01 Aug 2018 07:01  -  02 Aug 2018 06:36  --------------------------------------------------------  IN:    dextrose 5% + sodium chloride 0.45%.: 1100 mL    IV PiggyBack: 200 mL    Oral Fluid: 240 mL  Total IN: 1540 mL    OUT:    Voided: 400 mL  Total OUT: 400 mL    Total NET: 1140 mL          Lab Data                        11.3   11.31 )-----------( 223      ( 01 Aug 2018 06:52 )             32.1     08-01    138  |  104  |  13  ----------------------------<  112<H>  3.0<L>   |  26  |  0.93    Ca    7.9<L>      01 Aug 2018 06:52    TPro  5.0<L>  /  Alb  1.9<L>  /  TBili  0.8  /  DBili  x   /  AST  25  /  ALT  22  /  AlkPhos  67  08-01            Review of Systems	      Objective     Physical Examination  heart s1s2  lung dec BS  abd soft        Pertinent Lab findings & Imaging      [place]:  NO   Adequate UO     I&O's Detail    31 Jul 2018 07:01  -  01 Aug 2018 07:00  --------------------------------------------------------  IN:    dextrose 5% + sodium chloride 0.45%.: 1200 mL    IV PiggyBack: 200 mL    Oral Fluid: 480 mL  Total IN: 1880 mL    OUT:    Voided: 600 mL  Total OUT: 600 mL    Total NET: 1280 mL      01 Aug 2018 07:01  -  02 Aug 2018 06:36  --------------------------------------------------------  IN:    dextrose 5% + sodium chloride 0.45%.: 1100 mL    IV PiggyBack: 200 mL    Oral Fluid: 240 mL  Total IN: 1540 mL    OUT:    Voided: 400 mL  Total OUT: 400 mL    Total NET: 1140 mL               Discussed with:     Cultures:	        Radiology

## 2018-08-02 NOTE — PROGRESS NOTE ADULT - SUBJECTIVE AND OBJECTIVE BOX
KAUSHIK ORION                                                               [id number]                                                       Allergies---No Known Allergies        Pt is a 86y year old Male s/p right TKR.   Pt. is A&O x 3, resting comfortably, with no complaints.   Pain is 1/10.   C-diff is controlled and abx changed to PO.   No solid BM as of yet.   Starting solids and fluids.   Denies chest pain / shortness of breath / dyspnea / nausea / vomiting / headaches or light headed ness.         Vital Signs Last 24 Hrs  T(F): 98.1 (08-02-18 @ 08:55), Max: 98.9 (08-01-18 @ 23:59)  HR: 70 (08-02-18 @ 10:33)  BP: 127/63 (08-02-18 @ 10:33)  RR: 18 (08-02-18 @ 10:33)  SpO2: 100% (08-02-18 @ 10:33)    I&O's Detail    01 Aug 2018 07:01  -  02 Aug 2018 07:00  --------------------------------------------------------  IN:    dextrose 5% + sodium chloride 0.45%.: 1200 mL    IV PiggyBack: 200 mL    Oral Fluid: 480 mL  Total IN: 1880 mL    OUT:    Voided: 800 mL  Total OUT: 800 mL    Total NET: 1080 mL      02 Aug 2018 07:01  -  02 Aug 2018 11:37  --------------------------------------------------------  IN:    dextrose 5% + sodium chloride 0.45%.: 50 mL    IV PiggyBack: 100 mL    Oral Fluid: 550 mL  Total IN: 700 mL    OUT:  Total OUT: 0 mL    Total NET: 700 mL        PE:   Right Lower Extremity:   Dressing is C/D/I.   Dressing changed.   Incision is clean and dry.   The wound is closed with sutures.   No redness, swelling, heat, discharge or other evidence of infection, superficial or deep.   Neurovascularly intact.   No gross evidence of motor or sensory deficit.   +2 DP/PT pulses.   EHL/FHL/TA intact.   Toes are pink and mobile.   Capillary refill < 2 seconds.   Negative calf tenderness.                                10.7   10.14 )--------------( 230                          08-02-18 @ 06:45               31.3         137   |  102  |  11  -----------------------------<  108                  08-02-18 @ 06:45  3.4    |  24    |  0.90        Ca    7.7<L>              Mg     1.4<L>           A:   Pt is a 86y year old Male S/P right total knee replacement, Post Op Day #9        Plan:    - Follow up with Medicine/GI    - OOB with PT/OT   - Pain control    - monitor closeley   - Incentive spirometry   - encourage PO food and liquids   - Discharge Planning for tomorrow?   - DVT ppx = PAS +  aspirin enteric coated 162 milliGRAM(s) Oral every 12 hours                                                                                                                                                                             Forest FLORES

## 2018-08-02 NOTE — PROGRESS NOTE ADULT - SUBJECTIVE AND OBJECTIVE BOX
Patient is a 86y old  Male who presents with a chief complaint of " Polo is going to replace my RIGHT knee" (25 Jul 2018 12:46)        HPI:Patient is 87 yo male presenting with right total knee replacement day, complicated with diarrhea/dehydration/ARF secondary to c.diff colliti, diarrhea resolving       SUBJECTIVE & OBJECTIVE: Pt seen and examined at bedside. still complaniing of anorexia and diarrhea     PHYSICAL EXAM:  T(C): 36.7 (08-02-18 @ 08:55), Max: 37.2 (08-01-18 @ 23:59)  HR: 56 (08-02-18 @ 08:00) (53 - 64)  BP: 144/65 (08-02-18 @ 08:00) (116/62 - 166/78)  RR: 20 (08-02-18 @ 08:00) (15 - 22)  SpO2: 99% (08-02-18 @ 08:00) (93% - 100%)  Wt(kg): --   GENERAL: NAD, well-groomed, well-developed  HEAD:  Atraumatic, Normocephalic  EYES: EOMI, PERRLA, conjunctiva and sclera clear  ENMT: Moist mucous membranes  NECK: Supple, No JVD  NERVOUS SYSTEM:  Alert & Oriented X3, Motor Strength 5/5 B/L upper and lower extremities; DTRs 2+ intact and symmetric  CHEST/LUNG: Clear to auscultation bilaterally; No rales, rhonchi, wheezing, or rubs  HEART: Regular rate and rhythm; No murmurs, rubs, or gallops  ABDOMEN: Soft, Nontender, Nondistended; Bowel sounds present  EXTREMITIES:  2+ Peripheral Pulses, No clubbing, cyanosis, or edema        MEDICATIONS  (STANDING):  acetaminophen   Tablet. 1000 milliGRAM(s) Oral every 8 hours  aspirin enteric coated 162 milliGRAM(s) Oral every 12 hours  dicyclomine 10 milliGRAM(s) Oral three times a day before meals  famotidine    Tablet 20 milliGRAM(s) Oral daily  ferrous    sulfate 325 milliGRAM(s) Oral daily  lactobacillus acidophilus 1 Tablet(s) Oral two times a day with meals  latanoprost 0.005% Ophthalmic Solution 1 Drop(s) Both EYES at bedtime  tamsulosin 0.4 milliGRAM(s) Oral at bedtime  vancomycin    Solution 125 milliGRAM(s) Oral every 6 hours    MEDICATIONS  (PRN):  aluminum hydroxide/magnesium hydroxide/simethicone Suspension 30 milliLiter(s) Oral four times a day PRN Indigestion  ondansetron Injectable 4 milliGRAM(s) IV Push every 6 hours PRN Nausea and/or Vomiting  polyethylene glycol 3350 17 Gram(s) Oral daily PRN Constipation  simethicone 80 milliGRAM(s) Chew every 6 hours PRN Gas  sodium chloride 0.65% Nasal 1 Spray(s) Both Nostrils every 4 hours PRN Nasal Congestion  traMADol 25 milliGRAM(s) Oral every 4 hours PRN Mild Pain (1 - 3)  traMADol 50 milliGRAM(s) Oral every 4 hours PRN Moderate Pain (4 - 6)      LABS:                        10.7   10.14 )-----------( 230      ( 02 Aug 2018 06:45 )             31.3     08-02    137  |  102  |  11  ----------------------------<  108<H>  3.4<L>   |  24  |  0.90    Ca    7.7<L>      02 Aug 2018 06:45  Mg     1.4     08-02    TPro  5.0<L>  /  Alb  1.9<L>  /  TBili  0.8  /  DBili  x   /  AST  25  /  ALT  22  /  AlkPhos  67  08-01        Magnesium, Serum: 1.4 mg/dL (08-02 @ 06:45)    CAPILLARY BLOOD GLUCOSE          CAPILLARY BLOOD GLUCOSE        CAPILLARY BLOOD GLUCOSE                RECENT CULTURES:      RADIOLOGY & ADDITIONAL TESTS:                        DVT/GI ppx  Discussed with pt @ bedside

## 2018-08-02 NOTE — CHART NOTE - NSCHARTNOTEFT_GEN_A_CORE
Nutrition note: Full nutrition f/u completed yesterday.  Pt tolerating diet progression to regular consistency, DASH/TLC diet c overall continued decreased intake.  Pt consumed small amount of egg at breakfast.  Discussed lunch and dinner meal changes to optimize po intake- pt tolerated ~100% of sandwich at lunch and package of cookies.  Discussed foods least likely to exacerbate diarrhea c pt and family, will provide lactaid milk going forward.  Also discussed adding Ensure Clear BID for addtl kcal/protein/vitamins- pt and family agreeable; order to be sent to MD.  Encouraged pt to inform staff/diet office of meal changes going forward. RD to f/u.     Factors impacting intake: [ ] none [ ] nausea  [ ] vomiting [ ] diarrhea [ ] constipation  [ ]chewing problems [ ] swallowing issues  [ ] other:     Diet Presciption: Diet, DASH/TLC:   Sodium & Cholesterol Restricted (08-01-18 @ 11:26)    Intake:     Current Weight:   % Weight Change    Pertinent Medications: MEDICATIONS  (STANDING):  acetaminophen   Tablet. 1000 milliGRAM(s) Oral every 8 hours  aspirin enteric coated 162 milliGRAM(s) Oral every 12 hours  dicyclomine 10 milliGRAM(s) Oral three times a day before meals  famotidine    Tablet 20 milliGRAM(s) Oral daily  ferrous    sulfate 325 milliGRAM(s) Oral daily  lactobacillus acidophilus 1 Tablet(s) Oral two times a day with meals  latanoprost 0.005% Ophthalmic Solution 1 Drop(s) Both EYES at bedtime  magnesium sulfate  IVPB 1 Gram(s) IV Intermittent once  potassium chloride    Tablet ER 20 milliEquivalent(s) Oral once  tamsulosin 0.4 milliGRAM(s) Oral at bedtime  vancomycin    Solution 125 milliGRAM(s) Oral every 6 hours    MEDICATIONS  (PRN):  aluminum hydroxide/magnesium hydroxide/simethicone Suspension 30 milliLiter(s) Oral four times a day PRN Indigestion  ondansetron Injectable 4 milliGRAM(s) IV Push every 6 hours PRN Nausea and/or Vomiting  polyethylene glycol 3350 17 Gram(s) Oral daily PRN Constipation  simethicone 80 milliGRAM(s) Chew every 6 hours PRN Gas  sodium chloride 0.65% Nasal 1 Spray(s) Both Nostrils every 4 hours PRN Nasal Congestion  traMADol 25 milliGRAM(s) Oral every 4 hours PRN Mild Pain (1 - 3)  traMADol 50 milliGRAM(s) Oral every 4 hours PRN Moderate Pain (4 - 6)    Pertinent Labs: 08-02 Na137 mmol/L Glu 108 mg/dL<H> K+ 3.4 mmol/L<L> Cr  0.90 mg/dL BUN 11 mg/dL 07-30 Phos 3.0 mg/dL 08-01 Alb 1.9 g/dL<L>     CAPILLARY BLOOD GLUCOSE        Skin:     Estimated Needs:   [ ] no change since previous assessment  [ ] recalculated:     Previous Nutrition Diagnosis:   [ ] Inadequate Energy Intake [ ]Inadequate Oral Intake [ ] Excessive Energy Intake   [ ] Underweight [ ] Increased Nutrient Needs [ ] Overweight/Obesity   [ ] Altered GI Function [ ] Unintended Weight Loss [ ] Food & Nutrition Related Knowledge Deficit [ ] Malnutrition     Nutrition Diagnosis is [ ] ongoing  [ ] resolved [ ] not applicable     New Nutrition Diagnosis: [ ] not applicable       Interventions:   Recommend  [ ] Change Diet To:  [ ] Nutrition Supplement  [ ] Nutrition Support  [ ] Other:     Monitoring and Evaluation:   [ ] PO intake [ x ] Tolerance to diet prescription [ x ] weights [ x ] labs[ x ] follow up per protocol  [ ] other:

## 2018-08-02 NOTE — PROGRESS NOTE ADULT - SUBJECTIVE AND OBJECTIVE BOX
KAUSHIK SEARS is a 86yMale , patient examined and chart reviewed.     INTERVAL HPI/ OVERNIGHT EVENTS:  Afebrile, No events. Denies pain.    Past Medical History--  PAST MEDICAL & SURGICAL HISTORY:  Osteoarthritis of right knee  Hypertension  Tremor of both hands  History of hernia surgery: X3 1950&#x27;s-1970&#x27;s  History of shoulder surgery: right, 2012  History of knee replacement, total, left: 2007      For details regarding the patient's social history, family history, and other miscellaneous elements, please refer the initial infectious diseases consultation and/or the admitting history and physical examination for this admission.    ROS:  CONSTITUTIONAL:  Negative fever or chills  EYES:  Negative  blurry vision or double vision  CARDIOVASCULAR:  Negative for chest pain or palpitations  RESPIRATORY:  Negative for cough, wheezing, or SOB   GASTROINTESTINAL:  Negative for nausea, vomiting, diarrhea, constipation, or abdominal pain  GENITOURINARY:  Negative frequency, urgency , dysuria or hematuria   NEUROLOGIC:  No headache, confusion, dizziness, lightheadedness  All other systems were reviewed and are negative         Current inpatient medications :    ANTIBIOTICS/RELEVANT:  lactobacillus acidophilus 1 Tablet(s) Oral two times a day with meals  vancomycin    Solution 125 milliGRAM(s) Oral every 6 hours      acetaminophen   Tablet. 1000 milliGRAM(s) Oral every 8 hours  aluminum hydroxide/magnesium hydroxide/simethicone Suspension 30 milliLiter(s) Oral four times a day PRN  aspirin enteric coated 162 milliGRAM(s) Oral every 12 hours  dicyclomine 10 milliGRAM(s) Oral three times a day before meals  famotidine    Tablet 20 milliGRAM(s) Oral daily  ferrous    sulfate 325 milliGRAM(s) Oral daily  latanoprost 0.005% Ophthalmic Solution 1 Drop(s) Both EYES at bedtime  ondansetron Injectable 4 milliGRAM(s) IV Push every 6 hours PRN  polyethylene glycol 3350 17 Gram(s) Oral daily PRN  simethicone 80 milliGRAM(s) Chew every 6 hours PRN  sodium chloride 0.65% Nasal 1 Spray(s) Both Nostrils every 4 hours PRN  tamsulosin 0.4 milliGRAM(s) Oral at bedtime  traMADol 25 milliGRAM(s) Oral every 4 hours PRN      Objective:    08-01 @ 07:01  -  08-02 @ 07:00  --------------------------------------------------------  IN: 1880 mL / OUT: 800 mL / NET: 1080 mL    08-02 @ 07:01  -  08-02 @ 22:18  --------------------------------------------------------  IN: 1320 mL / OUT: 0 mL / NET: 1320 mL      T(C): 36.4 (08-02-18 @ 16:04), Max: 37.2 (08-01-18 @ 23:59)  HR: 56 (08-02-18 @ 20:00) (52 - 71)  BP: 140/67 (08-02-18 @ 20:00) (108/63 - 166/78)  RR: 16 (08-02-18 @ 20:00) (14 - 22)  SpO2: 96% (08-02-18 @ 20:00) (93% - 100%)  Wt(kg): --      Physical Exam:  GEN: NAD, pleasant  HEENT: normocephalic and atraumatic. EOMI. MIAH. Moist mucosa. Clear Posterior pharynx.  NECK: Supple. No carotid bruits.  No lymphadenopathy or thyromegaly.  LUNGS: Clear to auscultation.  HEART: Regular rate and rhythm without murmur.  ABDOMEN: Soft, nontender, and nondistended.  Positive bowel sounds.  No hepatosplenomegaly was noted.  EXTREMITIES: Without any cyanosis, clubbing, rash, lesions or edema.  NEUROLOGIC: A & O x3, No focal neurological deficits   SKIN: No ulceration or induration present.      LABS:                        10.7   10.14 )-----------( 230      ( 02 Aug 2018 06:45 )             31.3       08-02    137  |  102  |  11  ----------------------------<  108<H>  3.4<L>   |  24  |  0.90    Ca    7.7<L>      02 Aug 2018 06:45  Mg     1.4     08-02    TPro  5.0<L>  /  Alb  1.9<L>  /  TBili  0.8  /  DBili  x   /  AST  25  /  ALT  22  /  AlkPhos  67  08-01        Assessment :  Patient is an 86 year old male with a history of HTN, OA sp right TKR 7/24/18  complicated by sepsis with acute pancolitis sec Cdiff colitis  Wbc resolving  KARLENE resolved  +troponins  Overall doing well      Plan :   Cont po vanc x 10 days  Off IV Flagyl   Cont Bacid  Trend temp and wbc  Increase activity    Continue with present regiment.  Appropriate use of antibiotics and adverse effects reviewed.    I have discussed the above plan of care with patient/ family in detail. They expressed understanding of the the treatment plan . Risks, benefits and alternatives discussed in detail. I have asked if they have any questions or concerns and appropriately addressed them to the best of my ability .      Critical care time greater then 35 minutes reviewing notes, labs data/ imaging , discussion with multidisciplinary team.    Thank you for allowing me to participate in care of your patient .        Yoon Sepulveda MD  Infectious Disease  [phone]

## 2018-08-02 NOTE — PROGRESS NOTE ADULT - ASSESSMENT
·	KARLENE  ·	Hypokalemia    Improved renal function. Potassium and Mg supps. Encourage PO intake as tolerated.   Will follow electrolytes and renal function trend. ·	KARLENE  ·	Hypo- K & Mg    Improved renal function. Potassium and Mg supps. Encourage PO intake as tolerated.   Will follow electrolytes and renal function trend.

## 2018-08-02 NOTE — PROGRESS NOTE ADULT - ASSESSMENT
The patient is an 86 year old male with a history of HTN, OA who is s/p right TKR.    Plan:  - Avoid rate lowering agents  - No significant pauses noted on telemetry  - No indication for pacemaker  - Hold antihypertensives  - On flagyl and PO vancomycin for C. diff  - Improving slowly

## 2018-08-02 NOTE — PROGRESS NOTE ADULT - SUBJECTIVE AND OBJECTIVE BOX
Patient is a 86y old  Male who presents with a chief complaint of "Dr Cuellar is going to replace my RIGHT knee" (25 Jul 2018 12:46)      Patient seen in follow up for KARLENE. Improved renal function. Low Mg, K.     PAST MEDICAL HISTORY:  Osteoarthritis of right knee  Hypertension  Tremor of both hands    MEDICATIONS  (STANDING):  acetaminophen   Tablet. 1000 milliGRAM(s) Oral every 8 hours  aspirin enteric coated 162 milliGRAM(s) Oral every 12 hours  dicyclomine 10 milliGRAM(s) Oral three times a day before meals  famotidine    Tablet 20 milliGRAM(s) Oral daily  ferrous    sulfate 325 milliGRAM(s) Oral daily  lactobacillus acidophilus 1 Tablet(s) Oral two times a day with meals  latanoprost 0.005% Ophthalmic Solution 1 Drop(s) Both EYES at bedtime  tamsulosin 0.4 milliGRAM(s) Oral at bedtime  vancomycin    Solution 125 milliGRAM(s) Oral every 6 hours    MEDICATIONS  (PRN):  aluminum hydroxide/magnesium hydroxide/simethicone Suspension 30 milliLiter(s) Oral four times a day PRN Indigestion  ondansetron Injectable 4 milliGRAM(s) IV Push every 6 hours PRN Nausea and/or Vomiting  polyethylene glycol 3350 17 Gram(s) Oral daily PRN Constipation  simethicone 80 milliGRAM(s) Chew every 6 hours PRN Gas  sodium chloride 0.65% Nasal 1 Spray(s) Both Nostrils every 4 hours PRN Nasal Congestion  traMADol 25 milliGRAM(s) Oral every 4 hours PRN Mild Pain (1 - 3)  traMADol 50 milliGRAM(s) Oral every 4 hours PRN Moderate Pain (4 - 6)    T(C): 36.7 (08-02-18 @ 12:44), Max: 37.2 (08-01-18 @ 23:59)  HR: 71 (08-02-18 @ 14:00) (43 - 75)  BP: 125/44 (08-02-18 @ 14:00) (104/55 - 166/78)  RR: 14 (08-02-18 @ 14:00)  SpO2: 100% (08-02-18 @ 14:00)  Wt(kg): --  I&O's Detail    01 Aug 2018 07:01  -  02 Aug 2018 07:00  --------------------------------------------------------  IN:    dextrose 5% + sodium chloride 0.45%.: 1200 mL    IV PiggyBack: 200 mL    Oral Fluid: 480 mL  Total IN: 1880 mL    OUT:    Voided: 800 mL  Total OUT: 800 mL    Total NET: 1080 mL      02 Aug 2018 07:01  -  02 Aug 2018 15:39  --------------------------------------------------------  IN:    dextrose 5% + sodium chloride 0.45%.: 50 mL    IV PiggyBack: 100 mL    Oral Fluid: 550 mL  Total IN: 700 mL    OUT:  Total OUT: 0 mL    Total NET: 700 mL              PHYSICAL EXAM:  General: NAD  Respiratory: b/l air entry  Cardiovascular: S1 S2  Gastrointestinal: soft  Extremities:  no edema    LABORATORY:                        10.7   10.14 )-----------( 230      ( 02 Aug 2018 06:45 )             31.3     08-02    137  |  102  |  11  ----------------------------<  108<H>  3.4<L>   |  24  |  0.90    Ca    7.7<L>      02 Aug 2018 06:45  Mg     1.4     08-02    TPro  5.0<L>  /  Alb  1.9<L>  /  TBili  0.8  /  DBili  x   /  AST  25  /  ALT  22  /  AlkPhos  67  08-01    Sodium, Serum: 137 mmol/L (08-02 @ 06:45)  Sodium, Serum: 138 mmol/L (08-01 @ 06:52)    Potassium, Serum: 3.4 mmol/L (08-02 @ 06:45)  Potassium, Serum: 3.0 mmol/L (08-01 @ 06:52)    Hemoglobin: 10.7 g/dL (08-02 @ 06:45)  Hemoglobin: 11.3 g/dL (08-01 @ 06:52)  Hemoglobin: 11.4 g/dL (07-31 @ 12:07)    Creatinine, Serum 0.90 (08-02 @ 06:45)  Creatinine, Serum 0.93 (08-01 @ 06:52)        LIVER FUNCTIONS - ( 01 Aug 2018 06:52 )  Alb: 1.9 g/dL / Pro: 5.0 g/dL / ALK PHOS: 67 U/L / ALT: 22 U/L DA / AST: 25 U/L / GGT: x

## 2018-08-03 LAB
ANION GAP SERPL CALC-SCNC: 8 MMOL/L — SIGNIFICANT CHANGE UP (ref 5–17)
BUN SERPL-MCNC: 16 MG/DL — SIGNIFICANT CHANGE UP (ref 7–23)
CALCIUM SERPL-MCNC: 8.2 MG/DL — LOW (ref 8.4–10.5)
CHLORIDE SERPL-SCNC: 103 MMOL/L — SIGNIFICANT CHANGE UP (ref 96–108)
CO2 SERPL-SCNC: 26 MMOL/L — SIGNIFICANT CHANGE UP (ref 22–31)
CREAT SERPL-MCNC: 0.94 MG/DL — SIGNIFICANT CHANGE UP (ref 0.5–1.3)
GLUCOSE SERPL-MCNC: 104 MG/DL — HIGH (ref 70–99)
HCT VFR BLD CALC: 32.6 % — LOW (ref 39–50)
HGB BLD-MCNC: 11.1 G/DL — LOW (ref 13–17)
MAGNESIUM SERPL-MCNC: 1.8 MG/DL — SIGNIFICANT CHANGE UP (ref 1.6–2.6)
MCHC RBC-ENTMCNC: 31.4 PG — SIGNIFICANT CHANGE UP (ref 27–34)
MCHC RBC-ENTMCNC: 34 GM/DL — SIGNIFICANT CHANGE UP (ref 32–36)
MCV RBC AUTO: 92.4 FL — SIGNIFICANT CHANGE UP (ref 80–100)
NRBC # BLD: 0 /100 WBCS — SIGNIFICANT CHANGE UP (ref 0–0)
PHOSPHATE SERPL-MCNC: 2.7 MG/DL — SIGNIFICANT CHANGE UP (ref 2.5–4.5)
PLATELET # BLD AUTO: 249 K/UL — SIGNIFICANT CHANGE UP (ref 150–400)
POTASSIUM SERPL-MCNC: 4 MMOL/L — SIGNIFICANT CHANGE UP (ref 3.5–5.3)
POTASSIUM SERPL-SCNC: 4 MMOL/L — SIGNIFICANT CHANGE UP (ref 3.5–5.3)
RBC # BLD: 3.53 M/UL — LOW (ref 4.2–5.8)
RBC # FLD: 12.9 % — SIGNIFICANT CHANGE UP (ref 10.3–14.5)
SODIUM SERPL-SCNC: 137 MMOL/L — SIGNIFICANT CHANGE UP (ref 135–145)
WBC # BLD: 12.31 K/UL — HIGH (ref 3.8–10.5)
WBC # FLD AUTO: 12.31 K/UL — HIGH (ref 3.8–10.5)

## 2018-08-03 PROCEDURE — 99232 SBSQ HOSP IP/OBS MODERATE 35: CPT

## 2018-08-03 RX ORDER — SODIUM CHLORIDE 9 MG/ML
1000 INJECTION, SOLUTION INTRAVENOUS
Qty: 0 | Refills: 0 | Status: COMPLETED | OUTPATIENT
Start: 2018-08-03 | End: 2018-08-03

## 2018-08-03 RX ADMIN — Medication 125 MILLIGRAM(S): at 17:08

## 2018-08-03 RX ADMIN — Medication 162 MILLIGRAM(S): at 21:22

## 2018-08-03 RX ADMIN — SIMETHICONE 80 MILLIGRAM(S): 80 TABLET, CHEWABLE ORAL at 11:29

## 2018-08-03 RX ADMIN — Medication 1000 MILLIGRAM(S): at 16:00

## 2018-08-03 RX ADMIN — LATANOPROST 1 DROP(S): 0.05 SOLUTION/ DROPS OPHTHALMIC; TOPICAL at 21:23

## 2018-08-03 RX ADMIN — Medication 125 MILLIGRAM(S): at 23:23

## 2018-08-03 RX ADMIN — Medication 10 MILLIGRAM(S): at 16:55

## 2018-08-03 RX ADMIN — Medication 125 MILLIGRAM(S): at 07:12

## 2018-08-03 RX ADMIN — Medication 125 MILLIGRAM(S): at 11:27

## 2018-08-03 RX ADMIN — SODIUM CHLORIDE 150 MILLILITER(S): 9 INJECTION, SOLUTION INTRAVENOUS at 21:22

## 2018-08-03 RX ADMIN — Medication 1000 MILLIGRAM(S): at 22:00

## 2018-08-03 RX ADMIN — Medication 325 MILLIGRAM(S): at 11:27

## 2018-08-03 RX ADMIN — Medication 1000 MILLIGRAM(S): at 21:22

## 2018-08-03 RX ADMIN — Medication 10 MILLIGRAM(S): at 10:51

## 2018-08-03 RX ADMIN — TAMSULOSIN HYDROCHLORIDE 0.4 MILLIGRAM(S): 0.4 CAPSULE ORAL at 21:22

## 2018-08-03 RX ADMIN — Medication 1000 MILLIGRAM(S): at 07:55

## 2018-08-03 RX ADMIN — Medication 162 MILLIGRAM(S): at 10:50

## 2018-08-03 RX ADMIN — Medication 1000 MILLIGRAM(S): at 07:12

## 2018-08-03 RX ADMIN — FAMOTIDINE 20 MILLIGRAM(S): 10 INJECTION INTRAVENOUS at 11:27

## 2018-08-03 RX ADMIN — Medication 1 TABLET(S): at 16:55

## 2018-08-03 RX ADMIN — Medication 1 TABLET(S): at 07:13

## 2018-08-03 RX ADMIN — Medication 1000 MILLIGRAM(S): at 15:12

## 2018-08-03 RX ADMIN — Medication 10 MILLIGRAM(S): at 07:12

## 2018-08-03 NOTE — CHART NOTE - NSCHARTNOTEFT_GEN_A_CORE
Assessment: Pt  s/p R TKR and pt was rapid response for hypotension and pt then developed c.diff. Today pt reports diarrhea improving. He reports he was able to consume english muffin c peanut butter this morning s trouble. He states today is first day that appetite has improved during hospitalization. Encouraged pt to consume adequate fluids as IVF now d/carlos. Also recommended continuing  nutrition supplement ensure clear. Plan is to go to [facility] within the next day or 2.     Factors impacting intake: [ ] none [ ] nausea  [ ] vomiting [x ] diarrhea [ ] constipation  [ ]chewing problems [ ] swallowing issues  [ ] other:     Diet Presciption: Diet, DASH/TLC:   Sodium & Cholesterol Restricted (08-01-18 @ 11:26)    Intake: only about 30% but improving     Current Weight:   % Weight Change    Pertinent Medications: MEDICATIONS  (STANDING):  acetaminophen   Tablet. 1000 milliGRAM(s) Oral every 8 hours  aspirin enteric coated 162 milliGRAM(s) Oral every 12 hours  dicyclomine 10 milliGRAM(s) Oral three times a day before meals  famotidine    Tablet 20 milliGRAM(s) Oral daily  ferrous    sulfate 325 milliGRAM(s) Oral daily  lactobacillus acidophilus 1 Tablet(s) Oral two times a day with meals  latanoprost 0.005% Ophthalmic Solution 1 Drop(s) Both EYES at bedtime  tamsulosin 0.4 milliGRAM(s) Oral at bedtime  vancomycin    Solution 125 milliGRAM(s) Oral every 6 hours    MEDICATIONS  (PRN):  aluminum hydroxide/magnesium hydroxide/simethicone Suspension 30 milliLiter(s) Oral four times a day PRN Indigestion  ondansetron Injectable 4 milliGRAM(s) IV Push every 6 hours PRN Nausea and/or Vomiting  polyethylene glycol 3350 17 Gram(s) Oral daily PRN Constipation  simethicone 80 milliGRAM(s) Chew every 6 hours PRN Gas  sodium chloride 0.65% Nasal 1 Spray(s) Both Nostrils every 4 hours PRN Nasal Congestion    Pertinent Labs: 08-03 Na137 mmol/L Glu 104 mg/dL<H> K+ 4.0 mmol/L Cr  0.94 mg/dL BUN 16 mg/dL 08-03 Phos 2.7 mg/dL 08-01 Alb 1.9 g/dL<L>     CAPILLARY BLOOD GLUCOSE        Skin:     Estimated Needs:   [x ] no change since previous assessment  [ ] recalculated:     Previous Nutrition Diagnosis:   [ x] Inadequate Energy Intake [ ]Inadequate Oral Intake [ ] Excessive Energy Intake   [ ] Underweight [ ] Increased Nutrient Needs [ ] Overweight/Obesity   [ ] Altered GI Function [ ] Unintended Weight Loss [ ] Food & Nutrition Related Knowledge Deficit [ ] Malnutrition     Nutrition Diagnosis is [x ] ongoing  [ ] resolved [ ] not applicable     New Nutrition Diagnosis: [ ] not applicable       Interventions:   Recommend  [ ] Change Diet To:  [ ] Nutrition Supplement  [ ] Nutrition Support  [x ] Other: Continue diet/supplements as ordered    Monitoring and Evaluation:   [ ] PO intake [ x ] Tolerance to diet prescription [ x ] weights [ x ] labs[ x ] follow up per protocol  [ ] other: Assessment: Pt  s/p R TKR and pt was rapid response for hypotension and pt then developed c.diff. Today pt reports diarrhea improving. He reports he was able to consume english muffin c peanut butter this morning s trouble. He states today is first day that appetite has improved during hospitalization. Encouraged pt to consume adequate fluids as IVF now d/carlos. Also recommended continuing  nutrition supplement ensure clear. Plan is to go to [facility] within the next day or 2.     Factors impacting intake: [ ] none [ ] nausea  [ ] vomiting [x ] diarrhea [ ] constipation  [ ]chewing problems [ ] swallowing issues  [ ] other:     Diet Presciption: Diet, DASH/TLC:   Sodium & Cholesterol Restricted (08-01-18 @ 11:26)    Intake: only about 30% but improving     Current Weight:  202# 8/2/18  Admit weight 174# recent weight appears to be error. Pt reports that PTA his usual weight was about 185#.  % Weight Change    Pertinent Medications: MEDICATIONS  (STANDING):  acetaminophen   Tablet. 1000 milliGRAM(s) Oral every 8 hours  aspirin enteric coated 162 milliGRAM(s) Oral every 12 hours  dicyclomine 10 milliGRAM(s) Oral three times a day before meals  famotidine    Tablet 20 milliGRAM(s) Oral daily  ferrous    sulfate 325 milliGRAM(s) Oral daily  lactobacillus acidophilus 1 Tablet(s) Oral two times a day with meals  latanoprost 0.005% Ophthalmic Solution 1 Drop(s) Both EYES at bedtime  tamsulosin 0.4 milliGRAM(s) Oral at bedtime  vancomycin    Solution 125 milliGRAM(s) Oral every 6 hours    MEDICATIONS  (PRN):  aluminum hydroxide/magnesium hydroxide/simethicone Suspension 30 milliLiter(s) Oral four times a day PRN Indigestion  ondansetron Injectable 4 milliGRAM(s) IV Push every 6 hours PRN Nausea and/or Vomiting  polyethylene glycol 3350 17 Gram(s) Oral daily PRN Constipation  simethicone 80 milliGRAM(s) Chew every 6 hours PRN Gas  sodium chloride 0.65% Nasal 1 Spray(s) Both Nostrils every 4 hours PRN Nasal Congestion    Pertinent Labs: 08-03 Na137 mmol/L Glu 104 mg/dL<H> K+ 4.0 mmol/L Cr  0.94 mg/dL BUN 16 mg/dL 08-03 Phos 2.7 mg/dL 08-01 Alb 1.9 g/dL<L>     CAPILLARY BLOOD GLUCOSE        Skin: r knee +1 edema  no pressure ulcers    Estimated Needs:   [x ] no change since previous assessment  [ ] recalculated:     Previous Nutrition Diagnosis:   [ x] Inadequate Energy Intake [ ]Inadequate Oral Intake [ ] Excessive Energy Intake   [ ] Underweight [ ] Increased Nutrient Needs [ ] Overweight/Obesity   [ ] Altered GI Function [ ] Unintended Weight Loss [ ] Food & Nutrition Related Knowledge Deficit [ ] Malnutrition     Nutrition Diagnosis is [x ] ongoing  [ ] resolved [ ] not applicable     New Nutrition Diagnosis: [ ] not applicable       Interventions:   Recommend  [ ] Change Diet To:  [ ] Nutrition Supplement  [ ] Nutrition Support  [x ] Other: Continue diet/supplements as ordered    Monitoring and Evaluation:   [ ] PO intake [ x ] Tolerance to diet prescription [ x ] weights [ x ] labs[ x ] follow up per protocol  [ ] other:

## 2018-08-03 NOTE — PROGRESS NOTE ADULT - SUBJECTIVE AND OBJECTIVE BOX
Patient is a 86y old  Male who presents with a chief complaint of "Dr Cuellar is going to replace my RIGHT knee" (25 Jul 2018 12:46)      BRIEF HOSPITAL COURSE:     86M S/P total right knee replacement, complicated by developement of C.diff colitis.     Events last 24 hours:   -pain controlled  -diarrhea improving     PAST MEDICAL & SURGICAL HISTORY:  Osteoarthritis of right knee  Hypertension  Tremor of both hands  History of hernia surgery: X3 1950&#x27;s-1970&#x27;s  History of shoulder surgery: right, 2012  History of knee replacement, total, left: 2007      Review of Systems:  CONSTITUTIONAL: No fever, chills, or fatigue  EYES: No eye pain, visual disturbances, or discharge  ENMT:  No difficulty hearing, tinnitus, vertigo; No sinus or throat pain  NECK: No pain or stiffness  RESPIRATORY: No cough, wheezing, chills or hemoptysis; No shortness of breath  CARDIOVASCULAR: No chest pain, palpitations, dizziness, or leg swelling  GASTROINTESTINAL: No abdominal or epigastric pain. No nausea, vomiting, or hematemesis; (+)diarrhea   GENITOURINARY: No dysuria, frequency, hematuria, or incontinence  NEUROLOGICAL: No headaches, memory loss, loss of strength, numbness, or tremors  SKIN: No itching, burning, rashes, or lesions   MUSCULOSKELETAL: No joint pain or swelling; No muscle, back, or extremity pain  PSYCHIATRIC: No depression, anxiety, mood swings, or difficulty sleeping      Medications:  vancomycin    Solution 125 milliGRAM(s) Oral every 6 hours    tamsulosin 0.4 milliGRAM(s) Oral at bedtime      acetaminophen   Tablet. 1000 milliGRAM(s) Oral every 8 hours  ondansetron Injectable 4 milliGRAM(s) IV Push every 6 hours PRN      aspirin enteric coated 162 milliGRAM(s) Oral every 12 hours    aluminum hydroxide/magnesium hydroxide/simethicone Suspension 30 milliLiter(s) Oral four times a day PRN  dicyclomine 10 milliGRAM(s) Oral three times a day before meals  famotidine    Tablet 20 milliGRAM(s) Oral daily  polyethylene glycol 3350 17 Gram(s) Oral daily PRN  simethicone 80 milliGRAM(s) Chew every 6 hours PRN        ferrous    sulfate 325 milliGRAM(s) Oral daily      latanoprost 0.005% Ophthalmic Solution 1 Drop(s) Both EYES at bedtime  sodium chloride 0.65% Nasal 1 Spray(s) Both Nostrils every 4 hours PRN    lactobacillus acidophilus 1 Tablet(s) Oral two times a day with meals          ICU Vital Signs Last 24 Hrs  T(C): 36.8 (03 Aug 2018 16:00), Max: 36.8 (03 Aug 2018 16:00)  T(F): 98.2 (03 Aug 2018 16:00), Max: 98.2 (03 Aug 2018 16:00)  HR: 67 (03 Aug 2018 18:19) (54 - 73)  BP: 109/68 (03 Aug 2018 18:19) (107/60 - 146/69)  BP(mean): 81 (03 Aug 2018 18:19) (67 - 92)  ABP: --  ABP(mean): --  RR: 16 (03 Aug 2018 18:19) (12 - 24)  SpO2: 98% (03 Aug 2018 18:19) (90% - 100%)          I&O's Detail    02 Aug 2018 07:01  -  03 Aug 2018 07:00  --------------------------------------------------------  IN:    dextrose 5% + sodium chloride 0.45%.: 50 mL    IV PiggyBack: 200 mL    Oral Fluid: 1490 mL  Total IN: 1740 mL    OUT:  Total OUT: 0 mL    Total NET: 1740 mL      03 Aug 2018 07:01  -  03 Aug 2018 20:08  --------------------------------------------------------  IN:    Oral Fluid: 910 mL  Total IN: 910 mL    OUT:    Estimated Blood Loss: 2 mL  Total OUT: 2 mL    Total NET: 908 mL            LABS:                        11.1   12.31 )-----------( 249      ( 03 Aug 2018 07:01 )             32.6     08-03    137  |  103  |  16  ----------------------------<  104<H>  4.0   |  26  |  0.94    Ca    8.2<L>      03 Aug 2018 07:01  Phos  2.7     08-03  Mg     1.8     08-03            CAPILLARY BLOOD GLUCOSE            CULTURES:  C Diff by PCR Result: Detected (07-27-18 @ 22:54)  Culture Results:   No enteric pathogens isolated.  (Stool culture examined for Salmonella,  Shigella, Campylobacter, Aeromonas, Plesiomonas,  Vibrio, E.coli O157 and Yersinia) (07-27-18 @ 21:49)      Physical Examination:    General: No acute distress.  Alert, oriented, interactive, nonfocal    HEENT: Pupils equal, reactive to light.  Symmetric.    PULM: Clear to auscultation bilaterally, no significant sputum production    CVS: Regular rate and rhythm, no murmurs, rubs, or gallops    ABD: Soft, nondistended, nontender, normoactive bowel sounds, no masses    EXT: right leg with post-op dressing in place     SKIN: Warm and well perfused, no rashes noted.    RADIOLOGY:   < from: CT Chest No Cont (07.27.18 @ 14:16) >  Impression:    Bibasilar dependent consolidation/atelectasis and small effusions.   Correlate clinically for active infection.  Mild pretracheal adenopathy.  Cholelithiasis.  Nonobstructive left nephrolithiasis.  Acute uncomplicated pancolitis.  Markedly enlarged prostate.    < end of copied text >

## 2018-08-03 NOTE — PROGRESS NOTE ADULT - ASSESSMENT
The patient is an 86 year old male with a history of HTN, OA who is s/p right TKR.    Plan:  - Avoid rate lowering agents  - No significant pauses noted on telemetry  - No indication for pacemaker  - Hold antihypertensives  - On vancomycin PO for C. diff  - Overall improved  - Discharge planning

## 2018-08-03 NOTE — PROGRESS NOTE ADULT - SUBJECTIVE AND OBJECTIVE BOX
Subjective: Pt reports decreased bowel movements.  Denies abdominal pain. Tolerating his diet.    Objective:  Vital Signs Last 24 Hrs  T(C): 36.8 (03 Aug 2018 16:00), Max: 36.8 (03 Aug 2018 16:00)  T(F): 98.2 (03 Aug 2018 16:00), Max: 98.2 (03 Aug 2018 16:00)  HR: 57 (03 Aug 2018 20:00) (54 - 73)  BP: 100/62 (03 Aug 2018 20:00) (100/62 - 146/69)  BP(mean): 72 (03 Aug 2018 20:00) (67 - 92)  RR: 14 (03 Aug 2018 20:00) (12 - 24)  SpO2: 97% (03 Aug 2018 20:00) (90% - 100%)    Heent: TOÑITO LOVE  Pul: clear  Cor: RSR, without murmurs  Abdomen:normal bowel sounds without distension or    Extremities: without edema, motor/sensory intact, without calf pain, Homans sign negative.                        11.1   12.31 )-----------( 249      ( 03 Aug 2018 07:01 )             32.6       08-03    137  |  103  |  16  ----------------------------<  104<H>  4.0   |  26  |  0.94    Ca    8.2<L>      03 Aug 2018 07:01  Phos  2.7     08-03  Mg     1.8     08-03 08-02 @ 07:01 - 08-03 @ 07:00  --------------------------------------------------------  IN:    dextrose 5% + sodium chloride 0.45%.: 50 mL    IV PiggyBack: 200 mL    Oral Fluid: 1490 mL  Total IN: 1740 mL    OUT:  Total OUT: 0 mL    Total NET: 1740 mL      08-03 @ 07:01  -  08-03 @ 22:36  --------------------------------------------------------  IN:    Oral Fluid: 910 mL  Total IN: 910 mL    OUT:    Estimated Blood Loss: 2 mL    Voided: 300 mL  Total OUT: 302 mL    Total NET: 608 mL

## 2018-08-03 NOTE — PROGRESS NOTE ADULT - ASSESSMENT
Patient is 85 yo male presenting with right total knee replacement day, complicated with diarrhea/dehydration/ARF secondary to c.diff collitis      1. Pancolitis secondary to c.diff collitis,    continue on po vanco,  d/c flagyl   advance diet as tolerated   pt eating improved   d/c planning in am       2. S/P right Total knee replacement.  Continue with pain management, DVT proph, and wound care as per Ortho.    Continue with PT/OT.    3. HTN.  Hold BP medication and Monitor BP  4.  Bradycardia with few pauses.  Resolved.   Cardiology follow up appreciated.   5. Hypotension with acute renal failure.  Improved with IVF.  BP stable.    Nephrology  follow up  appreciated.   6.  Anemia.  due to post-operative blood loss.  Asymptomatic.  Monitor H/H.  Iron supplement  7. Urinary retention , passed [facility] wei to medical floor

## 2018-08-03 NOTE — PROGRESS NOTE ADULT - ASSESSMENT
86M S/P total right knee replacement, complicated by developement of C.diff colitis.     Events last 24 hours:   -pain controlled  -diarrhea improving

## 2018-08-03 NOTE — PROGRESS NOTE ADULT - SUBJECTIVE AND OBJECTIVE BOX
KAUSHIK SEARS is a 86yMale , patient examined and chart reviewed.      INTERVAL HPI/ OVERNIGHT EVENTS:  Doing well. Afebrile.  No events.    Past Medical History--  PAST MEDICAL & SURGICAL HISTORY:  Osteoarthritis of right knee  Hypertension  Tremor of both hands  History of hernia surgery: X3 1950&#x27;s-1970&#x27;s  History of shoulder surgery: right, 2012  History of knee replacement, total, left: 2007    For details regarding the patient's social history, family history, and other miscellaneous elements, please refer the initial infectious diseases consultation and/or the admitting history and physical examination for this admission.    ROS:  CONSTITUTIONAL:  Negative fever or chills, feels well  EYES:  Negative  blurry vision or double vision  CARDIOVASCULAR:  Negative for chest pain or palpitations  RESPIRATORY:  Negative for cough, wheezing, or SOB   GASTROINTESTINAL:  Negative for nausea, vomiting, diarrhea, constipation, or abdominal pain  GENITOURINARY:  Negative frequency, urgency , dysuria or hematuria   NEUROLOGIC:  No headache, confusion, dizziness, lightheadedness  All other systems were reviewed and are negative       Current inpatient medications :    ANTIBIOTICS/RELEVANT:  lactobacillus acidophilus 1 Tablet(s) Oral two times a day with meals  vancomycin    Solution 125 milliGRAM(s) Oral every 6 hours      acetaminophen   Tablet. 1000 milliGRAM(s) Oral every 8 hours  aluminum hydroxide/magnesium hydroxide/simethicone Suspension 30 milliLiter(s) Oral four times a day PRN  aspirin enteric coated 162 milliGRAM(s) Oral every 12 hours  dicyclomine 10 milliGRAM(s) Oral three times a day before meals  famotidine    Tablet 20 milliGRAM(s) Oral daily  ferrous    sulfate 325 milliGRAM(s) Oral daily  latanoprost 0.005% Ophthalmic Solution 1 Drop(s) Both EYES at bedtime  ondansetron Injectable 4 milliGRAM(s) IV Push every 6 hours PRN  polyethylene glycol 3350 17 Gram(s) Oral daily PRN  simethicone 80 milliGRAM(s) Chew every 6 hours PRN  sodium chloride 0.65% Nasal 1 Spray(s) Both Nostrils every 4 hours PRN  tamsulosin 0.4 milliGRAM(s) Oral at bedtime      Objective:    08-02 @ 07:01  -  08-03 @ 07:00  --------------------------------------------------------  IN: 1740 mL / OUT: 0 mL / NET: 1740 mL    08-03 @ 07:01  -  08-03 @ 19:13  --------------------------------------------------------  IN: 910 mL / OUT: 2 mL / NET: 908 mL      T(C): 36.8 (08-03-18 @ 16:00), Max: 36.8 (08-03-18 @ 16:00)  HR: 67 (08-03-18 @ 18:19) (54 - 73)  BP: 109/68 (08-03-18 @ 18:19) (107/60 - 146/69)  RR: 16 (08-03-18 @ 18:19) (12 - 24)  SpO2: 98% (08-03-18 @ 18:19) (90% - 100%)  Wt(kg): --      Physical Exam:  GEN: NAD, pleasant  HEENT: normocephalic and atraumatic. EOMI. MIAH. Moist mucosa. Clear Posterior pharynx.  NECK: Supple. No carotid bruits.  No lymphadenopathy or thyromegaly.  LUNGS: Clear to auscultation.  HEART: Regular rate and rhythm without murmur.  ABDOMEN: Soft, nontender, and nondistended.  Positive bowel sounds.  No hepatosplenomegaly was noted.  EXTREMITIES: Without any cyanosis, clubbing, rash, lesions or edema. right knee c/d/i  NEUROLOGIC: A & O x3, No focal neurological deficits   SKIN: No ulceration or induration present.      LABS:                        11.1   12.31 )-----------( 249      ( 03 Aug 2018 07:01 )             32.6       08-03    137  |  103  |  16  ----------------------------<  104<H>  4.0   |  26  |  0.94    Ca    8.2<L>      03 Aug 2018 07:01  Phos  2.7     08-03  Mg     1.8     08-03      Assessment :  Patient is an 86 year old male with a history of HTN, OA sp right TKR 7/24/18  complicated by sepsis with acute pancolitis sec Cdiff colitis  Wbc resolving  KARLENE resolved  +troponins  Overall doing well    Plan :   Cont po vanc x 10 daysl   Cont Bacid  Increase activity  Dc to rehab      Continue with present regiment.  Appropriate use of antibiotics and adverse effects reviewed.    I have discussed the above plan of care with patient/ family in detail. They expressed understanding of the the treatment plan . Risks, benefits and alternatives discussed in detail. I have asked if they have any questions or concerns and appropriately addressed them to the best of my ability .      Critical care time greater then 35 minutes reviewing notes, labs data/ imaging , discussion with multidisciplinary team.    Thank you for allowing me to participate in care of your patient .        Yoon Sepulveda MD  Infectious Disease  [phone]

## 2018-08-03 NOTE — PROGRESS NOTE ADULT - SUBJECTIVE AND OBJECTIVE BOX
Chief Complaint: TKR    Interval Events: No events overnight. Diarrhea improving.    Review of Systems:  General: No fevers, chills, weight loss or gain  Skin: No rashes, color changes  Cardiovascular: No chest pain, orthopnea  Respiratory: No shortness of breath, cough  Gastrointestinal: No nausea, abdominal pain  Genitourinary: No incontinence, pain with urination  Musculoskeletal: No pain, swelling, decreased range of motion  Neurological: No headache, weakness  Psychiatric: No depression, anxiety  Endocrine: No weight loss or gain, increased thirst  All other systems are comprehensively negative.    Physical Exam:  Vital Signs Last 24 Hrs  T(C): 36.4 (03 Aug 2018 08:00), Max: 36.7 (02 Aug 2018 12:44)  T(F): 97.6 (03 Aug 2018 08:00), Max: 98.1 (02 Aug 2018 20:00)  HR: 59 (03 Aug 2018 08:00) (52 - 71)  BP: 146/69 (03 Aug 2018 08:00) (108/63 - 146/69)  BP(mean): 92 (03 Aug 2018 08:00) (66 - 101)  RR: 16 (03 Aug 2018 08:00) (12 - 20)  SpO2: 98% (03 Aug 2018 08:00) (90% - 100%)  General: NAD  HEENT: MMM  Neck: No JVD, no carotid bruit  Lungs: CTAB  CV: RRR, nl S1/S2, no M/R/G  Abdomen: S/NT/ND, +BS  Extremities: No LE edema, no cyanosis  Neuro: AAOx3, non-focal  Skin: No rash    Labs:    08-03    137  |  103  |  16  ----------------------------<  104<H>  4.0   |  26  |  0.94    Ca    8.2<L>      03 Aug 2018 07:01  Phos  2.7     08-03  Mg     1.8     08-03                          11.1   12.31 )-----------( 249      ( 03 Aug 2018 07:01 )             32.6         Telemetry: Sinus rhythm, PACs with compensatory pauses, PVCs, bradycardia

## 2018-08-03 NOTE — PROGRESS NOTE ADULT - SUBJECTIVE AND OBJECTIVE BOX
Patient is a 86y old  Male who presents with a chief complaint of " Polo is going to replace my RIGHT knee" (25 Jul 2018 12:46)        HPI:      SUBJECTIVE & OBJECTIVE: Pt seen and examined at bedside.     PHYSICAL EXAM:  T(C): 36.4 (08-03-18 @ 08:00), Max: 36.7 (08-02-18 @ 20:00)  HR: 73 (08-03-18 @ 10:52) (54 - 73)  BP: 114/57 (08-03-18 @ 10:52) (114/57 - 146/69)  RR: 15 (08-03-18 @ 10:52) (12 - 17)  SpO2: 99% (08-03-18 @ 10:52) (90% - 100%)  Wt(kg): --   GENERAL: NAD, well-groomed, well-developed  HEAD:  Atraumatic, Normocephalic  EYES: EOMI, PERRLA, conjunctiva and sclera clear  ENMT: Moist mucous membranes  NECK: Supple, No JVD  NERVOUS SYSTEM:  Alert & Oriented X3, Motor Strength 5/5 B/L upper and lower extremities; DTRs 2+ intact and symmetric  CHEST/LUNG: Clear to auscultation bilaterally; No rales, rhonchi, wheezing, or rubs  HEART: Regular rate and rhythm; No murmurs, rubs, or gallops  ABDOMEN: Soft, Nontender, Nondistended; Bowel sounds present  EXTREMITIES:  2+ Peripheral Pulses, No clubbing, cyanosis, or edema        MEDICATIONS  (STANDING):  acetaminophen   Tablet. 1000 milliGRAM(s) Oral every 8 hours  aspirin enteric coated 162 milliGRAM(s) Oral every 12 hours  dicyclomine 10 milliGRAM(s) Oral three times a day before meals  famotidine    Tablet 20 milliGRAM(s) Oral daily  ferrous    sulfate 325 milliGRAM(s) Oral daily  lactobacillus acidophilus 1 Tablet(s) Oral two times a day with meals  latanoprost 0.005% Ophthalmic Solution 1 Drop(s) Both EYES at bedtime  tamsulosin 0.4 milliGRAM(s) Oral at bedtime  vancomycin    Solution 125 milliGRAM(s) Oral every 6 hours    MEDICATIONS  (PRN):  aluminum hydroxide/magnesium hydroxide/simethicone Suspension 30 milliLiter(s) Oral four times a day PRN Indigestion  ondansetron Injectable 4 milliGRAM(s) IV Push every 6 hours PRN Nausea and/or Vomiting  polyethylene glycol 3350 17 Gram(s) Oral daily PRN Constipation  simethicone 80 milliGRAM(s) Chew every 6 hours PRN Gas  sodium chloride 0.65% Nasal 1 Spray(s) Both Nostrils every 4 hours PRN Nasal Congestion      LABS:                        11.1   12.31 )-----------( 249      ( 03 Aug 2018 07:01 )             32.6     08-03    137  |  103  |  16  ----------------------------<  104<H>  4.0   |  26  |  0.94    Ca    8.2<L>      03 Aug 2018 07:01  Phos  2.7     08-03  Mg     1.8     08-03          Magnesium, Serum: 1.8 mg/dL (08-03 @ 07:01)    CAPILLARY BLOOD GLUCOSE          CAPILLARY BLOOD GLUCOSE        CAPILLARY BLOOD GLUCOSE                RECENT CULTURES:      RADIOLOGY & ADDITIONAL TESTS:                        DVT/GI ppx  Discussed with pt @ bedside

## 2018-08-03 NOTE — PROGRESS NOTE ADULT - SUBJECTIVE AND OBJECTIVE BOX
POD  #:  10  S/P  Right TKR                       SUBJECTIVE: Patient seen and examined. Sitting up in chair. Continues to improve in terms of C. diff.  Reported Pain Score =  0    OBJECTIVE:     Vital Signs Last 24 Hrs  T(C): 36.4 (03 Aug 2018 08:00), Max: 36.7 (02 Aug 2018 12:44)  T(F): 97.6 (03 Aug 2018 08:00), Max: 98.1 (02 Aug 2018 20:00)  HR: 73 (03 Aug 2018 10:52) (52 - 73)  BP: 114/57 (03 Aug 2018 10:52) (108/63 - 146/69)  BP(mean): 67 (03 Aug 2018 10:52) (66 - 101)  RR: 15 (03 Aug 2018 10:52) (12 - 17)  SpO2: 99% (03 Aug 2018 10:52) (90% - 100%)    Right Knee:          Dressing: clean/dry/intact; incision CDI, sutures in place. No erythema    Bilateral LEs:         Sensation:  intact to light touch          Motor exam:  5/5 knee extension and flexion, ankle dorsiflexion/plantarflexion/EHL          2+ DP pulses          calf supple, NT    LABS:                        11.1   12.31 )-----------( 249      ( 03 Aug 2018 07:01 )             32.6     08-03    137  |  103  |  16  ----------------------------<  104<H>  4.0   |  26  |  0.94    Ca    8.2<L>      03 Aug 2018 07:01  Phos  2.7     08-03  Mg     1.8     08-03            MEDICATIONS:  Anticoagulation:  aspirin enteric coated 162 milliGRAM(s) Oral every 12 hours      Pain medications:   acetaminophen   Tablet. 1000 milliGRAM(s) Oral every 8 hours          A/P : Patient stable  s/p Right TKR POD # 10  -    Pain control  -    DVT ppx: aspirin  -    Weight bearing status: WBAT   -    Physical Therapy  -    Occupational Therapy  -    CPM  -    Encourage knee extension  -    Discharge plan: rehab when C. Diff resolved

## 2018-08-03 NOTE — PROGRESS NOTE ADULT - PROBLEM SELECTOR PLAN 2
-CT abd with signs iof colitis.  -lactate last checked on July 27th and was 2.3, follow up repeat in morning   -on IV fluids, will give 1L over shift and monitor UOP, is taking PO diet, solids and fluids  -received IV flagyl, and now maintained on PO vancomycin, ID is following  -diarrhea has improved since admission, no abdominal pain   -remains on isolation/contact precautions  -monitor lytes while having diarrhea
post op  oral and skin care  I [facility]  wound care  ortho follow up  PT as tolerated

## 2018-08-03 NOTE — PROGRESS NOTE ADULT - SUBJECTIVE AND OBJECTIVE BOX
Date/Time Patient Seen:  		  Referring MD:   Data Reviewed	       Patient is a 86y old  Male who presents with a chief complaint of " Polo is going to replace my RIGHT knee" (25 Jul 2018 12:46)  vs and meds reviewed      Subjective/HPI     PAST MEDICAL & SURGICAL HISTORY:  Osteoarthritis of right knee  Hypertension  Tremor of both hands  History of hernia surgery: X3 1950&#x27;s-1970&#x27;s  History of shoulder surgery: right, 2012  History of knee replacement, total, left: 2007        Medication list         MEDICATIONS  (STANDING):  acetaminophen   Tablet. 1000 milliGRAM(s) Oral every 8 hours  aspirin enteric coated 162 milliGRAM(s) Oral every 12 hours  dicyclomine 10 milliGRAM(s) Oral three times a day before meals  famotidine    Tablet 20 milliGRAM(s) Oral daily  ferrous    sulfate 325 milliGRAM(s) Oral daily  lactobacillus acidophilus 1 Tablet(s) Oral two times a day with meals  latanoprost 0.005% Ophthalmic Solution 1 Drop(s) Both EYES at bedtime  tamsulosin 0.4 milliGRAM(s) Oral at bedtime  vancomycin    Solution 125 milliGRAM(s) Oral every 6 hours    MEDICATIONS  (PRN):  aluminum hydroxide/magnesium hydroxide/simethicone Suspension 30 milliLiter(s) Oral four times a day PRN Indigestion  ondansetron Injectable 4 milliGRAM(s) IV Push every 6 hours PRN Nausea and/or Vomiting  polyethylene glycol 3350 17 Gram(s) Oral daily PRN Constipation  simethicone 80 milliGRAM(s) Chew every 6 hours PRN Gas  sodium chloride 0.65% Nasal 1 Spray(s) Both Nostrils every 4 hours PRN Nasal Congestion         Vitals log        ICU Vital Signs Last 24 Hrs  T(C): 36.7 (03 Aug 2018 04:29), Max: 36.7 (02 Aug 2018 08:55)  T(F): 98 (03 Aug 2018 04:29), Max: 98.1 (02 Aug 2018 08:55)  HR: 54 (03 Aug 2018 04:29) (52 - 71)  BP: 140/67 (03 Aug 2018 04:29) (108/63 - 140/67)  BP(mean): 84 (03 Aug 2018 04:29) (66 - 101)  ABP: --  ABP(mean): --  RR: 13 (03 Aug 2018 04:29) (12 - 20)  SpO2: 97% (03 Aug 2018 04:29) (90% - 100%)           Input and Output:  I&O's Detail    02 Aug 2018 07:01  -  03 Aug 2018 07:00  --------------------------------------------------------  IN:    dextrose 5% + sodium chloride 0.45%.: 50 mL    IV PiggyBack: 200 mL    Oral Fluid: 1490 mL  Total IN: 1740 mL    OUT:  Total OUT: 0 mL    Total NET: 1740 mL          Lab Data                        11.1   12.31 )-----------( 249      ( 03 Aug 2018 07:01 )             32.6     08-03    137  |  103  |  16  ----------------------------<  104<H>  4.0   |  26  |  0.94    Ca    8.2<L>      03 Aug 2018 07:01  Phos  2.7     08-03  Mg     1.8     08-03              Review of Systems	      Objective     Physical Examination      heart s1s2  lung dec BS  abd soft    Pertinent Lab findings & Imaging      [place]:  NO   Adequate UO     I&O's Detail    02 Aug 2018 07:01  -  03 Aug 2018 07:00  --------------------------------------------------------  IN:    dextrose 5% + sodium chloride 0.45%.: 50 mL    IV PiggyBack: 200 mL    Oral Fluid: 1490 mL  Total IN: 1740 mL    OUT:  Total OUT: 0 mL    Total NET: 1740 mL               Discussed with:     Cultures:	        Radiology

## 2018-08-03 NOTE — PROGRESS NOTE ADULT - PROBLEM SELECTOR PROBLEM 2
KARLENE (acute kidney injury)
Clostridium difficile colitis
History of knee replacement, total, left

## 2018-08-04 VITALS
RESPIRATION RATE: 18 BRPM | OXYGEN SATURATION: 98 % | HEART RATE: 48 BPM | SYSTOLIC BLOOD PRESSURE: 105 MMHG | DIASTOLIC BLOOD PRESSURE: 43 MMHG

## 2018-08-04 LAB — LACTATE SERPL-SCNC: 1.3 MMOL/L — SIGNIFICANT CHANGE UP (ref 0.7–2)

## 2018-08-04 PROCEDURE — C1776: CPT

## 2018-08-04 PROCEDURE — 93306 TTE W/DOPPLER COMPLETE: CPT

## 2018-08-04 PROCEDURE — 97535 SELF CARE MNGMENT TRAINING: CPT

## 2018-08-04 PROCEDURE — 84100 ASSAY OF PHOSPHORUS: CPT

## 2018-08-04 PROCEDURE — 97161 PT EVAL LOW COMPLEX 20 MIN: CPT

## 2018-08-04 PROCEDURE — 85027 COMPLETE CBC AUTOMATED: CPT

## 2018-08-04 PROCEDURE — 97165 OT EVAL LOW COMPLEX 30 MIN: CPT

## 2018-08-04 PROCEDURE — 80053 COMPREHEN METABOLIC PANEL: CPT

## 2018-08-04 PROCEDURE — 76770 US EXAM ABDO BACK WALL COMP: CPT

## 2018-08-04 PROCEDURE — 82803 BLOOD GASES ANY COMBINATION: CPT

## 2018-08-04 PROCEDURE — 84132 ASSAY OF SERUM POTASSIUM: CPT

## 2018-08-04 PROCEDURE — 86923 COMPATIBILITY TEST ELECTRIC: CPT

## 2018-08-04 PROCEDURE — 97110 THERAPEUTIC EXERCISES: CPT

## 2018-08-04 PROCEDURE — 88305 TISSUE EXAM BY PATHOLOGIST: CPT

## 2018-08-04 PROCEDURE — 86900 BLOOD TYPING SEROLOGIC ABO: CPT

## 2018-08-04 PROCEDURE — C1713: CPT

## 2018-08-04 PROCEDURE — 86140 C-REACTIVE PROTEIN: CPT

## 2018-08-04 PROCEDURE — 82962 GLUCOSE BLOOD TEST: CPT

## 2018-08-04 PROCEDURE — 88311 DECALCIFY TISSUE: CPT

## 2018-08-04 PROCEDURE — 83735 ASSAY OF MAGNESIUM: CPT

## 2018-08-04 PROCEDURE — 74022 RADEX COMPL AQT ABD SERIES: CPT

## 2018-08-04 PROCEDURE — 93005 ELECTROCARDIOGRAM TRACING: CPT

## 2018-08-04 PROCEDURE — 99239 HOSP IP/OBS DSCHRG MGMT >30: CPT

## 2018-08-04 PROCEDURE — 97530 THERAPEUTIC ACTIVITIES: CPT

## 2018-08-04 PROCEDURE — 87045 FECES CULTURE AEROBIC BACT: CPT

## 2018-08-04 PROCEDURE — 87040 BLOOD CULTURE FOR BACTERIA: CPT

## 2018-08-04 PROCEDURE — C1889: CPT

## 2018-08-04 PROCEDURE — 73562 X-RAY EXAM OF KNEE 3: CPT

## 2018-08-04 PROCEDURE — 83605 ASSAY OF LACTIC ACID: CPT

## 2018-08-04 PROCEDURE — 85018 HEMOGLOBIN: CPT

## 2018-08-04 PROCEDURE — 84484 ASSAY OF TROPONIN QUANT: CPT

## 2018-08-04 PROCEDURE — 93971 EXTREMITY STUDY: CPT

## 2018-08-04 PROCEDURE — 84443 ASSAY THYROID STIM HORMONE: CPT

## 2018-08-04 PROCEDURE — 80048 BASIC METABOLIC PNL TOTAL CA: CPT

## 2018-08-04 PROCEDURE — 84145 PROCALCITONIN (PCT): CPT

## 2018-08-04 PROCEDURE — 87493 C DIFF AMPLIFIED PROBE: CPT

## 2018-08-04 PROCEDURE — 86850 RBC ANTIBODY SCREEN: CPT

## 2018-08-04 PROCEDURE — 71045 X-RAY EXAM CHEST 1 VIEW: CPT

## 2018-08-04 PROCEDURE — 97116 GAIT TRAINING THERAPY: CPT

## 2018-08-04 PROCEDURE — 82533 TOTAL CORTISOL: CPT

## 2018-08-04 PROCEDURE — 85014 HEMATOCRIT: CPT

## 2018-08-04 PROCEDURE — 87046 STOOL CULTR AEROBIC BACT EA: CPT

## 2018-08-04 PROCEDURE — 36415 COLL VENOUS BLD VENIPUNCTURE: CPT

## 2018-08-04 PROCEDURE — 81001 URINALYSIS AUTO W/SCOPE: CPT

## 2018-08-04 PROCEDURE — 86901 BLOOD TYPING SEROLOGIC RH(D): CPT

## 2018-08-04 RX ADMIN — Medication 325 MILLIGRAM(S): at 11:16

## 2018-08-04 RX ADMIN — Medication 125 MILLIGRAM(S): at 05:59

## 2018-08-04 RX ADMIN — Medication 1000 MILLIGRAM(S): at 12:37

## 2018-08-04 RX ADMIN — Medication 10 MILLIGRAM(S): at 08:17

## 2018-08-04 RX ADMIN — Medication 125 MILLIGRAM(S): at 11:16

## 2018-08-04 RX ADMIN — Medication 1000 MILLIGRAM(S): at 05:59

## 2018-08-04 RX ADMIN — Medication 1 TABLET(S): at 08:17

## 2018-08-04 RX ADMIN — Medication 10 MILLIGRAM(S): at 11:16

## 2018-08-04 RX ADMIN — FAMOTIDINE 20 MILLIGRAM(S): 10 INJECTION INTRAVENOUS at 11:16

## 2018-08-04 RX ADMIN — Medication 162 MILLIGRAM(S): at 08:17

## 2018-08-04 NOTE — PROGRESS NOTE ADULT - SUBJECTIVE AND OBJECTIVE BOX
Patient is a 86y old  Male who presents with a chief complaint of "Dr Cuellar is going to replace my RIGHT knee" (25 Jul 2018 12:46)      INTERVAL HPI/OVERNIGHT EVENTS: no acute overnight events , patient is feeling fine . Stable for discharge to rehab.     MEDICATIONS  (STANDING):  acetaminophen   Tablet. 1000 milliGRAM(s) Oral every 8 hours  aspirin enteric coated 162 milliGRAM(s) Oral every 12 hours  dicyclomine 10 milliGRAM(s) Oral three times a day before meals  famotidine    Tablet 20 milliGRAM(s) Oral daily  ferrous    sulfate 325 milliGRAM(s) Oral daily  lactobacillus acidophilus 1 Tablet(s) Oral two times a day with meals  latanoprost 0.005% Ophthalmic Solution 1 Drop(s) Both EYES at bedtime  tamsulosin 0.4 milliGRAM(s) Oral at bedtime  vancomycin    Solution 125 milliGRAM(s) Oral every 6 hours    MEDICATIONS  (PRN):  aluminum hydroxide/magnesium hydroxide/simethicone Suspension 30 milliLiter(s) Oral four times a day PRN Indigestion  ondansetron Injectable 4 milliGRAM(s) IV Push every 6 hours PRN Nausea and/or Vomiting  polyethylene glycol 3350 17 Gram(s) Oral daily PRN Constipation  simethicone 80 milliGRAM(s) Chew every 6 hours PRN Gas  sodium chloride 0.65% Nasal 1 Spray(s) Both Nostrils every 4 hours PRN Nasal Congestion      Allergies    No Known Allergies    Intolerances        REVIEW OF SYSTEMS:  CONSTITUTIONAL: No fever or chills  HEENT:  No headache, no sore throat  RESPIRATORY: No cough, wheezing, or shortness of breath  CARDIOVASCULAR: No chest pain, palpitations, or leg swelling  GASTROINTESTINAL: No nausea, vomiting, or diarrhea  GENITOURINARY: No dysuria, frequency, or hematuria  NEUROLOGICAL: no focal weakness or dizziness  SKIN:  No rashes or lesions   MUSCULOSKELETAL: no myalgias       Vital Signs Last 24 Hrs  T(C): 36.8 (04 Aug 2018 00:00), Max: 36.9 (03 Aug 2018 20:00)  T(F): 98.3 (04 Aug 2018 00:00), Max: 98.4 (03 Aug 2018 20:00)  HR: 48 (04 Aug 2018 12:00) (48 - 67)  BP: 105/43 (04 Aug 2018 12:00) (100/62 - 129/47)  BP(mean): 60 (04 Aug 2018 12:00) (60 - 84)  RR: 18 (04 Aug 2018 12:00) (11 - 24)  SpO2: 98% (04 Aug 2018 12:00) (96% - 100%)    PHYSICAL EXAM:  GENERAL: NAD  HEENT:  EOMI, mmm  CHEST/LUNG:  CTA b/l, no rales, wheezes, or rhonchi  HEART:  RRR, S1, S2, []murmur  ABDOMEN:  BS+, soft, NT, ND  EXTREMITIES: no edema or calf tenderness , dressing dry and intact.   NERVOUS SYSTEM: AA&Ox3 , no focal neurological deficit.     DIET:     Cultures:     LABS:    CBC Full  -  ( 03 Aug 2018 07:01 )  WBC Count : 12.31 K/uL  Hemoglobin : 11.1 g/dL  Hematocrit : 32.6 %  Platelet Count - Automated : 249 K/uL  Mean Cell Volume : 92.4 fl  Mean Cell Hemoglobin : 31.4 pg  Mean Cell Hemoglobin Concentration : 34.0 gm/dL  Auto Neutrophil # : x  Auto Lymphocyte # : x  Auto Monocyte # : x  Auto Eosinophil # : x  Auto Basophil # : x  Auto Neutrophil % : x  Auto Lymphocyte % : x  Auto Monocyte % : x  Auto Eosinophil % : x  Auto Basophil % : x      Ca    8.2        03 Aug 2018 07:01          CAPILLARY BLOOD GLUCOSE              RADIOLOGY & ADDITIONAL TESTS:    Personally reviewed.     Consultant(s) Notes Reviewed:  [x] YES  [ ] NO    Care Discussed with [ ] Consultants  [x] Patient  [ ] Family  [x]      [ ] Other; RN  DVT ppx  Advanced directive

## 2018-08-04 NOTE — PROGRESS NOTE ADULT - PROVIDER SPECIALTY LIST ADULT
Cardiology
Critical Care
Hospitalist
Infectious Disease
Nephrology
Orthopedics
Pulmonology
Surgery
Orthopedics
Pulmonology
Nephrology
Orthopedics
Critical Care
Critical Care
Hospitalist

## 2018-08-04 NOTE — PROGRESS NOTE ADULT - PROBLEM SELECTOR PROBLEM 1
History of knee replacement, total, left
Clostridium difficile colitis
History of knee replacement, total, left
Hypotension
Clostridium difficile colitis
Osteoarthritis of right knee
Hypotension

## 2018-08-04 NOTE — PROGRESS NOTE ADULT - SUBJECTIVE AND OBJECTIVE BOX
Date/Time Patient Seen:  		  Referring MD:   Data Reviewed	       Patient is a 86y old  Male who presents with a chief complaint of " Polo is going to replace my RIGHT knee" (25 Jul 2018 12:46)  vs and meds reviewed      Subjective/HPI     PAST MEDICAL & SURGICAL HISTORY:  Osteoarthritis of right knee  Hypertension  Tremor of both hands  History of hernia surgery: X3 1950&#x27;s-1970&#x27;s  History of shoulder surgery: right, 2012  History of knee replacement, total, left: 2007        Medication list         MEDICATIONS  (STANDING):  acetaminophen   Tablet. 1000 milliGRAM(s) Oral every 8 hours  aspirin enteric coated 162 milliGRAM(s) Oral every 12 hours  dicyclomine 10 milliGRAM(s) Oral three times a day before meals  famotidine    Tablet 20 milliGRAM(s) Oral daily  ferrous    sulfate 325 milliGRAM(s) Oral daily  lactobacillus acidophilus 1 Tablet(s) Oral two times a day with meals  latanoprost 0.005% Ophthalmic Solution 1 Drop(s) Both EYES at bedtime  tamsulosin 0.4 milliGRAM(s) Oral at bedtime  vancomycin    Solution 125 milliGRAM(s) Oral every 6 hours    MEDICATIONS  (PRN):  aluminum hydroxide/magnesium hydroxide/simethicone Suspension 30 milliLiter(s) Oral four times a day PRN Indigestion  ondansetron Injectable 4 milliGRAM(s) IV Push every 6 hours PRN Nausea and/or Vomiting  polyethylene glycol 3350 17 Gram(s) Oral daily PRN Constipation  simethicone 80 milliGRAM(s) Chew every 6 hours PRN Gas  sodium chloride 0.65% Nasal 1 Spray(s) Both Nostrils every 4 hours PRN Nasal Congestion         Vitals log        ICU Vital Signs Last 24 Hrs  T(C): 36.8 (04 Aug 2018 00:00), Max: 36.9 (03 Aug 2018 20:00)  T(F): 98.3 (04 Aug 2018 00:00), Max: 98.4 (03 Aug 2018 20:00)  HR: 49 (04 Aug 2018 04:00) (49 - 73)  BP: 118/43 (04 Aug 2018 04:00) (100/62 - 146/69)  BP(mean): 66 (04 Aug 2018 04:00) (66 - 92)  ABP: --  ABP(mean): --  RR: 11 (04 Aug 2018 04:00) (11 - 24)  SpO2: 97% (04 Aug 2018 04:00) (96% - 100%)           Input and Output:  I&O's Detail    02 Aug 2018 07:01  -  03 Aug 2018 07:00  --------------------------------------------------------  IN:    dextrose 5% + sodium chloride 0.45%.: 50 mL    IV PiggyBack: 200 mL    Oral Fluid: 1490 mL  Total IN: 1740 mL    OUT:  Total OUT: 0 mL    Total NET: 1740 mL      03 Aug 2018 07:01  -  04 Aug 2018 06:45  --------------------------------------------------------  IN:    lactated ringers.: 750 mL    Oral Fluid: 910 mL  Total IN: 1660 mL    OUT:    Estimated Blood Loss: 2 mL    Voided: 300 mL  Total OUT: 302 mL    Total NET: 1358 mL          Lab Data                        11.1   12.31 )-----------( 249      ( 03 Aug 2018 07:01 )             32.6     08-03    137  |  103  |  16  ----------------------------<  104<H>  4.0   |  26  |  0.94    Ca    8.2<L>      03 Aug 2018 07:01  Phos  2.7     08-03  Mg     1.8     08-03              Review of Systems	      Objective     Physical Examination    heart s1s2  lung dec BS  abd soft      Pertinent Lab findings & Imaging      [place]:  NO   Adequate UO     I&O's Detail    02 Aug 2018 07:01  -  03 Aug 2018 07:00  --------------------------------------------------------  IN:    dextrose 5% + sodium chloride 0.45%.: 50 mL    IV PiggyBack: 200 mL    Oral Fluid: 1490 mL  Total IN: 1740 mL    OUT:  Total OUT: 0 mL    Total NET: 1740 mL      03 Aug 2018 07:01  -  04 Aug 2018 06:45  --------------------------------------------------------  IN:    lactated ringers.: 750 mL    Oral Fluid: 910 mL  Total IN: 1660 mL    OUT:    Estimated Blood Loss: 2 mL    Voided: 300 mL  Total OUT: 302 mL    Total NET: 1358 mL               Discussed with:     Cultures:	        Radiology

## 2018-08-04 NOTE — PROGRESS NOTE ADULT - ASSESSMENT
The patient is an 86 year old male with a history of HTN, OA who is s/p right TKR.    Plan:  - Avoid rate lowering agents  - No significant pauses noted on telemetry  - No indication for pacemaker  - Hold antihypertensives. This can be resumed as outpatient as BPs improved.  - On vancomycin PO for C. diff  - Overall improved  - Discharge planning

## 2018-08-04 NOTE — PROGRESS NOTE ADULT - SUBJECTIVE AND OBJECTIVE BOX
Chief Complaint: TKR    Interval Events: No events overnight.    Review of Systems:  General: No fevers, chills, weight loss or gain  Skin: No rashes, color changes  Cardiovascular: No chest pain, orthopnea  Respiratory: No shortness of breath, cough  Gastrointestinal: No nausea, abdominal pain  Genitourinary: No incontinence, pain with urination  Musculoskeletal: No pain, swelling, decreased range of motion  Neurological: No headache, weakness  Psychiatric: No depression, anxiety  Endocrine: No weight loss or gain, increased thirst  All other systems are comprehensively negative.    Physical Exam:  Vital Signs Last 24 Hrs  T(C): 36.8 (04 Aug 2018 00:00), Max: 36.9 (03 Aug 2018 20:00)  T(F): 98.3 (04 Aug 2018 00:00), Max: 98.4 (03 Aug 2018 20:00)  HR: 63 (04 Aug 2018 08:00) (49 - 73)  BP: 124/53 (04 Aug 2018 08:00) (100/62 - 129/47)  BP(mean): 72 (04 Aug 2018 08:00) (66 - 84)  RR: 14 (04 Aug 2018 08:00) (11 - 24)  SpO2: 98% (04 Aug 2018 08:00) (96% - 100%)  General: NAD  HEENT: MMM  Neck: No JVD, no carotid bruit  Lungs: CTAB  CV: RRR, nl S1/S2, no M/R/G  Abdomen: S/NT/ND, +BS  Extremities: No LE edema, no cyanosis  Neuro: AAOx3, non-focal  Skin: No rash    Labs:    08-03    137  |  103  |  16  ----------------------------<  104<H>  4.0   |  26  |  0.94    Ca    8.2<L>      03 Aug 2018 07:01  Phos  2.7     08-03  Mg     1.8     08-03                          11.1   12.31 )-----------( 249      ( 03 Aug 2018 07:01 )             32.6         Telemetry: Sinus rhythm, PACs with compensatory pauses, PVCs, bradycardia

## 2018-08-04 NOTE — PROGRESS NOTE ADULT - SUBJECTIVE AND OBJECTIVE BOX
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   -   Wound C/D/I.   -   Distal Neurvascular status intact grossly.   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

## 2018-08-04 NOTE — PROGRESS NOTE ADULT - PROBLEM SELECTOR PLAN 1
c diff colitis  sepsis  on ABX  oral and skin care  I and O  serial labs  replete lytes  nutrition  post op care  I alexus  PT as tolerated  wound care  ortho follow up  will need GLORY when optimized medically and cleared by ID  pt is on C Diff isolation precs.
post op  oa and op  c diff  anemia  oral and skin care  abx for c diff  HOB elev  I alexus  monitor HD and VS and Sat  serial labs  replete lytes  ID following  Ortho following  Surgery following  pain regimen, caution with Opioids
post op  I alexus  oral and skin care  monitor lytes  renal following  supp lytes as needed  serial PE  on ABX for C Diff, ID follow up noted  PT as tolerated  dc planning once off Isolation  overall better  will follow
post op  hypotension - sepsis work up under way  ID eval noted  oral and skin care  doubt PNA, however, work up is still under way, colitis etiology unclear, c diff pending  supportive ICU care  I alexus  keep MAP > 60  am WBC pending  pain assessment  dvt p  caution with IVF  replete lytes  will follow
post op care  I alexus  PT and ortho follow up  oral and skin care  wound care  serial labs  dvt p  c diff colitis, abx, id follow up noted  out of bed as tolerated  monitor labs, monitor vs and HD and Sat  will follow  eventually will need GLORY
post op care, I alexus, oral and skin care, wound care, ortho follow up, PT  dvt p  anemia  PRBC as needed to keep Hgb > 7  monitor vs and HD and Sat  on ABX for C Diff, serial labs, serial lytes, replete as needed  will follow
post op, PT and I alexus, and ortho follow up  serial PE and serial LABS  pain regimen  C Diff - ABX, Sugery follow up noted  oral and skin care  wound care  dc planning once off Isolation  Diarrhea subsided  will follow
sepsis  colitis  post op  atelectasis  I alexus  oral and skin care  ABX  ID follow up  serial labs  replete lytes  isolation precs  will follow  PT as tolerated  pain regimen  will follow
-S/P R TKR on July 24th.  -pain controlled  -ortho following  -PT/OT  -ASA prophylaxis per ortho team
monitor HD  off IVF  off Midodrine - bradycardia concern  keep MAP > 60  I and O  serial labs  must eval and rule out sepsis, will check CRP and Procalcitonin and Lytes   AM WBC pending  source of infection if such is present is not obvious, may need ID eval  work up under way  supportive ICU measures  abd soft  will need ct abd and chest when medically optimized

## 2018-08-10 ENCOUNTER — APPOINTMENT (OUTPATIENT)
Dept: ORTHOPEDIC SURGERY | Facility: CLINIC | Age: 83
End: 2018-08-10
Payer: MEDICARE

## 2018-08-10 VITALS — HEIGHT: 70 IN | WEIGHT: 188 LBS | BODY MASS INDEX: 26.92 KG/M2

## 2018-08-10 DIAGNOSIS — M17.10 UNILATERAL PRIMARY OSTEOARTHRITIS, UNSPECIFIED KNEE: ICD-10-CM

## 2018-08-10 PROCEDURE — 73560 X-RAY EXAM OF KNEE 1 OR 2: CPT | Mod: RT

## 2018-08-10 PROCEDURE — 99024 POSTOP FOLLOW-UP VISIT: CPT

## 2018-08-10 PROCEDURE — 20610 DRAIN/INJ JOINT/BURSA W/O US: CPT | Mod: 58,RT

## 2018-09-01 ENCOUNTER — INPATIENT (INPATIENT)
Facility: HOSPITAL | Age: 83
LOS: 11 days | Discharge: HOME | DRG: 371 | End: 2018-09-13
Attending: FAMILY MEDICINE | Admitting: FAMILY MEDICINE
Payer: MEDICARE

## 2018-09-01 VITALS
DIASTOLIC BLOOD PRESSURE: 63 MMHG | TEMPERATURE: 98 F | OXYGEN SATURATION: 96 % | HEART RATE: 69 BPM | SYSTOLIC BLOOD PRESSURE: 103 MMHG | RESPIRATION RATE: 18 BRPM

## 2018-09-01 DIAGNOSIS — Z98.890 OTHER SPECIFIED POSTPROCEDURAL STATES: Chronic | ICD-10-CM

## 2018-09-01 DIAGNOSIS — Z96.652 PRESENCE OF LEFT ARTIFICIAL KNEE JOINT: Chronic | ICD-10-CM

## 2018-09-01 LAB
ALBUMIN SERPL ELPH-MCNC: 4 G/DL — SIGNIFICANT CHANGE UP (ref 3.3–5)
ALP SERPL-CCNC: 78 U/L — SIGNIFICANT CHANGE UP (ref 40–120)
ALT FLD-CCNC: 11 U/L — SIGNIFICANT CHANGE UP (ref 10–45)
ANION GAP SERPL CALC-SCNC: 15 MMOL/L — SIGNIFICANT CHANGE UP (ref 5–17)
APTT BLD: 29.2 SEC — SIGNIFICANT CHANGE UP (ref 27.5–37.4)
AST SERPL-CCNC: 12 U/L — SIGNIFICANT CHANGE UP (ref 10–40)
BASE EXCESS BLDV CALC-SCNC: -1.1 MMOL/L — SIGNIFICANT CHANGE UP (ref -2–2)
BASOPHILS # BLD AUTO: 0.1 K/UL — SIGNIFICANT CHANGE UP (ref 0–0.2)
BASOPHILS NFR BLD AUTO: 0.3 % — SIGNIFICANT CHANGE UP (ref 0–2)
BILIRUB SERPL-MCNC: 1.3 MG/DL — HIGH (ref 0.2–1.2)
BUN SERPL-MCNC: 33 MG/DL — HIGH (ref 7–23)
CA-I SERPL-SCNC: 1.2 MMOL/L — SIGNIFICANT CHANGE UP (ref 1.12–1.3)
CALCIUM SERPL-MCNC: 10 MG/DL — SIGNIFICANT CHANGE UP (ref 8.4–10.5)
CHLORIDE BLDV-SCNC: 107 MMOL/L — SIGNIFICANT CHANGE UP (ref 96–108)
CHLORIDE SERPL-SCNC: 101 MMOL/L — SIGNIFICANT CHANGE UP (ref 96–108)
CO2 BLDV-SCNC: 24 MMOL/L — SIGNIFICANT CHANGE UP (ref 22–30)
CO2 SERPL-SCNC: 22 MMOL/L — SIGNIFICANT CHANGE UP (ref 22–31)
CREAT SERPL-MCNC: 1.3 MG/DL — SIGNIFICANT CHANGE UP (ref 0.5–1.3)
EOSINOPHIL # BLD AUTO: 0.1 K/UL — SIGNIFICANT CHANGE UP (ref 0–0.5)
EOSINOPHIL NFR BLD AUTO: 0.3 % — SIGNIFICANT CHANGE UP (ref 0–6)
GAS PNL BLDV: 133 MMOL/L — LOW (ref 136–145)
GAS PNL BLDV: SIGNIFICANT CHANGE UP
GAS PNL BLDV: SIGNIFICANT CHANGE UP
GLUCOSE BLDV-MCNC: 135 MG/DL — HIGH (ref 70–99)
GLUCOSE SERPL-MCNC: 147 MG/DL — HIGH (ref 70–99)
HCO3 BLDV-SCNC: 23 MMOL/L — SIGNIFICANT CHANGE UP (ref 21–29)
HCT VFR BLD CALC: 37.5 % — LOW (ref 39–50)
HCT VFR BLDA CALC: 35 % — LOW (ref 39–50)
HGB BLD CALC-MCNC: 11.3 G/DL — LOW (ref 13–17)
HGB BLD-MCNC: 12.8 G/DL — LOW (ref 13–17)
INR BLD: 1.24 RATIO — HIGH (ref 0.88–1.16)
LACTATE BLDV-MCNC: 2.2 MMOL/L — HIGH (ref 0.7–2)
LYMPHOCYTES # BLD AUTO: 1.3 K/UL — SIGNIFICANT CHANGE UP (ref 1–3.3)
LYMPHOCYTES # BLD AUTO: 6.2 % — LOW (ref 13–44)
MCHC RBC-ENTMCNC: 31.6 PG — SIGNIFICANT CHANGE UP (ref 27–34)
MCHC RBC-ENTMCNC: 34.2 GM/DL — SIGNIFICANT CHANGE UP (ref 32–36)
MCV RBC AUTO: 92.5 FL — SIGNIFICANT CHANGE UP (ref 80–100)
MONOCYTES # BLD AUTO: 1 K/UL — HIGH (ref 0–0.9)
MONOCYTES NFR BLD AUTO: 4.6 % — SIGNIFICANT CHANGE UP (ref 2–14)
NEUTROPHILS # BLD AUTO: 18.8 K/UL — HIGH (ref 1.8–7.4)
NEUTROPHILS NFR BLD AUTO: 88.6 % — HIGH (ref 43–77)
PCO2 BLDV: 39 MMHG — SIGNIFICANT CHANGE UP (ref 35–50)
PH BLDV: 7.39 — SIGNIFICANT CHANGE UP (ref 7.35–7.45)
PLATELET # BLD AUTO: 287 K/UL — SIGNIFICANT CHANGE UP (ref 150–400)
PO2 BLDV: 19 MMHG — LOW (ref 25–45)
POTASSIUM BLDV-SCNC: 3.4 MMOL/L — LOW (ref 3.5–5.3)
POTASSIUM SERPL-MCNC: 4.6 MMOL/L — SIGNIFICANT CHANGE UP (ref 3.5–5.3)
POTASSIUM SERPL-SCNC: 4.6 MMOL/L — SIGNIFICANT CHANGE UP (ref 3.5–5.3)
PROT SERPL-MCNC: 7.6 G/DL — SIGNIFICANT CHANGE UP (ref 6–8.3)
PROTHROM AB SERPL-ACNC: 13.5 SEC — HIGH (ref 9.8–12.7)
RBC # BLD: 4.05 M/UL — LOW (ref 4.2–5.8)
RBC # FLD: 12.8 % — SIGNIFICANT CHANGE UP (ref 10.3–14.5)
SAO2 % BLDV: 24 % — LOW (ref 67–88)
SODIUM SERPL-SCNC: 138 MMOL/L — SIGNIFICANT CHANGE UP (ref 135–145)
WBC # BLD: 21.2 K/UL — HIGH (ref 3.8–10.5)
WBC # FLD AUTO: 21.2 K/UL — HIGH (ref 3.8–10.5)

## 2018-09-01 PROCEDURE — 99285 EMERGENCY DEPT VISIT HI MDM: CPT | Mod: GC

## 2018-09-01 RX ORDER — SODIUM CHLORIDE 9 MG/ML
1000 INJECTION, SOLUTION INTRAVENOUS ONCE
Qty: 0 | Refills: 0 | Status: COMPLETED | OUTPATIENT
Start: 2018-09-01 | End: 2018-09-01

## 2018-09-01 RX ADMIN — SODIUM CHLORIDE 2000 MILLILITER(S): 9 INJECTION, SOLUTION INTRAVENOUS at 23:11

## 2018-09-01 NOTE — ED ADULT NURSE NOTE - NSIMPLEMENTINTERV_GEN_ALL_ED
Implemented All Fall with Harm Risk Interventions:  [place] to call system. Call bell, personal items and telephone within reach. Instruct patient to call for assistance. Room bathroom lighting operational. Non-slip footwear when patient is off stretcher. Physically safe environment: no spills, clutter or unnecessary equipment. Stretcher in lowest position, wheels locked, appropriate side rails in place. Provide visual cue, wrist band, yellow gown, etc. Monitor gait and stability. Monitor for mental status changes and reorient to person, place, and time. Review medications for side effects contributing to fall risk. Reinforce activity limits and safety measures with patient and family. Provide visual clues: red socks.

## 2018-09-01 NOTE — ED PROVIDER NOTE - CARE PLAN
Principal Discharge DX:	Diarrhea of presumed infectious origin  Secondary Diagnosis:	Leukocytosis  Secondary Diagnosis:	Acute kidney injury

## 2018-09-01 NOTE — ED ADULT NURSE NOTE - OBJECTIVE STATEMENT
87 y/o male presenting to ED c/o diarrhea since late July. Per family at bedside pt. was diagnosed with cdiff at that time, and the diarrhea never really went away. Pt was treated with abx. Per daughter at the bedside pt. over the last week has become intermittently altered at times. At this time denies chest pain, sob, ha, n/v, abdominal pain, f/c, urinary symptoms, hematuria, numbness, tingling, weakness, dizziness. A&Ox4 gross neuro intact, lungs cta bilaterally, no difficulty speaking in complete sentences, s1s2 heart sounds heard, pulses x 4, marroquin x4, abdomen soft nontender nondistended, skin intact. Safety and comfort measures maintained. Patient undressed and placed into gown, call bell in hand and side rails up for safety. warm blanket provided, vital signs stable, pt in no acute distress.

## 2018-09-01 NOTE — ED PROVIDER NOTE - OBJECTIVE STATEMENT
86M presenting with diarrhea. He has had persistent diarrhea for several weeks since a recent hospitalization during which he was diagnosed with C-diff. He has been treated w/ PO vancomycin. The diarrhea has questionably improved minimally, but family reports that in the last couple weeks he has had waxing and waning confusion that was worse the past few days, described as difficulty w/ short-term memory. He has also had decreased appetite, eating very little the past couple days. Denies vomiting, fever, or abdominal pain.

## 2018-09-01 NOTE — ED CLERICAL - NS ED CLERK NOTE PRE-ARRIVAL INFORMATION; ADDITIONAL PRE-ARRIVAL INFORMATION
This patient is enrolled in the comprehensive joint replacement (CJR) program and has active care navigation.  This patient can be followed up by the care navigation team within 24 hours. To arrange close follow-up or to obtain additional clinical information about this patient, please call the contact number above.

## 2018-09-01 NOTE — ED PROVIDER NOTE - ATTENDING CONTRIBUTION TO CARE
Patient presenting with diarrhea.  Recently treated for C. diff after surgery, completed vanco 2 weeks ago.  Continuing to have diarrhea and decreased appetite.  No abdominal pains/nausea/vomiting.  Family worried he is a little more confused than normal and has not been drinking much liquids.  Had normal labs approx 2 weeks ago at [facility] office, was never rechecked for resolution of C diff.    On exam patient non toxic appearing, vital signs within normal limits, RRR S1/S2, lungs clear to ascultation bilaterally, abdomen soft, non tender, non distended, mildly DMM.    Will recheck labs for dehydration, intravenous fluids bolus, repeat stool testing, dispo to be determined by workup.

## 2018-09-01 NOTE — ED PROVIDER NOTE - MEDICAL DECISION MAKING DETAILS
Jonathan Weil, PGY2 - likely failed outpatient c-diff management. Will assess for leukocytosis, renal function, repeat C-diff if he has a bowel movement. No s/s of toxic megacolon. Will admit.

## 2018-09-01 NOTE — ED PROVIDER NOTE - PROGRESS NOTE DETAILS
Jonathan Weil, PGY2 - worsened leukocytosis and renal function compared to historicals in system. Will speak with Dr. Tin Shah about admission.

## 2018-09-02 DIAGNOSIS — D72.829 ELEVATED WHITE BLOOD CELL COUNT, UNSPECIFIED: ICD-10-CM

## 2018-09-02 DIAGNOSIS — R19.7 DIARRHEA, UNSPECIFIED: ICD-10-CM

## 2018-09-02 DIAGNOSIS — I10 ESSENTIAL (PRIMARY) HYPERTENSION: ICD-10-CM

## 2018-09-02 DIAGNOSIS — G93.40 ENCEPHALOPATHY, UNSPECIFIED: ICD-10-CM

## 2018-09-02 DIAGNOSIS — N17.9 ACUTE KIDNEY FAILURE, UNSPECIFIED: ICD-10-CM

## 2018-09-02 DIAGNOSIS — D72.828 OTHER ELEVATED WHITE BLOOD CELL COUNT: ICD-10-CM

## 2018-09-02 DIAGNOSIS — B96.89 OTHER SPECIFIED BACTERIAL AGENTS AS THE CAUSE OF DISEASES CLASSIFIED ELSEWHERE: ICD-10-CM

## 2018-09-02 LAB
ANION GAP SERPL CALC-SCNC: 12 MMOL/L — SIGNIFICANT CHANGE UP (ref 5–17)
BASOPHILS # BLD AUTO: 0.04 K/UL — SIGNIFICANT CHANGE UP (ref 0–0.2)
BASOPHILS NFR BLD AUTO: 0.3 % — SIGNIFICANT CHANGE UP (ref 0–2)
BUN SERPL-MCNC: 30 MG/DL — HIGH (ref 7–23)
CALCIUM SERPL-MCNC: 9.3 MG/DL — SIGNIFICANT CHANGE UP (ref 8.4–10.5)
CHLORIDE SERPL-SCNC: 102 MMOL/L — SIGNIFICANT CHANGE UP (ref 96–108)
CO2 SERPL-SCNC: 22 MMOL/L — SIGNIFICANT CHANGE UP (ref 22–31)
CREAT SERPL-MCNC: 1.21 MG/DL — SIGNIFICANT CHANGE UP (ref 0.5–1.3)
EOSINOPHIL # BLD AUTO: 0.18 K/UL — SIGNIFICANT CHANGE UP (ref 0–0.5)
EOSINOPHIL NFR BLD AUTO: 1.2 % — SIGNIFICANT CHANGE UP (ref 0–6)
GLUCOSE SERPL-MCNC: 112 MG/DL — HIGH (ref 70–99)
HCT VFR BLD CALC: 31 % — LOW (ref 39–50)
HGB BLD-MCNC: 10 G/DL — LOW (ref 13–17)
IMM GRANULOCYTES NFR BLD AUTO: 0.3 % — SIGNIFICANT CHANGE UP (ref 0–1.5)
LYMPHOCYTES # BLD AUTO: 1.45 K/UL — SIGNIFICANT CHANGE UP (ref 1–3.3)
LYMPHOCYTES # BLD AUTO: 9.4 % — LOW (ref 13–44)
MCHC RBC-ENTMCNC: 30.1 PG — SIGNIFICANT CHANGE UP (ref 27–34)
MCHC RBC-ENTMCNC: 32.3 GM/DL — SIGNIFICANT CHANGE UP (ref 32–36)
MCV RBC AUTO: 93.4 FL — SIGNIFICANT CHANGE UP (ref 80–100)
MONOCYTES # BLD AUTO: 0.86 K/UL — SIGNIFICANT CHANGE UP (ref 0–0.9)
MONOCYTES NFR BLD AUTO: 5.6 % — SIGNIFICANT CHANGE UP (ref 2–14)
NEUTROPHILS # BLD AUTO: 12.87 K/UL — HIGH (ref 1.8–7.4)
NEUTROPHILS NFR BLD AUTO: 83.2 % — HIGH (ref 43–77)
PLATELET # BLD AUTO: 233 K/UL — SIGNIFICANT CHANGE UP (ref 150–400)
POTASSIUM SERPL-MCNC: 3.3 MMOL/L — LOW (ref 3.5–5.3)
POTASSIUM SERPL-SCNC: 3.3 MMOL/L — LOW (ref 3.5–5.3)
RBC # BLD: 3.32 M/UL — LOW (ref 4.2–5.8)
RBC # FLD: 13.8 % — SIGNIFICANT CHANGE UP (ref 10.3–14.5)
SODIUM SERPL-SCNC: 136 MMOL/L — SIGNIFICANT CHANGE UP (ref 135–145)
WBC # BLD: 15.44 K/UL — HIGH (ref 3.8–10.5)
WBC # FLD AUTO: 15.44 K/UL — HIGH (ref 3.8–10.5)

## 2018-09-02 PROCEDURE — 99223 1ST HOSP IP/OBS HIGH 75: CPT

## 2018-09-02 RX ORDER — SODIUM CHLORIDE 9 MG/ML
1000 INJECTION INTRAMUSCULAR; INTRAVENOUS; SUBCUTANEOUS
Qty: 0 | Refills: 0 | Status: DISCONTINUED | OUTPATIENT
Start: 2018-09-02 | End: 2018-09-09

## 2018-09-02 RX ORDER — ONDANSETRON 8 MG/1
4 TABLET, FILM COATED ORAL EVERY 6 HOURS
Qty: 0 | Refills: 0 | Status: DISCONTINUED | OUTPATIENT
Start: 2018-09-02 | End: 2018-09-13

## 2018-09-02 RX ORDER — SODIUM CHLORIDE 9 MG/ML
1000 INJECTION, SOLUTION INTRAVENOUS
Qty: 0 | Refills: 0 | Status: DISCONTINUED | OUTPATIENT
Start: 2018-09-02 | End: 2018-09-02

## 2018-09-02 RX ORDER — VANCOMYCIN HCL 1 G
125 VIAL (EA) INTRAVENOUS EVERY 6 HOURS
Qty: 0 | Refills: 0 | Status: DISCONTINUED | OUTPATIENT
Start: 2018-09-02 | End: 2018-09-11

## 2018-09-02 RX ORDER — VANCOMYCIN HCL 1 G
125 VIAL (EA) INTRAVENOUS ONCE
Qty: 0 | Refills: 0 | Status: COMPLETED | OUTPATIENT
Start: 2018-09-02 | End: 2018-09-02

## 2018-09-02 RX ORDER — POTASSIUM CHLORIDE 20 MEQ
40 PACKET (EA) ORAL EVERY 4 HOURS
Qty: 0 | Refills: 0 | Status: COMPLETED | OUTPATIENT
Start: 2018-09-02 | End: 2018-09-02

## 2018-09-02 RX ORDER — SIMETHICONE 80 MG/1
80 TABLET, CHEWABLE ORAL DAILY
Qty: 0 | Refills: 0 | Status: DISCONTINUED | OUTPATIENT
Start: 2018-09-02 | End: 2018-09-02

## 2018-09-02 RX ORDER — TAMSULOSIN HYDROCHLORIDE 0.4 MG/1
0.4 CAPSULE ORAL AT BEDTIME
Qty: 0 | Refills: 0 | Status: DISCONTINUED | OUTPATIENT
Start: 2018-09-02 | End: 2018-09-13

## 2018-09-02 RX ORDER — LATANOPROST 0.05 MG/ML
1 SOLUTION/ DROPS OPHTHALMIC; TOPICAL AT BEDTIME
Qty: 0 | Refills: 0 | Status: DISCONTINUED | OUTPATIENT
Start: 2018-09-02 | End: 2018-09-13

## 2018-09-02 RX ORDER — METRONIDAZOLE 500 MG
500 TABLET ORAL ONCE
Qty: 0 | Refills: 0 | Status: COMPLETED | OUTPATIENT
Start: 2018-09-02 | End: 2018-09-02

## 2018-09-02 RX ORDER — ASPIRIN/CALCIUM CARB/MAGNESIUM 324 MG
81 TABLET ORAL DAILY
Qty: 0 | Refills: 0 | Status: DISCONTINUED | OUTPATIENT
Start: 2018-09-02 | End: 2018-09-05

## 2018-09-02 RX ORDER — LACTOBACILLUS ACIDOPHILUS 100MM CELL
1 CAPSULE ORAL
Qty: 0 | Refills: 0 | Status: DISCONTINUED | OUTPATIENT
Start: 2018-09-02 | End: 2018-09-13

## 2018-09-02 RX ORDER — FAMOTIDINE 10 MG/ML
20 INJECTION INTRAVENOUS DAILY
Qty: 0 | Refills: 0 | Status: DISCONTINUED | OUTPATIENT
Start: 2018-09-02 | End: 2018-09-13

## 2018-09-02 RX ORDER — SIMETHICONE 80 MG/1
80 TABLET, CHEWABLE ORAL EVERY 6 HOURS
Qty: 0 | Refills: 0 | Status: DISCONTINUED | OUTPATIENT
Start: 2018-09-02 | End: 2018-09-13

## 2018-09-02 RX ORDER — INFLUENZA VIRUS VACCINE 15; 15; 15; 15 UG/.5ML; UG/.5ML; UG/.5ML; UG/.5ML
0.5 SUSPENSION INTRAMUSCULAR ONCE
Qty: 0 | Refills: 0 | Status: DISCONTINUED | OUTPATIENT
Start: 2018-09-02 | End: 2018-09-13

## 2018-09-02 RX ORDER — HEPARIN SODIUM 5000 [USP'U]/ML
5000 INJECTION INTRAVENOUS; SUBCUTANEOUS EVERY 12 HOURS
Qty: 0 | Refills: 0 | Status: DISCONTINUED | OUTPATIENT
Start: 2018-09-02 | End: 2018-09-02

## 2018-09-02 RX ORDER — HEPARIN SODIUM 5000 [USP'U]/ML
5000 INJECTION INTRAVENOUS; SUBCUTANEOUS EVERY 12 HOURS
Qty: 0 | Refills: 0 | Status: DISCONTINUED | OUTPATIENT
Start: 2018-09-02 | End: 2018-09-13

## 2018-09-02 RX ADMIN — FAMOTIDINE 20 MILLIGRAM(S): 10 INJECTION INTRAVENOUS at 12:53

## 2018-09-02 RX ADMIN — SODIUM CHLORIDE 70 MILLILITER(S): 9 INJECTION, SOLUTION INTRAVENOUS at 05:12

## 2018-09-02 RX ADMIN — SODIUM CHLORIDE 100 MILLILITER(S): 9 INJECTION INTRAMUSCULAR; INTRAVENOUS; SUBCUTANEOUS at 12:52

## 2018-09-02 RX ADMIN — Medication 125 MILLIGRAM(S): at 12:36

## 2018-09-02 RX ADMIN — Medication 10 MILLIGRAM(S): at 14:20

## 2018-09-02 RX ADMIN — TAMSULOSIN HYDROCHLORIDE 0.4 MILLIGRAM(S): 0.4 CAPSULE ORAL at 22:18

## 2018-09-02 RX ADMIN — Medication 125 MILLIGRAM(S): at 07:14

## 2018-09-02 RX ADMIN — Medication 40 MILLIEQUIVALENT(S): at 16:04

## 2018-09-02 RX ADMIN — Medication 20 MILLIGRAM(S): at 14:20

## 2018-09-02 RX ADMIN — Medication 1 TABLET(S): at 17:06

## 2018-09-02 RX ADMIN — Medication 10 MILLIGRAM(S): at 16:08

## 2018-09-02 RX ADMIN — LATANOPROST 1 DROP(S): 0.05 SOLUTION/ DROPS OPHTHALMIC; TOPICAL at 22:09

## 2018-09-02 RX ADMIN — HEPARIN SODIUM 5000 UNIT(S): 5000 INJECTION INTRAVENOUS; SUBCUTANEOUS at 17:10

## 2018-09-02 RX ADMIN — Medication 81 MILLIGRAM(S): at 12:52

## 2018-09-02 RX ADMIN — Medication 125 MILLIGRAM(S): at 17:06

## 2018-09-02 RX ADMIN — Medication 40 MILLIEQUIVALENT(S): at 12:53

## 2018-09-02 RX ADMIN — Medication 100 MILLIGRAM(S): at 00:41

## 2018-09-02 NOTE — H&P ADULT - PROBLEM SELECTOR PLAN 1
- recurrent c diff infection, patient continues to have loose watery bowel movements; at least 5 in the last 12 hours   - Given recurrence, will start pulse tapered regimen 125 mg po q6h for 14 days, then 125 mg BID x 7 days, then 125 mg po once daily and then 125 mg every other day for 2 (upto to 8 weeks pending response) weeks  - c diff battery re-ordered  - c/w  cc/hr until diarrhea resolves   - c/w lactobacillus  - c/w simethicone for gas  - would recommend ID consult given recurrence of infection  - hypokalemia 2' diarrhea, repleted; recheck bmp in the am

## 2018-09-02 NOTE — H&P ADULT - NSHPPHYSICALEXAM_GEN_ALL_CORE
Vital Signs Last 24 Hrs  T(C): 36.9 (02 Sep 2018 04:47), Max: 36.9 (02 Sep 2018 04:47)  T(F): 98.4 (02 Sep 2018 04:47), Max: 98.4 (02 Sep 2018 04:47)  HR: 54 (02 Sep 2018 04:47) (54 - 69)  BP: 109/58 (02 Sep 2018 04:47) (103/63 - 130/65)  BP(mean): --  RR: 16 (02 Sep 2018 04:47) (16 - 18)  SpO2: 99% (02 Sep 2018 04:47) (96% - 99%)    GENERAL: NAD, well-developed  HEAD:  Atraumatic, Normocephalic  EYES: EOMI, PERRLA, conjunctiva and sclera clear  ENT: Pharynx not erythematous  PULMONARY: Clear to auscultation bilaterally; No wheeze  CARDIOVASCULAR: Regular rate and rhythm; No murmurs, rubs, or gallops  ABDOMEN: Soft, mild generalized tenderness to palpation, Nondistended; Bowel sounds present  EXTREMITIES:  2+ Peripheral Pulses, No clubbing, cyanosis, or edema  PSYCH: AAOx3, normal affect- mentation at baseline  SKIN: warm and dry, No rashes or lesions

## 2018-09-02 NOTE — H&P ADULT - NSHPLABSRESULTS_GEN_ALL_CORE
.  LABS:                         10.0   15.44 )-----------( 233      ( 02 Sep 2018 08:10 )             31.0     09-02    136  |  102  |  30<H>  ----------------------------<  112<H>  3.3<L>   |  22  |  1.21    Ca    9.3      02 Sep 2018 07:01    TPro  7.6  /  Alb  4.0  /  TBili  1.3<H>  /  DBili  x   /  AST  12  /  ALT  11  /  AlkPhos  78  09-01    PT/INR - ( 01 Sep 2018 23:20 )   PT: 13.5 sec;   INR: 1.24 ratio         PTT - ( 01 Sep 2018 23:20 )  PTT:29.2 sec          RADIOLOGY, EKG & ADDITIONAL TESTS: Reviewed.     blood cultures sent

## 2018-09-02 NOTE — H&P ADULT - HISTORY OF PRESENT ILLNESS
Patient is an 86 year old male with HTN, BPH, recent completion of vancomycin for a cdiff infection (complete 14 days ago) presents to the ED because of recurrent several loose watery stools for the last 2-3 days and also waxing and waning of mentation for the last week, which has become progressively worse in the last 48 hours prior to admission. Patient reports that he continues to have loose watery stools. In the last 12 hours, hes had at least 5 bowel movements. Denies blood in stool. Abdomen is crampy and he is having gas, but denies distension or any abdominal pain. Denies fevers, chills. No cough, sputum production, pain/burning with urination, no rashes.     In the ED, he has been given 1L of fluid and a dose of po vancomycin and iv flagyl.

## 2018-09-02 NOTE — H&P ADULT - PROBLEM SELECTOR PLAN 5
- 2' diarrhea as a result of the c diff infection  - c/w NS at 100 cc/hr until diarrhea improves  - repeat bmp in the am

## 2018-09-02 NOTE — H&P ADULT - ASSESSMENT
Patient is an 86 year old male with HTN, BPH, recent completion of vancomycin for a cdiff infection (complete 14 days ago) presents to the ED because of recurrent several loose watery stools for the last 2-3 days and also waxing and waning of mentation for the last week, which has become progressively worse in the last 48 hours prior to admission, admitted for metabolic encephalopathy 2' recurrent c diff infection.

## 2018-09-02 NOTE — H&P ADULT - NSHPREVIEWOFSYSTEMS_GEN_ALL_CORE
GENERAL: No fevers, no chills.  EYES: No blurry vision,  No photophobia  ENT: No sore throat.  No dysphagia  Cardiovascular: No chest pain, palpitations, orthopnea  Pulmonary: No cough, no wheezing. No shortness of breath  Gastrointestinal: No abdominal pain, no diarrhea, no constipation.  No melena.  No hematochezia  : No dysuria.  No hematuria  Musculoskeletal: No weakness.  No myalgias.  Dermatology:  No rashes.  Neuro: No Headache.  No vertigo.  No dizziness.  Psych: No anxiety, no depression.  Denies suicidal thoughts.

## 2018-09-03 LAB
ANION GAP SERPL CALC-SCNC: 11 MMOL/L — SIGNIFICANT CHANGE UP (ref 5–17)
BASOPHILS # BLD AUTO: 0.04 K/UL — SIGNIFICANT CHANGE UP (ref 0–0.2)
BASOPHILS NFR BLD AUTO: 0.5 % — SIGNIFICANT CHANGE UP (ref 0–2)
BUN SERPL-MCNC: 24 MG/DL — HIGH (ref 7–23)
CALCIUM SERPL-MCNC: 9.1 MG/DL — SIGNIFICANT CHANGE UP (ref 8.4–10.5)
CHLORIDE SERPL-SCNC: 105 MMOL/L — SIGNIFICANT CHANGE UP (ref 96–108)
CO2 SERPL-SCNC: 22 MMOL/L — SIGNIFICANT CHANGE UP (ref 22–31)
CREAT SERPL-MCNC: 0.97 MG/DL — SIGNIFICANT CHANGE UP (ref 0.5–1.3)
EOSINOPHIL # BLD AUTO: 0.18 K/UL — SIGNIFICANT CHANGE UP (ref 0–0.5)
EOSINOPHIL NFR BLD AUTO: 2.2 % — SIGNIFICANT CHANGE UP (ref 0–6)
GLUCOSE SERPL-MCNC: 92 MG/DL — SIGNIFICANT CHANGE UP (ref 70–99)
HCT VFR BLD CALC: 32.3 % — LOW (ref 39–50)
HGB BLD-MCNC: 10.4 G/DL — LOW (ref 13–17)
IMM GRANULOCYTES NFR BLD AUTO: 0.2 % — SIGNIFICANT CHANGE UP (ref 0–1.5)
LYMPHOCYTES # BLD AUTO: 1.69 K/UL — SIGNIFICANT CHANGE UP (ref 1–3.3)
LYMPHOCYTES # BLD AUTO: 20.7 % — SIGNIFICANT CHANGE UP (ref 13–44)
MCHC RBC-ENTMCNC: 30.5 PG — SIGNIFICANT CHANGE UP (ref 27–34)
MCHC RBC-ENTMCNC: 32.2 GM/DL — SIGNIFICANT CHANGE UP (ref 32–36)
MCV RBC AUTO: 94.7 FL — SIGNIFICANT CHANGE UP (ref 80–100)
MONOCYTES # BLD AUTO: 0.45 K/UL — SIGNIFICANT CHANGE UP (ref 0–0.9)
MONOCYTES NFR BLD AUTO: 5.5 % — SIGNIFICANT CHANGE UP (ref 2–14)
NEUTROPHILS # BLD AUTO: 5.8 K/UL — SIGNIFICANT CHANGE UP (ref 1.8–7.4)
NEUTROPHILS NFR BLD AUTO: 70.9 % — SIGNIFICANT CHANGE UP (ref 43–77)
PLATELET # BLD AUTO: 249 K/UL — SIGNIFICANT CHANGE UP (ref 150–400)
POTASSIUM SERPL-MCNC: 3.9 MMOL/L — SIGNIFICANT CHANGE UP (ref 3.5–5.3)
POTASSIUM SERPL-SCNC: 3.9 MMOL/L — SIGNIFICANT CHANGE UP (ref 3.5–5.3)
RBC # BLD: 3.41 M/UL — LOW (ref 4.2–5.8)
RBC # FLD: 13.9 % — SIGNIFICANT CHANGE UP (ref 10.3–14.5)
SODIUM SERPL-SCNC: 138 MMOL/L — SIGNIFICANT CHANGE UP (ref 135–145)
WBC # BLD: 8.18 K/UL — SIGNIFICANT CHANGE UP (ref 3.8–10.5)
WBC # FLD AUTO: 8.18 K/UL — SIGNIFICANT CHANGE UP (ref 3.8–10.5)

## 2018-09-03 PROCEDURE — 99222 1ST HOSP IP/OBS MODERATE 55: CPT | Mod: GC

## 2018-09-03 RX ORDER — HALOPERIDOL DECANOATE 100 MG/ML
1 INJECTION INTRAMUSCULAR ONCE
Qty: 0 | Refills: 0 | Status: COMPLETED | OUTPATIENT
Start: 2018-09-03 | End: 2018-09-03

## 2018-09-03 RX ADMIN — HALOPERIDOL DECANOATE 1 MILLIGRAM(S): 100 INJECTION INTRAMUSCULAR at 22:57

## 2018-09-03 RX ADMIN — SODIUM CHLORIDE 100 MILLILITER(S): 9 INJECTION INTRAMUSCULAR; INTRAVENOUS; SUBCUTANEOUS at 05:37

## 2018-09-03 RX ADMIN — Medication 125 MILLIGRAM(S): at 05:36

## 2018-09-03 RX ADMIN — HEPARIN SODIUM 5000 UNIT(S): 5000 INJECTION INTRAVENOUS; SUBCUTANEOUS at 05:37

## 2018-09-03 RX ADMIN — Medication 81 MILLIGRAM(S): at 11:55

## 2018-09-03 RX ADMIN — Medication 10 MILLIGRAM(S): at 11:56

## 2018-09-03 RX ADMIN — Medication 125 MILLIGRAM(S): at 01:07

## 2018-09-03 RX ADMIN — Medication 20 MILLIGRAM(S): at 05:36

## 2018-09-03 RX ADMIN — Medication 10 MILLIGRAM(S): at 17:22

## 2018-09-03 RX ADMIN — Medication 1 TABLET(S): at 17:22

## 2018-09-03 RX ADMIN — Medication 125 MILLIGRAM(S): at 11:55

## 2018-09-03 RX ADMIN — SODIUM CHLORIDE 100 MILLILITER(S): 9 INJECTION INTRAMUSCULAR; INTRAVENOUS; SUBCUTANEOUS at 12:00

## 2018-09-03 RX ADMIN — FAMOTIDINE 20 MILLIGRAM(S): 10 INJECTION INTRAVENOUS at 11:55

## 2018-09-03 RX ADMIN — Medication 10 MILLIGRAM(S): at 08:29

## 2018-09-03 RX ADMIN — Medication 1 TABLET(S): at 08:31

## 2018-09-03 RX ADMIN — HEPARIN SODIUM 5000 UNIT(S): 5000 INJECTION INTRAVENOUS; SUBCUTANEOUS at 17:23

## 2018-09-03 RX ADMIN — TAMSULOSIN HYDROCHLORIDE 0.4 MILLIGRAM(S): 0.4 CAPSULE ORAL at 21:21

## 2018-09-03 RX ADMIN — Medication 125 MILLIGRAM(S): at 17:23

## 2018-09-03 RX ADMIN — Medication 125 MILLIGRAM(S): at 21:21

## 2018-09-03 RX ADMIN — HALOPERIDOL DECANOATE 1 MILLIGRAM(S): 100 INJECTION INTRAMUSCULAR at 21:13

## 2018-09-03 NOTE — PROGRESS NOTE ADULT - SUBJECTIVE AND OBJECTIVE BOX
Patient is a 86y old  Male who presents with a chief complaint of recurrent c diff (02 Sep 2018 10:12)      INTERVAL HPI/OVERNIGHT EVENTS:    MEDICATIONS  (STANDING):  aspirin enteric coated 81 milliGRAM(s) Oral daily  dicyclomine 10 milliGRAM(s) Oral three times a day before meals  enalapril 20 milliGRAM(s) Oral daily  famotidine    Tablet 20 milliGRAM(s) Oral daily  haloperidol    Injectable 1 milliGRAM(s) IntraMuscular once  heparin  Injectable 5000 Unit(s) SubCutaneous every 12 hours  influenza   Vaccine 0.5 milliLiter(s) IntraMuscular once  lactobacillus acidophilus 1 Tablet(s) Oral two times a day with meals  latanoprost 0.005% Ophthalmic Solution 1 Drop(s) Both EYES at bedtime  sodium chloride 0.9%. 1000 milliLiter(s) (100 mL/Hr) IV Continuous <Continuous>  tamsulosin 0.4 milliGRAM(s) Oral at bedtime  vancomycin    Solution 125 milliGRAM(s) Oral every 6 hours    MEDICATIONS  (PRN):  aluminum hydroxide/magnesium hydroxide/simethicone Suspension 30 milliLiter(s) Oral four times a day PRN Indigestion  ondansetron Injectable 4 milliGRAM(s) IV Push every 6 hours PRN Nausea  simethicone 80 milliGRAM(s) Chew every 6 hours PRN Gas      Allergies    No Known Allergies    Intolerances        Vital Signs Last 24 Hrs  T(C): 36.8 (03 Sep 2018 21:02), Max: 36.8 (03 Sep 2018 21:02)  T(F): 98.2 (03 Sep 2018 21:02), Max: 98.2 (03 Sep 2018 21:02)  HR: 80 (03 Sep 2018 21:02) (80 - 85)  BP: 110/69 (03 Sep 2018 21:02) (110/69 - 114/64)  BP(mean): --  RR: 18 (03 Sep 2018 21:02) (16 - 18)  SpO2: 98% (03 Sep 2018 21:02) (95% - 98%)    LABS:                        10.4   8.18  )-----------( 249      ( 03 Sep 2018 09:43 )             32.3     09-03    138  |  105  |  24<H>  ----------------------------<  92  3.9   |  22  |  0.97    Ca    9.1      03 Sep 2018 07:11    TPro  7.6  /  Alb  4.0  /  TBili  1.3<H>  /  DBili  x   /  AST  12  /  ALT  11  /  AlkPhos  78  09-01    PT/INR - ( 01 Sep 2018 23:20 )   PT: 13.5 sec;   INR: 1.24 ratio         PTT - ( 01 Sep 2018 23:20 )  PTT:29.2 sec      RADIOLOGY & ADDITIONAL TESTS:        Dr Wesley [phone]

## 2018-09-03 NOTE — PROGRESS NOTE ADULT - ASSESSMENT
c dif- htn- ekg a flutter-will  review office ekg-agitation- psych called- will see pt in am-  dw dr orta- rec flavio

## 2018-09-03 NOTE — CONSULT NOTE ADULT - ASSESSMENT
86 year old male with HTN, BPH, recent completion of vancomycin for a cdiff infection (complete 14 days ago) presents to the ED because of recurrent several loose watery stools for the last 2-3 days and also waxing and waning of mentation for the last week, which has become progressively worse in the last 48 hours prior to admission, admitted for metabolic encephalopathy 2' recurrent c diff infection.     Suggest:   send stool for c.diff   follow up on blood cultures   pt will likely needs  pulse tapered regimen 125 mg po q6h for 14 days, then 125 mg BID x 7 days, then 125 mg po once daily and then 125 mg every other day for 2 (upto to 8 weeks pending response) weeks 86 year old male with HTN, BPH, recent completion of vancomycin for a cdiff infection (complete 14 days ago) presents to the ED because of recurrent several loose watery stools for the last 2-3 days and also waxing and waning of mentation for the last week, which has become progressively worse in the last 48 hours prior to admission, admitted for recurrent c diff infection.     Suggest:   send stool for c.diff   follow up on blood cultures   pt will likely needs  pulse tapered regimen 125 mg po q6h for 14 days, then 125 mg BID x 7 days, then 125 mg po once daily and then 125 mg every other day for 2 (upto to 8 weeks pending response) weeks  will continue to follow 86 year old male with HTN, BPH, recent completion of vancomycin for a cdiff infection (complete 14 days ago) presents to the ED because of recurrent several loose watery stools for the last 2-3 days and also waxing and waning of mentation for the last week, which has become progressively worse in the last 48 hours prior to admission, admitted for recurrent c diff infection.   Overall Recurrent C diff colitis, leucocytosis resolved.     Suggest:   send stool for c.diff   follow up on blood cultures   pt will likely needs  pulse tapered regimen 125 mg po q6h for 14 days, then 125 mg BID x 7 days, then 125 mg po once daily and then 125 mg every other day for 2 (upto to 8 weeks pending response) weeks  will continue to follow

## 2018-09-03 NOTE — CONSULT NOTE ADULT - SUBJECTIVE AND OBJECTIVE BOX
Patient is a 86y old  Male who presents with a chief complaint of recurrent c diff (02 Sep 2018 10:12)      HPI:  Patient is an 86 year old male with HTN, BPH, recent completion of vancomycin for a cdiff infection (complete 14 days ago) presents to the ED because of recurrent several loose watery stools for the last 2-3 days and also waxing and waning of mentation for the last week, which has become progressively worse in the last 48 hours prior to admission. Patient reports that he continues to have loose watery stools. In the last 12 hours, hes had at least 5 bowel movements. Denies blood in stool. Abdomen is crampy and he is having gas, but denies distension or any abdominal pain. Denies fevers, chills. No cough, sputum production, pain/burning with urination, no rashes.     In the ED, he has been given 1L of fluid and a dose of po vancomycin and iv flagyl. (02 Sep 2018 10:12)      PAST MEDICAL & SURGICAL HISTORY:  Osteoarthritis of right knee  Hypertension  Tremor of both hands  History of hernia surgery: X3 1950&#x27;s-1970&#x27;s  History of shoulder surgery: right, 2012  History of knee replacement, total, left: 2007      Allergies  No Known Allergies        ANTIMICROBIALS:  vancomycin    Solution 125 every 6 hours      OTHER MEDS: MEDICATIONS  (STANDING):  aluminum hydroxide/magnesium hydroxide/simethicone Suspension 30 four times a day PRN  aspirin enteric coated 81 daily  dicyclomine 10 three times a day before meals  enalapril 20 daily  famotidine    Tablet 20 daily  heparin  Injectable 5000 every 12 hours  influenza   Vaccine 0.5 once  ondansetron Injectable 4 every 6 hours PRN  simethicone 80 every 6 hours PRN  tamsulosin 0.4 at bedtime      SOCIAL HISTORY:     denies drug use  	denies alcohol use  	denies tobacco use    FAMILY HISTORY:  Family history of heart disease (Father)      REVIEW OF SYSTEMS   [x  ] All other systems negative except as noted below:	    Constitutional:  [ ] fever [ ] chills  [ ] weight loss  [ ] weakness  Skin:  [ ] rash [ ] phlebitis	  Eyes: [ ] icterus [ ] pain  [ ] discharge	  ENMT: [ ] sore throat  [ ] thrush [ ] ulcers [ ] exudates  Respiratory: [ ] dyspnea [ ] hemoptysis [ ] cough [ ] sputum	  Cardiovascular:  [ ] chest pain [ ] palpitations [ ] edema	  Gastrointestinal:  [ ] nausea [ ] vomiting [x ] diarrhea [ ] constipation [x ] pain	  Genitourinary:  [ ] dysuria [ ] frequency [ ] hematuria [ ] discharge [ ] flank pain  [ ] incontinence  Musculoskeletal:  [ ] myalgias [ ] arthralgias [ ] arthritis  [ ] back pain  Neurological:  [ ] headache [ ] seizures  [ ] confusion/altered mental status  Psychiatric:  [ ] anxiety [ ] depression	  Hematology/Lymphatics:  [ ] lymphadenopathy  Endocrine:  [ ] adrenal [ ] thyroid  Allergic/Immunologic:	 [ ] transplant [ ] seasonal    Vital Signs Last 24 Hrs  T(F): 97.4 (09-03-18 @ 08:34), Max: 98.4 (09-02-18 @ 04:47)    Vital Signs Last 24 Hrs  HR: 85 (09-03-18 @ 08:34) (54 - 85)  BP: 114/64 (09-03-18 @ 08:34) (114/64 - 133/70)  RR: 16 (09-03-18 @ 08:34)  SpO2: 95% (09-03-18 @ 08:34) (95% - 98%)  Wt(kg): --    PHYSICAL EXAM:  General: non-toxic  HEAD/EYES: anicteric, PERRL  ENT:  supple  Cardiovascular:   S1, S2  Respiratory:  clear bilaterally  GI:  soft, non-tender, normal bowel sounds  :  no CVA tenderness   Musculoskeletal:  no synovitis  Neurologic:  grossly non-focal  Skin:  no rash  Lymph: no lymphadenopathy  Psychiatric:  appropriate affect  Vascular:  no phlebitis                          10.4   8.18  )-----------( 249      ( 03 Sep 2018 09:43 )             32.3       09-03    138  |  105  |  24<H>  ----------------------------<  92  3.9   |  22  |  0.97    Ca    9.1      03 Sep 2018 07:11    TPro  7.6  /  Alb  4.0  /  TBili  1.3<H>  /  DBili  x   /  AST  12  /  ALT  11  /  AlkPhos  78  09-01          MICROBIOLOGY:    Clostridium difficile Toxin by PCR (07.27.18 @ 22:54)    Clostridium difficile Toxin by PCR: The results of this test should be interpreted with consideration of all  clinical and laboratory findings. This test determines the presence of  the C. difficile tcdB gene at a given time and is not intended to  identify antibiotic associated disease or C. difficile infection without  clinical context. Successful treatment is based on the resolution of  clinical symptoms. This test should not be used as a "test of cure"  because C. difficile DNA will persist after successful treatment. Repeat  testing will not be permitted.    This test is performed on the BD MAX system using Real-Time PCR and  fluorogenic target-specific hybridization.    C Diff by PCR Result: Detected      RADIOLOGY:    < from: CT Chest No Cont (07.27.18 @ 14:16) >  Impression:    Bibasilar dependent consolidation/atelectasis and small effusions.   Correlate clinically for active infection.  Mild pretracheal adenopathy.  Cholelithiasis.  Nonobstructive left nephrolithiasis.  Acute uncomplicated pancolitis.  Markedly enlarged prostate.  Additional findings as discussed.    < end of copied text >    imaging below personally reviewed Patient is a 86y old  Male who presents with a chief complaint of recurrent c diff (02 Sep 2018 10:12)      HPI:  Patient is an 86 year old male with HTN, BPH, recent completion of vancomycin for a cdiff infection (complete 14 days ago) presents to the ED because of recurrent several loose watery stools for the last 2-3 days and also waxing and waning of mentation for the last week, which has become progressively worse in the last 48 hours prior to admission. Patient reports that he continues to have loose watery stools. In the last 12 hours, hes had at least 5 bowel movements. Denies blood in stool. Abdomen is crampy and he is having gas, but denies distension or any abdominal pain. Denies fevers, chills. No cough, sputum production, pain/burning with urination, no rashes.     In the ED, he has been given 1L of fluid and a dose of po vancomycin and iv flagyl. (02 Sep 2018 10:12)      PAST MEDICAL & SURGICAL HISTORY:  Osteoarthritis of right knee  Hypertension  Tremor of both hands  History of hernia surgery: X3 1950&#x27;s-1970&#x27;s  History of shoulder surgery: right, 2012  History of knee replacement, total, left: 2007      Allergies  No Known Allergies        ANTIMICROBIALS:  vancomycin    Solution 125 every 6 hours      OTHER MEDS: MEDICATIONS  (STANDING):  aluminum hydroxide/magnesium hydroxide/simethicone Suspension 30 four times a day PRN  aspirin enteric coated 81 daily  dicyclomine 10 three times a day before meals  enalapril 20 daily  famotidine    Tablet 20 daily  heparin  Injectable 5000 every 12 hours  influenza   Vaccine 0.5 once  ondansetron Injectable 4 every 6 hours PRN  simethicone 80 every 6 hours PRN  tamsulosin 0.4 at bedtime      SOCIAL HISTORY:     denies drug use  	denies alcohol use  	denies tobacco use    FAMILY HISTORY:  Family history of heart disease (Father)      REVIEW OF SYSTEMS   [x  ] All other systems negative except as noted below:	    Constitutional:  [ ] fever [ ] chills  [ ] weight loss  [ ] weakness  Skin:  [ ] rash [ ] phlebitis	  Eyes: [ ] icterus [ ] pain  [ ] discharge	  ENMT: [ ] sore throat  [ ] thrush [ ] ulcers [ ] exudates  Respiratory: [ ] dyspnea [ ] hemoptysis [ ] cough [ ] sputum	  Cardiovascular:  [ ] chest pain [ ] palpitations [ ] edema	  Gastrointestinal:  [ ] nausea [ ] vomiting [x ] diarrhea [ ] constipation [x ] pain	  Genitourinary:  [ ] dysuria [ ] frequency [ ] hematuria [ ] discharge [ ] flank pain  [ ] incontinence  Musculoskeletal:  [ ] myalgias [ ] arthralgias [ ] arthritis  [ ] back pain  Neurological:  [ ] headache [ ] seizures  [ ] confusion/altered mental status  Psychiatric:  [ ] anxiety [ ] depression	  Hematology/Lymphatics:  [ ] lymphadenopathy  Endocrine:  [ ] adrenal [ ] thyroid  Allergic/Immunologic:	 [ ] transplant [ ] seasonal    Vital Signs Last 24 Hrs  T(F): 97.4 (09-03-18 @ 08:34), Max: 98.4 (09-02-18 @ 04:47)    Vital Signs Last 24 Hrs  HR: 85 (09-03-18 @ 08:34) (54 - 85)  BP: 114/64 (09-03-18 @ 08:34) (114/64 - 133/70)  RR: 16 (09-03-18 @ 08:34)  SpO2: 95% (09-03-18 @ 08:34) (95% - 98%)  Wt(kg): --    PHYSICAL EXAM:  General: non-toxic  HEAD/EYES: anicteric, PERRL  ENT:  supple  Cardiovascular:   S1, S2  Respiratory:  clear bilaterally  GI:  soft, non-tender, normal bowel sounds  :  no CVA tenderness   Musculoskeletal:  right knee well healed scar with increased swelling and mild increased warmth but nontender, no fluctuance, no discharge   Neurologic:  grossly non-focal  Skin:  no rash  Lymph: no lymphadenopathy  Psychiatric:  appropriate affect  Vascular:  no phlebitis                          10.4   8.18  )-----------( 249      ( 03 Sep 2018 09:43 )             32.3       09-03    138  |  105  |  24<H>  ----------------------------<  92  3.9   |  22  |  0.97    Ca    9.1      03 Sep 2018 07:11    TPro  7.6  /  Alb  4.0  /  TBili  1.3<H>  /  DBili  x   /  AST  12  /  ALT  11  /  AlkPhos  78  09-01          MICROBIOLOGY:    Clostridium difficile Toxin by PCR (07.27.18 @ 22:54)    Clostridium difficile Toxin by PCR: The results of this test should be interpreted with consideration of all  clinical and laboratory findings. This test determines the presence of  the C. difficile tcdB gene at a given time and is not intended to  identify antibiotic associated disease or C. difficile infection without  clinical context. Successful treatment is based on the resolution of  clinical symptoms. This test should not be used as a "test of cure"  because C. difficile DNA will persist after successful treatment. Repeat  testing will not be permitted.    This test is performed on the BD MAX system using Real-Time PCR and  fluorogenic target-specific hybridization.    C Diff by PCR Result: Detected      RADIOLOGY:    < from: CT Chest No Cont (07.27.18 @ 14:16) >  Impression:    Bibasilar dependent consolidation/atelectasis and small effusions.   Correlate clinically for active infection.  Mild pretracheal adenopathy.  Cholelithiasis.  Nonobstructive left nephrolithiasis.  Acute uncomplicated pancolitis.  Markedly enlarged prostate.  Additional findings as discussed.    < end of copied text >    imaging below personally reviewed Patient is a 86y old  Male who presents with a chief complaint of recurrent c diff (02 Sep 2018 10:12)      HPI:  Patient is an 86 year old male with HTN, BPH, recent completion of vancomycin for a cdiff infection (complete 14 days ago) presents to the ED because of recurrent several loose watery stools for the last 2-3 days and also waxing and waning of mentation for the last week, which has become progressively worse in the last 48 hours prior to admission. Patient reports that he continues to have loose watery stools. In the last 12 hours, hes had at least 5 bowel movements. Denies blood in stool. Abdomen is crampy and he is having gas, but denies distension or any abdominal pain. Denies fevers, chills. No cough, sputum production, pain/burning with urination, no rashes.     In the ED, he has been given 1L of fluid and a dose of po vancomycin and iv flagyl. (02 Sep 2018 10:12)  Above reviewed;   Pt improved, diarrhea not as frequent, no cramping or abdominal pain, appetite coming back.     PAST MEDICAL & SURGICAL HISTORY:  Osteoarthritis of right knee  Hypertension  Tremor of both hands  History of hernia surgery: X3 1950&#x27;s-1970&#x27;s  History of shoulder surgery: right, 2012  History of knee replacement, total, left: 2007      Allergies  No Known Allergies      ANTIMICROBIALS:  vancomycin    Solution 125 every 6 hours      OTHER MEDS: MEDICATIONS  (STANDING):  aluminum hydroxide/magnesium hydroxide/simethicone Suspension 30 four times a day PRN  aspirin enteric coated 81 daily  dicyclomine 10 three times a day before meals  enalapril 20 daily  famotidine    Tablet 20 daily  heparin  Injectable 5000 every 12 hours  influenza   Vaccine 0.5 once  ondansetron Injectable 4 every 6 hours PRN  simethicone 80 every 6 hours PRN  tamsulosin 0.4 at bedtime      SOCIAL HISTORY:     Single, never smoker.     FAMILY HISTORY:  Family history of heart disease (Father)      REVIEW OF SYSTEMS   [x  ] All other systems negative except as noted below:	    Constitutional:  [ ] fever [ ] chills  [ ] weight loss  [ ] weakness  Skin:  [ ] rash [ ] phlebitis	  Eyes: [ ] icterus [ ] pain  [ ] discharge	  ENMT: [ ] sore throat  [ ] thrush [ ] ulcers [ ] exudates  Respiratory: [ ] dyspnea [ ] hemoptysis [ ] cough [ ] sputum	  Cardiovascular:  [ ] chest pain [ ] palpitations [ ] edema	  Gastrointestinal:  [ ] nausea [ ] vomiting [x ] diarrhea [ ] constipation [x ] pain	  Genitourinary:  [ ] dysuria [ ] frequency [ ] hematuria [ ] discharge [ ] flank pain  [ ] incontinence  Musculoskeletal:  [ ] myalgias [ ] arthralgias [ ] arthritis  [ ] back pain  Neurological:  [ ] headache [ ] seizures  [ ] confusion/altered mental status  Psychiatric:  [ ] anxiety [ ] depression	  Hematology/Lymphatics:  [ ] lymphadenopathy  Endocrine:  [ ] adrenal [ ] thyroid  Allergic/Immunologic:	 [ ] transplant [ ] seasonal    Vital Signs Last 24 Hrs  T(F): 97.4 (09-03-18 @ 08:34), Max: 98.4 (09-02-18 @ 04:47)    Vital Signs Last 24 Hrs  HR: 85 (09-03-18 @ 08:34) (54 - 85)  BP: 114/64 (09-03-18 @ 08:34) (114/64 - 133/70)  RR: 16 (09-03-18 @ 08:34)  SpO2: 95% (09-03-18 @ 08:34) (95% - 98%)  Wt(kg): --    PHYSICAL EXAM:  General: non-toxic  HEAD/EYES: anicteric, PERRL  ENT:  supple  Cardiovascular:   S1, S2 normal   Respiratory:  clear bilaterally  GI:  soft, non-tender, normal bowel sounds  :  no CVA tenderness   Musculoskeletal:  right knee well healed scar with increased swelling and mild increased warmth but nontender, no fluctuance, no discharge   Neurologic:  grossly non-focal  Skin:  no rash  Psychiatric:  appropriate affect  Vascular:  no phlebitis                          10.4   8.18  )-----------( 249      ( 03 Sep 2018 09:43 )             32.3       09-03    138  |  105  |  24<H>  ----------------------------<  92  3.9   |  22  |  0.97    Ca    9.1      03 Sep 2018 07:11    TPro  7.6  /  Alb  4.0  /  TBili  1.3<H>  /  DBili  x   /  AST  12  /  ALT  11  /  AlkPhos  78  09-01      MICROBIOLOGY:    Clostridium difficile Toxin by PCR (07.27.18 @ 22:54)    Clostridium difficile Toxin by PCR: The results of this test should be interpreted with consideration of all  clinical and laboratory findings. This test determines the presence of  the C. difficile tcdB gene at a given time and is not intended to  identify antibiotic associated disease or C. difficile infection without  clinical context. Successful treatment is based on the resolution of  clinical symptoms. This test should not be used as a "test of cure"  because C. difficile DNA will persist after successful treatment. Repeat  testing will not be permitted.    This test is performed on the BD MAX system using Real-Time PCR and  fluorogenic target-specific hybridization.    C Diff by PCR Result: Detected      RADIOLOGY: No imaging performed this admission.     < from: CT Chest No Cont (07.27.18 @ 14:16) >  Impression:    Bibasilar dependent consolidation/atelectasis and small effusions.   Correlate clinically for active infection.  Mild pretracheal adenopathy.  Cholelithiasis.  Nonobstructive left nephrolithiasis.  Acute uncomplicated pancolitis.  Markedly enlarged prostate.  Additional findings as discussed.

## 2018-09-04 DIAGNOSIS — F05 DELIRIUM DUE TO KNOWN PHYSIOLOGICAL CONDITION: ICD-10-CM

## 2018-09-04 LAB
ALBUMIN SERPL ELPH-MCNC: 3.3 G/DL — SIGNIFICANT CHANGE UP (ref 3.3–5)
ALP SERPL-CCNC: 58 U/L — SIGNIFICANT CHANGE UP (ref 40–120)
ALT FLD-CCNC: 9 U/L — LOW (ref 10–45)
ANION GAP SERPL CALC-SCNC: 11 MMOL/L — SIGNIFICANT CHANGE UP (ref 5–17)
ANION GAP SERPL CALC-SCNC: 14 MMOL/L — SIGNIFICANT CHANGE UP (ref 5–17)
APPEARANCE UR: CLEAR — SIGNIFICANT CHANGE UP
AST SERPL-CCNC: 16 U/L — SIGNIFICANT CHANGE UP (ref 10–40)
BASOPHILS # BLD AUTO: 0.02 K/UL — SIGNIFICANT CHANGE UP (ref 0–0.2)
BASOPHILS NFR BLD AUTO: 0.4 % — SIGNIFICANT CHANGE UP (ref 0–2)
BILIRUB SERPL-MCNC: 0.3 MG/DL — SIGNIFICANT CHANGE UP (ref 0.2–1.2)
BILIRUB UR-MCNC: NEGATIVE — SIGNIFICANT CHANGE UP
BUN SERPL-MCNC: 16 MG/DL — SIGNIFICANT CHANGE UP (ref 7–23)
BUN SERPL-MCNC: 16 MG/DL — SIGNIFICANT CHANGE UP (ref 7–23)
C DIFF GDH STL QL: NEGATIVE — SIGNIFICANT CHANGE UP
C DIFF GDH STL QL: SIGNIFICANT CHANGE UP
CALCIUM SERPL-MCNC: 8.9 MG/DL — SIGNIFICANT CHANGE UP (ref 8.4–10.5)
CALCIUM SERPL-MCNC: 9 MG/DL — SIGNIFICANT CHANGE UP (ref 8.4–10.5)
CHLORIDE SERPL-SCNC: 105 MMOL/L — SIGNIFICANT CHANGE UP (ref 96–108)
CHLORIDE SERPL-SCNC: 107 MMOL/L — SIGNIFICANT CHANGE UP (ref 96–108)
CO2 SERPL-SCNC: 19 MMOL/L — LOW (ref 22–31)
CO2 SERPL-SCNC: 20 MMOL/L — LOW (ref 22–31)
COLOR SPEC: YELLOW — SIGNIFICANT CHANGE UP
CREAT SERPL-MCNC: 0.89 MG/DL — SIGNIFICANT CHANGE UP (ref 0.5–1.3)
CREAT SERPL-MCNC: 0.9 MG/DL — SIGNIFICANT CHANGE UP (ref 0.5–1.3)
DIFF PNL FLD: NEGATIVE — SIGNIFICANT CHANGE UP
EOSINOPHIL # BLD AUTO: 0.08 K/UL — SIGNIFICANT CHANGE UP (ref 0–0.5)
EOSINOPHIL NFR BLD AUTO: 1.4 % — SIGNIFICANT CHANGE UP (ref 0–6)
FOLATE SERPL-MCNC: 8.7 NG/ML — SIGNIFICANT CHANGE UP
GLUCOSE BLDC GLUCOMTR-MCNC: 160 MG/DL — HIGH (ref 70–99)
GLUCOSE SERPL-MCNC: 89 MG/DL — SIGNIFICANT CHANGE UP (ref 70–99)
GLUCOSE SERPL-MCNC: 96 MG/DL — SIGNIFICANT CHANGE UP (ref 70–99)
GLUCOSE UR QL: NEGATIVE MG/DL — SIGNIFICANT CHANGE UP
HCT VFR BLD CALC: 29.7 % — LOW (ref 39–50)
HGB BLD-MCNC: 10.1 G/DL — LOW (ref 13–17)
IMM GRANULOCYTES NFR BLD AUTO: 0.2 % — SIGNIFICANT CHANGE UP (ref 0–1.5)
KETONES UR-MCNC: NEGATIVE — SIGNIFICANT CHANGE UP
LACTATE SERPL-SCNC: 1.4 MMOL/L — SIGNIFICANT CHANGE UP (ref 0.7–2)
LEUKOCYTE ESTERASE UR-ACNC: NEGATIVE — SIGNIFICANT CHANGE UP
LYMPHOCYTES # BLD AUTO: 1.04 K/UL — SIGNIFICANT CHANGE UP (ref 1–3.3)
LYMPHOCYTES # BLD AUTO: 18.5 % — SIGNIFICANT CHANGE UP (ref 13–44)
MAGNESIUM SERPL-MCNC: 1.6 MG/DL — SIGNIFICANT CHANGE UP (ref 1.6–2.6)
MCHC RBC-ENTMCNC: 31.2 PG — SIGNIFICANT CHANGE UP (ref 27–34)
MCHC RBC-ENTMCNC: 34 GM/DL — SIGNIFICANT CHANGE UP (ref 32–36)
MCV RBC AUTO: 91.7 FL — SIGNIFICANT CHANGE UP (ref 80–100)
MONOCYTES # BLD AUTO: 0.36 K/UL — SIGNIFICANT CHANGE UP (ref 0–0.9)
MONOCYTES NFR BLD AUTO: 6.4 % — SIGNIFICANT CHANGE UP (ref 2–14)
NEUTROPHILS # BLD AUTO: 4.11 K/UL — SIGNIFICANT CHANGE UP (ref 1.8–7.4)
NEUTROPHILS NFR BLD AUTO: 73.1 % — SIGNIFICANT CHANGE UP (ref 43–77)
NITRITE UR-MCNC: NEGATIVE — SIGNIFICANT CHANGE UP
PH UR: 5.5 — SIGNIFICANT CHANGE UP (ref 5–8)
PHOSPHATE SERPL-MCNC: 3.2 MG/DL — SIGNIFICANT CHANGE UP (ref 2.5–4.5)
PLATELET # BLD AUTO: 218 K/UL — SIGNIFICANT CHANGE UP (ref 150–400)
POTASSIUM SERPL-MCNC: 3.5 MMOL/L — SIGNIFICANT CHANGE UP (ref 3.5–5.3)
POTASSIUM SERPL-MCNC: 3.5 MMOL/L — SIGNIFICANT CHANGE UP (ref 3.5–5.3)
POTASSIUM SERPL-SCNC: 3.5 MMOL/L — SIGNIFICANT CHANGE UP (ref 3.5–5.3)
POTASSIUM SERPL-SCNC: 3.5 MMOL/L — SIGNIFICANT CHANGE UP (ref 3.5–5.3)
PROT SERPL-MCNC: 6.1 G/DL — SIGNIFICANT CHANGE UP (ref 6–8.3)
PROT UR-MCNC: NEGATIVE MG/DL — SIGNIFICANT CHANGE UP
RBC # BLD: 3.24 M/UL — LOW (ref 4.2–5.8)
RBC # FLD: 13.6 % — SIGNIFICANT CHANGE UP (ref 10.3–14.5)
SODIUM SERPL-SCNC: 138 MMOL/L — SIGNIFICANT CHANGE UP (ref 135–145)
SODIUM SERPL-SCNC: 138 MMOL/L — SIGNIFICANT CHANGE UP (ref 135–145)
SP GR SPEC: 1.01 — SIGNIFICANT CHANGE UP (ref 1.01–1.02)
T PALLIDUM AB TITR SER: NEGATIVE — SIGNIFICANT CHANGE UP
TSH SERPL-MCNC: 1.25 UIU/ML — SIGNIFICANT CHANGE UP (ref 0.27–4.2)
UROBILINOGEN FLD QL: NEGATIVE MG/DL — SIGNIFICANT CHANGE UP
VIT B12 SERPL-MCNC: 496 PG/ML — SIGNIFICANT CHANGE UP (ref 232–1245)
WBC # BLD: 5.62 K/UL — SIGNIFICANT CHANGE UP (ref 3.8–10.5)
WBC # FLD AUTO: 5.62 K/UL — SIGNIFICANT CHANGE UP (ref 3.8–10.5)

## 2018-09-04 PROCEDURE — 70553 MRI BRAIN STEM W/O & W/DYE: CPT | Mod: 26

## 2018-09-04 PROCEDURE — 93010 ELECTROCARDIOGRAM REPORT: CPT | Mod: 76,77

## 2018-09-04 PROCEDURE — 99232 SBSQ HOSP IP/OBS MODERATE 35: CPT

## 2018-09-04 PROCEDURE — 71260 CT THORAX DX C+: CPT | Mod: 26

## 2018-09-04 PROCEDURE — 93010 ELECTROCARDIOGRAM REPORT: CPT

## 2018-09-04 PROCEDURE — 99223 1ST HOSP IP/OBS HIGH 75: CPT | Mod: GC

## 2018-09-04 PROCEDURE — 74177 CT ABD & PELVIS W/CONTRAST: CPT | Mod: 26

## 2018-09-04 PROCEDURE — 70450 CT HEAD/BRAIN W/O DYE: CPT | Mod: 26

## 2018-09-04 PROCEDURE — 95819 EEG AWAKE AND ASLEEP: CPT | Mod: 26

## 2018-09-04 RX ORDER — HALOPERIDOL DECANOATE 100 MG/ML
2 INJECTION INTRAMUSCULAR ONCE
Qty: 0 | Refills: 0 | Status: COMPLETED | OUTPATIENT
Start: 2018-09-04 | End: 2018-09-04

## 2018-09-04 RX ADMIN — Medication 125 MILLIGRAM(S): at 22:33

## 2018-09-04 RX ADMIN — Medication 20 MILLIGRAM(S): at 12:43

## 2018-09-04 RX ADMIN — HEPARIN SODIUM 5000 UNIT(S): 5000 INJECTION INTRAVENOUS; SUBCUTANEOUS at 12:44

## 2018-09-04 RX ADMIN — Medication 125 MILLIGRAM(S): at 17:28

## 2018-09-04 RX ADMIN — HALOPERIDOL DECANOATE 2 MILLIGRAM(S): 100 INJECTION INTRAMUSCULAR at 22:31

## 2018-09-04 RX ADMIN — FAMOTIDINE 20 MILLIGRAM(S): 10 INJECTION INTRAVENOUS at 12:47

## 2018-09-04 RX ADMIN — Medication 125 MILLIGRAM(S): at 12:47

## 2018-09-04 RX ADMIN — TAMSULOSIN HYDROCHLORIDE 0.4 MILLIGRAM(S): 0.4 CAPSULE ORAL at 21:07

## 2018-09-04 RX ADMIN — HEPARIN SODIUM 5000 UNIT(S): 5000 INJECTION INTRAVENOUS; SUBCUTANEOUS at 21:07

## 2018-09-04 RX ADMIN — Medication 1 TABLET(S): at 17:29

## 2018-09-04 RX ADMIN — Medication 81 MILLIGRAM(S): at 12:46

## 2018-09-04 RX ADMIN — Medication 10 MILLIGRAM(S): at 17:29

## 2018-09-04 RX ADMIN — Medication 10 MILLIGRAM(S): at 12:45

## 2018-09-04 RX ADMIN — Medication 1 TABLET(S): at 12:44

## 2018-09-04 RX ADMIN — LATANOPROST 1 DROP(S): 0.05 SOLUTION/ DROPS OPHTHALMIC; TOPICAL at 21:07

## 2018-09-04 NOTE — BEHAVIORAL HEALTH ASSESSMENT NOTE - NSBHCONSULTMEDS_PSY_A_CORE FT
1. f/u mri, eeg. f/u neuro recs  2. haldol 2mg PRN agitation. minimize use of benzos, opiates, other deliriogenic agents.

## 2018-09-04 NOTE — BEHAVIORAL HEALTH ASSESSMENT NOTE - CASE SUMMARY
87 y/o male who lives alone, retired, , with no PPHx, no substance hx, medical hx significant for HTN, BPH, admitted to hospital for metabolic encephalopathy and c.diff, psychiatry consulted for medication management s/p episode of agitation and confusion last night.  On interview, pt AOx1, inattentive, paranoid.  States he is a "prisoner of war" and felt threatened by staff last night.  1/5 attention.  Cannot explain why he is here.  Denies AVH, denies SIIP/HIIP.  Daughter at bedside; states this is an acute change in mental status; was at baseline AOx3, no baseline dementia, living independently, paying bills, driving, up until hospitalization in July, has been waxing and waning since that time.  Of note, pt with MRI showing "Right temporal bleed ring-enhancing mass measuring 4.6 x 3.3 x 2.4 cm suspicious for neoplasm."  Dx: [place]ium 2/2 [facility] [place]  Recs: 1. Daughter prefers pt not be started on standing psychiatric medications at this time.  2. PRN: Haldol 2mg PO/IV q6h PRN severe agitation.  Monitor for QTc<500.  Melatonin 3mg PO qHS PRN insomnia.  3. Check: EEG, f/u neuro recs.  4. Supplement Vitamin B12.  5. Minimize use of benzos, opioids, anticholinergics, or other deliriogenic agents when possible.  Maintain sleep wake cycle.  Provide frequent reorientation and redirection.  Family member at bedside if possible. 6. Pt does not meet criteria for psychiatric hospitalization.  Recommend outpt psych f/u at [facility] after d/c- [phone].   [facility] Psych will follow.

## 2018-09-04 NOTE — BEHAVIORAL HEALTH ASSESSMENT NOTE - NSBHCHARTREVIEWIMAGING_PSY_A_CORE FT
CT head:  IMPRESSION:  A 1.3 cm hyperdense calcified versus hemorrhagic lesion with surrounding   edema is present in the right temporal lobe. Differential considerations   include a primary or secondary tumor, vascular lesion (AVM or cavernous   malformation), less likely atypical hemorrhagic stroke. A follow-up brain   MRI with and without contrast is recommended for further workup.

## 2018-09-04 NOTE — PROGRESS NOTE ADULT - ASSESSMENT
86 year old male with HTN, BPH, recent completion of vancomycin for a cdiff infection (complete 14 days ago) presents to the ED because of recurrent several loose watery stools for the last 2-3 days and also waxing and waning of mentation for the last week, which has become progressively worse in the last 48 hours prior to admission, admitted for recurrent c diff infection.   Overall Recurrent C diff colitis, leucocytosis resolved.   AMS, MRI with a mass.     Suggest:   c.diff negative   follow up prelim blood cultures, NTD   Continue with Vancomycin po for now, given pt clinically improved with therapy.   Consider CT abd/pelvis to look for other etiology of diarrhea.   Oncology eval.

## 2018-09-04 NOTE — PROGRESS NOTE ADULT - SUBJECTIVE AND OBJECTIVE BOX
86y old  Male who presents with a chief complaint of recurrent c diff (04 Sep 2018 11:54)      Interval history:  Afebrile, says had 1 BM today, now soft, no abdominal pain or cramping, appetite improved. Pt was agitated overnight. Still a little confused per daughter.     No Known Allergies    Antimicrobials:  vancomycin    Solution 125 milliGRAM(s) Oral every 6 hours      REVIEW OF SYSTEMS:  No chest pain   Minimal cough, no SOB  No N/V  No dysuria  No rash.     Vital Signs Last 24 Hrs  T(C): 36.5 (09-04-18 @ 14:28), Max: 36.8 (09-03-18 @ 21:02)  T(F): 97.7 (09-04-18 @ 14:28), Max: 98.2 (09-03-18 @ 21:02)  HR: 57 (09-04-18 @ 15:15) (44 - 80)  BP: 136/64 (09-04-18 @ 14:28) (110/69 - 180/76)  RR: 18 (09-04-18 @ 14:28) (18 - 18)  SpO2: 99% (09-04-18 @ 14:28) (96% - 100%)    PHYSICAL EXAM:  Patient in no acute distress. AAOX3.  No icterus, no oral ulcers.  Cardiovascular: S1S2 normal.  Lungs: + air entry B/L lung fields.  Gastrointestinal: soft, nontender, nondistended.  Extremities: no edema.  IV sites not inflamed.                           10.1   5.62  )-----------( 218      ( 04 Sep 2018 07:56 )             29.7   09-04    138  |  107  |  16  ----------------------------<  89  3.5   |  20<L>  |  0.89    Ca    8.9      04 Sep 2018 06:26  Phos  3.2     09-04  Mg     1.6     09-04    TPro  6.1  /  Alb  3.3  /  TBili  0.3  /  DBili  x   /  AST  16  /  ALT  9<L>  /  AlkPhos  58  09-04      LIVER FUNCTIONS - ( 04 Sep 2018 06:26 )  Alb: 3.3 g/dL / Pro: 6.1 g/dL / ALK PHOS: 58 U/L / ALT: 9 U/L / AST: 16 U/L / GGT: x             C. difficile GDH &amp; toxins A/B by EIA . (09.04.18 @ 14:34)    Clostridium difficile GDH Toxins A&amp;B, EIA:   Negative      Syphilis Screen (09.04.18 @ 08:56)    Treponema Pallidum Antibody Interpretation: Negative      Culture - Blood (collected 03 Sep 2018 08:30)  Source: .Blood Blood  Preliminary Report (04 Sep 2018 09:01):    No growth to date.    Culture - Blood (collected 02 Sep 2018 21:33)  Source: .Blood Blood  Preliminary Report (03 Sep 2018 22:01):    No growth to date.      Radiology:  < from: MR Head w/wo IV Cont (09.04.18 @ 12:22) >  IMPRESSION: Atrophy. Right temporal bleed ring-enhancing mass measuring   4.6 x 3.3 x 2.4 cm suspicious for neoplasm. Differential possibilities   include high grade glioma versus metastatic disease. Vasogenic edema with   mild mass effect. No midline shift or hydrocephalus. Atrophy.    Dr. Quinonez discussed these findings with DANNA Acevedo on 9/4/2018 1:01 PM   with read back.

## 2018-09-04 NOTE — CHART NOTE - NSCHARTNOTEFT_GEN_A_CORE
KAUSHIK SEARS    Notified by radiology patient with critical result:  < from: CT Head No Cont (09.04.18 @ 09:08) >    A 1.3 cm hyperdense calcified versus hemorrhagic lesion with surrounding   edema is present in the right temporal lobe. Differential considerations   include a primary or secondary tumor, vascular lesion (AVM or cavernous   malformation), less likely atypical hemorrhagic stroke. A follow-up brain   MRI with and without contrast is recommended for further workup.        Interventions:  -MRI brain w/wo contrast< from: MR Head w/wo IV Cont (09.04.18 @ 12:22) >  IMPRESSION: Atrophy. Right temporal bleed ring-enhancing mass measuring   4.6 x 3.3 x 2.4 cm suspicious for neoplasm. Differential possibilities   include high grade glioma versus metastatic disease. Vasogenic edema with   mild mass effect. No midline shift or hydrocephalus. Atrophy.        -Neurology consult  -EEG  -Neuro checks Q4 hrs  -d/w Dr. Shah and daughter   -Continue to closely monitor         Kristina Mcneill NP  [id number]

## 2018-09-04 NOTE — BEHAVIORAL HEALTH ASSESSMENT NOTE - NSBHCHARTREVIEWVS_PSY_A_CORE FT
Vital Signs Last 24 Hrs  T(C): 36.7 (04 Sep 2018 05:53), Max: 36.8 (03 Sep 2018 21:02)  T(F): 98 (04 Sep 2018 05:53), Max: 98.2 (03 Sep 2018 21:02)  HR: 57 (04 Sep 2018 08:50) (51 - 80)  BP: 157/80 (04 Sep 2018 08:50) (110/69 - 157/80)  BP(mean): --  RR: 18 (04 Sep 2018 05:57) (18 - 18)  SpO2: 96% (04 Sep 2018 05:57) (96% - 98%)

## 2018-09-04 NOTE — CONSULT NOTE ADULT - ASSESSMENT
Patient is an 86 year old male with HTN, BPH, recent completion of vancomycin for a cdiff infection (complete 14 days ago) presents to the ED because of recurrent several loose watery stools for the last 2-3 days and also waxing and waning of mentation for the last week, which has become progressively worse in the last 48 hours prior to admission. Ct head was done and showed a 1.3 hyperdense calcified vs hemorrhagic lesion with surrounding edema.    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  Ct head was done and showed a 1.3 hyperdense calcified vs hemorrhagic lesion with surrounding edema, confirmed on MRI, suspicous for primary glioma vs metastatic disease.     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  Ct head was done and showed a 1.3 hyperdense calcified vs hemorrhagic lesion with surrounding edema, confirmed on MRI, suspicous for primary glioma vs metastatic disease.     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

## 2018-09-04 NOTE — PROGRESS NOTE ADULT - SUBJECTIVE AND OBJECTIVE BOX
Patient is a 86y old  Male who presents with a chief complaint of recurrent c diff (02 Sep 2018 10:12)      INTERVAL HPI/OVERNIGHT EVENTS:    MEDICATIONS  (STANDING):  aspirin enteric coated 81 milliGRAM(s) Oral daily  dicyclomine 10 milliGRAM(s) Oral three times a day before meals  enalapril 20 milliGRAM(s) Oral daily  famotidine    Tablet 20 milliGRAM(s) Oral daily  heparin  Injectable 5000 Unit(s) SubCutaneous every 12 hours  influenza   Vaccine 0.5 milliLiter(s) IntraMuscular once  lactobacillus acidophilus 1 Tablet(s) Oral two times a day with meals  latanoprost 0.005% Ophthalmic Solution 1 Drop(s) Both EYES at bedtime  LORazepam   Injectable 1 milliGRAM(s) IntraMuscular once  sodium chloride 0.9%. 1000 milliLiter(s) (100 mL/Hr) IV Continuous <Continuous>  tamsulosin 0.4 milliGRAM(s) Oral at bedtime  vancomycin    Solution 125 milliGRAM(s) Oral every 6 hours    MEDICATIONS  (PRN):  aluminum hydroxide/magnesium hydroxide/simethicone Suspension 30 milliLiter(s) Oral four times a day PRN Indigestion  ondansetron Injectable 4 milliGRAM(s) IV Push every 6 hours PRN Nausea  simethicone 80 milliGRAM(s) Chew every 6 hours PRN Gas      Allergies    No Known Allergies    Intolerances        Vital Signs Last 24 Hrs  T(C): 36.7 (04 Sep 2018 05:53), Max: 36.8 (03 Sep 2018 21:02)  T(F): 98 (04 Sep 2018 05:53), Max: 98.2 (03 Sep 2018 21:02)  HR: 51 (04 Sep 2018 05:57) (51 - 85)  BP: 148/67 (04 Sep 2018 05:57) (110/69 - 151/84)  BP(mean): --  RR: 18 (04 Sep 2018 05:57) (16 - 18)  SpO2: 96% (04 Sep 2018 05:57) (95% - 98%)    LABS:                        10.4   8.18  )-----------( 249      ( 03 Sep 2018 09:43 )             32.3     09-04    138  |  107  |  16  ----------------------------<  89  3.5   |  20<L>  |  0.89    Ca    8.9      04 Sep 2018 06:26  Phos  3.2     09-04  Mg     1.6     09-04    TPro  6.1  /  Alb  3.3  /  TBili  0.3  /  DBili  x   /  AST  16  /  ALT  9<L>  /  AlkPhos  58  09-04          RADIOLOGY & ADDITIONAL TESTS:        Dr Wesley [phone] Patient is a 86y old  Male who presents with a chief complaint of recurrent c diff (02 Sep 2018 10:12)  Pt  sleeping- dw  np-apparently cardio feels   ekg is ot af - pvcs- o tele now -sinus  rafael-with pvc    INTERVAL HPI/OVERNIGHT EVENTS:    MEDICATIONS  (STANDING):  aspirin enteric coated 81 milliGRAM(s) Oral daily  dicyclomine 10 milliGRAM(s) Oral three times a day before meals  enalapril 20 milliGRAM(s) Oral daily  famotidine    Tablet 20 milliGRAM(s) Oral daily  heparin  Injectable 5000 Unit(s) SubCutaneous every 12 hours  influenza   Vaccine 0.5 milliLiter(s) IntraMuscular once  lactobacillus acidophilus 1 Tablet(s) Oral two times a day with meals  latanoprost 0.005% Ophthalmic Solution 1 Drop(s) Both EYES at bedtime  LORazepam   Injectable 1 milliGRAM(s) IntraMuscular once  sodium chloride 0.9%. 1000 milliLiter(s) (100 mL/Hr) IV Continuous <Continuous>  tamsulosin 0.4 milliGRAM(s) Oral at bedtime  vancomycin    Solution 125 milliGRAM(s) Oral every 6 hours    MEDICATIONS  (PRN):  aluminum hydroxide/magnesium hydroxide/simethicone Suspension 30 milliLiter(s) Oral four times a day PRN Indigestion  ondansetron Injectable 4 milliGRAM(s) IV Push every 6 hours PRN Nausea  simethicone 80 milliGRAM(s) Chew every 6 hours PRN Gas      Allergies    No Known Allergies    Intolerances        Vital Signs Last 24 Hrs  T(C): 36.7 (04 Sep 2018 05:53), Max: 36.8 (03 Sep 2018 21:02)  T(F): 98 (04 Sep 2018 05:53), Max: 98.2 (03 Sep 2018 21:02)  HR: 51 (04 Sep 2018 05:57) (51 - 85)  BP: 148/67 (04 Sep 2018 05:57) (110/69 - 151/84)  BP(mean): --  RR: 18 (04 Sep 2018 05:57) (16 - 18)  SpO2: 96% (04 Sep 2018 05:57) (95% - 98%)    LABS:                        10.4   8.18  )-----------( 249      ( 03 Sep 2018 09:43 )             32.3     09-04    138  |  107  |  16  ----------------------------<  89  3.5   |  20<L>  |  0.89    Ca    8.9      04 Sep 2018 06:26  Phos  3.2     09-04  Mg     1.6     09-04    TPro  6.1  /  Alb  3.3  /  TBili  0.3  /  DBili  x   /  AST  16  /  ALT  9<L>  /  AlkPhos  58  09-04          RADIOLOGY & ADDITIONAL TESTS:        Dr Wesley [phone]

## 2018-09-04 NOTE — CONSULT NOTE ADULT - ATTENDING COMMENTS
Patient was seen and examined with house staff. For additional details of history and examination, please see house staff note above. I agree with house staff assessment and recommendations above.   Findings of MRI reviewed by patient and family.  Evidence of tumor - details of uknown and to be determined. Glioma seems most likely but need to r/o other etiology.     1. EEG - r/o focal seizure as cause of recent MS changes.   2. CT Ch/Abd/Pel with contrast - r/o primary.  3. neurosurgery consult - may require biopsy.  4. oncology consult
Kati Abbasi  Pager: [phone]. If no response or past 5 pm call [phone].

## 2018-09-04 NOTE — BEHAVIORAL HEALTH ASSESSMENT NOTE - SUMMARY
Patient is an 87 y/o male who lives alone, retired, , with no PPHx, no substance hx, medical hx significant for HTN, BPH, admitted to hospital for metabolic encephalopathy and c.diff, psychiatry consulted for medication management s/p episode of agitation and confusion last night. Patient is an 87 y/o male who lives alone, retired, , with no PPHx, no substance hx, medical hx significant for HTN, BPH, admitted to hospital for metabolic encephalopathy and c.diff, psychiatry consulted for medication management s/p episode of agitation and confusion last night. Patient has been intermittently confused and disoriented for 2-3 months. Differential diagnosis includes delirium secondary to medications vs seizures vs brain mass or dementia.

## 2018-09-04 NOTE — CONSULT NOTE ADULT - SUBJECTIVE AND OBJECTIVE BOX
Neurology Consult Note     HPI - Much of the HPI obtained from admission H&P. "Patient is an 86 year old male with HTN, BPH, recent completion of vancomycin for a cdiff infection (complete 14 days ago) presents to the ED because of recurrent several loose watery stools for the last 2-3 days and also waxing and waning of mentation for the last week, which has become progressively worse in the last 48 hours prior to admission. Patient reports that he continues to have loose watery stools. In the last 12 hours, hes had at least 5 bowel movements. Denies blood in stool. Abdomen is crampy and he is having gas, but denies distension or any abdominal pain. Denies fevers, chills. No cough, sputum production, pain/burning with urination, no rashes.     A few days ago pt has been increasingly combative and agitated. Overnight a CT head was done and showed  1.3 cm hyperdense calcified versus hemorrhagic lesion with surrounding edema.    Allergies    No Known Allergies    Intolerances    PAST MEDICAL & SURGICAL HISTORY:  Osteoarthritis of right knee  Hypertension  Tremor of both hands  History of hernia surgery: X3 1950&#x27;s-1970&#x27;s  History of shoulder surgery: right, 2012  History of knee replacement, total, left: 2007    Home Medications:  aluminum hydroxide-magnesium hydroxide 200 mg-200 mg/5 mL oral suspension: 30 milliliter(s) orally 4 times a day, As needed, Indigestion (02 Sep 2018 10:10)  aspirin 81 mg oral delayed release tablet: 2 tab(s) orally every 12 hours (02 Sep 2018 10:10)  dicyclomine 10 mg oral capsule: 1 cap(s) orally 3 times a day (before meals) (02 Sep 2018 10:10)  enalapril 20 mg oral tablet: 1 tab(s) orally once a day (02 Sep 2018 10:10)  famotidine 20 mg oral tablet:  (02 Sep 2018 10:10)  lactobacillus acidophilus oral capsule: 1 tab(s) orally 3 times a day (02 Sep 2018 10:10)  latanoprost 0.005% ophthalmic solution: 1 drop(s) to each affected eye once a day (at bedtime) (02 Sep 2018 10:10)  simethicone 80 mg oral tablet, chewable: 1 tab(s) orally every 6 hours, As needed, Gas (02 Sep 2018 10:10)  tamsulosin 0.4 mg oral capsule: 1 cap(s) orally once a day (at bedtime) (02 Sep 2018 10:10)    Vital Signs Last 24 Hrs  T(C): 36.7 (04 Sep 2018 05:53), Max: 36.8 (03 Sep 2018 21:02)  T(F): 98 (04 Sep 2018 05:53), Max: 98.2 (03 Sep 2018 21:02)  HR: 57 (04 Sep 2018 08:50) (51 - 80)  BP: 157/80 (04 Sep 2018 08:50) (110/69 - 157/80)  BP(mean): --  RR: 18 (04 Sep 2018 05:57) (18 - 18)  SpO2: 96% (04 Sep 2018 05:57) (96% - 98%)    Neurologic Exam    Labs                        10.1   5.62  )-----------( 218      ( 04 Sep 2018 07:56 )             29.7   09-04    138  |  107  |  16  ----------------------------<  89  3.5   |  20<L>  |  0.89    Ca    8.9      04 Sep 2018 06:26  Phos  3.2     09-04  Mg     1.6     09-04    TPro  6.1  /  Alb  3.3  /  TBili  0.3  /  DBili  x   /  AST  16  /  ALT  9<L>  /  AlkPhos  58  09-04  LIVER FUNCTIONS - ( 04 Sep 2018 06:26 )  Alb: 3.3 g/dL / Pro: 6.1 g/dL / ALK PHOS: 58 U/L / ALT: 9 U/L / AST: 16 U/L / GGT: x           < from: CT Head No Cont (09.04.18 @ 09:08) >  A 1.3 cm hyperdense calcified versus hemorrhagic lesion with surrounding   edema is present in the right temporal lobe. Differential considerations   include a primary or secondary tumor, vascular lesion (AVM or cavernous   malformation), less likely atypical hemorrhagic stroke. A follow-up brain   MRI with and without contrast is recommended for further workup.          < end of copied text > Neurology Consult Note     HPI - Patient is an 86 year old male with HTN, BPH, recent completion of vancomycin for a cdiff infection (complete 14 days ago) presents to the ED because of recurrent several loose watery stools. Also, as per family, had several episodes of altered mental status, which is characterized as slowness in mentation, but it usually improved after a change in his environment or hydration. Neurology consulted because of CTH findings of R parietal hemorrhage w/ edema. MRI done confirmed findings, suspicious for metastatic disease. Patient endorses 20 lb weight loss. Denies other complaints. Never a smoker     Allergies    No Known Allergies    Intolerances    PAST MEDICAL & SURGICAL HISTORY:  Osteoarthritis of right knee  Hypertension  Tremor of both hands  History of hernia surgery: X3 1950&#x27;s-1970&#x27;s  History of shoulder surgery: right, 2012  History of knee replacement, total, left: 2007    Home Medications:  aluminum hydroxide-magnesium hydroxide 200 mg-200 mg/5 mL oral suspension: 30 milliliter(s) orally 4 times a day, As needed, Indigestion (02 Sep 2018 10:10)  aspirin 81 mg oral delayed release tablet: 2 tab(s) orally every 12 hours (02 Sep 2018 10:10)  dicyclomine 10 mg oral capsule: 1 cap(s) orally 3 times a day (before meals) (02 Sep 2018 10:10)  enalapril 20 mg oral tablet: 1 tab(s) orally once a day (02 Sep 2018 10:10)  famotidine 20 mg oral tablet:  (02 Sep 2018 10:10)  lactobacillus acidophilus oral capsule: 1 tab(s) orally 3 times a day (02 Sep 2018 10:10)  latanoprost 0.005% ophthalmic solution: 1 drop(s) to each affected eye once a day (at bedtime) (02 Sep 2018 10:10)  simethicone 80 mg oral tablet, chewable: 1 tab(s) orally every 6 hours, As needed, Gas (02 Sep 2018 10:10)  tamsulosin 0.4 mg oral capsule: 1 cap(s) orally once a day (at bedtime) (02 Sep 2018 10:10)    Vital Signs Last 24 Hrs  T(C): 36.7 (04 Sep 2018 05:53), Max: 36.8 (03 Sep 2018 21:02)  T(F): 98 (04 Sep 2018 05:53), Max: 98.2 (03 Sep 2018 21:02)  HR: 57 (04 Sep 2018 08:50) (51 - 80)  BP: 157/80 (04 Sep 2018 08:50) (110/69 - 157/80)  BP(mean): --  RR: 18 (04 Sep 2018 05:57) (18 - 18)  SpO2: 96% (04 Sep 2018 05:57) (96% - 98%)    Neurologic Exam    Gen: NAD, comfortable   MS: AOx3, speech fluent, coherent, following commands   CN: PERRL, EOMI, visual fields intact, face symmetric   Motor: 5/5 throughout grossly   Sens: grossly intact to LT     < from: MR Head w/wo IV Cont (09.04.18 @ 12:22) >  Atrophy. Right temporal bleed ring-enhancing mass measuring   4.6 x 3.3 x 2.4 cm suspicious for neoplasm. Differential possibilities   include high grade glioma versus metastatic disease. Vasogenic edema with   mild mass effect. No midline shift or hydrocephalus. Atrophy.    < end of copied text >

## 2018-09-04 NOTE — BEHAVIORAL HEALTH ASSESSMENT NOTE - DIFFERENTIAL
delirium 2/2 seizures vs meds vs brain mass   dementia Differential diagnosis includes delirium secondary to medications vs seizures vs brain mass or dementia.

## 2018-09-04 NOTE — CHART NOTE - NSCHARTNOTEFT_GEN_A_CORE
Called by primary team for concern for possible malignancy seen on brain MRI (4.6x 3.3x 2.5 cm ring enhancing lesion in right temporooccipital lobe)    -agree with full staging CT C/A/P (with contrast if kidney function allows) to rule out a primary lesion if this is a metastatic site  -full consult to follow tomorrow    Yovany Rashid  PGY-6, Hematology-Oncology Fellow  [phone] ([place]) [phone] ([facility])

## 2018-09-04 NOTE — BEHAVIORAL HEALTH ASSESSMENT NOTE - HPI (INCLUDE ILLNESS QUALITY, SEVERITY, DURATION, TIMING, CONTEXT, MODIFYING FACTORS, ASSOCIATED SIGNS AND SYMPTOMS)
Patient is an 87 y/o male who lives alone, retired, , with no PPHx, no substance hx, medical hx significant for HTN, BPH, admitted to hospital for metabolic encephalopathy and c.diff, psychiatry consulted for medication management s/p episode of agitation and confusion last night. On exam, patient is intermittently oriented x3, lethargic, but cooperative. He says he originally came into the hospital because he had a knee replacement a few months ago and "they wanted to see how it is." Per patient, he wanted to go home because he had "no business being here now." He states that he wanted to leave because his doctor last night "didn't' know anything," it was loud outside his room on the floor, and he was being bothered by the EKG. He says that "1  and three girls" had to come calm him down. Patient also expresses that he is now concerned because he doesn't remember most of what happened last night. Patient did endorse depressed mood & anhedonia, but denied other depressive symptoms including SI. Patient also endorsed intermittent worry and ruminations. Denied maggie, psychosis, substance abuse.     Per staff, patient was very agitated and confused last night and required Haldol 1mg PRN x2.     Per the patient's daughter Maurilio, the patient has been intermittently confused and disoriented for the past several months since he developed C.diff. He first was given clindamycin for a toe infection several months ago then underwent a knee replacement on 18. After the surgery, the patient was in the ICU for 2 weeks then rehab for 1 week and the daughter is concerned because he was very isolated. Since then, patient has had C. diff intermittently and the family brought in the patient this admission because he was weak, dehydrated, and not eating x2 days, so they believed there was a recurrence of the C. diff. Per daughter, the patient has had some depressed mood since his wife  2015. She states that he has never seen a psychiatrist, received a psychiatric diagnosis, or taken any psychiatric medications. No history of suicidality or substance abuse known to daughter. No family history of psychiatric conditions. No hx of dementia. At baseline, patient is lucid, oriented x3, can care for himself living alone. Patient is an 87 y/o male who lives alone, retired, , with no PPHx, no substance hx, medical hx significant for HTN, BPH, admitted to hospital for metabolic encephalopathy and c.diff, psychiatry consulted for medication management s/p episode of agitation and confusion last night.     On exam, patient is waxing and waning, intermittently oriented x3, lethargic, but cooperative. He says he originally came into the hospital because he had a knee replacement a few months ago and "they wanted to see how it is." Per patient, he wanted to go home because he had "no business being here now." He states that he wanted to leave because his doctor last night "didn't' know anything," it was loud outside his room on the floor, and he was being bothered by the EKG. He says that "1  and three girls" had to come calm him down. Patient also expresses that he is now concerned because he doesn't remember most of what happened last night. Patient did endorse depressed mood & anhedonia, but denied other depressive symptoms including SI. Patient also endorsed intermittent worry and ruminations. Denied maggie, psychosis, substance abuse.     Per staff, patient was very agitated and confused last night and required Haldol 1mg PRN x2.     Per the patient's daughter Maurilio, the patient has been intermittently confused and disoriented for the past several months since he developed C.diff. He first was given clindamycin for a toe infection several months ago then underwent a knee replacement on 18. After the surgery, the patient was in the ICU for 2 weeks then rehab for 1 week and the daughter is concerned because he was very isolated. Since then, patient has had C. diff intermittently and the family brought in the patient this admission because he was weak, dehydrated, and not eating x2 days, so they believed there was a recurrence of the C. diff. Per daughter, the patient has had some depressed mood since his wife  2015. She states that he has never seen a psychiatrist, received a psychiatric diagnosis, or taken any psychiatric medications. No history of suicidality or substance abuse known to daughter. No family history of psychiatric conditions. No hx of dementia. At baseline, patient is lucid, oriented x3, can care for himself living alone.

## 2018-09-05 LAB
ANION GAP SERPL CALC-SCNC: 13 MMOL/L — SIGNIFICANT CHANGE UP (ref 5–17)
BUN SERPL-MCNC: 10 MG/DL — SIGNIFICANT CHANGE UP (ref 7–23)
CALCIUM SERPL-MCNC: 8.8 MG/DL — SIGNIFICANT CHANGE UP (ref 8.4–10.5)
CHLORIDE SERPL-SCNC: 108 MMOL/L — SIGNIFICANT CHANGE UP (ref 96–108)
CO2 SERPL-SCNC: 21 MMOL/L — LOW (ref 22–31)
CREAT SERPL-MCNC: 0.9 MG/DL — SIGNIFICANT CHANGE UP (ref 0.5–1.3)
GLUCOSE SERPL-MCNC: 83 MG/DL — SIGNIFICANT CHANGE UP (ref 70–99)
HCT VFR BLD CALC: 30 % — LOW (ref 39–50)
HGB BLD-MCNC: 10.3 G/DL — LOW (ref 13–17)
MCHC RBC-ENTMCNC: 31.6 PG — SIGNIFICANT CHANGE UP (ref 27–34)
MCHC RBC-ENTMCNC: 34.3 GM/DL — SIGNIFICANT CHANGE UP (ref 32–36)
MCV RBC AUTO: 92 FL — SIGNIFICANT CHANGE UP (ref 80–100)
PLATELET # BLD AUTO: 215 K/UL — SIGNIFICANT CHANGE UP (ref 150–400)
POTASSIUM SERPL-MCNC: 3.8 MMOL/L — SIGNIFICANT CHANGE UP (ref 3.5–5.3)
POTASSIUM SERPL-SCNC: 3.8 MMOL/L — SIGNIFICANT CHANGE UP (ref 3.5–5.3)
RBC # BLD: 3.26 M/UL — LOW (ref 4.2–5.8)
RBC # FLD: 13.7 % — SIGNIFICANT CHANGE UP (ref 10.3–14.5)
SODIUM SERPL-SCNC: 142 MMOL/L — SIGNIFICANT CHANGE UP (ref 135–145)
WBC # BLD: 4.18 K/UL — SIGNIFICANT CHANGE UP (ref 3.8–10.5)
WBC # FLD AUTO: 4.18 K/UL — SIGNIFICANT CHANGE UP (ref 3.8–10.5)

## 2018-09-05 PROCEDURE — 93010 ELECTROCARDIOGRAM REPORT: CPT | Mod: 77

## 2018-09-05 PROCEDURE — 99232 SBSQ HOSP IP/OBS MODERATE 35: CPT

## 2018-09-05 PROCEDURE — 99232 SBSQ HOSP IP/OBS MODERATE 35: CPT | Mod: GC

## 2018-09-05 PROCEDURE — 93010 ELECTROCARDIOGRAM REPORT: CPT

## 2018-09-05 PROCEDURE — 99223 1ST HOSP IP/OBS HIGH 75: CPT | Mod: GC

## 2018-09-05 RX ORDER — DEXAMETHASONE 0.5 MG/5ML
4 ELIXIR ORAL EVERY 6 HOURS
Qty: 0 | Refills: 0 | Status: DISCONTINUED | OUTPATIENT
Start: 2018-09-05 | End: 2018-09-13

## 2018-09-05 RX ORDER — HALOPERIDOL DECANOATE 100 MG/ML
1 INJECTION INTRAMUSCULAR ONCE
Qty: 0 | Refills: 0 | Status: COMPLETED | OUTPATIENT
Start: 2018-09-05 | End: 2018-09-05

## 2018-09-05 RX ORDER — LEVETIRACETAM 250 MG/1
500 TABLET, FILM COATED ORAL
Qty: 0 | Refills: 0 | Status: DISCONTINUED | OUTPATIENT
Start: 2018-09-05 | End: 2018-09-13

## 2018-09-05 RX ORDER — DIVALPROEX SODIUM 500 MG/1
125 TABLET, DELAYED RELEASE ORAL EVERY 8 HOURS
Qty: 0 | Refills: 0 | Status: DISCONTINUED | OUTPATIENT
Start: 2018-09-05 | End: 2018-09-10

## 2018-09-05 RX ADMIN — Medication 10 MILLIGRAM(S): at 05:40

## 2018-09-05 RX ADMIN — Medication 1 TABLET(S): at 08:28

## 2018-09-05 RX ADMIN — HEPARIN SODIUM 5000 UNIT(S): 5000 INJECTION INTRAVENOUS; SUBCUTANEOUS at 23:07

## 2018-09-05 RX ADMIN — Medication 125 MILLIGRAM(S): at 19:41

## 2018-09-05 RX ADMIN — Medication 20 MILLIGRAM(S): at 05:40

## 2018-09-05 RX ADMIN — LATANOPROST 1 DROP(S): 0.05 SOLUTION/ DROPS OPHTHALMIC; TOPICAL at 21:54

## 2018-09-05 RX ADMIN — Medication 81 MILLIGRAM(S): at 12:44

## 2018-09-05 RX ADMIN — FAMOTIDINE 20 MILLIGRAM(S): 10 INJECTION INTRAVENOUS at 12:44

## 2018-09-05 RX ADMIN — Medication 10 MILLIGRAM(S): at 12:44

## 2018-09-05 RX ADMIN — Medication 4 MILLIGRAM(S): at 19:40

## 2018-09-05 RX ADMIN — HEPARIN SODIUM 5000 UNIT(S): 5000 INJECTION INTRAVENOUS; SUBCUTANEOUS at 12:44

## 2018-09-05 RX ADMIN — TAMSULOSIN HYDROCHLORIDE 0.4 MILLIGRAM(S): 0.4 CAPSULE ORAL at 21:54

## 2018-09-05 RX ADMIN — LEVETIRACETAM 500 MILLIGRAM(S): 250 TABLET, FILM COATED ORAL at 19:40

## 2018-09-05 RX ADMIN — Medication 4 MILLIGRAM(S): at 23:06

## 2018-09-05 RX ADMIN — Medication 125 MILLIGRAM(S): at 12:44

## 2018-09-05 RX ADMIN — Medication 125 MILLIGRAM(S): at 05:40

## 2018-09-05 RX ADMIN — Medication 125 MILLIGRAM(S): at 23:06

## 2018-09-05 RX ADMIN — HALOPERIDOL DECANOATE 1 MILLIGRAM(S): 100 INJECTION INTRAMUSCULAR at 17:29

## 2018-09-05 RX ADMIN — SODIUM CHLORIDE 100 MILLILITER(S): 9 INJECTION INTRAMUSCULAR; INTRAVENOUS; SUBCUTANEOUS at 05:40

## 2018-09-05 RX ADMIN — SODIUM CHLORIDE 100 MILLILITER(S): 9 INJECTION INTRAMUSCULAR; INTRAVENOUS; SUBCUTANEOUS at 12:44

## 2018-09-05 NOTE — PROGRESS NOTE BEHAVIORAL HEALTH - SUMMARY
Patient is an 85 y/o male who lives alone, retired, , with no PPHx, no substance hx, medical hx significant for HTN, BPH, admitted to hospital for metabolic encephalopathy and c.diff, psychiatry consulted for medication management s/p episode of agitation and confusion last night. Patient has been intermittently confused and disoriented for 2-3 months. Differential diagnosis includes delirium secondary to medications vs seizures vs brain mass or dementia. Patient is an 87 y/o male who lives alone, retired, , with no PPHx, no substance hx, medical hx significant for HTN, BPH, admitted to hospital for metabolic encephalopathy and c.diff, psychiatry consulted for medication management s/p episode of agitation and confusion last night. Patient has been intermittently confused and disoriented for 2-3 months. Differential diagnosis includes delirium secondary to brain mass or dementia.

## 2018-09-05 NOTE — CONSULT NOTE ADULT - ASSESSMENT
Talha Mccauley,  86 year old male with PMH melanoma sp resection 2 years ago without any known systemic disease, HTN, BPH, recent completion of vancomycin for a cdiff infection (complete 14 days ago) presented to the ED because of recurrent several loose watery stools for the last 2-3 days and intermittent agitaiton/decreased concentration for the past several days. MR showed right temporal ring enhancing mass with some edema.CT CAP negative.      -hold [id number]  -decadron 4q6  -keppra 500  -q4 neurochecks Talha Mccauley,  86 year old male with PMH melanoma sp resection 2 years ago without any known systemic disease, HTN, BPH, recent completion of vancomycin for a cdiff infection (complete 14 days ago) presented to the ED because of recurrent several loose watery stools for the last 2-3 days and intermittent agitaiton/decreased concentration for the past several days. MR showed right temporal ring enhancing mass with some edema.CT CAP negative.      -hold [id number]  -decadron 4q6  -keppra 500  -q4 neurochecks  -Please document medical clearance  -Cards consult for medical clearance

## 2018-09-05 NOTE — PROGRESS NOTE BEHAVIORAL HEALTH - NSBHFUPINTERVALHXFT_PSY_A_CORE
Patient required Haldol 2mg x1 PRN agitation last night at about 1030PM. Patient is an 87 y/o male who lives alone, retired, , with no PPHx, no substance hx, medical hx significant for HTN, BPH, admitted to hospital for metabolic encephalopathy and c.diff, psychiatry consulted for medication management s/p episode of agitation and confusion. Of note, MRI head yesterday showed a right temporal bleed w/ large ring-enhancing mass suspicious for neoplasm. Neurology, oncology, and neurosurgery are following. Patient required Haldol 2mg x1 PRN agitation last night at about 1030PM. Per nursing staff, the patient required the PRN when family members went to leave bedside and patient attempted to go home with them and became agitated with told he couldn't leave. Nursing staff also expressed concern that patient became bradycardic s/p Haldol dose with HR down to 30's. Today, patient states that he is feeling good now but wanted to go home to get clothes last night with family. He remains paranoid and is concerned that he "failed my quiz yesterday."

## 2018-09-05 NOTE — CONSULT NOTE ADULT - SUBJECTIVE AND OBJECTIVE BOX
CHIEF COMPLAINT:Patient is a 86y old  Male who presents with a chief complaint of recurrent c diff (05 Sep 2018 14:36)      HISTORY OF PRESENT ILLNESS:    this is a pleasant gentleman with history as below  admitted with c diff  found to have brain mass planned for resection   Due to altered mental status, subjective information were not able to be obtained from the patient. History was obtained, to the extent possible, from review of the chart and collateral sources of information.      PAST MEDICAL & SURGICAL HISTORY:  Osteoarthritis of right knee  Hypertension  Tremor of both hands  History of hernia surgery: X3 1950&#x27;s-1970&#x27;s  History of shoulder surgery: right, 2012  History of knee replacement, total, left: 2007          MEDICATIONS:  enalapril 20 milliGRAM(s) Oral daily  heparin  Injectable 5000 Unit(s) SubCutaneous every 12 hours  tamsulosin 0.4 milliGRAM(s) Oral at bedtime    vancomycin    Solution 125 milliGRAM(s) Oral every 6 hours      diVALproex  milliGRAM(s) Oral every 8 hours PRN  levETIRAcetam 500 milliGRAM(s) Oral two times a day  LORazepam   Injectable 1 milliGRAM(s) IntraMuscular once  ondansetron Injectable 4 milliGRAM(s) IV Push every 6 hours PRN    aluminum hydroxide/magnesium hydroxide/simethicone Suspension 30 milliLiter(s) Oral four times a day PRN  dicyclomine 10 milliGRAM(s) Oral three times a day before meals  famotidine    Tablet 20 milliGRAM(s) Oral daily  simethicone 80 milliGRAM(s) Chew every 6 hours PRN    dexamethasone     Tablet 4 milliGRAM(s) Oral every 6 hours    influenza   Vaccine 0.5 milliLiter(s) IntraMuscular once  latanoprost 0.005% Ophthalmic Solution 1 Drop(s) Both EYES at bedtime  sodium chloride 0.9%. 1000 milliLiter(s) IV Continuous <Continuous>      FAMILY HISTORY:  Family history of heart disease (Father)      Non-contributory    SOCIAL HISTORY:    No tobacco, drugs or etoh    Allergies    No Known Allergies    Intolerances    	    REVIEW OF SYSTEMS:  as above  The rest of the 14 points ROS reviewed and except above they are unremarkable.        PHYSICAL EXAM:  T(C): 36.6 (09-05-18 @ 14:30), Max: 37.3 (09-05-18 @ 03:02)  HR: 56 (09-05-18 @ 14:30) (48 - 56)  BP: 131/61 (09-05-18 @ 14:30) (117/56 - 141/67)  RR: 20 (09-05-18 @ 14:30) (18 - 20)  SpO2: 97% (09-05-18 @ 14:30) (95% - 98%)  Wt(kg): --  I&O's Summary    04 Sep 2018 07:01  -  05 Sep 2018 07:00  --------------------------------------------------------  IN: 1980 mL / OUT: 400 mL / NET: 1580 mL        JVP: Normal  Neck: supple  Lung: clear   CV: S1 S2 , Murmur:  Abd: soft  Ext: No edema  neuro: Awake / alert  Psych: flat affect  Skin: normal    LABS/DATA:    TELEMETRY: 	    ECG:  	sinus , non specific st wave abn   	  CARDIAC MARKERS:                                      10.3   4.18  )-----------( 215      ( 05 Sep 2018 07:55 )             30.0     09-05    142  |  108  |  10  ----------------------------<  83  3.8   |  21<L>  |  0.90    Ca    8.8      05 Sep 2018 06:59  Phos  3.2     09-04  Mg     1.6     09-04    TPro  6.1  /  Alb  3.3  /  TBili  0.3  /  DBili  x   /  AST  16  /  ALT  9<L>  /  AlkPhos  58  09-04    proBNP:   Lipid Profile:   HgA1c:   TSH:     < from: Transthoracic Echocardiogram (05.01.18 @ 08:01) >  CONCLUSIONS:  1. Mitral annular calcification, otherwise normal mitral  valve. Mild mitral regurgitation.  2. Calcified trileaflet aortic valve with mildly decreased  opening. Mild aortic regurgitation.  3. Moderately dilated left atrium.  LA volume index = 44  cc/m2.  4. Increased relative wall thickness with normal left  ventricular mass index, consistent with concentric left  ventricular remodeling.  5. Mild segmental left ventricular systolic dysfunction.  There is basal inferior and basal inferolateral wall  hypokinesis.  6. Normal tricuspid valve. Mild tricuspid regurgitation.  7. Estimated pulmonary artery systolic pressure equals 39  mm Hg, assuming right atrial pressure equals 10  mm Hg,  consistent with borderline pulmonary hypertension.    < end of copied text >  < from: Nuclear Stress Test-Pharmacologic (05.08.18 @ 11:04) >  IMPRESSIONS:Normal Study  * Myocardial Perfusion SPECT results are normal.  * Review of raw data shows: The study is of good technical  quality.  * Theleft ventricle was normal in size. Normal myocardial  perfusion scan,with no evidence of infarction or inducible  ischemia.  * Post-stress gated wall motion analysis was performed  (LVEF = 62 %;LVEDV = 123 ml.), revealing normal LV  function. RV function appeared normal.    < end of copied text >

## 2018-09-05 NOTE — PROGRESS NOTE BEHAVIORAL HEALTH - NSBHCONSULTMEDS_PSY_A_CORE FT
1. f/u mri, eeg. f/u neuro recs  2. haldol 2mg PRN agitation. minimize use of benzos, opiates, other deliriogenic agents. 1. Discontinue Haldol and give Depakote PRN agitation. Minimize use of benzos, opiates, other deliriogenic agents. 1. Discontinue Haldol and recommend Depakote 125mg Q8H PRN agitation. Minimize use of benzos, opiates, other deliriogenic agents.

## 2018-09-05 NOTE — PROGRESS NOTE BEHAVIORAL HEALTH - NSBHCHARTREVIEWIMAGING_PSY_A_CORE FT
CT head:  IMPRESSION:  A 1.3 cm hyperdense calcified versus hemorrhagic lesion with surrounding   edema is present in the right temporal lobe. Differential considerations   include a primary or secondary tumor, vascular lesion (AVM or cavernous   malformation), less likely atypical hemorrhagic stroke. A follow-up brain   MRI with and without contrast is recommended for further workup. 9/4 CT head:  IMPRESSION:  A 1.3 cm hyperdense calcified versus hemorrhagic lesion with surrounding   edema is present in the right temporal lobe. Differential considerations   include a primary or secondary tumor, vascular lesion (AVM or cavernous   malformation), less likely atypical hemorrhagic stroke. A follow-up brain   MRI with and without contrast is recommended for further workup.    9/4 MRI head  IMPRESSION: Atrophy. Right temporal bleed ring-enhancing mass measuring   4.6 x 3.3 x 2.4 cm suspicious for neoplasm. Differential possibilities   include high grade glioma versus metastatic disease. Vasogenic edema with   mild mass effect. No midline shift or hydrocephalus. Atrophy.

## 2018-09-05 NOTE — CONSULT NOTE ADULT - ASSESSMENT
PreOp  Based on current ACC/AHA guidelines, patient history and physical exam, the patient is considered to have intermediate risk  no evidence of ischemia or infarction on recent stress test  no objection to proceed to OR    HTN  stable    Cdiff  on vanco  fu with ID

## 2018-09-05 NOTE — CHART NOTE - NSCHARTNOTEFT_GEN_A_CORE
CC:      HPI:  Called by RN pt with HR ___ on telemetry with question of 2nd degree HB.   Patient denied headache, dizziness, CP, SOB, abdominal  pain, N/V/D, numbness/tingling, extremity weakness, dysuria.         ROS:  CONSTITUTIONAL:  No fever, chills, rigors  CARDIOVASCULAR:  No chest pain or palpitations  RESPIRATORY:   No SOB, cough, dyspnea on exertion.  No wheezing  GASTROINTESTINAL:  No abd pain, N/V, diarrhea/constipation  EXTREMITIES:  No swelling or joint pain  GENITOURINARY:  No burning on urination, increased frequency or urgency.  No flank pain  NEUROLOGIC:  No HA, visual disturbances  SKIN: No rashes        PAST MEDICAL & SURGICAL HISTORY:  Diarrhea  Family history of heart disease (Father)  Handoff  MEWS Score  Osteoarthritis of right knee  Hypertension  Tremor of both hands  Diarrhea of presumed infectious origin  Delirium due to another medical condition  Encephalopathy acute  Hypertension, unspecified type  Acute kidney injury  Other elevated white blood cell (WBC) count  Leukocytosis  Diarrhea of presumed infectious origin  Clostridium difficile infection  History of hernia surgery  History of shoulder surgery  History of knee replacement, total, left  UNILATERAL PRIMARY OSTEOARTHRI  28  Acute kidney injury  Leukocytosis        Vital Signs Last 24 Hrs  T(C): 36.6 (05 Sep 2018 14:30), Max: 37.3 (05 Sep 2018 03:02)  T(F): 97.9 (05 Sep 2018 14:30), Max: 99.1 (05 Sep 2018 03:02)  HR: 56 (05 Sep 2018 14:30) (48 - 56)  BP: 131/61 (05 Sep 2018 14:30) (117/56 - 141/67)  BP(mean): --  RR: 20 (05 Sep 2018 14:30) (18 - 20)  SpO2: 97% (05 Sep 2018 14:30) (95% - 98%)          Physical Exam:  General: WN/WD NAD, AOx3, nontoxic appearing  Head:  NC/AT  CV: RRR, S1S2   Respiratory: CTA B/L, nonlabored  Abdominal: (+) bowel sounds x4. Soft, NT, ND, no palpable mass, no guarding, or rebound tenderness  Genitourinary: ? Bartholomew   MSK: No BLLE edema, + peripheral pulses, FROM all 4 extremity  Skin: (+) warm, dry   Psych: Appropriate affect       Labs:                        10.3   4.18  )-----------( 215      ( 05 Sep 2018 07:55 )             30.0     09-05    142  |  108  |  10  ----------------------------<  83  3.8   |  21<L>  |  0.90    Ca    8.8      05 Sep 2018 06:59  Phos  3.2     09-04  Mg     1.6     09-04    TPro  6.1  /  Alb  3.3  /  TBili  0.3  /  DBili  x   /  AST  16  /  ALT  9<L>  /  AlkPhos  58  09-04      Urinalysis Basic - ( 09-04 @ 14:06 )    Color: -- / Appearance: Negative / SG: -- / pH: Negative  Gluc: Negative / Ketone: Yellow  / Bili: -- / Urobili: --   Blood: -- / Protein: -- / Nitrite: Negative   Leuk Esterase: Negative / RBC: 1.012 / WBC --   Sq Epi: Negative / Non Sq Epi: -- / Bacteria: 5.5            Radiology:          Assessment & Plan:  HPI:  Patient is an 86 year old male with HTN, BPH, recent completion of vancomycin for a cdiff infection (complete 14 days ago) presents to the ED because of recurrent several loose watery stools for the last 2-3 days and also waxing and waning of mentation for the last week, which has become progressively worse in the last 48 hours prior to admission. Patient reports that he continues to have loose watery stools. In the last 12 hours, hes had at least 5 bowel movements. Denies blood in stool. Abdomen is crampy and he is having gas, but denies distension or any abdominal pain. Denies fevers, chills. No cough, sputum production, pain/burning with urination, no rashes.     In the ED, he has been given 1L of fluid and a dose of po vancomycin and iv flagyl. (02 Sep 2018 10:12)        -  -  -  -Primary Team to follow up in AM, attending to follow       Colette Prieto PA-C  Dept of Medicine CC:      HPI:  Called by RN pt with HR 38 on telemetry; patient sleeping peacefully at the time of the event and asymptomatic when awoken.  Patient seen and assessed at bedside, sleeping but arousable, NAD.   He denied headache, dizziness, CP, SOB, palpitations, abdominal  pain, or N/V/D.         ROS:  CONSTITUTIONAL:  No fever, chills, rigors  CARDIOVASCULAR:  No chest pain or palpitations  RESPIRATORY:   No SOB, cough, dyspnea on exertion.  No wheezing  GASTROINTESTINAL:  No abd pain, N/V, diarrhea/constipation  EXTREMITIES:  No swelling or joint pain  GENITOURINARY:  No burning on urination, increased frequency or urgency.  No flank pain  NEUROLOGIC:  No HA, visual disturbances  SKIN: No rashes        Vital Signs Last 24 Hrs  T(C): 36.8 (05 Sep 2018 22:00), Max: 37.3 (05 Sep 2018 03:02)  T(F): 98.2 (05 Sep 2018 22:00), Max: 99.1 (05 Sep 2018 03:02)  HR: 57 (05 Sep 2018 22:00) (48 - 57)  BP: 165/80 (05 Sep 2018 22:00) (117/56 - 165/80)  RR: 16 (05 Sep 2018 22:00) (16 - 20)  SpO2: 98% (05 Sep 2018 22:00) (95% - 98%)          Physical Exam:  General: WN/WD NAD, AOx3, nontoxic appearing  Head:  NC/AT  CV: RRR, S1S2   Respiratory: CTA B/L, nonlabored  Abdominal: (+) bowel sounds x4. Soft, NT, ND, no palpable mass, no guarding, or rebound tenderness  Genitourinary: ? Bartholomew   MSK: No BLLE edema, + peripheral pulses, FROM all 4 extremity  Skin: (+) warm, dry   Psych: Appropriate affect       Labs:                        10.3   4.18  )-----------( 215      ( 05 Sep 2018 07:55 )             30.0     09-05    142  |  108  |  10  ----------------------------<  83  3.8   |  21<L>  |  0.90    Ca    8.8      05 Sep 2018 06:59  Phos  3.2     09-04  Mg     1.6     09-04    TPro  6.1  /  Alb  3.3  /  TBili  0.3  /  DBili  x   /  AST  16  /  ALT  9<L>  /  AlkPhos  58  09-04      Urinalysis Basic - ( 09-04 @ 14:06 )    Color: -- / Appearance: Negative / SG: -- / pH: Negative  Gluc: Negative / Ketone: Yellow  / Bili: -- / Urobili: --   Blood: -- / Protein: -- / Nitrite: Negative   Leuk Esterase: Negative / RBC: 1.012 / WBC --   Sq Epi: Negative / Non Sq Epi: -- / Bacteria: 5.5            Radiology:          Assessment & Plan:  HPI:  Patient is an 86 year old male with HTN, BPH, recent completion of vancomycin for a cdiff infection (complete 14 days ago) presents to the ED because of recurrent several loose watery stools for the last 2-3 days and also waxing and waning of mentation for the last week, which has become progressively worse in the last 48 hours prior to admission. Patient reports that he continues to have loose watery stools. In the last 12 hours, hes had at least 5 bowel movements. Denies blood in stool. Abdomen is crampy and he is having gas, but denies distension or any abdominal pain. Denies fevers, chills. No cough, sputum production, pain/burning with urination, no rashes.     In the ED, he has been given 1L of fluid and a dose of po vancomycin and iv flagyl. (02 Sep 2018 10:12)      EKG with sinus bradycardia, HR 52, 1st degree AV Block. Compared with prior  EKG in chart from 9/4/18, also with sinus bradycardia and 1st degree AV block.   -Avoid AVN blocking agents   -Continue with telemetry  -  -Primary Team to follow up in AM, attending to follow       Colette Prieto PA-C  Dept of Medicine HPI:  Called by RN pt with HR 38 on telemetry; patient sleeping peacefully at the time of the event and asymptomatic when awoken.  Patient seen and assessed at bedside, sleeping but arousable, NAD.   He denied headache, dizziness, CP, SOB, palpitations, abdominal  pain, or N/V/D.         Vital Signs Last 24 Hrs  T(C): 36.8 (05 Sep 2018 22:00), Max: 37.3 (05 Sep 2018 03:02)  T(F): 98.2 (05 Sep 2018 22:00), Max: 99.1 (05 Sep 2018 03:02)  HR: 57 (05 Sep 2018 22:00) (48 - 57)  BP: 165/80 (05 Sep 2018 22:00) (117/56 - 165/80)  RR: 16 (05 Sep 2018 22:00) (16 - 20)  SpO2: 98% (05 Sep 2018 22:00) (95% - 98%)        Physical Exam:  General: WN/WD, laying in bed, somnolent but arousable, NAD, AOx2, nontoxic appearing  Head:  NC/AT  CV: RRR, S1S2   Respiratory: CTA B/L, nonlabored  Abdominal: (+) bowel sounds x4. Soft, NT, ND, no palpable mass, no guarding, or rebound tenderness  MSK: No BLLE edema, + peripheral pulses, FROM all 4 extremity  Skin: (+) warm, dry         Labs:                        10.3   4.18  )-----------( 215      ( 05 Sep 2018 07:55 )             30.0     09-05    142  |  108  |  10  ----------------------------<  83  3.8   |  21<L>  |  0.90    Ca    8.8      05 Sep 2018 06:59  Phos  3.2     09-04  Mg     1.6     09-04    TPro  6.1  /  Alb  3.3  /  TBili  0.3  /  DBili  x   /  AST  16  /  ALT  9<L>  /  AlkPhos  58  09-04      Urinalysis Basic - ( 09-04 @ 14:06 )  Color: -- / Appearance: Negative / SG: -- / pH: Negative  Gluc: Negative / Ketone: Yellow  / Bili: -- / Urobili: --   Blood: -- / Protein: -- / Nitrite: Negative   Leuk Esterase: Negative / RBC: 1.012 / WBC --   Sq Epi: Negative / Non Sq Epi: -- / Bacteria: 5.5            Radiology:  < from: CT Abdomen and Pelvis w/ IV Cont (09.04.18 @ 20:53) >  IMPRESSION:  Mural thickening of the cecum and rectum with surrounding inflammatory   changes, possibly neoplasm. Please correlate with colonoscopy.  Indeterminant hepatic lesion, probably focal fat.           Assessment & Plan:  HPI:  86 m with HTN, BPH, recent completion of vancomycin for a c-diff infection (complete 14 days ago) p/w recurrent several loose watery stools for the last 2-3 days and also waxing and waning of mentation for the last week,  progressively worse in the last 48 hours prior to admission, admitted for recurrent c diff infection and was started on PO vanco, the diarrhea and leukocytosis resolved but brain MRI showed a ring enhancing mass s/o neoplasm and chest/abd CT with thickening of cecum and rectum, possible neoplasm.    Patient now with episode of HR to 38 while sleeping; he was sleeping at the time and asymptomatic when awoken. Vital signs hemodynamically stable. Patient has had multiple episodes of bradycardia to 30s while sleeping this admission. His HR has been 40-60s this admission with two readings in the 80s.     Bradycardia A/P:  -Vital signs hemodynamically stable  -EKG with sinus bradycardia, HR 52, 1st degree AV Block. Compared with prior EKG in chart from 9/4/18, also with sinus bradycardia and 1st degree AV block.   -Telemetry strip reviewed, sinus bradycardia with PVC  -Avoid AVN blocking agents   -Continue with telemetry  -Cardiology is following patient, appreciate recommendations   -Continue to closely monitor patient  -Primary Team to follow up in AM, attending to follow       Colette Prieto PA-C  Dept of Medicine  [phone]

## 2018-09-05 NOTE — CONSULT NOTE ADULT - SUBJECTIVE AND OBJECTIVE BOX
HPI:  86M HTN/BPH, recent diagnosis c diff completed PO vancomycin in mid August, presented to ED on 9/2/18 with complaint of loose watery stools as well as altered sensorium, was found to have a 4.6x3.3x2.4cm right temporal bleed ring-enhancing mass, oncology is consulted to rule out malignancy.    Patient denies any personal history of malignancy but has been confused at times this visit. Currently denies all symptoms including fevers/chills, diarrhea, nausea/vomiting, chest pain, increased work of breathing, acute neurologic deficits, weakness.    Allergies  No Known Allergies    MEDICATIONS  (STANDING):  aspirin enteric coated 81 milliGRAM(s) Oral daily  dicyclomine 10 milliGRAM(s) Oral three times a day before meals  enalapril 20 milliGRAM(s) Oral daily  famotidine    Tablet 20 milliGRAM(s) Oral daily  heparin  Injectable 5000 Unit(s) SubCutaneous every 12 hours  influenza   Vaccine 0.5 milliLiter(s) IntraMuscular once  lactobacillus acidophilus 1 Tablet(s) Oral two times a day with meals  latanoprost 0.005% Ophthalmic Solution 1 Drop(s) Both EYES at bedtime  LORazepam   Injectable 1 milliGRAM(s) IntraMuscular once  sodium chloride 0.9%. 1000 milliLiter(s) (100 mL/Hr) IV Continuous <Continuous>  tamsulosin 0.4 milliGRAM(s) Oral at bedtime  vancomycin    Solution 125 milliGRAM(s) Oral every 6 hours    MEDICATIONS  (PRN):  aluminum hydroxide/magnesium hydroxide/simethicone Suspension 30 milliLiter(s) Oral four times a day PRN Indigestion  ondansetron Injectable 4 milliGRAM(s) IV Push every 6 hours PRN Nausea  simethicone 80 milliGRAM(s) Chew every 6 hours PRN Gas    PAST MEDICAL & SURGICAL HISTORY:  Osteoarthritis of right knee  Hypertension  Tremor of both hands  History of hernia surgery: X3 1950&#x27;s-1970&#x27;s  History of shoulder surgery: right, 2012  History of knee replacement, total, left: 2007    FAMILY HISTORY:  Family history of heart disease (Father)    SOCIAL HISTORY: Denies EtOH, no tobacco    REVIEW OF SYSTEMS:  10 point ROS otherwise negative    T(F): 97.7 (09-05-18 @ 04:50), Max: 99.1 (09-05-18 @ 03:02)  HR: 53 (09-05-18 @ 04:50)  BP: 137/69 (09-05-18 @ 04:50)  RR: 18 (09-05-18 @ 04:50)  SpO2: 98% (09-05-18 @ 04:50)  Wt(kg): --    GENERAL: NAD  HEAD:  Atraumatic, Normocephalic  EYES: EOMI, PERRLA, conjunctiva and sclera clear  NECK: Supple, No JVD  CHEST/LUNG: Clear to auscultation bilaterally; No wheeze  HEART: +S1S2 bradycardic; No murmurs, rubs, or gallops  ABDOMEN: Soft, Nontender, Nondistended; Bowel sounds present  EXTREMITIES:  2+ Peripheral Pulses, No clubbing, cyanosis, or edema  NEUROLOGY: non-focal  SKIN: No rashes or lesions                        10.3   4.18  )-----------( 215      ( 05 Sep 2018 07:55 )             30.0       09-05    142  |  108  |  10  ----------------------------<  83  3.8   |  21<L>  |  0.90    Ca    8.8      05 Sep 2018 06:59  Phos  3.2     09-04  Mg     1.6     09-04    TPro  6.1  /  Alb  3.3  /  TBili  0.3  /  DBili  x   /  AST  16  /  ALT  9<L>  /  AlkPhos  58  09-04    < from: MR Head w/wo IV Cont (09.04.18 @ 12:22) >  MPRESSION: Atrophy. Right temporal bleed ring-enhancing mass measuring   4.6 x 3.3 x 2.4 cm suspicious for neoplasm. Differential possibilities   include high grade glioma versus metastatic disease. Vasogenic edema with   mild mass effect. No midline shift or hydrocephalus. Atrophy.      < end of copied text >

## 2018-09-05 NOTE — PROGRESS NOTE BEHAVIORAL HEALTH - NSBHCHARTREVIEWVS_PSY_A_CORE FT
Vital Signs Last 24 Hrs  T(C): 36.7 (04 Sep 2018 05:53), Max: 36.8 (03 Sep 2018 21:02)  T(F): 98 (04 Sep 2018 05:53), Max: 98.2 (03 Sep 2018 21:02)  HR: 57 (04 Sep 2018 08:50) (51 - 80)  BP: 157/80 (04 Sep 2018 08:50) (110/69 - 157/80)  BP(mean): --  RR: 18 (04 Sep 2018 05:57) (18 - 18)  SpO2: 96% (04 Sep 2018 05:57) (96% - 98%) Vital Signs Last 24 Hrs  T(C): 36.5 (05 Sep 2018 04:50), Max: 37.3 (05 Sep 2018 03:02)  T(F): 97.7 (05 Sep 2018 04:50), Max: 99.1 (05 Sep 2018 03:02)  HR: 53 (05 Sep 2018 04:50) (44 - 60)  BP: 137/69 (05 Sep 2018 04:50) (117/56 - 180/76)  BP(mean): --  RR: 18 (05 Sep 2018 04:50) (18 - 19)  SpO2: 98% (05 Sep 2018 04:50) (95% - 100%)

## 2018-09-05 NOTE — EEG REPORT - NS EEG RECRD DROWSINESS Q5 RES
attenuation of the posterior dominant rhythm, the emergence of diffuse theta activity and an increase in beta activity/slowing of the background

## 2018-09-05 NOTE — PROGRESS NOTE BEHAVIORAL HEALTH - NSBHCHARTREVIEWINVESTIGATE_PSY_A_CORE FT
EKG 9/4 3AM QTc 417  EKG 9/4 9AM QTc 432 EKG 9/4 3AM QTc 417  EKG 9/4 9AM QTc 432    EEG 9/5  This EEG is suggestive of a structural lesion in the right temporal region.  No epileptiform abnormalities were recorded.

## 2018-09-05 NOTE — PROGRESS NOTE ADULT - SUBJECTIVE AND OBJECTIVE BOX
Follow Up:  C-diff, brain mass    Interval History: pt stable and afebrile, diarrhea resolved but pt is intermittently confused and agitated    ROS:      All other systems negative    Constitutional: no fever, no chills  Head: no trauma  Eyes: no vision changes, no eye pain  ENT:  no sore throat, no rhinorrhea  Cardiovascular:  no chest pain, no palpitation  Respiratory:  no SOB, no cough  GI:  no abd pain, no vomiting, resolved diarrhea  urinary: no dysuria, no hematuria, no flank pain  musculoskeletal:  no joint pain, no joint swelling  skin:  no rash  neurology:  no headache, no seizure, no change in mental status  psych: no anxiety, no depression         Allergies  No Known Allergies        ANTIMICROBIALS:  vancomycin    Solution 125 every 6 hours      OTHER MEDS:  aluminum hydroxide/magnesium hydroxide/simethicone Suspension 30 milliLiter(s) Oral four times a day PRN  dicyclomine 10 milliGRAM(s) Oral three times a day before meals  enalapril 20 milliGRAM(s) Oral daily  famotidine    Tablet 20 milliGRAM(s) Oral daily  heparin  Injectable 5000 Unit(s) SubCutaneous every 12 hours  influenza   Vaccine 0.5 milliLiter(s) IntraMuscular once  lactobacillus acidophilus 1 Tablet(s) Oral two times a day with meals  latanoprost 0.005% Ophthalmic Solution 1 Drop(s) Both EYES at bedtime  LORazepam   Injectable 1 milliGRAM(s) IntraMuscular once  ondansetron Injectable 4 milliGRAM(s) IV Push every 6 hours PRN  simethicone 80 milliGRAM(s) Chew every 6 hours PRN  sodium chloride 0.9%. 1000 milliLiter(s) IV Continuous <Continuous>  tamsulosin 0.4 milliGRAM(s) Oral at bedtime      Vital Signs Last 24 Hrs  T(C): 36.6 (05 Sep 2018 14:30), Max: 37.3 (05 Sep 2018 03:02)  T(F): 97.9 (05 Sep 2018 14:30), Max: 99.1 (05 Sep 2018 03:02)  HR: 56 (05 Sep 2018 14:30) (48 - 57)  BP: 131/61 (05 Sep 2018 14:30) (117/56 - 141/67)  BP(mean): --  RR: 20 (05 Sep 2018 14:30) (18 - 20)  SpO2: 97% (05 Sep 2018 14:30) (95% - 98%)    Physical Exam:  General:    NAD,  non toxic  Head: atraumatic, normocephalic  Eye: normal sclera and conjunctiva  ENT:    no oropharyngeal lesions,   no LAD,   neck supple  Cardio:     regular S1, S2,  no murmur  Respiratory:    clear b/l,    no wheezing  abd:     soft,   BS +,   no tenderness,    no organomegaly  :   no CVAT,  no suprapubic tenderness,   no  bueno  Musculoskeletal:   no joint swelling,   no edema  vascular: no lines, normal pulses  Skin:    no rash  Neurologic:     no focal deficit  psych: normal affect, no suicidal ideation                          10.3   4.18  )-----------( 215      ( 05 Sep 2018 07:55 )             30.0       -    142  |  108  |  10  ----------------------------<  83  3.8   |  21<L>  |  0.90    Ca    8.8      05 Sep 2018 06:59  Phos  3.2       Mg     1.6         TPro  6.1  /  Alb  3.3  /  TBili  0.3  /  DBili  x   /  AST  16  /  ALT  9<L>  /  AlkPhos  58        Urinalysis Basic - ( 04 Sep 2018 14:06 )    Color: Yellow / Appearance: Clear / S.012 / pH: x  Gluc: x / Ketone: Negative  / Bili: Negative / Urobili: Negative mg/dL   Blood: x / Protein: Negative mg/dL / Nitrite: Negative   Leuk Esterase: Negative / RBC: x / WBC x   Sq Epi: x / Non Sq Epi: x / Bacteria: x        MICROBIOLOGY:  v  .Blood Blood  18   No growth to date.  --  --      .Blood Blood  18   No growth to date.  --  --            Clostridium difficile GDH Toxins A&amp;B, EIA:   Negative (18 @ 14:34)  Clostridium difficile GDH Interpretation: Negative for toxigenic C. Difficile.      RADIOLOGY:  Images below reviewed personally  < from: CT Abdomen and Pelvis w/ IV Cont (18 @ 20:53) >  IMPRESSION:    Mural thickening of the cecum and rectum with surrounding inflammatory   changes, possibly neoplasm. Please correlate with colonoscopy.    Indeterminant hepatic lesion, probably focal fat.       < from: MR Head w/wo IV Cont (18 @ 12:22) >    IMPRESSION: Atrophy. Right temporal bleed ring-enhancing mass measuring   4.6 x 3.3 x 2.4 cm suspicious for neoplasm. Differential possibilities   include high grade glioma versus metastatic disease. Vasogenic edema with   mild mass effect. No midline shift or hydrocephalus. Atrophy.

## 2018-09-05 NOTE — PROGRESS NOTE BEHAVIORAL HEALTH - NSBHCONSULTRECOMMENDOTHER_PSY_A_CORE FT
- Will evaluate patient for capacity to consent for/refuse brain biopsy with primary team at bedside.

## 2018-09-05 NOTE — EEG REPORT - COMMENTS
This EEG is suggestive of a structural lesion in the right temporal region.  No epileptiform abnormalities were recorded.    Haja Peña MD  EEG / Epilepsy Attending Physician

## 2018-09-05 NOTE — CONSULT NOTE ADULT - SUBJECTIVE AND OBJECTIVE BOX
p ([id number])     HPI:  Talha Mccauley,  86 year old male with PMH melanoma sp resection 2 years ago without any known systemic disease, HTN, BPH, recent completion of vancomycin for a cdiff infection (complete 14 days ago) presented to the ED because of recurrent several loose watery stools for the last 2-3 days and intermittent agitaiton/decreased concentration for the past several days. MR showed right temporal ring enhancing mass with some edema. No ha/n/v.  Currently on asa81.  Lives at home by himself, independent at baseline. Daughter is a nurse. CT CAP negative.    PAST MEDICAL HISTORY   Osteoarthritis of right knee  Hypertension  Tremor of both hands    PAST SURGICAL HISTORY   History of hernia surgery  History of shoulder surgery  History of knee replacement, total, left        MEDICATIONS:  Antibiotics:  vancomycin    Solution 125 milliGRAM(s) Oral every 6 hours    Neuro:  LORazepam   Injectable 1 milliGRAM(s) IntraMuscular once  ondansetron Injectable 4 milliGRAM(s) IV Push every 6 hours PRN    Anticoagulation:  aspirin enteric coated 81 milliGRAM(s) Oral daily  heparin  Injectable 5000 Unit(s) SubCutaneous every 12 hours    Other:  aluminum hydroxide/magnesium hydroxide/simethicone Suspension 30 milliLiter(s) Oral four times a day PRN  dicyclomine 10 milliGRAM(s) Oral three times a day before meals  enalapril 20 milliGRAM(s) Oral daily  famotidine    Tablet 20 milliGRAM(s) Oral daily  influenza   Vaccine 0.5 milliLiter(s) IntraMuscular once  simethicone 80 milliGRAM(s) Chew every 6 hours PRN  sodium chloride 0.9%. 1000 milliLiter(s) IV Continuous <Continuous>  tamsulosin 0.4 milliGRAM(s) Oral at bedtime      SOCIAL HISTORY:   Occupation:   Marital Status:     FAMILY HISTORY:  Family history of heart disease (Father)      REVIEW OF SYSTEMS:  Check here if all are normal other than Neurological/HPI [x]  General:  Eyes:  ENT:  Cardiac:  Respiratory:  GI:  Musculoskeletal:   Skin:  Neurologic:   Psychiatric:     PHYSICAL EXAMINATION:   T(C): 36.5 (18 @ 04:50), Max: 37.3 (18 @ 03:02)  HR: 53 (18 @ 04:50) (44 - 57)  BP: 137/69 (18 @ 04:50) (117/56 - 141/67)  RR: 18 (18 @ 04:50) (18 - 19)  SpO2: 98% (18 @ 04:50) (95% - 99%)  Wt(kg): --    General Examination:   PHYSICAL EXAM:    Constitutional: No Acute Distress     Neurological: AOx3, Following Commands    Motor exam:  AOx3, FC, PERRL, EOMI, no facial   5/5 throughout, no drift  SILT  no clonus      Incision:       LABS:                        10.3   4.18  )-----------( 215      ( 05 Sep 2018 07:55 )             30.0         142  |  108  |  10  ----------------------------<  83  3.8   |  21<L>  |  0.90    Ca    8.8      05 Sep 2018 06:59  Phos  3.2       Mg     1.6         TPro  6.1  /  Alb  3.3  /  TBili  0.3  /  DBili  x   /  AST  16  /  ALT  9<L>  /  AlkPhos  58        Urinalysis Basic - ( 04 Sep 2018 14:06 )    Color: Yellow / Appearance: Clear / S.012 / pH: x  Gluc: x / Ketone: Negative  / Bili: Negative / Urobili: Negative mg/dL   Blood: x / Protein: Negative mg/dL / Nitrite: Negative   Leuk Esterase: Negative / RBC: x / WBC x   Sq Epi: x / Non Sq Epi: x / Bacteria: x        RADIOLOGY & ADDITIONAL STUDIES:

## 2018-09-05 NOTE — PROGRESS NOTE ADULT - ASSESSMENT
86 m with HTN, BPH, recent completion of vancomycin for a c-diff infection (complete 14 days ago) p/w recurrent several loose watery stools for the last 2-3 days and also waxing and waning of mentation for the last week,  progressively worse in the last 48 hours prior to admission, admitted for recurrent c diff infection and was started on PO vanco, the diarrhea and leukocytosis resolved but brain MRI showed a ring enhancing mass s/o neoplasm and chest/abd CT with thickening of cecum and rectum, possible neoplasm     Recurrent C diff colitis, leucocytosis and diarrhea resolved  AMS, MRI with a mass.       * C-diff negative but will c/w vanco course as the patient improved clinically  * f/u with onc and neurosurgery for the brain mass and biopsy

## 2018-09-05 NOTE — CHART NOTE - NSCHARTNOTEFT_GEN_A_CORE
MEDICINE PA MEDICINE PA     Notified by RN that patient with bradycardia to 38 x secs. Pt sleeping at the time. EKG done with first degree AV block. HR persistently in 50s. No AV liza agents. Continue close monitoring of clinica status and vital signs.  Endorsed to primary team in AM. Attending to follow.     Celia Nieto PA-C   Department of Medicine   *Late entry note*

## 2018-09-05 NOTE — PROGRESS NOTE ADULT - ASSESSMENT
brain lesion- neuro  consult noted- for  heme and  neurosurgery eval- c difficile diarrhea brain lesion- neuro  consult noted- for  heme and  neurosurgery eval- c difficile diarrhea   case dw  heme onc and  psychiatry and id at  bedside- psych to evaluate   pt and capacity and   dw np- neurosurgery  to consult re   ?need for  biopsy of brain lesion

## 2018-09-05 NOTE — PROGRESS NOTE BEHAVIORAL HEALTH - NSBHCHARTREVIEWLAB_PSY_A_CORE FT
10.1   5.62  )-----------( 218      ( 04 Sep 2018 07:56 )             29.7     Hgb Trend: 10.1<--, 10.4<--, 10.0<--, 12.8<--  09-04    138  |  107  |  16  ----------------------------<  89  3.5   |  20<L>  |  0.89    Ca    8.9      04 Sep 2018 06:26  Phos  3.2     09-04  Mg     1.6     09-04    TPro  6.1  /  Alb  3.3  /  TBili  0.3  /  DBili  x   /  AST  16  /  ALT  9<L>  /  AlkPhos  58  09-04    Creatinine Trend: 0.89<--, 0.97<--, 1.21<--, 1.30<--    RPR negative Complete Blood Count in AM (09.05.18 @ 07:55)    WBC Count: 4.18 K/uL    RBC Count: 3.26 M/uL    Hemoglobin: 10.3 g/dL    Hematocrit: 30.0 %    Mean Cell Volume: 92.0 fl    Mean Cell Hemoglobin: 31.6 pg    Mean Cell Hemoglobin Conc: 34.3 gm/dL    Red Cell Distrib Width: 13.7 %    Platelet Count - Automated: 215 K/uL  Basic Metabolic Panel in AM (09.05.18 @ 06:59)    Sodium, Serum: 142 mmol/L    Potassium, Serum: 3.8 mmol/L    Chloride, Serum: 108 mmol/L    Carbon Dioxide, Serum: 21 mmol/L    Anion Gap, Serum: 13 mmol/L    Blood Urea Nitrogen, Serum: 10 mg/dL    Creatinine, Serum: 0.90 mg/dL    Glucose, Serum: 83 mg/dL    Calcium, Total Serum: 8.8 mg/dL    eGFR if Non : 77    eGFR if : 89 mL/min/1.73M2  C. difficile GDH &amp; toxins A/B by EIA . (09.04.18 @ 14:34)    Clostridium difficile GDH Toxins A&amp;B, EIA:   Negative    Clostridium difficile GDH Interpretation: Negative for toxigenic C. Difficile.   Urinalysis in AM (09.04.18 @ 14:06)    Glucose Qualitative, Urine: Negative mg/dL    Blood, Urine: Negative    pH Urine: 5.5    Color: Yellow    Urine Appearance: Clear    Bilirubin: Negative    Ketone - Urine: Negative    Specific Gravity: 1.012    Protein, Urine: Negative mg/dL    Urobilinogen: Negative mg/dL    Nitrite: Negative    Leukocyte Esterase Concentration: Negative  Vitamin B12, Serum in AM (09.04.18 @ 08:56)    Vitamin B12, Serum: 496: Note: Reference Range Change on 12/18/2017. pg/mL  Syphilis Screen (09.04.18 @ 08:56)    Treponema Pallidum Antibody Interpretation: Negative  Folate, Serum in AM (09.04.18 @ 08:56)    Folate, Serum: 8.7 ng/mL  Vitamin B12, Serum in AM (09.04.18 @ 08:56)    Vitamin B12, Serum: 496: Note: Reference Range Change on 12/18/2017. pg/mL  Thyroid Stimulating Hormone, Serum in AM (09.04.18 @ 08:56)    Thyroid Stimulating Hormone, Serum: 1.25 uIU/mL

## 2018-09-05 NOTE — PROVIDER CONTACT NOTE (OTHER) - ACTION/TREATMENT ORDERED:
1x dose haldol for agitation. Will place orders as per psych consult. 1x dose haldol for agitation ordered & given. Will place orders as per psych consult, Depakote PO ordered. Pt took missed keppra/decadron/vancomycin w/ Night RN Liliana.

## 2018-09-05 NOTE — CONSULT NOTE ADULT - ASSESSMENT
86M HTN/BPH, recent diagnosis c diff completed PO vancomycin in mid August, presented to ED on 9/2/18 with complaint of loose watery stools as well as altered sensorium, was found to have a 4.6x3.3x2.4cm right temporal bleed ring-enhancing mass, oncology is consulted to rule out malignancy    1. Rule out primary versus secondary brain neoplasm  -discussed with patient concern for malignancy that is seen on his brain imaging, however cannot be diagnosed by imaging alone  -CT chest/abdomen/pelvis with contrast performed yesterday, final result pending. If no suggestion of a primary lesion on the imaging patient would require a tissue diagnosis by either brain mass resection or biopsy to rule out malignancy (i.e. high grade glioma vs metastatic disease), however given patient's age he may not be a candidate for this type of surgery, recommend discussing case with neurosurgery if patient can be a candidate if there is no other site to biopsy  -cannot prognosticate or offer chemotherapy if no definitive diagnosis    Please call oncology fellow if any questions or concerns     Yovany Rashid  PGY-6, Hematology-Oncology Fellow  [phone] ([place]) [phone] ([facility]) 86M HTN/BPH, recently treated for c diff presents with AMS, found to have a 4.6x3.3x2.4cm right temporal bleed ring-enhancing mass, oncology is consulted to rule out malignancy    1. Rule out primary versus secondary brain neoplasm  -discussed with patient concern for malignancy that is seen on his brain imaging, however cannot be diagnosed by imaging alone  -CT chest/abdomen/pelvis with contrast performed yesterday, final result pending. If no suggestion of a primary lesion on the imaging patient would require a tissue diagnosis by either brain mass resection or biopsy to rule out malignancy (i.e. high grade glioma vs metastatic disease), however given patient's age he may not be a candidate for this type of surgery, recommend discussing case with neurosurgery if patient can be a candidate if there is no other site to biopsy  -cannot prognosticate or offer chemotherapy if no definitive diagnosis    Please call oncology fellow if any questions or concerns     Yovany Rashid  PGY-6, Hematology-Oncology Fellow  [phone] ([place]) [phone] ([facility]) 86M HTN/BPH, recently treated for c diff presents with AMS, found to have a 4.6x3.3x2.4cm right temporal bleed ring-enhancing mass, oncology is consulted to rule out malignancy    1. Rule out primary versus secondary brain neoplasm  -discussed with patient and his daughter concern for malignancy that is seen on his brain imaging, however cannot be diagnosed by imaging alone and if tumor markers other workup negative a brain biopsy would be only way to make diagnosis. Patient states he would be unwilling for brain biopsy, but is acutely agitated and unclear if he has capacity to make medical decision currently. Neurosurgery and Psychiatry evaluations are appreciated.  -CT chest/abdomen/pelvis with suggestion of inflammation in sigmoid colon, labs CEA and CA 19.9 ordered for AM labs if elevated would consider GI workup for colon cancer dx which the CNS mass may represent  -cannot prognosticate or offer treatment plan if no definitive diagnosis    Please call oncology fellow if any questions or concerns     Yovany Rashid  PGY-6, Hematology-Oncology Fellow  [phone] ([place]) [phone] ([facility])

## 2018-09-06 LAB
ANION GAP SERPL CALC-SCNC: 9 MMOL/L — SIGNIFICANT CHANGE UP (ref 5–17)
APTT BLD: 30.9 SEC — SIGNIFICANT CHANGE UP (ref 27.5–37.4)
BUN SERPL-MCNC: 8 MG/DL — SIGNIFICANT CHANGE UP (ref 7–23)
CALCIUM SERPL-MCNC: 9.7 MG/DL — SIGNIFICANT CHANGE UP (ref 8.4–10.5)
CEA SERPL-MCNC: 1 NG/ML — SIGNIFICANT CHANGE UP (ref 0–3.8)
CHLORIDE SERPL-SCNC: 106 MMOL/L — SIGNIFICANT CHANGE UP (ref 96–108)
CO2 SERPL-SCNC: 22 MMOL/L — SIGNIFICANT CHANGE UP (ref 22–31)
CREAT SERPL-MCNC: 0.86 MG/DL — SIGNIFICANT CHANGE UP (ref 0.5–1.3)
GLUCOSE SERPL-MCNC: 173 MG/DL — HIGH (ref 70–99)
INR BLD: 1.1 RATIO — SIGNIFICANT CHANGE UP (ref 0.88–1.16)
POTASSIUM SERPL-MCNC: 4.4 MMOL/L — SIGNIFICANT CHANGE UP (ref 3.5–5.3)
POTASSIUM SERPL-SCNC: 4.4 MMOL/L — SIGNIFICANT CHANGE UP (ref 3.5–5.3)
PROTHROM AB SERPL-ACNC: 12 SEC — SIGNIFICANT CHANGE UP (ref 9.8–12.7)
SODIUM SERPL-SCNC: 137 MMOL/L — SIGNIFICANT CHANGE UP (ref 135–145)

## 2018-09-06 PROCEDURE — 99232 SBSQ HOSP IP/OBS MODERATE 35: CPT

## 2018-09-06 PROCEDURE — 99232 SBSQ HOSP IP/OBS MODERATE 35: CPT | Mod: GC

## 2018-09-06 RX ORDER — HALOPERIDOL DECANOATE 100 MG/ML
1 INJECTION INTRAMUSCULAR ONCE
Qty: 0 | Refills: 0 | Status: COMPLETED | OUTPATIENT
Start: 2018-09-06 | End: 2018-09-07

## 2018-09-06 RX ORDER — LANOLIN ALCOHOL/MO/W.PET/CERES
3 CREAM (GRAM) TOPICAL AT BEDTIME
Qty: 0 | Refills: 0 | Status: DISCONTINUED | OUTPATIENT
Start: 2018-09-06 | End: 2018-09-13

## 2018-09-06 RX ORDER — SOD SULF/SODIUM/NAHCO3/KCL/PEG
4000 SOLUTION, RECONSTITUTED, ORAL ORAL ONCE
Qty: 0 | Refills: 0 | Status: DISCONTINUED | OUTPATIENT
Start: 2018-09-06 | End: 2018-09-06

## 2018-09-06 RX ORDER — SOD SULF/SODIUM/NAHCO3/KCL/PEG
4000 SOLUTION, RECONSTITUTED, ORAL ORAL ONCE
Qty: 0 | Refills: 0 | Status: COMPLETED | OUTPATIENT
Start: 2018-09-06 | End: 2018-09-06

## 2018-09-06 RX ORDER — HALOPERIDOL DECANOATE 100 MG/ML
1 INJECTION INTRAMUSCULAR ONCE
Qty: 0 | Refills: 0 | Status: COMPLETED | OUTPATIENT
Start: 2018-09-06 | End: 2018-09-06

## 2018-09-06 RX ADMIN — Medication 1 TABLET(S): at 07:51

## 2018-09-06 RX ADMIN — HALOPERIDOL DECANOATE 1 MILLIGRAM(S): 100 INJECTION INTRAMUSCULAR at 23:30

## 2018-09-06 RX ADMIN — Medication 4000 MILLILITER(S): at 17:04

## 2018-09-06 RX ADMIN — Medication 10 MILLIGRAM(S): at 17:03

## 2018-09-06 RX ADMIN — Medication 20 MILLIGRAM(S): at 05:43

## 2018-09-06 RX ADMIN — Medication 125 MILLIGRAM(S): at 17:03

## 2018-09-06 RX ADMIN — LEVETIRACETAM 500 MILLIGRAM(S): 250 TABLET, FILM COATED ORAL at 05:42

## 2018-09-06 RX ADMIN — Medication 125 MILLIGRAM(S): at 12:03

## 2018-09-06 RX ADMIN — Medication 10 MILLIGRAM(S): at 05:43

## 2018-09-06 RX ADMIN — Medication 4 MILLIGRAM(S): at 05:42

## 2018-09-06 RX ADMIN — HEPARIN SODIUM 5000 UNIT(S): 5000 INJECTION INTRAVENOUS; SUBCUTANEOUS at 12:03

## 2018-09-06 RX ADMIN — Medication 1 TABLET(S): at 17:04

## 2018-09-06 RX ADMIN — Medication 10 MILLIGRAM(S): at 12:02

## 2018-09-06 RX ADMIN — DIVALPROEX SODIUM 125 MILLIGRAM(S): 500 TABLET, DELAYED RELEASE ORAL at 22:50

## 2018-09-06 RX ADMIN — FAMOTIDINE 20 MILLIGRAM(S): 10 INJECTION INTRAVENOUS at 12:02

## 2018-09-06 RX ADMIN — Medication 4 MILLIGRAM(S): at 17:03

## 2018-09-06 RX ADMIN — LEVETIRACETAM 500 MILLIGRAM(S): 250 TABLET, FILM COATED ORAL at 17:03

## 2018-09-06 RX ADMIN — Medication 4 MILLIGRAM(S): at 12:01

## 2018-09-06 RX ADMIN — Medication 125 MILLIGRAM(S): at 05:43

## 2018-09-06 NOTE — CONSULT NOTE ADULT - SUBJECTIVE AND OBJECTIVE BOX
SURGERY CONSULT NOTE    Patient is a 86y old  Male who presents with a chief complaint of recurrent c diff (06 Sep 2018 15:44)      HPI:  Patient is an 86 year old male with HTN, BPH, recent completion of vancomycin for a cdiff infection (complete 14 days ago) presents to the ED because of recurrent several loose watery stools for the last 2-3 days and also waxing and waning of mentation for the last week, which has become progressively worse in the last 48 hours prior to admission. Patient reports that he continues to have loose watery stools. In the last 12 hours, hes had at least 5 bowel movements. Denies blood in stool. Abdomen is crampy and he is having gas, but denies distension or any abdominal pain. Denies fevers, chills. No cough, sputum production, pain/burning with urination, no rashes.     In the ED, he has been given 1L of fluid and a dose of po vancomycin and iv flagyl. (02 Sep 2018 10:12)    The patient had some agitation/AMS and underwent brain imaging which revealed a large R parietal mass. CT C/A/P was performed to ascertain whether this was metastatic, and revealed cecal and rectal thickening with surrounding inflammatory changes. Colorectal surgery was consulted in that context. The patient is tolerating regular diet. He denies abd pain. He says his bowel movements have improved.  He has never had a colonoscopy.          PAST MEDICAL & SURGICAL HISTORY:  Osteoarthritis of right knee  Hypertension  Tremor of both hands  History of hernia surgery: X3 1950&#x27;s-1970&#x27;s  History of shoulder surgery: right, 2012  History of knee replacement, total, left: 2007    [  ] No significant past history as reviewed with the patient and family    FAMILY HISTORY:  Family history of heart disease (Father)    [  ] Family history not pertinent as reviewed with the patient and family    SOCIAL HISTORY:    MEDICATIONS  (STANDING):  dexamethasone     Tablet 4 milliGRAM(s) Oral every 6 hours  dicyclomine 10 milliGRAM(s) Oral three times a day before meals  enalapril 20 milliGRAM(s) Oral daily  famotidine    Tablet 20 milliGRAM(s) Oral daily  heparin  Injectable 5000 Unit(s) SubCutaneous every 12 hours  influenza   Vaccine 0.5 milliLiter(s) IntraMuscular once  lactobacillus acidophilus 1 Tablet(s) Oral two times a day with meals  latanoprost 0.005% Ophthalmic Solution 1 Drop(s) Both EYES at bedtime  levETIRAcetam 500 milliGRAM(s) Oral two times a day  LORazepam   Injectable 1 milliGRAM(s) IntraMuscular once  polyethylene glycol/electrolyte Solution. 4000 milliLiter(s) Oral once  sodium chloride 0.9%. 1000 milliLiter(s) (100 mL/Hr) IV Continuous <Continuous>  tamsulosin 0.4 milliGRAM(s) Oral at bedtime  vancomycin    Solution 125 milliGRAM(s) Oral every 6 hours    MEDICATIONS  (PRN):  aluminum hydroxide/magnesium hydroxide/simethicone Suspension 30 milliLiter(s) Oral four times a day PRN Indigestion  diVALproex  milliGRAM(s) Oral every 8 hours PRN agitation  ondansetron Injectable 4 milliGRAM(s) IV Push every 6 hours PRN Nausea  simethicone 80 milliGRAM(s) Chew every 6 hours PRN Gas    Allergies    No Known Allergies    Intolerances        Vital Signs Last 24 Hrs  T(C): 36.7 (06 Sep 2018 13:06), Max: 36.8 (05 Sep 2018 22:00)  T(F): 98 (06 Sep 2018 13:06), Max: 98.2 (05 Sep 2018 22:00)  HR: 61 (06 Sep 2018 13:06) (57 - 61)  BP: 155/73 (06 Sep 2018 13:06) (150/80 - 165/80)  BP(mean): --  RR: 18 (06 Sep 2018 13:06) (16 - 18)  SpO2: 96% (06 Sep 2018 13:06) (96% - 99%)  Daily     Daily     NAD, awake and alert  No rashes  No jaundice or scleral icterus  Respirations nonlabored  CV Regular  Abdomen soft, nontender, nondistended  No guarding or rebound tenderness  Extremities warm, L knee incision well healed                        10.3   4.18  )-----------( 215      ( 05 Sep 2018 07:55 )             30.0     09-06    137  |  106  |  8   ----------------------------<  173<H>  4.4   |  22  |  0.86    Ca    9.7      06 Sep 2018 06:42      PT/INR - ( 06 Sep 2018 10:21 )   PT: 12.0 sec;   INR: 1.10 ratio         PTT - ( 06 Sep 2018 10:21 )  PTT:30.9 sec      IMAGING STUDIES:  < from: CT Abdomen and Pelvis w/ IV Cont (09.04.18 @ 20:53) >  FINDINGS:    CHEST:     LUNGS AND LARGE AIRWAYS: Patent central airways. Bibasilar subsegmental   atelectasis.   PLEURA: No pleural effusion.  VESSELS: Coronary arterial and aortic calcific atherosclerosis.  HEART: The heart is enlarged. No pericardial effusion.  MEDIASTINUM AND MERCEDES: No lymphadenopathy.  CHEST WALL AND LOWER NECK: Within normal limits.    ABDOMEN AND PELVIS:    LIVER: Hypodensenodule in segment IVb (series 3, image 154), measuring   2.0 x 1.7 cm.  BILE DUCTS: Normal caliber.  GALLBLADDER: Nonobstructing calculus  SPLEEN: Within normal limits.  PANCREAS: Within normal limits.  ADRENALS: Within normal limits.  KIDNEYS/URETERS: Multiple left renal cysts, unchanged. Nonobstructing   calculus in the left lower pole, measuring 0.6 cm. No hydronephrosis.    BLADDER: Within normal limits.  REPRODUCTIVE ORGANS: The prostate is enlarged.    BOWEL: Mural thickening of the cecum and rectum with surrounding   inflammatory changes. Appendix visualized.  PERITONEUM: No ascites.  VESSELS:  Atherosclerosis.  RETROPERITONEUM: No lymphadenopathy.  ABDOMINAL WALL: Small fat-containing umbilical hernia.  BONES: Nonspecific sclerotic foci and degenerative change similar in   appearance prior examination.    IMPRESSION:    Mural thickening of the cecum and rectum with surrounding inflammatory   changes, possibly neoplasm. Please correlate with colonoscopy.    Indeterminant hepatic lesion, probably focal fat.       < end of copied text >

## 2018-09-06 NOTE — PROGRESS NOTE ADULT - SUBJECTIVE AND OBJECTIVE BOX
INTERVAL HPI/OVERNIGHT EVENTS:  Patient S&E at bedside. No o/n events, patient denies current symptoms such as pain, fevers/chills, or increased work of breathing.     VITAL SIGNS:  T(F): 98 (09-06-18 @ 13:06)  HR: 61 (09-06-18 @ 13:06)  BP: 155/73 (09-06-18 @ 13:06)  RR: 18 (09-06-18 @ 13:06)  SpO2: 96% (09-06-18 @ 13:06)  Wt(kg): --    PHYSICAL EXAM:  Constitutional: NAD  Eyes: EOMI, sclera non-icteric  Neck: supple, no JVD  Respiratory: CTA b/l, good air entry b/l  Cardiovascular: +S1S2 RRR, no M/R/G  Gastrointestinal: soft, NTND, + BS  Extremities: no c/c/e    MEDICATIONS  (STANDING):  dexamethasone     Tablet 4 milliGRAM(s) Oral every 6 hours  dicyclomine 10 milliGRAM(s) Oral three times a day before meals  enalapril 20 milliGRAM(s) Oral daily  famotidine    Tablet 20 milliGRAM(s) Oral daily  heparin  Injectable 5000 Unit(s) SubCutaneous every 12 hours  influenza   Vaccine 0.5 milliLiter(s) IntraMuscular once  lactobacillus acidophilus 1 Tablet(s) Oral two times a day with meals  latanoprost 0.005% Ophthalmic Solution 1 Drop(s) Both EYES at bedtime  levETIRAcetam 500 milliGRAM(s) Oral two times a day  LORazepam   Injectable 1 milliGRAM(s) IntraMuscular once  polyethylene glycol/electrolyte Solution. 4000 milliLiter(s) Oral once  sodium chloride 0.9%. 1000 milliLiter(s) (100 mL/Hr) IV Continuous <Continuous>  tamsulosin 0.4 milliGRAM(s) Oral at bedtime  vancomycin    Solution 125 milliGRAM(s) Oral every 6 hours    MEDICATIONS  (PRN):  aluminum hydroxide/magnesium hydroxide/simethicone Suspension 30 milliLiter(s) Oral four times a day PRN Indigestion  diVALproex  milliGRAM(s) Oral every 8 hours PRN agitation  ondansetron Injectable 4 milliGRAM(s) IV Push every 6 hours PRN Nausea  simethicone 80 milliGRAM(s) Chew every 6 hours PRN Gas    Allergies  No Known Allergies    LABS:                        10.3   4.18  )-----------( 215      ( 05 Sep 2018 07:55 )             30.0     09-06    137  |  106  |  8   ----------------------------<  173<H>  4.4   |  22  |  0.86    Ca    9.7      06 Sep 2018 06:42  PT/INR - ( 06 Sep 2018 10:21 )   PT: 12.0 sec;   INR: 1.10 ratio    PTT - ( 06 Sep 2018 10:21 )  PTT:30.9 sec      RADIOLOGY & ADDITIONAL TESTS:  Studies reviewed.    ASSESSMENT & PLAN:

## 2018-09-06 NOTE — CONSULT NOTE ADULT - SUBJECTIVE AND OBJECTIVE BOX
Chief Complaint:  Patient is a 86y old  Male who presents with a chief complaint of recurrent c diff (06 Sep 2018 09:24)      HPI:  86M with HTN, Osteoarthritis, recent C. Diff infection (7/2018) s/p 14 day course of PO vancomycin, presenting from home 9/2/18 with loose watery diarrhea in setting of altered mental status over the past week with acute worsening over the last 48 hours PTA. The patient noted abdominal cramping without nausea or vomiting. Since arrival his bowel movements initially became more solid but over the past 2 days have become watery again. He denies blood in stool. He has never had an EGD or Colonoscopy. He is unsure if he lost any weight but does appear cachectic on exam. While admitted the patient has had several episodes of agitation. CT Head was performed showing right temporal lobe mass suspicious for neoplasm. The patient has not had any seizures. He was seen by Neurosurgery- plan for eventual biopsy. CT imaging of chest/abdomen/pelvis was performed showing mural thickening of cecum and rectum.     Allergies:  No Known Allergies      Home Medications:  Home Medications:   * Patient Currently Takes Medications as of 02-Sep-2018 10:10 documented in Structured Notes  · 	simethicone 80 mg oral tablet, chewable: 1 tab(s) orally every 6 hours, As needed, Gas, Last Dose Taken:    · 	lactobacillus acidophilus oral capsule: 1 tab(s) orally 3 times a day, Last Dose Taken:    · 	latanoprost 0.005% ophthalmic solution: 1 drop(s) to each affected eye once a day (at bedtime), Last Dose Taken:    · 	dicyclomine 10 mg oral capsule: 1 cap(s) orally 3 times a day (before meals), Last Dose Taken:    · 	aspirin 81 mg oral delayed release tablet: 2 tab(s) orally every 12 hours, Last Dose Taken:    · 	aluminum hydroxide-magnesium hydroxide 200 mg-200 mg/5 mL oral suspension: 30 milliliter(s) orally 4 times a day, As needed, Indigestion, Last Dose Taken:    · 	tamsulosin 0.4 mg oral capsule: 1 cap(s) orally once a day (at bedtime), Last Dose Taken:    · 	famotidine 20 mg oral tablet: Last Dose Taken:    · 	enalapril 20 mg oral tablet: 1 tab(s) orally once a day, Last Dose Taken:     Hospital Medications:  aluminum hydroxide/magnesium hydroxide/simethicone Suspension 30 milliLiter(s) Oral four times a day PRN  dexamethasone     Tablet 4 milliGRAM(s) Oral every 6 hours  dicyclomine 10 milliGRAM(s) Oral three times a day before meals  diVALproex  milliGRAM(s) Oral every 8 hours PRN  enalapril 20 milliGRAM(s) Oral daily  famotidine    Tablet 20 milliGRAM(s) Oral daily  heparin  Injectable 5000 Unit(s) SubCutaneous every 12 hours  influenza   Vaccine 0.5 milliLiter(s) IntraMuscular once  lactobacillus acidophilus 1 Tablet(s) Oral two times a day with meals  latanoprost 0.005% Ophthalmic Solution 1 Drop(s) Both EYES at bedtime  levETIRAcetam 500 milliGRAM(s) Oral two times a day  LORazepam   Injectable 1 milliGRAM(s) IntraMuscular once  ondansetron Injectable 4 milliGRAM(s) IV Push every 6 hours PRN  polyethylene glycol/electrolyte Solution. 4000 milliLiter(s) Oral once  simethicone 80 milliGRAM(s) Chew every 6 hours PRN  sodium chloride 0.9%. 1000 milliLiter(s) IV Continuous <Continuous>  tamsulosin 0.4 milliGRAM(s) Oral at bedtime  vancomycin    Solution 125 milliGRAM(s) Oral every 6 hours      PMHX/PSHX:  Osteoarthritis of right knee  Hypertension  Tremor of both hands  History of hernia surgery  History of shoulder surgery  History of knee replacement, total, left      Family history:  Family history of heart disease (Father)      Social History:     ROS:     General:  No wt loss, fevers, chills, night sweats, fatigue,   Eyes:  Good vision, no reported pain  ENT:  No sore throat, pain, runny nose, dysphagia  CV:  No pain, palpitations, hypo/hypertension  Resp:  No dyspnea, cough, tachypnea, wheezing  GI:  No pain, No nausea, No vomiting, No diarrhea, No constipation, No weight loss, No fever, No pruritis, No rectal bleeding, No tarry stools, No dysphagia,  :  No pain, bleeding, incontinence, nocturia  Muscle:  No pain, weakness  Neuro:  No weakness, tingling, memory problems  Psych:  No fatigue, insomnia, mood problems, depression  Endocrine:  No polyuria, polydipsia, cold/heat intolerance  Heme:  No petechiae, ecchymosis, easy bruisability  Skin:  No rash, tattoos, scars, edema      PHYSICAL EXAM:     GENERAL:  Appears stated age, well-groomed, well-nourished, no distress  HEENT:  NC/AT,  conjunctivae clear and pink, no thyromegaly, nodules, adenopathy, no JVD, sclera -anicteric  CHEST:  Full & symmetric excursion, no increased effort, breath sounds clear  HEART:  Regular rhythm, S1, S2, no murmur/rub/S3/S4, no abdominal bruit, no edema  ABDOMEN:  Soft, non-tender, non-distended, normoactive bowel sounds  EXTEREMITIES:  no cyanosis,clubbing or edema  SKIN:  No rash/erythema, intact   NEURO:  Alert, oriented, no asterixis, no tremor, no encephalopathy    Vital Signs:  Vital Signs Last 24 Hrs  T(C): 36.7 (06 Sep 2018 13:06), Max: 36.8 (05 Sep 2018 22:00)  T(F): 98 (06 Sep 2018 13:06), Max: 98.2 (05 Sep 2018 22:00)  HR: 61 (06 Sep 2018 13:06) (57 - 61)  BP: 155/73 (06 Sep 2018 13:06) (150/80 - 165/80)  BP(mean): --  RR: 18 (06 Sep 2018 13:06) (16 - 18)  SpO2: 96% (06 Sep 2018 13:06) (96% - 99%)  Daily     Daily     LABS:                        10.3   4.18  )-----------( 215      ( 05 Sep 2018 07:55 )             30.0     Hemoglobin: 16.1 g/dL (07.05.18 @ 13:16)  Hemoglobin: 13.8 g/dL (07.24.18 @ 20:46)      09-06    137  |  106  |  8   ----------------------------<  173<H>  4.4   |  22  |  0.86    Ca    9.7      06 Sep 2018 06:42        PT/INR - ( 06 Sep 2018 10:21 )   PT: 12.0 sec;   INR: 1.10 ratio         PTT - ( 06 Sep 2018 10:21 )  PTT:30.9 sec    C. difficile GDH &amp; toxins A/B by EIA . (09.04.18 @ 14:34)    Clostridium difficile GDH Toxins A&amp;B, EIA:   Negative    Clostridium difficile GDH Interpretation: Negative for toxigenic C. Difficile.  This specimen is negative for C.  Difficile glutamate dehydrogenase (GDH) antigen and negative for C.  Difficile Toxins A & B, by EIA.  GDH is a highly sensitive screening  marker for C. Difficile that is produced in large amounts by all C.  Difficile strains, both toxigenic and nontoxigenic.  This assay has not  been validated as a test of cure.  Repeat testing during the same episode  of diarrhea is of limited value and is discouraged.  The results of this  assay should always be interpreted in conjunction with pateint's clinical  history.                            10.3   4.18  )-----------( 215      ( 05 Sep 2018 07:55 )             30.0                         10.1   5.62  )-----------( 218      ( 04 Sep 2018 07:56 )             29.7     Imaging:  < from: MR Head w/wo IV Cont (09.04.18 @ 12:22) >  EXAM:  MR BRAIN WAW IC                            PROCEDURE DATE:  09/04/2018            INTERPRETATION:    CLINICAL INDICATION: Evaluate right temporal lesion seen on CT, history   of recent knee surgery, altered mental status, C. diff  infection      Magnetic resonance imaging of the brain was carried out with transaxial   SPGR, FLAIR, fast spin echo T2 weighted images, axial susceptibility   weighted series, diffusion weighted series and sagittal T1 weighted   series on a 3.0 Blanca magnet. Post contrast axial, coronal and sagittal   T1 weighted images were obtained. 7.5 cc of Gadavist were intravenously   injected, 0 cc were discarded.    Comparison is made with the prior brain CT of 9:00 AM.        Moderate atrophy is identified with ventricular and sulcal prominence.   There is abnormal signal in the right temporal lobe with vasogenic edema   extending to the right temporal occipital region. There is a ring   enhancing mass present on the postcontrast scan with irregular thickened   rim enhancement. This is consistent with a neoplasm and does not   demonstrate restricted diffusion. This could represent a primary or   secondary neoplasm. This ring-enhancing mass measures 4.6 cm in AP   diameter x 3.3 cm transversely x 2.5 cm in craniocaudal diameter. There   is a minimal amount of hemosiderin deposition associated with this mass   which does not appear overtly hemorrhagic. Adjacent vasogenic edema does   not cause significant mass effect.    There is no midline shift. Small vessel white matter ischemic changes are   noted.     IMPRESSION: Atrophy. Right temporal bleed ring-enhancing mass measuring   4.6 x 3.3 x 2.4 cm suspicious for neoplasm. Differential possibilities   include high grade glioma versus metastatic disease. Vasogenic edema with   mild mass effect. No midline shift or hydrocephalus. Atrophy.    Dr. Quinonez discussed these findings with DANNA Acevedo on 9/4/2018 1:01 PM   with read back.     < end of copied text >    < from: CT Abdomen and Pelvis w/ IV Cont (09.04.18 @ 20:53) >  EXAM:  CT CHEST IC                          EXAM:  CT ABDOMEN AND PELVIS IC                            PROCEDURE DATE:  09/04/2018            INTERPRETATION:  CLINICAL INFORMATION: Solitary 1.3 cm intracranial   lesion at the gray-white junction; suspicion for primary versus   metastatic disease. Evaluate for primary.    COMPARISON: CT chest abdomen and pelvis 7/27/2018    PROCEDURE:   CT of the Chest, Abdomen and Pelvis was performed with intravenous   contrast.   Intravenous contrast: 90 ml Omnipaque 350. 10 ml discarded.  Oral contrast: None.  Sagittal and coronal reformats were performed.    FINDINGS:    CHEST:     LUNGS AND LARGE AIRWAYS: Patent central airways. Bibasilar subsegmental   atelectasis.   PLEURA: No pleural effusion.  VESSELS: Coronary arterial and aortic calcific atherosclerosis.  HEART: The heart is enlarged. No pericardial effusion.  MEDIASTINUM AND MERCEDES: No lymphadenopathy.  CHEST WALL AND LOWER NECK: Within normal limits.    ABDOMEN AND PELVIS:    LIVER: Hypodensenodule in segment IVb (series 3, image 154), measuring   2.0 x 1.7 cm.  BILE DUCTS: Normal caliber.  GALLBLADDER: Nonobstructing calculus  SPLEEN: Within normal limits.  PANCREAS: Within normal limits.  ADRENALS: Within normal limits.  KIDNEYS/URETERS: Multiple left renal cysts, unchanged. Nonobstructing   calculus in the left lower pole, measuring 0.6 cm. No hydronephrosis.    BLADDER: Within normal limits.  REPRODUCTIVE ORGANS: The prostate is enlarged.    BOWEL: Mural thickening of the cecum and rectum with surrounding   inflammatory changes. Appendix visualized.  PERITONEUM: No ascites.  VESSELS:  Atherosclerosis.  RETROPERITONEUM: No lymphadenopathy.  ABDOMINAL WALL: Small fat-containing umbilical hernia.  BONES: Nonspecific sclerotic foci and degenerative change similar in   appearance prior examination.    IMPRESSION:    Mural thickening of the cecum and rectum with surrounding inflammatory   changes, possibly neoplasm. Please correlate with colonoscopy.    Indeterminant hepatic lesion, probably focal fat.     < end of copied text >

## 2018-09-06 NOTE — PROGRESS NOTE ADULT - ASSESSMENT
r  temporal lesion-  abnormal  ct  scan abdomne-?cecal  lesion-  followup gi consult-hx c difficile  followup with cardio and    neuro; pt is medically  cleared  for surgery r  temporal lesion-  abnormal  ct  scan abdomne-?cecal  lesion-  followup gi consult-hx c difficile  followup with cardio and    neuro;elevated  coag- will dw  heme pt is medically  cleared  for surgery r  temporal lesion-  abnormal  ct  scan abdomne-?cecal  lesion-  followup gi consult-hx c difficile  followup with cardio and    neuro;elevated  coag- will  rpt   stat dw  heme Dr Mcghee-  she will see pt   before  we finalize pt's  medical clearance r  temporal lesion-  abnormal  ct  scan abdomne-?cecal  lesion-  followup gi consult-hx c difficile  followup with cardio and    neuro;elevated  coag- will  rpt   stat dw  heme Dr Mcghee-  she will see pt.  If   stat  coags wnl pt is  cleared  for surgery

## 2018-09-06 NOTE — CONSULT NOTE ADULT - ASSESSMENT
Impression:  1)Abnormal thickening of rectum and cecum in setting of normocytic anemia- concern for colonic neoplasm, potential brain metastasis     Recommendations:  -imaging reviewed, plan for Colonoscopy Friday  Risks, benefits, alternatives described to patient including, but not limited to, bleeding, infection, organ damage, perforation, missed lesions and risks of anesthesia. Patient understands and agrees to these risks. Patient's family is present at bedside and also agreeable.  -plan for Golytely prep- discussed with neurosurgery- no neurosurgical contraindication to procedure  -monitor bowel movements, monitor hemoglobin daily  -check CEA level       Please call with questions  Mandi Dunn  GI Fellow  Pager: [phone]

## 2018-09-06 NOTE — PROGRESS NOTE ADULT - SUBJECTIVE AND OBJECTIVE BOX
86y old  Male who presents with a chief complaint of recurrent c diff (06 Sep 2018 16:14)      Interval history:  Afebrile, resolved diarrhea, still confused, denies cough.     No Known Allergies    Antimicrobials:    vancomycin    Solution 125 milliGRAM(s) Oral every 6 hours    REVIEW OF SYSTEMS:  No chest pain   No N/V  No dysuria   No rash.     Vital Signs Last 24 Hrs  T(C): 36.7 (09-06-18 @ 13:06), Max: 36.8 (09-05-18 @ 22:00)  T(F): 98 (09-06-18 @ 13:06), Max: 98.2 (09-05-18 @ 22:00)  HR: 61 (09-06-18 @ 13:06) (57 - 61)  BP: 155/73 (09-06-18 @ 13:06) (150/80 - 165/80)  RR: 18 (09-06-18 @ 13:06) (16 - 18)  SpO2: 96% (09-06-18 @ 13:06) (96% - 99%)    PHYSICAL EXAM:  Patient in no acute distress. Alert, awake, communicative, confused.   No icterus, no oral ulcers.  Cardiovascular: S1S2 normal.  Lungs: + air entry B/L lung fields.  Gastrointestinal: soft, nontender, nondistended.  Extremities: no edema.  IV sites not inflamed.                           10.3   4.18  )-----------( 215      ( 05 Sep 2018 07:55 )             30.0   09-06    137  |  106  |  8   ----------------------------<  173<H>  4.4   |  22  |  0.86    Ca    9.7      06 Sep 2018 06:42    Culture - Blood (09.03.18 @ 08:30)    Specimen Source: .Blood Blood    Culture Results:   No growth to date.      Radiology:  < from: CT Abdomen and Pelvis w/ IV Cont (09.04.18 @ 20:53) >  IMPRESSION:    Mural thickening of the cecum and rectum with surrounding inflammatory   changes, possibly neoplasm. Please correlate with colonoscopy.    Indeterminant hepatic lesion, probably focal fat.

## 2018-09-06 NOTE — CHART NOTE - NSCHARTNOTEFT_GEN_A_CORE
No absolute contraindication neurosurgical for colonoscopy; however must weigh risk vs benefits of any procedure.

## 2018-09-06 NOTE — PROGRESS NOTE ADULT - ASSESSMENT
86M HTN/BPH, recently treated for c diff presents with AMS, found to have a 4.6x3.3x2.4cm right temporal bleed ring-enhancing mass, oncology is consulted to rule out malignancy    1. Rule out primary versus secondary brain neoplasm  -Appreciate neurosurgery consult, patient will likely need brain biopsy for diagnosis of brain lesion suspicious for primary versus secondary cancer. Although there is a colon thickening noted on CT Abdomen/Pelvis, the CEA is within normal limit making malignant colon cancer to the brain less likely. Appreciate GI evaluation and the plan is for colonoscopy in the morning to further investigate  -discussed plan with patient's daughter/decisionmaker Bea who is in agreement with current plan  -appreciate psychiatry recommendations, patient currently agreeable to plan but at times he is delirious and if patient refuses workup would recommend evaluation for his capacity     Please call oncology fellow if any questions or concerns     Yovany Rashid  PGY-6, Hematology-Oncology Fellow  [phone] ([place]) [phone] ([facility])

## 2018-09-06 NOTE — PROGRESS NOTE ADULT - SUBJECTIVE AND OBJECTIVE BOX
Patient is a 86y old  Male who presents with a chief complaint of recurrent c diff (06 Sep 2018 08:41)      INTERVAL HPI/OVERNIGHT EVENTS:    MEDICATIONS  (STANDING):  dexamethasone     Tablet 4 milliGRAM(s) Oral every 6 hours  dicyclomine 10 milliGRAM(s) Oral three times a day before meals  enalapril 20 milliGRAM(s) Oral daily  famotidine    Tablet 20 milliGRAM(s) Oral daily  heparin  Injectable 5000 Unit(s) SubCutaneous every 12 hours  influenza   Vaccine 0.5 milliLiter(s) IntraMuscular once  lactobacillus acidophilus 1 Tablet(s) Oral two times a day with meals  latanoprost 0.005% Ophthalmic Solution 1 Drop(s) Both EYES at bedtime  levETIRAcetam 500 milliGRAM(s) Oral two times a day  LORazepam   Injectable 1 milliGRAM(s) IntraMuscular once  sodium chloride 0.9%. 1000 milliLiter(s) (100 mL/Hr) IV Continuous <Continuous>  tamsulosin 0.4 milliGRAM(s) Oral at bedtime  vancomycin    Solution 125 milliGRAM(s) Oral every 6 hours    MEDICATIONS  (PRN):  aluminum hydroxide/magnesium hydroxide/simethicone Suspension 30 milliLiter(s) Oral four times a day PRN Indigestion  diVALproex  milliGRAM(s) Oral every 8 hours PRN agitation  ondansetron Injectable 4 milliGRAM(s) IV Push every 6 hours PRN Nausea  simethicone 80 milliGRAM(s) Chew every 6 hours PRN Gas      Allergies    No Known Allergies    Intolerances        Vital Signs Last 24 Hrs  T(C): 36.4 (06 Sep 2018 05:40), Max: 36.8 (05 Sep 2018 22:00)  T(F): 97.6 (06 Sep 2018 05:40), Max: 98.2 (05 Sep 2018 22:00)  HR: 60 (06 Sep 2018 05:40) (56 - 60)  BP: 150/80 (06 Sep 2018 05:40) (131/61 - 165/80)  BP(mean): --  RR: 18 (06 Sep 2018 05:40) (16 - 20)  SpO2: 99% (06 Sep 2018 05:40) (97% - 99%)    LABS:                        10.3   4.18  )-----------( 215      ( 05 Sep 2018 07:55 )             30.0     09-    137  |  106  |  8   ----------------------------<  173<H>  4.4   |  22  |  0.86    Ca    9.7      06 Sep 2018 06:42        Urinalysis Basic - ( 04 Sep 2018 14:06 )    Color: Yellow / Appearance: Clear / S.012 / pH: x  Gluc: x / Ketone: Negative  / Bili: Negative / Urobili: Negative mg/dL   Blood: x / Protein: Negative mg/dL / Nitrite: Negative   Leuk Esterase: Negative / RBC: x / WBC x   Sq Epi: x / Non Sq Epi: x / Bacteria: x        RADIOLOGY & ADDITIONAL TESTS:        Dr Wesley [phone]

## 2018-09-06 NOTE — PROGRESS NOTE ADULT - ASSESSMENT
86 year old male with HTN, BPH, recent completion of vancomycin for a cdiff infection (complete 14 days ago) presents to the ED because of recurrent several loose watery stools for the last 2-3 days and also waxing and waning of mentation for the last week, which has become progressively worse in the last 48 hours prior to admission, admitted for recurrent c diff infection.   Overall Recurrent C diff colitis, leucocytosis resolved.   AMS, MRI with a mass and CT with concern for neoplasm.     Suggest:   c.diff negative, but clinically pt admitted with diarrhea and leucocytosis which resolved, will treat with a 10 day course.   Day 5/10 of therapy today.   follow up prelim blood cultures, NTD   Neurosurgery and GI on board

## 2018-09-06 NOTE — PROGRESS NOTE ADULT - SUBJECTIVE AND OBJECTIVE BOX
Subjective: Patient seen and examined. No new events except as noted.     SUBJECTIVE/ROS:  no cp or sob       MEDICATIONS:  MEDICATIONS  (STANDING):  dexamethasone     Tablet 4 milliGRAM(s) Oral every 6 hours  dicyclomine 10 milliGRAM(s) Oral three times a day before meals  enalapril 20 milliGRAM(s) Oral daily  famotidine    Tablet 20 milliGRAM(s) Oral daily  heparin  Injectable 5000 Unit(s) SubCutaneous every 12 hours  influenza   Vaccine 0.5 milliLiter(s) IntraMuscular once  lactobacillus acidophilus 1 Tablet(s) Oral two times a day with meals  latanoprost 0.005% Ophthalmic Solution 1 Drop(s) Both EYES at bedtime  levETIRAcetam 500 milliGRAM(s) Oral two times a day  LORazepam   Injectable 1 milliGRAM(s) IntraMuscular once  sodium chloride 0.9%. 1000 milliLiter(s) (100 mL/Hr) IV Continuous <Continuous>  tamsulosin 0.4 milliGRAM(s) Oral at bedtime  vancomycin    Solution 125 milliGRAM(s) Oral every 6 hours      PHYSICAL EXAM:  T(C): 36.4 (09-06-18 @ 05:40), Max: 36.8 (09-05-18 @ 22:00)  HR: 60 (09-06-18 @ 05:40) (56 - 60)  BP: 150/80 (09-06-18 @ 05:40) (131/61 - 165/80)  RR: 18 (09-06-18 @ 05:40) (16 - 20)  SpO2: 99% (09-06-18 @ 05:40) (97% - 99%)  Wt(kg): --  I&O's Summary    05 Sep 2018 07:01  -  06 Sep 2018 07:00  --------------------------------------------------------  IN: 3600 mL / OUT: 0 mL / NET: 3600 mL          Appearance: Normal	  HEENT:   Normal oral mucosa, PERRL, EOMI	  Cardiovascular: Normal S1 S2,    Murmur:   Neck: JVP normal  Respiratory: Lungs clear to auscultation  Gastrointestinal:  Soft, Non-tender, + BS	  Skin: normal   Neuro: No gross deficits.   Psychiatry:  Mood & affect appropriate  Ext: No edema      LABS/DATA:    CARDIAC MARKERS:                                10.3   4.18  )-----------( 215      ( 05 Sep 2018 07:55 )             30.0     09-06    137  |  106  |  8   ----------------------------<  173<H>  4.4   |  22  |  0.86    Ca    9.7      06 Sep 2018 06:42      proBNP:   Lipid Profile:   HgA1c:   TSH:     TELE:  EKG:
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
## 2018-09-06 NOTE — CONSULT NOTE ADULT - ASSESSMENT
86M w/ parietal lesion, r/o metastatic source, w/ colonic thickening on CT    - colonic inflammation likely in the setting of very recent colitis, but as this man has evidence of brain neoplasm/mets and no hx of colonoscopy, must r/o colonic source  - GI c/s appreciated, will f/u colonoscopy results  - d/w colorectal fellow on behalf of Dr. Kaleb JIMENEZ  [id number]  [facility]
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
## 2018-09-07 ENCOUNTER — RESULT REVIEW (OUTPATIENT)
Age: 83
End: 2018-09-07

## 2018-09-07 LAB
CANCER AG19-9 SERPL-ACNC: 39.5 U/ML — SIGNIFICANT CHANGE UP
CULTURE RESULTS: SIGNIFICANT CHANGE UP
SPECIMEN SOURCE: SIGNIFICANT CHANGE UP

## 2018-09-07 PROCEDURE — 70553 MRI BRAIN STEM W/O & W/DYE: CPT | Mod: 26

## 2018-09-07 PROCEDURE — 99232 SBSQ HOSP IP/OBS MODERATE 35: CPT

## 2018-09-07 PROCEDURE — 99232 SBSQ HOSP IP/OBS MODERATE 35: CPT | Mod: GC

## 2018-09-07 PROCEDURE — 93010 ELECTROCARDIOGRAM REPORT: CPT

## 2018-09-07 PROCEDURE — 88305 TISSUE EXAM BY PATHOLOGIST: CPT | Mod: 26

## 2018-09-07 RX ORDER — DIAZEPAM 5 MG
5 TABLET ORAL ONCE
Qty: 0 | Refills: 0 | Status: DISCONTINUED | OUTPATIENT
Start: 2018-09-07 | End: 2018-09-07

## 2018-09-07 RX ADMIN — LEVETIRACETAM 500 MILLIGRAM(S): 250 TABLET, FILM COATED ORAL at 17:44

## 2018-09-07 RX ADMIN — TAMSULOSIN HYDROCHLORIDE 0.4 MILLIGRAM(S): 0.4 CAPSULE ORAL at 22:28

## 2018-09-07 RX ADMIN — Medication 125 MILLIGRAM(S): at 00:31

## 2018-09-07 RX ADMIN — Medication 4 MILLIGRAM(S): at 11:45

## 2018-09-07 RX ADMIN — Medication 4 MILLIGRAM(S): at 17:44

## 2018-09-07 RX ADMIN — Medication 20 MILLIGRAM(S): at 06:39

## 2018-09-07 RX ADMIN — Medication 5 MILLIGRAM(S): at 19:58

## 2018-09-07 RX ADMIN — DIVALPROEX SODIUM 125 MILLIGRAM(S): 500 TABLET, DELAYED RELEASE ORAL at 06:39

## 2018-09-07 RX ADMIN — Medication 1 MILLIGRAM(S): at 04:24

## 2018-09-07 RX ADMIN — Medication 125 MILLIGRAM(S): at 17:45

## 2018-09-07 RX ADMIN — HEPARIN SODIUM 5000 UNIT(S): 5000 INJECTION INTRAVENOUS; SUBCUTANEOUS at 11:12

## 2018-09-07 RX ADMIN — LATANOPROST 1 DROP(S): 0.05 SOLUTION/ DROPS OPHTHALMIC; TOPICAL at 22:27

## 2018-09-07 RX ADMIN — Medication 10 MILLIGRAM(S): at 06:39

## 2018-09-07 RX ADMIN — Medication 4 MILLIGRAM(S): at 06:39

## 2018-09-07 RX ADMIN — Medication 4 MILLIGRAM(S): at 00:30

## 2018-09-07 RX ADMIN — LEVETIRACETAM 500 MILLIGRAM(S): 250 TABLET, FILM COATED ORAL at 06:39

## 2018-09-07 RX ADMIN — Medication 3 MILLIGRAM(S): at 01:02

## 2018-09-07 RX ADMIN — HALOPERIDOL DECANOATE 1 MILLIGRAM(S): 100 INJECTION INTRAMUSCULAR at 00:00

## 2018-09-07 RX ADMIN — Medication 125 MILLIGRAM(S): at 06:39

## 2018-09-07 NOTE — PROGRESS NOTE ADULT - ASSESSMENT
86 year old male with HTN, BPH, recent completion of vancomycin for a cdiff infection (complete 14 days ago) presents to the ED because of recurrent several loose watery stools for the last 2-3 days and also waxing and waning of mentation for the last week, which has become progressively worse in the last 48 hours prior to admission, admitted for recurrent c diff infection.   Overall Recurrent C diff colitis, leucocytosis resolved.   AMS, MRI with a mass and CT with concern for neoplasm.     Suggest:   c.diff negative, but clinically pt admitted with diarrhea and leucocytosis which resolved, will treat with a 10 day course.   Day 6/10 of therapy today until 9/11/18  follow up prelim blood cultures, NTD   Neurosurgery and GI on board   Pending colonoscopy today, planned for brain biopsy next week.

## 2018-09-07 NOTE — PROGRESS NOTE BEHAVIORAL HEALTH - NSBHCHARTREVIEWIMAGING_PSY_A_CORE FT
< from: MR Head w/wo IV Cont (09.04.18 @ 12:22) >    IMPRESSION: Atrophy. Right temporal bleed ring-enhancing mass measuring   4.6 x 3.3 x 2.4 cm suspicious for neoplasm. Differential possibilities   include high grade glioma versus metastatic disease. Vasogenic edema with   mild mass effect. No midline shift or hydrocephalus. Atrophy.    < end of copied text >    < from: CT Abdomen and Pelvis w/ IV Cont (09.04.18 @ 20:53) >    IMPRESSION:    Mural thickening of the cecum and rectum with surrounding inflammatory   changes, possibly neoplasm. Please correlate with colonoscopy.    Indeterminant hepatic lesion, probably focal fat.     < end of copied text >

## 2018-09-07 NOTE — PROGRESS NOTE ADULT - ASSESSMENT
PreOp  Based on current ACC/AHA guidelines, patient history and physical exam, the patient is considered to have intermediate risk  no evidence of ischemia or infarction on recent stress test  no objection to proceed to OR    HTN  stable    Cdiff  on vanco  fu with ID    plan for colonoscopy

## 2018-09-07 NOTE — PROGRESS NOTE BEHAVIORAL HEALTH - NSBHCHARTREVIEWLAB_PSY_A_CORE FT
Hgb Trend: 10.3<--, 10.1<--, 10.4<--, 10.0<--, 12.8<--  09-06    137  |  106  |  8   ----------------------------<  173<H>  4.4   |  22  |  0.86    Ca    9.7      06 Sep 2018 06:42      Creatinine Trend: 0.86<--, 0.90<--, 0.89<--, 0.97<--, 1.21<--, 1.30<--  PT/INR - ( 06 Sep 2018 10:21 )   PT: 12.0 sec;   INR: 1.10 ratio         PTT - ( 06 Sep 2018 10:21 )  PTT:30.9 sec

## 2018-09-07 NOTE — PROGRESS NOTE BEHAVIORAL HEALTH - SUMMARY
Summary (include case differential, formulation and patient response to therapy): Patient is an 85 y/o male who lives alone, retired, , with no PPHx, no substance hx, medical hx significant for HTN, BPH, admitted to hospital for metabolic encephalopathy and c.diff, psychiatry consulted for medication management s/p episode of agitation and confusion. Patient has been intermittently confused and disoriented for 2-3 months. Differential diagnosis includes delirium secondary to brain mass or dementia.

## 2018-09-07 NOTE — PROGRESS NOTE BEHAVIORAL HEALTH - NSBHCONSULTMEDAGITATION_PSY_A_CORE FT
1. Discontinue Haldol and recommend Depakote 125mg Q8H PRN agitation. Minimize use of benzos, opiates, other deliriogenic agents.  2. Continue Melatonin 3mg PO qHS PRN insomnia.

## 2018-09-07 NOTE — PROGRESS NOTE ADULT - ASSESSMENT
recurrent c diff- brain  mass-  rule out  colon  cancer  followup with   gi  for colonoscopy- crs   consult  noted  as well-  id   input appreciated;followup with neurosurgery as well

## 2018-09-07 NOTE — PROGRESS NOTE BEHAVIORAL HEALTH - NSBHCHARTREVIEWINVESTIGATE_PSY_A_CORE FT
< from: 12 Lead ECG (09.05.18 @ 04:39) >      Ventricular Rate 52 BPM    Atrial Rate 52 BPM    P-R Interval 224 ms    QRS Duration 116 ms    Q-T Interval 476 ms    QTC Calculation(Bezet) 442 ms    P Axis 65 degrees    R Axis -21 degrees    T Axis 9 degrees    Diagnosis Line SINUS BRADYCARDIA WITH 1ST DEGREE A-V BLOCK  POSSIBLE ANTERIOR INFARCT , AGE UNDETERMINED    Confirmed by MD ILIANA, OSBALDO ([id number]) on 9/5/2018 12:36:31 PM    < end of copied text >

## 2018-09-07 NOTE — PROGRESS NOTE ADULT - ATTENDING COMMENTS
Kati Abbasi  Pager: [phone]. If no response or past 5 pm call [phone].     Will sign off, please call with questions.
Kati Abbasi  Pager: [phone]. If no response or past 5 pm call [phone].
Kati Abbasi  Pager: [phone]. If no response or past 5 pm call [phone].     My colleague will cover 9/5.
Pt appears less agitated today and d/w him and daughter re next steps.To get Colonoscopy in am 9/7,f/b re-assessment re brain Bx, which is indicated regardless of result of colonoscopy.CEA is neg which likely indicates non-met from CRC.

## 2018-09-07 NOTE — PROGRESS NOTE BEHAVIORAL HEALTH - NSBHFUPINTERVALHXFT_PSY_A_CORE
Overnight, the patient received Depakote PRN at 11pm, Haldol PRN 1mg at 1130PM, Haldol PRN 1mg at 12AM, Ativan PRN 1mg at 4AM, and Depakote PRN at 630AM today. On exam today, patient is completely disoriented and does not know why he is here. Overnight, the patient received Depakote PRN at 11pm, Haldol PRN 1mg at 1130PM, Haldol PRN 1mg at 12AM, Ativan PRN 1mg at 4AM, and Depakote PRN at 630AM today. On exam today, patient is completely disoriented and does not know why he is here.  AOx0.  Cannot explain why he is in the hospital, what his medical issues are, or the need for brain biopsy.

## 2018-09-07 NOTE — PROGRESS NOTE BEHAVIORAL HEALTH - NSBHCHARTREVIEWVS_PSY_A_CORE FT
Vital Signs Last 24 Hrs  T(C): 36.3 (07 Sep 2018 06:36), Max: 36.7 (06 Sep 2018 13:06)  T(F): 97.4 (07 Sep 2018 06:36), Max: 98 (06 Sep 2018 13:06)  HR: 60 (07 Sep 2018 06:36) (60 - 61)  BP: 179/81 (07 Sep 2018 06:36) (155/73 - 179/81)  BP(mean): --  RR: 18 (07 Sep 2018 06:36) (18 - 18)  SpO2: 97% (07 Sep 2018 06:36) (96% - 97%)

## 2018-09-07 NOTE — PROGRESS NOTE ADULT - SUBJECTIVE AND OBJECTIVE BOX
Surgery Progress Note    S: Patient seen and examined. No acute events overnight.     O:  Vital Signs Last 24 Hrs  T(C): 36.3 (07 Sep 2018 06:36), Max: 36.7 (06 Sep 2018 13:06)  T(F): 97.4 (07 Sep 2018 06:36), Max: 98 (06 Sep 2018 13:06)  HR: 60 (07 Sep 2018 06:36) (60 - 61)  BP: 179/81 (07 Sep 2018 06:36) (155/73 - 179/81)  BP(mean): --  RR: 18 (07 Sep 2018 06:36) (18 - 18)  SpO2: 97% (07 Sep 2018 06:36) (96% - 97%)    I&O's Detail    06 Sep 2018 07:01  -  07 Sep 2018 07:00  --------------------------------------------------------  IN:    Oral Fluid: 1440 mL    sodium chloride 0.9%.: 2200 mL  Total IN: 3640 mL    OUT:    Voided: 300 mL  Total OUT: 300 mL    Total NET: 3340 mL          MEDICATIONS  (STANDING):  dexamethasone     Tablet 4 milliGRAM(s) Oral every 6 hours  dicyclomine 10 milliGRAM(s) Oral three times a day before meals  enalapril 20 milliGRAM(s) Oral daily  famotidine    Tablet 20 milliGRAM(s) Oral daily  heparin  Injectable 5000 Unit(s) SubCutaneous every 12 hours  influenza   Vaccine 0.5 milliLiter(s) IntraMuscular once  lactobacillus acidophilus 1 Tablet(s) Oral two times a day with meals  latanoprost 0.005% Ophthalmic Solution 1 Drop(s) Both EYES at bedtime  levETIRAcetam 500 milliGRAM(s) Oral two times a day  LORazepam   Injectable 1 milliGRAM(s) IntraMuscular once  sodium chloride 0.9%. 1000 milliLiter(s) (100 mL/Hr) IV Continuous <Continuous>  tamsulosin 0.4 milliGRAM(s) Oral at bedtime  vancomycin    Solution 125 milliGRAM(s) Oral every 6 hours    MEDICATIONS  (PRN):  aluminum hydroxide/magnesium hydroxide/simethicone Suspension 30 milliLiter(s) Oral four times a day PRN Indigestion  diVALproex  milliGRAM(s) Oral every 8 hours PRN agitation  melatonin 3 milliGRAM(s) Oral at bedtime PRN Insomnia  ondansetron Injectable 4 milliGRAM(s) IV Push every 6 hours PRN Nausea  simethicone 80 milliGRAM(s) Chew every 6 hours PRN Gas          09-06    137  |  106  |  8   ----------------------------<  173<H>  4.4   |  22  |  0.86    Ca    9.7      06 Sep 2018 06:42        Physical Exam:  Gen: Laying in bed, NAD  Resp: Unlabored breathing  Abd: soft, NTND  Ext: WWP  Skin: No rashes

## 2018-09-07 NOTE — PROGRESS NOTE ADULT - SUBJECTIVE AND OBJECTIVE BOX
Patient is a 86y old  Male who presents with a chief complaint of Recurrent c diff (06 Sep 2018 16:48)      INTERVAL HPI/OVERNIGHT EVENTS:    MEDICATIONS  (STANDING):  dexamethasone     Tablet 4 milliGRAM(s) Oral every 6 hours  dicyclomine 10 milliGRAM(s) Oral three times a day before meals  enalapril 20 milliGRAM(s) Oral daily  famotidine    Tablet 20 milliGRAM(s) Oral daily  heparin  Injectable 5000 Unit(s) SubCutaneous every 12 hours  influenza   Vaccine 0.5 milliLiter(s) IntraMuscular once  lactobacillus acidophilus 1 Tablet(s) Oral two times a day with meals  latanoprost 0.005% Ophthalmic Solution 1 Drop(s) Both EYES at bedtime  levETIRAcetam 500 milliGRAM(s) Oral two times a day  LORazepam   Injectable 1 milliGRAM(s) IntraMuscular once  sodium chloride 0.9%. 1000 milliLiter(s) (100 mL/Hr) IV Continuous <Continuous>  tamsulosin 0.4 milliGRAM(s) Oral at bedtime  vancomycin    Solution 125 milliGRAM(s) Oral every 6 hours    MEDICATIONS  (PRN):  aluminum hydroxide/magnesium hydroxide/simethicone Suspension 30 milliLiter(s) Oral four times a day PRN Indigestion  diVALproex  milliGRAM(s) Oral every 8 hours PRN agitation  melatonin 3 milliGRAM(s) Oral at bedtime PRN Insomnia  ondansetron Injectable 4 milliGRAM(s) IV Push every 6 hours PRN Nausea  simethicone 80 milliGRAM(s) Chew every 6 hours PRN Gas      Allergies    No Known Allergies    Intolerances        Vital Signs Last 24 Hrs  T(C): 36.3 (07 Sep 2018 06:36), Max: 36.7 (06 Sep 2018 13:06)  T(F): 97.4 (07 Sep 2018 06:36), Max: 98 (06 Sep 2018 13:06)  HR: 60 (07 Sep 2018 06:36) (60 - 61)  BP: 179/81 (07 Sep 2018 06:36) (155/73 - 179/81)  BP(mean): --  RR: 18 (07 Sep 2018 06:36) (18 - 18)  SpO2: 97% (07 Sep 2018 06:36) (96% - 97%)    LABS:                        10.3   4.18  )-----------( 215      ( 05 Sep 2018 07:55 )             30.0     09-06    137  |  106  |  8   ----------------------------<  173<H>  4.4   |  22  |  0.86    Ca    9.7      06 Sep 2018 06:42      PT/INR - ( 06 Sep 2018 10:21 )   PT: 12.0 sec;   INR: 1.10 ratio         PTT - ( 06 Sep 2018 10:21 )  PTT:30.9 sec      RADIOLOGY & ADDITIONAL TESTS:        Dr Wesley [phone] Patient is a 86y old  Male who presents with a chief complaint of Recurrent c diff (06 Sep 2018 16:48)    Pt  with some dizziness  earlier per nursing; awake and   confused  -nad  INTERVAL HPI/OVERNIGHT EVENTS:    MEDICATIONS  (STANDING):  dexamethasone     Tablet 4 milliGRAM(s) Oral every 6 hours  dicyclomine 10 milliGRAM(s) Oral three times a day before meals  enalapril 20 milliGRAM(s) Oral daily  famotidine    Tablet 20 milliGRAM(s) Oral daily  heparin  Injectable 5000 Unit(s) SubCutaneous every 12 hours  influenza   Vaccine 0.5 milliLiter(s) IntraMuscular once  lactobacillus acidophilus 1 Tablet(s) Oral two times a day with meals  latanoprost 0.005% Ophthalmic Solution 1 Drop(s) Both EYES at bedtime  levETIRAcetam 500 milliGRAM(s) Oral two times a day  LORazepam   Injectable 1 milliGRAM(s) IntraMuscular once  sodium chloride 0.9%. 1000 milliLiter(s) (100 mL/Hr) IV Continuous <Continuous>  tamsulosin 0.4 milliGRAM(s) Oral at bedtime  vancomycin    Solution 125 milliGRAM(s) Oral every 6 hours    MEDICATIONS  (PRN):  aluminum hydroxide/magnesium hydroxide/simethicone Suspension 30 milliLiter(s) Oral four times a day PRN Indigestion  diVALproex  milliGRAM(s) Oral every 8 hours PRN agitation  melatonin 3 milliGRAM(s) Oral at bedtime PRN Insomnia  ondansetron Injectable 4 milliGRAM(s) IV Push every 6 hours PRN Nausea  simethicone 80 milliGRAM(s) Chew every 6 hours PRN Gas      Allergies    No Known Allergies    Intolerances        Vital Signs Last 24 Hrs  T(C): 36.3 (07 Sep 2018 06:36), Max: 36.7 (06 Sep 2018 13:06)  T(F): 97.4 (07 Sep 2018 06:36), Max: 98 (06 Sep 2018 13:06)  HR: 60 (07 Sep 2018 06:36) (60 - 61)  BP: 179/81 (07 Sep 2018 06:36) (155/73 - 179/81)  BP(mean): --  RR: 18 (07 Sep 2018 06:36) (18 - 18)  SpO2: 97% (07 Sep 2018 06:36) (96% - 97%)    LABS:                        10.3   4.18  )-----------( 215      ( 05 Sep 2018 07:55 )             30.0     09-06    137  |  106  |  8   ----------------------------<  173<H>  4.4   |  22  |  0.86    Ca    9.7      06 Sep 2018 06:42      PT/INR - ( 06 Sep 2018 10:21 )   PT: 12.0 sec;   INR: 1.10 ratio         PTT - ( 06 Sep 2018 10:21 )  PTT:30.9 sec      RADIOLOGY & ADDITIONAL TESTS:        Dr Wesley [phone]

## 2018-09-07 NOTE — PROGRESS NOTE ADULT - SUBJECTIVE AND OBJECTIVE BOX
Subjective: Patient seen and examined. No new events except as noted.     SUBJECTIVE/ROS:  events noted       MEDICATIONS:  MEDICATIONS  (STANDING):  dexamethasone     Tablet 4 milliGRAM(s) Oral every 6 hours  diazepam    Tablet 5 milliGRAM(s) Oral once  dicyclomine 10 milliGRAM(s) Oral three times a day before meals  enalapril 20 milliGRAM(s) Oral daily  famotidine    Tablet 20 milliGRAM(s) Oral daily  heparin  Injectable 5000 Unit(s) SubCutaneous every 12 hours  influenza   Vaccine 0.5 milliLiter(s) IntraMuscular once  lactobacillus acidophilus 1 Tablet(s) Oral two times a day with meals  latanoprost 0.005% Ophthalmic Solution 1 Drop(s) Both EYES at bedtime  levETIRAcetam 500 milliGRAM(s) Oral two times a day  LORazepam   Injectable 1 milliGRAM(s) IntraMuscular once  sodium chloride 0.9%. 1000 milliLiter(s) (100 mL/Hr) IV Continuous <Continuous>  tamsulosin 0.4 milliGRAM(s) Oral at bedtime  vancomycin    Solution 125 milliGRAM(s) Oral every 6 hours      PHYSICAL EXAM:  T(C): 36.7 (09-07-18 @ 12:49), Max: 36.7 (09-07-18 @ 12:49)  HR: 55 (09-07-18 @ 12:49) (55 - 60)  BP: 154/72 (09-07-18 @ 12:49) (154/72 - 179/81)  RR: 18 (09-07-18 @ 12:49) (18 - 18)  SpO2: 94% (09-07-18 @ 12:49) (94% - 97%)  Wt(kg): --  I&O's Summary    06 Sep 2018 07:01  -  07 Sep 2018 07:00  --------------------------------------------------------  IN: 3640 mL / OUT: 300 mL / NET: 3340 mL    07 Sep 2018 07:01  -  07 Sep 2018 15:24  --------------------------------------------------------  IN: 0 mL / OUT: 700 mL / NET: -700 mL      JVP: Normal  Neck: supple  Lung: clear   CV: S1 S2 , Murmur:  Abd: soft  Ext: No edema  neuro: Awake / alert  Psych: flat affect  Skin: normal      LABS/DATA:    CARDIAC MARKERS:            09-06    137  |  106  |  8   ----------------------------<  173<H>  4.4   |  22  |  0.86    Ca    9.7      06 Sep 2018 06:42      proBNP:   Lipid Profile:   HgA1c:   TSH:     TELE:  EKG:

## 2018-09-07 NOTE — PROGRESS NOTE BEHAVIORAL HEALTH - NSBHCONSULTMEDS_PSY_A_CORE FT
1. Start Depakote 250mg QHS. Confirmed with sister Maurilio ([phone]). 1. Start Depakote 250mg QHS. Confirmed with sister Maurilio ([phone]).    2. Patient does not have capacity at this time to make decisions regarding biopsy/excision of brain mass

## 2018-09-07 NOTE — PROGRESS NOTE ADULT - SUBJECTIVE AND OBJECTIVE BOX
86y old  Male who presents with a chief complaint of recurrent c diff (07 Sep 2018 15:24)      Interval history:  Afebrile, somewhat confused, no abdominal pain. Awaiting colonoscopy.     No Known Allergies    Antimicrobials:    vancomycin    Solution 125 milliGRAM(s) Oral every 6 hours    REVIEW OF SYSTEMS:  No chest pain   No cough, no SOB  No N/V  No dysuria or frequency  No rash.     Vital Signs Last 24 Hrs  T(C): 36.9 (09-07-18 @ 17:47), Max: 36.9 (09-07-18 @ 17:47)  T(F): 98.4 (09-07-18 @ 17:47), Max: 98.4 (09-07-18 @ 17:47)  HR: 58 (09-07-18 @ 17:47) (55 - 60)  BP: 161/87 (09-07-18 @ 17:47) (154/72 - 179/81)  RR: 18 (09-07-18 @ 17:47) (18 - 18)  SpO2: 97% (09-07-18 @ 17:47) (94% - 97%)      PHYSICAL EXAM:  Patient in no acute distress. Alert, awake, communicative, confused.   No icterus, no oral ulcers.  Cardiovascular: S1S2 normal.  Lungs: + air entry B/L lung fields.  Gastrointestinal: soft, nontender, nondistended.  Extremities: no edema.  IV sites not inflamed.       09-06    137  |  106  |  8   ----------------------------<  173<H>  4.4   |  22  |  0.86    Ca    9.7      06 Sep 2018 06:42

## 2018-09-07 NOTE — PROGRESS NOTE ADULT - ASSESSMENT
Assessment:  86M w/ right parietal lesion, r/o metastatic source, w/ cecal & rectal thickening on CT.    Plan:  - colonic inflammation likely in the setting of very recent colitis, but as this man has evidence of brain neoplasm/mets and no hx of colonoscopy, must r/o colonic source  - GI c/s appreciated, will f/u colonoscopy results    Patrick Caldwell, PGY-2  [facility] [phone]

## 2018-09-08 LAB
ANION GAP SERPL CALC-SCNC: 13 MMOL/L — SIGNIFICANT CHANGE UP (ref 5–17)
BUN SERPL-MCNC: 12 MG/DL — SIGNIFICANT CHANGE UP (ref 7–23)
CALCIUM SERPL-MCNC: 8.9 MG/DL — SIGNIFICANT CHANGE UP (ref 8.4–10.5)
CHLORIDE SERPL-SCNC: 106 MMOL/L — SIGNIFICANT CHANGE UP (ref 96–108)
CO2 SERPL-SCNC: 22 MMOL/L — SIGNIFICANT CHANGE UP (ref 22–31)
CREAT SERPL-MCNC: 0.82 MG/DL — SIGNIFICANT CHANGE UP (ref 0.5–1.3)
CULTURE RESULTS: SIGNIFICANT CHANGE UP
GLUCOSE SERPL-MCNC: 115 MG/DL — HIGH (ref 70–99)
HCT VFR BLD CALC: 30 % — LOW (ref 39–50)
HGB BLD-MCNC: 10.2 G/DL — LOW (ref 13–17)
MCHC RBC-ENTMCNC: 31.1 PG — SIGNIFICANT CHANGE UP (ref 27–34)
MCHC RBC-ENTMCNC: 34 GM/DL — SIGNIFICANT CHANGE UP (ref 32–36)
MCV RBC AUTO: 91.5 FL — SIGNIFICANT CHANGE UP (ref 80–100)
PLATELET # BLD AUTO: 212 K/UL — SIGNIFICANT CHANGE UP (ref 150–400)
POTASSIUM SERPL-MCNC: 3.6 MMOL/L — SIGNIFICANT CHANGE UP (ref 3.5–5.3)
POTASSIUM SERPL-SCNC: 3.6 MMOL/L — SIGNIFICANT CHANGE UP (ref 3.5–5.3)
RBC # BLD: 3.28 M/UL — LOW (ref 4.2–5.8)
RBC # FLD: 13.8 % — SIGNIFICANT CHANGE UP (ref 10.3–14.5)
SODIUM SERPL-SCNC: 141 MMOL/L — SIGNIFICANT CHANGE UP (ref 135–145)
SPECIMEN SOURCE: SIGNIFICANT CHANGE UP
WBC # BLD: 7.18 K/UL — SIGNIFICANT CHANGE UP (ref 3.8–10.5)
WBC # FLD AUTO: 7.18 K/UL — SIGNIFICANT CHANGE UP (ref 3.8–10.5)

## 2018-09-08 PROCEDURE — 93010 ELECTROCARDIOGRAM REPORT: CPT

## 2018-09-08 RX ORDER — HALOPERIDOL DECANOATE 100 MG/ML
1 INJECTION INTRAMUSCULAR ONCE
Qty: 0 | Refills: 0 | Status: COMPLETED | OUTPATIENT
Start: 2018-09-08 | End: 2018-09-08

## 2018-09-08 RX ORDER — ACETAMINOPHEN 500 MG
650 TABLET ORAL ONCE
Qty: 0 | Refills: 0 | Status: COMPLETED | OUTPATIENT
Start: 2018-09-08 | End: 2018-09-09

## 2018-09-08 RX ADMIN — FAMOTIDINE 20 MILLIGRAM(S): 10 INJECTION INTRAVENOUS at 12:50

## 2018-09-08 RX ADMIN — Medication 0.5 MILLIGRAM(S): at 17:50

## 2018-09-08 RX ADMIN — Medication 20 MILLIGRAM(S): at 09:13

## 2018-09-08 RX ADMIN — TAMSULOSIN HYDROCHLORIDE 0.4 MILLIGRAM(S): 0.4 CAPSULE ORAL at 22:11

## 2018-09-08 RX ADMIN — Medication 4 MILLIGRAM(S): at 22:12

## 2018-09-08 RX ADMIN — Medication 125 MILLIGRAM(S): at 00:13

## 2018-09-08 RX ADMIN — Medication 4 MILLIGRAM(S): at 12:52

## 2018-09-08 RX ADMIN — Medication 4 MILLIGRAM(S): at 18:11

## 2018-09-08 RX ADMIN — Medication 1 TABLET(S): at 18:10

## 2018-09-08 RX ADMIN — Medication 3 MILLIGRAM(S): at 00:13

## 2018-09-08 RX ADMIN — Medication 125 MILLIGRAM(S): at 12:51

## 2018-09-08 RX ADMIN — Medication 1 TABLET(S): at 09:13

## 2018-09-08 RX ADMIN — LATANOPROST 1 DROP(S): 0.05 SOLUTION/ DROPS OPHTHALMIC; TOPICAL at 22:11

## 2018-09-08 RX ADMIN — Medication 4 MILLIGRAM(S): at 06:37

## 2018-09-08 RX ADMIN — HALOPERIDOL DECANOATE 1 MILLIGRAM(S): 100 INJECTION INTRAMUSCULAR at 18:10

## 2018-09-08 RX ADMIN — Medication 125 MILLIGRAM(S): at 06:37

## 2018-09-08 RX ADMIN — HEPARIN SODIUM 5000 UNIT(S): 5000 INJECTION INTRAVENOUS; SUBCUTANEOUS at 22:12

## 2018-09-08 RX ADMIN — Medication 10 MILLIGRAM(S): at 18:10

## 2018-09-08 RX ADMIN — LEVETIRACETAM 500 MILLIGRAM(S): 250 TABLET, FILM COATED ORAL at 18:10

## 2018-09-08 RX ADMIN — Medication 10 MILLIGRAM(S): at 12:50

## 2018-09-08 RX ADMIN — Medication 4 MILLIGRAM(S): at 00:13

## 2018-09-08 RX ADMIN — Medication 10 MILLIGRAM(S): at 06:37

## 2018-09-08 RX ADMIN — Medication 125 MILLIGRAM(S): at 22:11

## 2018-09-08 RX ADMIN — LEVETIRACETAM 500 MILLIGRAM(S): 250 TABLET, FILM COATED ORAL at 06:37

## 2018-09-08 RX ADMIN — HEPARIN SODIUM 5000 UNIT(S): 5000 INJECTION INTRAVENOUS; SUBCUTANEOUS at 12:51

## 2018-09-08 RX ADMIN — Medication 125 MILLIGRAM(S): at 18:10

## 2018-09-08 NOTE — PROGRESS NOTE ADULT - ASSESSMENT
PreOp  Based on current ACC/AHA guidelines, patient history and physical exam, the patient is considered to have intermediate risk  no evidence of ischemia or infarction on recent stress test  no objection to proceed to OR    HTN  stable    Cdiff  on vanco  fu with ID    Bradycardia  likely related to sleeping hours  overall HR on low side ? related to brain mass   ? underlying JAYRO  obtain EKG    plan for colonoscopy

## 2018-09-08 NOTE — PROVIDER CONTACT NOTE (OTHER) - ACTION/TREATMENT ORDERED:
If patient becomes agitated again, administer PRN Depakote. OK to attempt PIV access in AM. Hold fluids for now. Continue to monitor.

## 2018-09-08 NOTE — PHYSICAL THERAPY INITIAL EVALUATION ADULT - DISCHARGE DISPOSITION, PT EVAL
GLORY if family declines pt will benefit home w/assistance and home PT to improve strength, balance, endurance, improve ambulation and home safety assessment./rehabilitation facility

## 2018-09-08 NOTE — PROGRESS NOTE ADULT - SUBJECTIVE AND OBJECTIVE BOX
Subjective: Patient seen and examined. No new events except as noted.     SUBJECTIVE/ROS:  NAD      MEDICATIONS:  MEDICATIONS  (STANDING):  dexamethasone     Tablet 4 milliGRAM(s) Oral every 6 hours  dicyclomine 10 milliGRAM(s) Oral three times a day before meals  enalapril 20 milliGRAM(s) Oral daily  famotidine    Tablet 20 milliGRAM(s) Oral daily  heparin  Injectable 5000 Unit(s) SubCutaneous every 12 hours  influenza   Vaccine 0.5 milliLiter(s) IntraMuscular once  lactobacillus acidophilus 1 Tablet(s) Oral two times a day with meals  latanoprost 0.005% Ophthalmic Solution 1 Drop(s) Both EYES at bedtime  levETIRAcetam 500 milliGRAM(s) Oral two times a day  LORazepam   Injectable 1 milliGRAM(s) IntraMuscular once  sodium chloride 0.9%. 1000 milliLiter(s) (100 mL/Hr) IV Continuous <Continuous>  tamsulosin 0.4 milliGRAM(s) Oral at bedtime  vancomycin    Solution 125 milliGRAM(s) Oral every 6 hours      PHYSICAL EXAM:  T(C): 36.9 (09-08-18 @ 05:04), Max: 37.1 (09-07-18 @ 21:46)  HR: 42 (09-08-18 @ 05:04) (42 - 58)  BP: 141/74 (09-08-18 @ 05:04) (141/74 - 165/89)  RR: 18 (09-08-18 @ 05:04) (18 - 18)  SpO2: 97% (09-08-18 @ 05:04) (94% - 98%)  Wt(kg): --  I&O's Summary    07 Sep 2018 07:01  -  08 Sep 2018 07:00  --------------------------------------------------------  IN: 1940 mL / OUT: 1600 mL / NET: 340 mL        JVP: Normal  Neck: supple  Lung: clear   CV: S1 S2 , Murmur:  Abd: soft  Ext: No edema  neuro: Awake / alert  Psych: flat affect  Skin: normal      LABS/DATA:    CARDIAC MARKERS:                  proBNP:   Lipid Profile:   HgA1c:   TSH:     TELE:  EKG:

## 2018-09-08 NOTE — PHYSICAL THERAPY INITIAL EVALUATION ADULT - ADDITIONAL COMMENTS
MR Brain: Redemonstration of necrotic right temporal lobe mass with surrounding vasogenic edema, highly suspicious for glioblastoma.    Social hx: lives in a pvt house with spouse +1flight of stairs As per the pt he was able to do everything by himself. MR Brain: Redemonstration of necrotic right temporal lobe mass with surrounding vasogenic edema, highly suspicious for glioblastoma.    Social hx: lives in a pvt house alone, +1flight of stairs inside, As per the pt he was able to ambulate and do self care independently w/o any help or using AD.

## 2018-09-08 NOTE — PROGRESS NOTE ADULT - SUBJECTIVE AND OBJECTIVE BOX
Patient is a 86y old  Male who presents with a chief complaint of recurrent c diff (08 Sep 2018 07:54)      INTERVAL HPI/OVERNIGHT EVENTS:    MEDICATIONS  (STANDING):  dexamethasone     Tablet 4 milliGRAM(s) Oral every 6 hours  dicyclomine 10 milliGRAM(s) Oral three times a day before meals  enalapril 20 milliGRAM(s) Oral daily  famotidine    Tablet 20 milliGRAM(s) Oral daily  heparin  Injectable 5000 Unit(s) SubCutaneous every 12 hours  influenza   Vaccine 0.5 milliLiter(s) IntraMuscular once  lactobacillus acidophilus 1 Tablet(s) Oral two times a day with meals  latanoprost 0.005% Ophthalmic Solution 1 Drop(s) Both EYES at bedtime  levETIRAcetam 500 milliGRAM(s) Oral two times a day  LORazepam   Injectable 1 milliGRAM(s) IntraMuscular once  sodium chloride 0.9%. 1000 milliLiter(s) (100 mL/Hr) IV Continuous <Continuous>  tamsulosin 0.4 milliGRAM(s) Oral at bedtime  vancomycin    Solution 125 milliGRAM(s) Oral every 6 hours    MEDICATIONS  (PRN):  acetaminophen   Tablet .. 650 milliGRAM(s) Oral once PRN Mild Pain (1 - 3)  aluminum hydroxide/magnesium hydroxide/simethicone Suspension 30 milliLiter(s) Oral four times a day PRN Indigestion  diVALproex  milliGRAM(s) Oral every 8 hours PRN agitation  melatonin 3 milliGRAM(s) Oral at bedtime PRN Insomnia  ondansetron Injectable 4 milliGRAM(s) IV Push every 6 hours PRN Nausea  simethicone 80 milliGRAM(s) Chew every 6 hours PRN Gas      Allergies    No Known Allergies    Intolerances        Vital Signs Last 24 Hrs  T(C): 36.9 (08 Sep 2018 05:04), Max: 37.1 (07 Sep 2018 21:46)  T(F): 98.5 (08 Sep 2018 05:04), Max: 98.7 (07 Sep 2018 21:46)  HR: 101 (08 Sep 2018 09:12) (42 - 101)  BP: 158/80 (08 Sep 2018 09:12) (141/74 - 165/89)  BP(mean): --  RR: 18 (08 Sep 2018 05:04) (18 - 18)  SpO2: 97% (08 Sep 2018 05:04) (94% - 98%)    LABS:                        10.2   7.18  )-----------( 212      ( 08 Sep 2018 08:18 )             30.0     09-08    141  |  106  |  12  ----------------------------<  115<H>  3.6   |  22  |  0.82    Ca    8.9      08 Sep 2018 06:44            RADIOLOGY & ADDITIONAL TESTS:        Dr Wesley [phone]

## 2018-09-08 NOTE — PHYSICAL THERAPY INITIAL EVALUATION ADULT - PERTINENT HX OF CURRENT PROBLEM, REHAB EVAL
Patient is an 86 year old male with HTN, BPH, recent completion of vancomycin for a cdiff infection (complete 14 days ago) presents to the ED because of recurrent several loose watery stools for the last 2-3 days and also waxing and waning of mentation for the last week, which has become progressively worse in the last 48 hours prior to admission. Result shows decreased h/h, Patient is an 86 year old male with HTN, BPH, recent completion of vancomycin for a cdiff infection (complete 14 days ago) presents to the ED because of recurrent several loose watery stools for the last 2-3 days and also waxing and waning of mentation for the last week, which has become progressively worse in the last 48 hours prior to admission. Result shows decreased h/h, dx with encephalopathy ,

## 2018-09-08 NOTE — PHYSICAL THERAPY INITIAL EVALUATION ADULT - BALANCE TRAINING, PT EVAL
pt will demonstrate good dynamic standing balance to improve safety and decrease LOB during functional mobility in 2weeks.

## 2018-09-09 RX ORDER — DIVALPROEX SODIUM 500 MG/1
250 TABLET, DELAYED RELEASE ORAL AT BEDTIME
Qty: 0 | Refills: 0 | Status: DISCONTINUED | OUTPATIENT
Start: 2018-09-09 | End: 2018-09-13

## 2018-09-09 RX ORDER — HALOPERIDOL DECANOATE 100 MG/ML
1 INJECTION INTRAMUSCULAR ONCE
Qty: 0 | Refills: 0 | Status: COMPLETED | OUTPATIENT
Start: 2018-09-09 | End: 2018-09-09

## 2018-09-09 RX ORDER — HALOPERIDOL DECANOATE 100 MG/ML
2 INJECTION INTRAMUSCULAR ONCE
Qty: 0 | Refills: 0 | Status: COMPLETED | OUTPATIENT
Start: 2018-09-09 | End: 2018-09-09

## 2018-09-09 RX ADMIN — Medication 125 MILLIGRAM(S): at 17:26

## 2018-09-09 RX ADMIN — Medication 125 MILLIGRAM(S): at 20:43

## 2018-09-09 RX ADMIN — Medication 10 MILLIGRAM(S): at 11:12

## 2018-09-09 RX ADMIN — DIVALPROEX SODIUM 125 MILLIGRAM(S): 500 TABLET, DELAYED RELEASE ORAL at 17:25

## 2018-09-09 RX ADMIN — DIVALPROEX SODIUM 250 MILLIGRAM(S): 500 TABLET, DELAYED RELEASE ORAL at 20:41

## 2018-09-09 RX ADMIN — Medication 4 MILLIGRAM(S): at 17:31

## 2018-09-09 RX ADMIN — Medication 650 MILLIGRAM(S): at 20:42

## 2018-09-09 RX ADMIN — Medication 650 MILLIGRAM(S): at 21:15

## 2018-09-09 RX ADMIN — Medication 20 MILLIGRAM(S): at 06:14

## 2018-09-09 RX ADMIN — TAMSULOSIN HYDROCHLORIDE 0.4 MILLIGRAM(S): 0.4 CAPSULE ORAL at 20:42

## 2018-09-09 RX ADMIN — Medication 125 MILLIGRAM(S): at 11:16

## 2018-09-09 RX ADMIN — Medication 4 MILLIGRAM(S): at 20:41

## 2018-09-09 RX ADMIN — FAMOTIDINE 20 MILLIGRAM(S): 10 INJECTION INTRAVENOUS at 11:16

## 2018-09-09 RX ADMIN — Medication 125 MILLIGRAM(S): at 06:14

## 2018-09-09 RX ADMIN — Medication 4 MILLIGRAM(S): at 06:14

## 2018-09-09 RX ADMIN — Medication 10 MILLIGRAM(S): at 06:14

## 2018-09-09 RX ADMIN — Medication 3 MILLIGRAM(S): at 20:41

## 2018-09-09 RX ADMIN — Medication 10 MILLIGRAM(S): at 17:26

## 2018-09-09 RX ADMIN — HALOPERIDOL DECANOATE 2 MILLIGRAM(S): 100 INJECTION INTRAMUSCULAR at 17:25

## 2018-09-09 RX ADMIN — Medication 4 MILLIGRAM(S): at 11:16

## 2018-09-09 RX ADMIN — HEPARIN SODIUM 5000 UNIT(S): 5000 INJECTION INTRAVENOUS; SUBCUTANEOUS at 11:12

## 2018-09-09 RX ADMIN — Medication 1 TABLET(S): at 08:20

## 2018-09-09 RX ADMIN — Medication 1 TABLET(S): at 17:26

## 2018-09-09 RX ADMIN — LEVETIRACETAM 500 MILLIGRAM(S): 250 TABLET, FILM COATED ORAL at 17:26

## 2018-09-09 RX ADMIN — LEVETIRACETAM 500 MILLIGRAM(S): 250 TABLET, FILM COATED ORAL at 06:14

## 2018-09-09 RX ADMIN — HALOPERIDOL DECANOATE 1 MILLIGRAM(S): 100 INJECTION INTRAMUSCULAR at 09:24

## 2018-09-09 RX ADMIN — DIVALPROEX SODIUM 125 MILLIGRAM(S): 500 TABLET, DELAYED RELEASE ORAL at 09:05

## 2018-09-09 NOTE — PROGRESS NOTE ADULT - ASSESSMENT
recurrent  c diff-  brain  mass -  metabolic  encephalopathy-  colon  polyps  removed  case  dw  daughter (RN)    yesterday on phone  at length-  they  will discuss among family-  to  proceed with  biopsy or  perhaps  to  have   a  palliative  care  consult in light of  pts  overall  health

## 2018-09-09 NOTE — PROGRESS NOTE ADULT - SUBJECTIVE AND OBJECTIVE BOX
Patient is a 86y old  Male who presents with a chief complaint of recurrent c diff (08 Sep 2018 11:39)      INTERVAL HPI/OVERNIGHT EVENTS:    MEDICATIONS  (STANDING):  dexamethasone     Tablet 4 milliGRAM(s) Oral every 6 hours  dicyclomine 10 milliGRAM(s) Oral three times a day before meals  enalapril 20 milliGRAM(s) Oral daily  famotidine    Tablet 20 milliGRAM(s) Oral daily  heparin  Injectable 5000 Unit(s) SubCutaneous every 12 hours  influenza   Vaccine 0.5 milliLiter(s) IntraMuscular once  lactobacillus acidophilus 1 Tablet(s) Oral two times a day with meals  latanoprost 0.005% Ophthalmic Solution 1 Drop(s) Both EYES at bedtime  levETIRAcetam 500 milliGRAM(s) Oral two times a day  LORazepam   Injectable 1 milliGRAM(s) IntraMuscular once  tamsulosin 0.4 milliGRAM(s) Oral at bedtime  vancomycin    Solution 125 milliGRAM(s) Oral every 6 hours    MEDICATIONS  (PRN):  acetaminophen   Tablet .. 650 milliGRAM(s) Oral once PRN Mild Pain (1 - 3)  aluminum hydroxide/magnesium hydroxide/simethicone Suspension 30 milliLiter(s) Oral four times a day PRN Indigestion  diVALproex  milliGRAM(s) Oral every 8 hours PRN agitation  melatonin 3 milliGRAM(s) Oral at bedtime PRN Insomnia  ondansetron Injectable 4 milliGRAM(s) IV Push every 6 hours PRN Nausea  simethicone 80 milliGRAM(s) Chew every 6 hours PRN Gas      Allergies    No Known Allergies    Intolerances        Vital Signs Last 24 Hrs  T(C): 36.4 (09 Sep 2018 06:18), Max: 36.7 (08 Sep 2018 12:55)  T(F): 97.5 (09 Sep 2018 06:18), Max: 98 (08 Sep 2018 12:55)  HR: 52 (09 Sep 2018 06:18) (50 - 63)  BP: 179/80 (09 Sep 2018 06:18) (137/82 - 179/80)  BP(mean): --  RR: 18 (09 Sep 2018 06:18) (18 - 18)  SpO2: 99% (09 Sep 2018 06:18) (98% - 99%)    LABS:                        10.2   7.18  )-----------( 212      ( 08 Sep 2018 08:18 )             30.0     09-08    141  |  106  |  12  ----------------------------<  115<H>  3.6   |  22  |  0.82    Ca    8.9      08 Sep 2018 06:44            RADIOLOGY & ADDITIONAL TESTS:        Dr Wesley [phone] Patient is a 86y old  Male who presents with a chief complaint of recurrent c diff (08 Sep 2018 11:39)  sinus   on tele    INTERVAL HPI/OVERNIGHT EVENTS:    MEDICATIONS  (STANDING):  dexamethasone     Tablet 4 milliGRAM(s) Oral every 6 hours  dicyclomine 10 milliGRAM(s) Oral three times a day before meals  enalapril 20 milliGRAM(s) Oral daily  famotidine    Tablet 20 milliGRAM(s) Oral daily  heparin  Injectable 5000 Unit(s) SubCutaneous every 12 hours  influenza   Vaccine 0.5 milliLiter(s) IntraMuscular once  lactobacillus acidophilus 1 Tablet(s) Oral two times a day with meals  latanoprost 0.005% Ophthalmic Solution 1 Drop(s) Both EYES at bedtime  levETIRAcetam 500 milliGRAM(s) Oral two times a day  LORazepam   Injectable 1 milliGRAM(s) IntraMuscular once  tamsulosin 0.4 milliGRAM(s) Oral at bedtime  vancomycin    Solution 125 milliGRAM(s) Oral every 6 hours    MEDICATIONS  (PRN):  acetaminophen   Tablet .. 650 milliGRAM(s) Oral once PRN Mild Pain (1 - 3)  aluminum hydroxide/magnesium hydroxide/simethicone Suspension 30 milliLiter(s) Oral four times a day PRN Indigestion  diVALproex  milliGRAM(s) Oral every 8 hours PRN agitation  melatonin 3 milliGRAM(s) Oral at bedtime PRN Insomnia  ondansetron Injectable 4 milliGRAM(s) IV Push every 6 hours PRN Nausea  simethicone 80 milliGRAM(s) Chew every 6 hours PRN Gas      Allergies    No Known Allergies    Intolerances        Vital Signs Last 24 Hrs  T(C): 36.4 (09 Sep 2018 06:18), Max: 36.7 (08 Sep 2018 12:55)  T(F): 97.5 (09 Sep 2018 06:18), Max: 98 (08 Sep 2018 12:55)  HR: 52 (09 Sep 2018 06:18) (50 - 63)  BP: 179/80 (09 Sep 2018 06:18) (137/82 - 179/80)  BP(mean): --  RR: 18 (09 Sep 2018 06:18) (18 - 18)  SpO2: 99% (09 Sep 2018 06:18) (98% - 99%)    LABS:                        10.2   7.18  )-----------( 212      ( 08 Sep 2018 08:18 )             30.0     09-08    141  |  106  |  12  ----------------------------<  115<H>  3.6   |  22  |  0.82    Ca    8.9      08 Sep 2018 06:44            RADIOLOGY & ADDITIONAL TESTS:        Dr Wesley [phone]

## 2018-09-10 DIAGNOSIS — Z71.89 OTHER SPECIFIED COUNSELING: ICD-10-CM

## 2018-09-10 DIAGNOSIS — Z51.5 ENCOUNTER FOR PALLIATIVE CARE: ICD-10-CM

## 2018-09-10 DIAGNOSIS — G93.9 DISORDER OF BRAIN, UNSPECIFIED: ICD-10-CM

## 2018-09-10 LAB
ANION GAP SERPL CALC-SCNC: 12 MMOL/L — SIGNIFICANT CHANGE UP (ref 5–17)
BUN SERPL-MCNC: 18 MG/DL — SIGNIFICANT CHANGE UP (ref 7–23)
CALCIUM SERPL-MCNC: 8.9 MG/DL — SIGNIFICANT CHANGE UP (ref 8.4–10.5)
CHLORIDE SERPL-SCNC: 103 MMOL/L — SIGNIFICANT CHANGE UP (ref 96–108)
CO2 SERPL-SCNC: 25 MMOL/L — SIGNIFICANT CHANGE UP (ref 22–31)
CREAT SERPL-MCNC: 0.92 MG/DL — SIGNIFICANT CHANGE UP (ref 0.5–1.3)
GLUCOSE SERPL-MCNC: 103 MG/DL — HIGH (ref 70–99)
HCT VFR BLD CALC: 35.1 % — LOW (ref 39–50)
HGB BLD-MCNC: 11.8 G/DL — LOW (ref 13–17)
MAGNESIUM SERPL-MCNC: 1.6 MG/DL — SIGNIFICANT CHANGE UP (ref 1.6–2.6)
MCHC RBC-ENTMCNC: 31 PG — SIGNIFICANT CHANGE UP (ref 27–34)
MCHC RBC-ENTMCNC: 33.6 GM/DL — SIGNIFICANT CHANGE UP (ref 32–36)
MCV RBC AUTO: 92.1 FL — SIGNIFICANT CHANGE UP (ref 80–100)
PHOSPHATE SERPL-MCNC: 3.3 MG/DL — SIGNIFICANT CHANGE UP (ref 2.5–4.5)
PLATELET # BLD AUTO: 225 K/UL — SIGNIFICANT CHANGE UP (ref 150–400)
POTASSIUM SERPL-MCNC: 4 MMOL/L — SIGNIFICANT CHANGE UP (ref 3.5–5.3)
POTASSIUM SERPL-SCNC: 4 MMOL/L — SIGNIFICANT CHANGE UP (ref 3.5–5.3)
RBC # BLD: 3.81 M/UL — LOW (ref 4.2–5.8)
RBC # FLD: 13.4 % — SIGNIFICANT CHANGE UP (ref 10.3–14.5)
SODIUM SERPL-SCNC: 140 MMOL/L — SIGNIFICANT CHANGE UP (ref 135–145)
WBC # BLD: 8.4 K/UL — SIGNIFICANT CHANGE UP (ref 3.8–10.5)
WBC # FLD AUTO: 8.4 K/UL — SIGNIFICANT CHANGE UP (ref 3.8–10.5)

## 2018-09-10 PROCEDURE — 99223 1ST HOSP IP/OBS HIGH 75: CPT

## 2018-09-10 PROCEDURE — 93010 ELECTROCARDIOGRAM REPORT: CPT

## 2018-09-10 PROCEDURE — 99497 ADVNCD CARE PLAN 30 MIN: CPT | Mod: 25

## 2018-09-10 PROCEDURE — 99232 SBSQ HOSP IP/OBS MODERATE 35: CPT

## 2018-09-10 RX ORDER — HALOPERIDOL DECANOATE 100 MG/ML
1 INJECTION INTRAMUSCULAR
Qty: 0 | Refills: 0 | Status: DISCONTINUED | OUTPATIENT
Start: 2018-09-10 | End: 2018-09-13

## 2018-09-10 RX ORDER — HALOPERIDOL DECANOATE 100 MG/ML
2 INJECTION INTRAMUSCULAR ONCE
Qty: 0 | Refills: 0 | Status: COMPLETED | OUTPATIENT
Start: 2018-09-10 | End: 2018-09-10

## 2018-09-10 RX ORDER — HALOPERIDOL DECANOATE 100 MG/ML
1 INJECTION INTRAMUSCULAR EVERY 6 HOURS
Qty: 0 | Refills: 0 | Status: DISCONTINUED | OUTPATIENT
Start: 2018-09-10 | End: 2018-09-13

## 2018-09-10 RX ADMIN — Medication 10 MILLIGRAM(S): at 17:59

## 2018-09-10 RX ADMIN — Medication 4 MILLIGRAM(S): at 06:10

## 2018-09-10 RX ADMIN — Medication 4 MILLIGRAM(S): at 12:18

## 2018-09-10 RX ADMIN — TAMSULOSIN HYDROCHLORIDE 0.4 MILLIGRAM(S): 0.4 CAPSULE ORAL at 21:31

## 2018-09-10 RX ADMIN — Medication 1 TABLET(S): at 08:02

## 2018-09-10 RX ADMIN — HALOPERIDOL DECANOATE 2 MILLIGRAM(S): 100 INJECTION INTRAMUSCULAR at 02:00

## 2018-09-10 RX ADMIN — HEPARIN SODIUM 5000 UNIT(S): 5000 INJECTION INTRAVENOUS; SUBCUTANEOUS at 13:33

## 2018-09-10 RX ADMIN — LEVETIRACETAM 500 MILLIGRAM(S): 250 TABLET, FILM COATED ORAL at 18:01

## 2018-09-10 RX ADMIN — Medication 10 MILLIGRAM(S): at 06:10

## 2018-09-10 RX ADMIN — HEPARIN SODIUM 5000 UNIT(S): 5000 INJECTION INTRAVENOUS; SUBCUTANEOUS at 21:32

## 2018-09-10 RX ADMIN — Medication 125 MILLIGRAM(S): at 18:00

## 2018-09-10 RX ADMIN — LEVETIRACETAM 500 MILLIGRAM(S): 250 TABLET, FILM COATED ORAL at 06:10

## 2018-09-10 RX ADMIN — Medication 10 MILLIGRAM(S): at 12:18

## 2018-09-10 RX ADMIN — FAMOTIDINE 20 MILLIGRAM(S): 10 INJECTION INTRAVENOUS at 12:18

## 2018-09-10 RX ADMIN — Medication 20 MILLIGRAM(S): at 06:10

## 2018-09-10 RX ADMIN — Medication 125 MILLIGRAM(S): at 06:10

## 2018-09-10 RX ADMIN — DIVALPROEX SODIUM 250 MILLIGRAM(S): 500 TABLET, DELAYED RELEASE ORAL at 21:31

## 2018-09-10 RX ADMIN — Medication 125 MILLIGRAM(S): at 21:32

## 2018-09-10 RX ADMIN — Medication 1 TABLET(S): at 17:59

## 2018-09-10 RX ADMIN — LATANOPROST 1 DROP(S): 0.05 SOLUTION/ DROPS OPHTHALMIC; TOPICAL at 21:31

## 2018-09-10 RX ADMIN — Medication 125 MILLIGRAM(S): at 12:27

## 2018-09-10 RX ADMIN — Medication 3 MILLIGRAM(S): at 21:31

## 2018-09-10 RX ADMIN — Medication 4 MILLIGRAM(S): at 21:31

## 2018-09-10 RX ADMIN — Medication 4 MILLIGRAM(S): at 17:59

## 2018-09-10 RX ADMIN — HALOPERIDOL DECANOATE 1 MILLIGRAM(S): 100 INJECTION INTRAMUSCULAR at 18:00

## 2018-09-10 NOTE — PROGRESS NOTE BEHAVIORAL HEALTH - NSBHCHARTREVIEWINVESTIGATE_PSY_A_CORE FT
9/7 EKG    Ventricular Rate 59 BPM    Atrial Rate 59 BPM    P-R Interval 232 ms    QRS Duration 96 ms    Q-T Interval 448 ms    QTC Calculation(Bezet) 443 ms    P Axis 49 degrees    R Axis -20 degrees    T Axis 3 degrees    Diagnosis Line SINUS BRADYCARDIA WITH 1ST DEGREE A-V BLOCK WITH SINUS ARRHYTHMIA    Confirmed by SYLVIA REDDY MD ([id number]) on 9/9/2018 7:57:30 PM

## 2018-09-10 NOTE — CHART NOTE - NSCHARTNOTEFT_GEN_A_CORE
Notified by RN for pt with acute agitation this evening. Pt being combative with staff, attempting to leave hospital bed, refusing all oral medication. 2mg haldol IVP ordered, pt responded well, now resting comfortably lying supine in bed. Repeat EKG this evening Qtc 437. Continue to monitor serial EKGs while pt is receiving haldol. Pt continues to have bursts of extreme agitation on current oral Depakote regimen, please consider behavioral health f/u in AM for further recommendations. Will discuss with primary team in AM.    Mk Ledezma PA-C  Department of Medicine  [id number]

## 2018-09-10 NOTE — GOALS OF CARE CONVERSATION - PERSONAL ADVANCE DIRECTIVE - CONVERSATION DETAILS
Lengthy family meeting with patient's children Mary Figueredo.  Patient's living will and HCP on chart and reviewed.    Family aware of new dx of Lengthy family meeting with patient's children BeaBetty rangel and eRji to discuss goals of care.  Patient's living will and HCP on chart and reviewed.  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 Patient lacks capacity at this time. Family not interested in any surgical intervention.      Education provide on home (home, SNF and inpt when medically indicated). Family interested in taking patient home as quickly as possible. They are willing to stay with patient and hire private duty aides.  Education provided on hospice options including hospice care network and [facility]. Family wants to discuss with the other 4 children but seem to prefer [facility] as patient may require inpt hospice for management of agitation.  Family hopes to keep patient at home as long as possible.     Family in agreement with DNR/DNI at this time. They are interested in supporting patient's wishes as designated in his Living Will.     Contact information for MD and LCSW provided to family members. Supportive counseling provided for medical decline.  LCSW had follow up discussion with RN Manager, Kristy Morales. MD spoke with CM.  Palliative will continue to follow.

## 2018-09-10 NOTE — CONSULT NOTE ADULT - CONSULT REASON
Abnormal CT head finding
Abnormal CT- evidence of colonic thickening
Clostridium difficile colitis
GOC; new brain mass
Rule out malignancy
decreased concentration/agitation
preop
Colonic thickening

## 2018-09-10 NOTE — PROGRESS NOTE BEHAVIORAL HEALTH - NSBHCONSULTMEDS_PSY_A_CORE FT
1. Recommend Haldol 1mg BID and add Haldol 1mg Q6H PRN severe agitation. Please monitor vitals and cardiac status closely.   2. Discontinue Keppra and replace with Depakote or other antiepileptic for seizure coverage because keppra may be contributing to his delirium and agitation.   3. Discontinue Depakote 125mg PRN.   4. Continue Melatonin 3mg PO qHS PRN insomnia. 1. Recommend Haldol 1mg BID and add Haldol 1mg po/iv Q6H PRN severe agitation. Please monitor vitals and cardiac status closely.   2. Discontinue Keppra and replace with Depakote or other antiepileptic for seizure coverage because keppra may be contributing to his delirium and agitation.   3. Discontinue Depakote 125mg PRN.   4. Continue Melatonin 3mg PO qHS PRN insomnia.

## 2018-09-10 NOTE — PROGRESS NOTE ADULT - ASSESSMENT
HTN  Bradycardia  AVB    possible all related to elevated ICP  avoid QT prolonging drugs   pt appears to be a poor candidate for PPM given her malignancy   consider EP eval if pt and family want aggressive measures

## 2018-09-10 NOTE — PROGRESS NOTE BEHAVIORAL HEALTH - NSBHFUPINTERVALHXFT_PSY_A_CORE
Yesterday, patient required Depakote 125mg PRN at 9am and 5:30PM. He also required Haldol 1mg at 9am and 5:30PM. Patient also required Haldol 2mg PRN severe agitation and combativeness at 2AM last night. Per staff, PRN depakote does not seem to be helping patient's agitation. This morning, patient is sleeping and refuses to participate in interview upon awakening. Per family, patient become agitated whenever he feels trapped or when staff touches him. They say he is paranoid and believes even the family is trapping him here. Family also states that they will be meeting with palliative care today to discuss refusing the biopsy and taking the patient home.

## 2018-09-10 NOTE — PROGRESS NOTE BEHAVIORAL HEALTH - NSBHCHARTREVIEWLAB_PSY_A_CORE FT
11.8   8.40  )-----------( 225      ( 10 Sep 2018 08:06 )             35.1     Hgb Trend: 11.8<--, 10.2<--, 10.3<--, 10.1<--  09-10    140  |  103  |  18  ----------------------------<  103<H>  4.0   |  25  |  0.92    Ca    8.9      10 Sep 2018 06:45  Phos  3.3     09-10  Mg     1.6     09-10      Creatinine Trend: 0.92<--, 0.82<--, 0.86<--, 0.90<--, 0.89<--, 0.97<--

## 2018-09-10 NOTE — CONSULT NOTE ADULT - REASON FOR ADMISSION
recurrent c diff

## 2018-09-10 NOTE — CONSULT NOTE ADULT - PROBLEM SELECTOR RECOMMENDATION 2
being managed by behavioral health. Discussed disease process with family and expectation that symptoms and personality changes will continue as disease progresses.

## 2018-09-10 NOTE — CONSULT NOTE ADULT - ASSESSMENT
87yo M with recurrent cdiff, here with cdiff and found to have brain mass during work up for agitation/delirium; palliative care consulted for goals of care.

## 2018-09-10 NOTE — PROGRESS NOTE BEHAVIORAL HEALTH - PRN MEDS
Patient required Depakote 125mg PRN at 9am and 5:30PM. He also required Haldol 1mg at 9am and 5:30PM. Patient also required Haldol 2mg PRN severe agitation and combativeness at 2AM last night.
Depakote PRN at 11pm, Haldol PRN 1mg at 1130PM, Haldol PRN 1mg at 12AM, Ativan PRN 1mg at 4AM, and Depakote PRN at 630AM

## 2018-09-10 NOTE — CONSULT NOTE ADULT - PROBLEM SELECTOR RECOMMENDATION 5
30 minutes spent on advance care planning; 2:30-3pm on disease progression, prognosis, discussion about code status and plan of care for the future.

## 2018-09-10 NOTE — CONSULT NOTE ADULT - CONSULT REQUESTED DATE/TIME
03-Sep-2018 16:22
04-Sep-2018 11:55
05-Sep-2018 09:44
05-Sep-2018 13:28
05-Sep-2018 17:47
06-Sep-2018 15:44
10-Sep-2018
06-Sep-2018 16:14

## 2018-09-10 NOTE — CONSULT NOTE ADULT - PROBLEM SELECTOR RECOMMENDATION 4
Met with family today including daughter Bea who is HCP (documentation in chart). hospice referral will be made once family decides on agency. They are considering based on locations of inpatient hospice units and proximity to their house. They have put protections in place including DNR/DNI. Discussed plan of care with nurse manager Kristy,  Ana.   Attempted to call daughter at 5pm to see if decision was made; voicemail left.   Will continue to be available to help coordinate plan of care moving forward. Please call with questions.

## 2018-09-10 NOTE — CONSULT NOTE ADULT - SUBJECTIVE AND OBJECTIVE BOX
HPI:  Patient is an 86 year old male with HTN, BPH, recent completion of vancomycin for a cdiff infection (complete 14 days ago) presents to the ED because of recurrent several loose watery stools for the last 2-3 days and also waxing and waning of mentation for the last week, which has become progressively worse in the last 48 hours prior to admission. Patient reports that he continues to have loose watery stools. In the last 12 hours, hes had at least 5 bowel movements. Denies blood in stool. Abdomen is crampy and he is having gas, but denies distension or any abdominal pain. Denies fevers, chills. No cough, sputum production, pain/burning with urination, no rashes.   In the ED, he has been given 1L of fluid and a dose of po vancomycin and iv flagyl. (02 Sep 2018 10:12)    INTERVAL EVENTS: Patient with agitation and delirium during admission prompting CT scan of the brain. Temporal mass noted and neurology follow up. Concern at this time for primary brain tumor, ?glioblastoma. Family at this time requesting to take patient home as there are no planned surgical interventions and family not interested in pursuing other treatment options. HCP and living will reviewed with family.    Palliative care consulted to help with goals of care; met with daughter Bea (HCP), son Reji and daughter Betty. They are requesting to get their father home; they understand that his life expectancy is limited and they feel as though his agitation and delirium is worsened by being in the hospital. Introduced hospice care- family is considering [facility] vs. [facility] given locations of inpatient units (in anticipation of symptoms in the future). At this time they are in agreement with hospice, but not sure with which agency. They understand that agitation and behavioral changes will likely continue in the future and get worse. Explained that hospice care would be helpful in managing this symptoms and providing support in the home. Discussed setting up supplemental care in terms of home health aide and family supports. Discussed DME, supplies and medication supports as well as social work and chaplaincy. Family would like to discuss which agency first and will let us know. This was discussed with , Ana.    Also discussed code status with family; they are all in agreement with DNR/DNI, which is consistent with patients previously stated wishes in living will. Form filled out and order placed in electronic chart.     PERTINENT PM/SXH:   Osteoarthritis of right knee  Hypertension  Tremor of both hands    History of hernia surgery  History of shoulder surgery  History of knee replacement, total, left    FAMILY HISTORY:  Family history of heart disease (Father)    ITEMS NOT CHECKED ARE NOT PRESENT    SOCIAL HISTORY:   Significant other/partner:  [ ]  Children:  [ x]  Moravian/Spirituality:  Substance hx:  [ ]   Tobacco hx:  [ ]   Alcohol hx: [ ]   Home Opioid hx:  [ ] I-Stop Reference No:  Living Situation: [ x]Home  [ ]Long term care  [ ]Rehab [ ]Other    ADVANCE DIRECTIVES:    DNR  Yes  MOLST  [ ]  Living Will  [ x]   DECISION MAKER(s):  [x ] Health Care Proxy(s)  [ ] Surrogate(s)  [ ] Guardian           Name(s): Phone Number(s): Bea [phone]    BASELINE (I)ADL(s) (prior to admission):  [place]: [ ]Total  [x ] Moderate [ ]Dependent    Allergies    No Known Allergies    Intolerances    MEDICATIONS  (STANDING):  dexamethasone     Tablet 4 milliGRAM(s) Oral every 6 hours  dicyclomine 10 milliGRAM(s) Oral three times a day before meals  diVALproex  milliGRAM(s) Oral at bedtime  enalapril 20 milliGRAM(s) Oral daily  famotidine    Tablet 20 milliGRAM(s) Oral daily  haloperidol     Tablet 1 milliGRAM(s) Oral two times a day  heparin  Injectable 5000 Unit(s) SubCutaneous every 12 hours  influenza   Vaccine 0.5 milliLiter(s) IntraMuscular once  lactobacillus acidophilus 1 Tablet(s) Oral two times a day with meals  latanoprost 0.005% Ophthalmic Solution 1 Drop(s) Both EYES at bedtime  levETIRAcetam 500 milliGRAM(s) Oral two times a day  LORazepam   Injectable 1 milliGRAM(s) IntraMuscular once  tamsulosin 0.4 milliGRAM(s) Oral at bedtime  vancomycin    Solution 125 milliGRAM(s) Oral every 6 hours    MEDICATIONS  (PRN):  aluminum hydroxide/magnesium hydroxide/simethicone Suspension 30 milliLiter(s) Oral four times a day PRN Indigestion  haloperidol    Injectable 1 milliGRAM(s) IV Push every 6 hours PRN agitation  melatonin 3 milliGRAM(s) Oral at bedtime PRN Insomnia  ondansetron Injectable 4 milliGRAM(s) IV Push every 6 hours PRN Nausea  simethicone 80 milliGRAM(s) Chew every 6 hours PRN Gas    PRESENT SYMPTOMS: [ x]Unable to obtain due to poor mentation, but denies pain  Source if other than patient:  [ ]Family   [ ]Team     Pain (Impact on QOL):    Location -         Minimal acceptable level (0-10 scale):                    Aggrevating factors -  Quality -  Radiation -  Severity (0-10 scale) -    Timing -    PAIN AD Score:     [email or web address] (press ctrl +  left click to view)    Dyspnea:                           [ ]Mild [ ]Moderate [ ]Severe  Anxiety:                             [ ]Mild [ ]Moderate [ ]Severe  Fatigue:                             [ ]Mild [ ]Moderate [ ]Severe  Nausea:                             [ ]Mild [ ]Moderate [ ]Severe  Loss of appetite:              [ ]Mild [ ]Moderate [ ]Severe  Constipation:                    [ ]Mild [ ]Moderate [ ]Severe    Other Symptoms:  [ ]All other review of systems negative     Karnofsky Performance Score/Palliative Performance Status Version 2:     40  %  PHYSICAL EXAM:  Vital Signs Last 24 Hrs  T(C): 36.6 (10 Sep 2018 14:53), Max: 37.2 (09 Sep 2018 20:58)  T(F): 97.8 (10 Sep 2018 14:53), Max: 98.9 (09 Sep 2018 20:58)  HR: 49 (10 Sep 2018 14:53) (45 - 58)  BP: 165/72 (10 Sep 2018 14:53) (165/72 - 191/90)  BP(mean): --  RR: 20 (10 Sep 2018 14:53) (16 - 20)  SpO2: 98% (10 Sep 2018 14:53) (97% - 99%) I&O's Summary    09 Sep 2018 07:01  -  10 Sep 2018 07:00  --------------------------------------------------------  IN: 1140 mL / OUT: 900 mL / NET: 240 mL    10 Sep 2018 07:01  -  10 Sep 2018 16:55  --------------------------------------------------------  IN: 1080 mL / OUT: 600 mL / NET: 480 mL    GENERAL:  [x ]Alert  [x ]Oriented x1   [ ]Lethargic  [ ]Cachexia  [ ]Unarousable  [x ]Verbal  [ ]Non-Verbal  Behavioral:   [ ] Anxiety  [ ] Delirium [x ] Agitation [ ] Other  HEENT:  [x ]Normal   [ ]Dry mouth   [ ]ET Tube/Trach  [ ]Oral lesions  PULMONARY:   [ x]Clear [ ]Tachypnea  [ ]Audible excessive secretions   [ ]Rhonchi        [ ]Right [ ]Left [ ]Bilateral  [ ]Crackles        [ ]Right [ ]Left [ ]Bilateral  [ ]Wheezing     [ ]Right [ ]Left [ ]Bilateral  CARDIOVASCULAR:    [x ]Regular [ ]Irregular [ ]Tachy  [ ]Satya [ ]Murmur [ ]Other  GASTROINTESTINAL:  [ x]Soft  [ ]Distended   [x ]+BS  [x ]Non tender [ ]Tender  [ ]PEG [ ]OGT/ NGT  Last BM:   GENITOURINARY:  [ ]Normal [x ] Incontinent   [ ]Oliguria/Anuria   [ ]Bartholomew  MUSCULOSKELETAL:   [x ]Normal   [ ]Weakness  [ ]Bed/Wheelchair bound [ ]Edema  NEUROLOGIC:   [ ]No focal deficits  [ x] Cognitive impairment  [ ] Dysphagia [ ]Dysarthria [ ] Paresis [ ]Other   SKIN:   [x ]Normal   [ ]Pressure ulcer(s)  [ ]Rash    CRITICAL CARE: none  [ ] Shock Present  [ ]Septic [ ]Cardiogenic [ ]Neurologic [ ]Hypovolemic  [ ]  Vasopressors [ ]  Inotropes   [ ] Respiratory failure present  [ ] Acute  [ ] Chronic [ ] Hypoxic  [ ] Hypercarbic [ ] Other  [ ] Other organ failure     LABS:                        11.8   8.40  )-----------( 225      ( 10 Sep 2018 08:06 )             35.1   09-10    140  |  103  |  18  ----------------------------<  103<H>  4.0   |  25  |  0.92    Ca    8.9      10 Sep 2018 06:45  Phos  3.3     09-10  Mg     1.6     09-10    RADIOLOGY & ADDITIONAL STUDIES:  from: MR Head w/wo IV Cont (09.07.18 @ 22:05) >  EXAM:  MR BRAIN WAW IC                            PROCEDURE DATE:  09/07/2018            INTERPRETATION:  Contrast-enhanced MRI of the brain.    CLINICAL INDICATION: Preoperative examination for resection of a right   temporal mass.    TECHNIQUE:  Multiplanar, multisequence MR images of the brain were   obtained before and after the intravenous demonstration of 10 cc of   Gadavist. 0 cc were discarded.    COMPARISON: MRI brain 9/4/2018.    FINDINGS:      Study is limited by motion.    Redemonstration of a 4.5 x 3.4 x 3.3 cm necrotic right temporal lobe mass   with surrounding vasogenic edema, highly suspicious for glioblastoma. No   hydrocephalus, effacement of the basal cisterns, or midline shift.    IMPRESSION:    Redemonstration of necrotic right temporal lobe mass with surrounding   vasogenic edema, highly suspicious for glioblastoma.    No midline shift or hydrocephalus.    Study done for presurgical planning.    CACHORRO ARCHULETA M.D., ATTENDING RADIOLOGIST  This documenthas been electronically signed. Sep  8 2018 12:28PM      < end of copied text >   PROTEIN CALORIE MALNUTRITION:   PPS < or = to 30% [ ] significant weight loss  [ ] poor nutritional intake [x ] catabolic state [ ] anasarca     Albumin, Serum: 3.3 g/dL (09-04-18 @ 06:26) Artificial Nutrition [ ]     REFERRALS:   [ ]Chaplaincy  [x ] Hospice  [ ]Child Life  [ ]Social Work  [ ]Case management [ ]Holistic Therapy   Goals of Care Discussion Document: Goals of Care Conversation - Personal Advance Directive [AVERY Khan] (07-27-18 @ 11:41)

## 2018-09-10 NOTE — PROGRESS NOTE ADULT - SUBJECTIVE AND OBJECTIVE BOX
Subjective: Patient seen and examined. No new events except as noted.     SUBJECTIVE/ROS:  agitated       MEDICATIONS:  MEDICATIONS  (STANDING):  dexamethasone     Tablet 4 milliGRAM(s) Oral every 6 hours  dicyclomine 10 milliGRAM(s) Oral three times a day before meals  diVALproex  milliGRAM(s) Oral at bedtime  enalapril 20 milliGRAM(s) Oral daily  famotidine    Tablet 20 milliGRAM(s) Oral daily  heparin  Injectable 5000 Unit(s) SubCutaneous every 12 hours  influenza   Vaccine 0.5 milliLiter(s) IntraMuscular once  lactobacillus acidophilus 1 Tablet(s) Oral two times a day with meals  latanoprost 0.005% Ophthalmic Solution 1 Drop(s) Both EYES at bedtime  levETIRAcetam 500 milliGRAM(s) Oral two times a day  LORazepam   Injectable 1 milliGRAM(s) IntraMuscular once  tamsulosin 0.4 milliGRAM(s) Oral at bedtime  vancomycin    Solution 125 milliGRAM(s) Oral every 6 hours      PHYSICAL EXAM:  T(C): 36.3 (09-10-18 @ 05:03), Max: 37.2 (09-09-18 @ 20:58)  HR: 45 (09-10-18 @ 06:49) (45 - 105)  BP: 188/86 (09-10-18 @ 06:49) (167/73 - 191/90)  RR: 18 (09-10-18 @ 05:03) (16 - 18)  SpO2: 99% (09-10-18 @ 05:03) (97% - 99%)  Wt(kg): --  I&O's Summary    09 Sep 2018 07:01  -  10 Sep 2018 07:00  --------------------------------------------------------  IN: 1140 mL / OUT: 900 mL / NET: 240 mL    10 Sep 2018 07:01  -  10 Sep 2018 10:31  --------------------------------------------------------  IN: 360 mL / OUT: 0 mL / NET: 360 mL          JVP: Normal  Neck: supple  Lung: clear   CV: S1 S2 , Murmur:  Abd: soft  Ext: No edema  Psych: flat affect  Skin: normal      LABS/DATA:    CARDIAC MARKERS:                                11.8   8.40  )-----------( 225      ( 10 Sep 2018 08:06 )             35.1     09-10    140  |  103  |  18  ----------------------------<  103<H>  4.0   |  25  |  0.92    Ca    8.9      10 Sep 2018 06:45  Phos  3.3     09-10  Mg     1.6     09-10      proBNP:   Lipid Profile:   HgA1c:   TSH:     TELE:  EKG:

## 2018-09-10 NOTE — PROGRESS NOTE BEHAVIORAL HEALTH - NSBHCHARTREVIEWVS_PSY_A_CORE FT
Vital Signs Last 24 Hrs  T(C): 36.3 (10 Sep 2018 05:03), Max: 37.2 (09 Sep 2018 20:58)  T(F): 97.4 (10 Sep 2018 05:03), Max: 98.9 (09 Sep 2018 20:58)  HR: 45 (10 Sep 2018 06:49) (45 - 105)  BP: 188/86 (10 Sep 2018 06:49) (167/73 - 191/90)  BP(mean): --  RR: 18 (10 Sep 2018 05:03) (16 - 18)  SpO2: 99% (10 Sep 2018 05:03) (97% - 99%)

## 2018-09-11 PROCEDURE — 99232 SBSQ HOSP IP/OBS MODERATE 35: CPT

## 2018-09-11 RX ADMIN — Medication 125 MILLIGRAM(S): at 05:33

## 2018-09-11 RX ADMIN — DIVALPROEX SODIUM 250 MILLIGRAM(S): 500 TABLET, DELAYED RELEASE ORAL at 22:13

## 2018-09-11 RX ADMIN — Medication 125 MILLIGRAM(S): at 17:38

## 2018-09-11 RX ADMIN — Medication 125 MILLIGRAM(S): at 12:55

## 2018-09-11 RX ADMIN — Medication 4 MILLIGRAM(S): at 12:55

## 2018-09-11 RX ADMIN — Medication 1 TABLET(S): at 17:38

## 2018-09-11 RX ADMIN — Medication 4 MILLIGRAM(S): at 05:33

## 2018-09-11 RX ADMIN — Medication 10 MILLIGRAM(S): at 12:55

## 2018-09-11 RX ADMIN — HALOPERIDOL DECANOATE 1 MILLIGRAM(S): 100 INJECTION INTRAMUSCULAR at 17:38

## 2018-09-11 RX ADMIN — Medication 4 MILLIGRAM(S): at 17:38

## 2018-09-11 RX ADMIN — LEVETIRACETAM 500 MILLIGRAM(S): 250 TABLET, FILM COATED ORAL at 17:38

## 2018-09-11 RX ADMIN — Medication 10 MILLIGRAM(S): at 05:33

## 2018-09-11 RX ADMIN — HEPARIN SODIUM 5000 UNIT(S): 5000 INJECTION INTRAVENOUS; SUBCUTANEOUS at 12:55

## 2018-09-11 RX ADMIN — Medication 1 TABLET(S): at 08:27

## 2018-09-11 RX ADMIN — HALOPERIDOL DECANOATE 1 MILLIGRAM(S): 100 INJECTION INTRAMUSCULAR at 05:33

## 2018-09-11 RX ADMIN — Medication 10 MILLIGRAM(S): at 17:38

## 2018-09-11 RX ADMIN — Medication 3 MILLIGRAM(S): at 22:13

## 2018-09-11 RX ADMIN — HEPARIN SODIUM 5000 UNIT(S): 5000 INJECTION INTRAVENOUS; SUBCUTANEOUS at 22:14

## 2018-09-11 RX ADMIN — LEVETIRACETAM 500 MILLIGRAM(S): 250 TABLET, FILM COATED ORAL at 05:33

## 2018-09-11 RX ADMIN — TAMSULOSIN HYDROCHLORIDE 0.4 MILLIGRAM(S): 0.4 CAPSULE ORAL at 22:13

## 2018-09-11 RX ADMIN — Medication 4 MILLIGRAM(S): at 22:13

## 2018-09-11 RX ADMIN — LATANOPROST 1 DROP(S): 0.05 SOLUTION/ DROPS OPHTHALMIC; TOPICAL at 22:14

## 2018-09-11 RX ADMIN — Medication 20 MILLIGRAM(S): at 05:33

## 2018-09-11 RX ADMIN — FAMOTIDINE 20 MILLIGRAM(S): 10 INJECTION INTRAVENOUS at 12:55

## 2018-09-11 NOTE — PROGRESS NOTE BEHAVIORAL HEALTH - NSBHCONSULTMEDS_PSY_A_CORE FT
1. Continue Haldol 1mg BID and add Haldol 1mg po/iv Q6H PRN severe agitation. Please monitor vitals and cardiac status closely.   2. Discontinue Keppra and replace with Depakote or other antiepileptic for seizure coverage because keppra may be contributing to his delirium and agitation.   3. Continue Melatonin 3mg PO qHS PRN insomnia.

## 2018-09-11 NOTE — PROGRESS NOTE BEHAVIORAL HEALTH - NSBHCHARTREVIEWLAB_PSY_A_CORE FT
11.8   8.40  )-----------( 225      ( 10 Sep 2018 08:06 )             35.1     Hgb Trend: 11.8<--, 10.2<--, 10.3<--  09-10    140  |  103  |  18  ----------------------------<  103<H>  4.0   |  25  |  0.92    Ca    8.9      10 Sep 2018 06:45  Phos  3.3     09-10  Mg     1.6     09-10      Creatinine Trend: 0.92<--, 0.82<--, 0.86<--, 0.90<--, 0.89<--, 0.97<--

## 2018-09-11 NOTE — CHART NOTE - NSCHARTNOTEFT_GEN_A_CORE
Family has decided on [facility] hospice; discussed with SACHIN Perez who will help coordinate referral.  Palliative care will sign off at this time; please re-consult if issues arise or new symptoms develop.  Agitation being managed by behavioral health.  Thank you for involving us in the care of this patient.     Sheyla Heart MD  Palliative Care

## 2018-09-11 NOTE — PROGRESS NOTE BEHAVIORAL HEALTH - ORIENTATION OTHER
Note: Orientation varied throughout interview
Patient knows it is a Tuesday in 2018, but says the month is June. Patient is aware of the brain biopsy plan but did not know he was no longer to undergo it.
unable to assess

## 2018-09-11 NOTE — CHART NOTE - NSCHARTNOTEFT_GEN_A_CORE
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 Oncology will sign off at this time, please reconsult as needed.    Yovany Rashid  PGY-6, Hematology-Oncology Fellow  [phone] ([place]) [phone] ([facility])

## 2018-09-11 NOTE — PROGRESS NOTE ADULT - ASSESSMENT
HTN  Bradycardia  AVB first degree     possible all related to elevated ICP  avoid QT prolonging drugs   pt appears to be a poor candidate for PPM given his malignancy   pt is planned for home hospice

## 2018-09-11 NOTE — PROGRESS NOTE ADULT - SUBJECTIVE AND OBJECTIVE BOX
Subjective: Patient seen and examined. No new events except as noted.     SUBJECTIVE/ROS:        MEDICATIONS:  MEDICATIONS  (STANDING):  dexamethasone     Tablet 4 milliGRAM(s) Oral every 6 hours  dicyclomine 10 milliGRAM(s) Oral three times a day before meals  diVALproex  milliGRAM(s) Oral at bedtime  enalapril 20 milliGRAM(s) Oral daily  famotidine    Tablet 20 milliGRAM(s) Oral daily  haloperidol     Tablet 1 milliGRAM(s) Oral two times a day  heparin  Injectable 5000 Unit(s) SubCutaneous every 12 hours  influenza   Vaccine 0.5 milliLiter(s) IntraMuscular once  lactobacillus acidophilus 1 Tablet(s) Oral two times a day with meals  latanoprost 0.005% Ophthalmic Solution 1 Drop(s) Both EYES at bedtime  levETIRAcetam 500 milliGRAM(s) Oral two times a day  LORazepam   Injectable 1 milliGRAM(s) IntraMuscular once  tamsulosin 0.4 milliGRAM(s) Oral at bedtime  vancomycin    Solution 125 milliGRAM(s) Oral every 6 hours      PHYSICAL EXAM:  T(C): 36.7 (09-11-18 @ 15:22), Max: 36.9 (09-10-18 @ 20:59)  HR: 53 (09-11-18 @ 15:22) (49 - 54)  BP: 160/86 (09-11-18 @ 15:22) (151/66 - 195/90)  RR: 18 (09-11-18 @ 15:22) (18 - 18)  SpO2: 99% (09-11-18 @ 15:22) (95% - 99%)  Wt(kg): --  I&O's Summary    10 Sep 2018 07:01  -  11 Sep 2018 07:00  --------------------------------------------------------  IN: 1680 mL / OUT: 1700 mL / NET: -20 mL    11 Sep 2018 07:01  -  11 Sep 2018 17:44  --------------------------------------------------------  IN: 240 mL / OUT: 200 mL / NET: 40 mL          JVP: Normal  Neck: supple  Lung: clear   CV: S1 S2 , Murmur:  Abd: soft  Ext: No edema  neuro: Awake   Psych: flat affect  Skin: normal        LABS/DATA:    CARDIAC MARKERS:                                11.8   8.40  )-----------( 225      ( 10 Sep 2018 08:06 )             35.1     09-10    140  |  103  |  18  ----------------------------<  103<H>  4.0   |  25  |  0.92    Ca    8.9      10 Sep 2018 06:45  Phos  3.3     09-10  Mg     1.6     09-10      proBNP:   Lipid Profile:   HgA1c:   TSH:     TELE:  EKG:

## 2018-09-11 NOTE — PROGRESS NOTE ADULT - SUBJECTIVE AND OBJECTIVE BOX
Patient is a 86y old  Male who presents with a chief complaint of recurrent c diff (10 Sep 2018 16:55)      INTERVAL HPI/OVERNIGHT EVENTS:    MEDICATIONS  (STANDING):  dexamethasone     Tablet 4 milliGRAM(s) Oral every 6 hours  dicyclomine 10 milliGRAM(s) Oral three times a day before meals  diVALproex  milliGRAM(s) Oral at bedtime  enalapril 20 milliGRAM(s) Oral daily  famotidine    Tablet 20 milliGRAM(s) Oral daily  haloperidol     Tablet 1 milliGRAM(s) Oral two times a day  heparin  Injectable 5000 Unit(s) SubCutaneous every 12 hours  influenza   Vaccine 0.5 milliLiter(s) IntraMuscular once  lactobacillus acidophilus 1 Tablet(s) Oral two times a day with meals  latanoprost 0.005% Ophthalmic Solution 1 Drop(s) Both EYES at bedtime  levETIRAcetam 500 milliGRAM(s) Oral two times a day  LORazepam   Injectable 1 milliGRAM(s) IntraMuscular once  tamsulosin 0.4 milliGRAM(s) Oral at bedtime  vancomycin    Solution 125 milliGRAM(s) Oral every 6 hours    MEDICATIONS  (PRN):  aluminum hydroxide/magnesium hydroxide/simethicone Suspension 30 milliLiter(s) Oral four times a day PRN Indigestion  haloperidol    Injectable 1 milliGRAM(s) IV Push every 6 hours PRN agitation  melatonin 3 milliGRAM(s) Oral at bedtime PRN Insomnia  ondansetron Injectable 4 milliGRAM(s) IV Push every 6 hours PRN Nausea  simethicone 80 milliGRAM(s) Chew every 6 hours PRN Gas      Allergies    No Known Allergies    Intolerances      Patient is a 86y old  Male who presents with a chief complaint of recurrent c diff (10 Sep 2018 16:55)      INTERVAL HPI/OVERNIGHT EVENTS:    MEDICATIONS  (STANDING):  dexamethasone     Tablet 4 milliGRAM(s) Oral every 6 hours  dicyclomine 10 milliGRAM(s) Oral three times a day before meals  diVALproex  milliGRAM(s) Oral at bedtime  enalapril 20 milliGRAM(s) Oral daily  famotidine    Tablet 20 milliGRAM(s) Oral daily  haloperidol     Tablet 1 milliGRAM(s) Oral two times a day  heparin  Injectable 5000 Unit(s) SubCutaneous every 12 hours  influenza   Vaccine 0.5 milliLiter(s) IntraMuscular once  lactobacillus acidophilus 1 Tablet(s) Oral two times a day with meals  latanoprost 0.005% Ophthalmic Solution 1 Drop(s) Both EYES at bedtime  levETIRAcetam 500 milliGRAM(s) Oral two times a day  LORazepam   Injectable 1 milliGRAM(s) IntraMuscular once  tamsulosin 0.4 milliGRAM(s) Oral at bedtime  vancomycin    Solution 125 milliGRAM(s) Oral every 6 hours    MEDICATIONS  (PRN):  aluminum hydroxide/magnesium hydroxide/simethicone Suspension 30 milliLiter(s) Oral four times a day PRN Indigestion  haloperidol    Injectable 1 milliGRAM(s) IV Push every 6 hours PRN agitation  melatonin 3 milliGRAM(s) Oral at bedtime PRN Insomnia  ondansetron Injectable 4 milliGRAM(s) IV Push every 6 hours PRN Nausea  simethicone 80 milliGRAM(s) Chew every 6 hours PRN Gas      Allergies    No Known Allergies    Intolerances        Vital Signs Last 24 Hrs  T(C): 36.6 (11 Sep 2018 06:36), Max: 36.9 (10 Sep 2018 20:59)  T(F): 97.8 (11 Sep 2018 06:36), Max: 98.4 (10 Sep 2018 20:59)  HR: 54 (11 Sep 2018 06:36) (49 - 54)  BP: 151/66 (11 Sep 2018 06:36) (151/66 - 195/90)  BP(mean): --  RR: 18 (11 Sep 2018 06:36) (18 - 20)  SpO2: 95% (11 Sep 2018 06:36) (95% - 98%)    LABS:                        11.8   8.40  )-----------( 225      ( 10 Sep 2018 08:06 )             35.1     09-10    140  |  103  |  18  ----------------------------<  103<H>  4.0   |  25  |  0.92    Ca    8.9      10 Sep 2018 06:45  Phos  3.3     09-10  Mg     1.6     09-10            RADIOLOGY & ADDITIONAL TESTS:        Dr Wesley [phone]  Vital Signs Last 24 Hrs  T(C): 36.6 (11 Sep 2018 06:36), Max: 36.9 (10 Sep 2018 20:59)  T(F): 97.8 (11 Sep 2018 06:36), Max: 98.4 (10 Sep 2018 20:59)  HR: 54 (11 Sep 2018 06:36) (49 - 54)  BP: 151/66 (11 Sep 2018 06:36) (151/66 - 195/90)  BP(mean): --  RR: 18 (11 Sep 2018 06:36) (18 - 20)  SpO2: 95% (11 Sep 2018 06:36) (95% - 98%)    LABS:                        11.8   8.40  )-----------( 225      ( 10 Sep 2018 08:06 )             35.1     09-10    140  |  103  |  18  ----------------------------<  103<H>  4.0   |  25  |  0.92    Ca    8.9      10 Sep 2018 06:45  Phos  3.3     09-10  Mg     1.6     09-10            RADIOLOGY & ADDITIONAL TESTS:        Dr Wesley [phone]

## 2018-09-11 NOTE — PROGRESS NOTE BEHAVIORAL HEALTH - NSBHCONSULTFOLLOWAFTERCARE_PSY_A_CORE FT
Patient can follow up with geriatric psychiatry clinic after discharge, [phone].
Patient can follow up with geriatric psychiatry clinic after discharge, [phone].
Patient can follow up with geriatric psychiatry clinic after discharge, [phone].
pt can f/u at [facility]

## 2018-09-11 NOTE — PROGRESS NOTE BEHAVIORAL HEALTH - CASE SUMMARY
Patient is an 85 y/o male who lives alone, retired, , with no PPHx, no substance hx, medical hx significant for HTN, BPH, admitted to hospital for metabolic encephalopathy and c.diff, psychiatry consulted for medication management s/p episode of agitation and confusion. Patient has been intermittently confused and disoriented for 2-3 months. Now appears to be well managed on standing Haldol.  pt much improved today, no agitation. rec cont haldol, cont prns. cont enhanced care
87 y/o male who lives alone, retired, , with no PPHx, no substance hx, medical hx significant for HTN, BPH, admitted to hospital for metabolic encephalopathy and c.diff, psychiatry consulted for medication management s/p episode of agitation and confusion last night.  On interview, pt AOx1, inattentive, paranoid.  States he is a "prisoner of war" and felt threatened by staff last night.  1/5 attention.  Cannot explain why he is here.  Denies AVH, denies SIIP/HIIP.  Daughter at bedside; states this is an acute change in mental status; was at baseline AOx3, no baseline dementia, living independently, paying bills, driving, up until hospitalization in July, has been waxing and waning since that time.  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  Also with episode of bradycardia to 30s.  Dx: Delirium 2/2 GMC.  Recs: 1. Daughter prefers pt not be started on standing psychiatric medications at this time.  2. PRN: D/c Haldol.  May use Depakote 125mg PO/IV q8h PRN agitation.  3. Start Melatonin 3mg PO qHS PRN insomnia.  4. F/u neuro recs. Supplement Vitamin B12.  5. Minimize use of benzos, opioids, anticholinergics, or other deliriogenic agents when possible.  Maintain sleep wake cycle.  Provide frequent reorientation and redirection.  Family member at bedside if possible. 6. Pt does not meet criteria for psychiatric hospitalization.  Recommend outpt psych f/u at [facility] after d/c- [phone].   CL Psych will follow.
87 y/o male who lives alone, retired, , with no PPHx, no substance hx, medical hx significant for HTN, BPH, admitted to hospital for metabolic encephalopathy and c.diff, psychiatry consulted for medication management s/p episode of agitation and confusion last night.  On interview, pt AOx1, inattentive, paranoid.  States he is a "prisoner of war" and felt threatened by staff last night.  1/5 attention.  Cannot explain why he is here.  Denies AVH, denies SIIP/HIIP.  Daughter at bedside; states this is an acute change in mental status; was at baseline AOx3, no baseline dementia, living independently, paying bills, driving, up until hospitalization in July, has been waxing and waning since that time.  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  Today is AOx0, very confused, disorganized, does not know he is in a hospital or why he is here, does not understand why he requires brain biopsy or excision of mass.  Dx: Delirium 2/2 GMC.  Recs: 1. Start Depakote 250mg PO qhS.  F/u LFTs, and VPA level in 5 days.  2. PRN: Depakote 250mg PO/IV q8h PRN agitation.  3. Start Melatonin 3mg PO qHS PRN insomnia.  4. Supplement Vitamin B12.  5. Minimize use of benzos, opioids, anticholinergics, or other deliriogenic agents when possible.  Maintain sleep wake cycle.  Provide frequent reorientation and redirection.  Family member at bedside if possible. 6. Pt does not meet criteria for psychiatric hospitalization.  Recommend outpt psych f/u at [facility] after d/c- [phone].   CL Psych will follow.
Patient is an 87 y/o male who lives alone, retired, , with no PPHx, no substance hx, medical hx significant for HTN, BPH, admitted to hospital for metabolic encephalopathy and c.diff, psychiatry consulted for medication management s/p episode of agitation and confusion. Patient has been intermittently confused and disoriented for 2-3 months.  pt less confused today, pt received prns last night for agitation, no si/hi. rec d/c depakote, can start haldol 1mg po/iv bid for agitation, haldol 1mg po/iv q6hr prn agitation

## 2018-09-11 NOTE — PROGRESS NOTE BEHAVIORAL HEALTH - RISK ASSESSMENT
Risk elevated in setting of delirium. Does not meet criteria for psych admission.

## 2018-09-11 NOTE — PROVIDER CONTACT NOTE (OTHER) - ACTION/TREATMENT ORDERED:
Administer AM BP medications and recheck pt in 1 HR. Pt rechecked and IN=232/66. Okay as per PA. Cont. to monitor pt.

## 2018-09-11 NOTE — PROGRESS NOTE BEHAVIORAL HEALTH - NSBHCHARTREVIEWVS_PSY_A_CORE FT
Vital Signs Last 24 Hrs  T(C): 36.6 (11 Sep 2018 06:36), Max: 36.9 (10 Sep 2018 20:59)  T(F): 97.8 (11 Sep 2018 06:36), Max: 98.4 (10 Sep 2018 20:59)  HR: 54 (11 Sep 2018 06:36) (49 - 54)  BP: 151/66 (11 Sep 2018 06:36) (151/66 - 195/90)  BP(mean): --  RR: 18 (11 Sep 2018 06:36) (18 - 20)  SpO2: 95% (11 Sep 2018 06:36) (95% - 98%)

## 2018-09-11 NOTE — PROGRESS NOTE BEHAVIORAL HEALTH - NSBHATTESTSEENBY_PSY_A_CORE
Attending Psychiatrist supervising NP/Trainee, meeting pt...

## 2018-09-11 NOTE — PROGRESS NOTE BEHAVIORAL HEALTH - SUMMARY
Summary (include case differential, formulation and patient response to therapy): Patient is an 87 y/o male who lives alone, retired, , with no PPHx, no substance hx, medical hx significant for HTN, BPH, admitted to hospital for metabolic encephalopathy and c.diff, psychiatry consulted for medication management s/p episode of agitation and confusion. Patient has been intermittently confused and disoriented for 2-3 months. Now appears to be well managed on standing Haldol. Differential diagnosis includes delirium secondary to brain mass or dementia.

## 2018-09-11 NOTE — PROGRESS NOTE BEHAVIORAL HEALTH - NSBHFUPINTERVALHXFT_PSY_A_CORE
Overnight, patient did not require any PRN medications for agitation. He says he feels better today compared to yesterday. He does express some anxiety, however, about today possibly "being his last day on earth" because of the biopsy. However, patient states that he feels safe and believes his team is taking care of him and won't hurt him; he doesn't appear paranoid. Denies SI/HI. Denies AVH.

## 2018-09-12 RX ADMIN — HALOPERIDOL DECANOATE 1 MILLIGRAM(S): 100 INJECTION INTRAMUSCULAR at 05:03

## 2018-09-12 RX ADMIN — Medication 4 MILLIGRAM(S): at 17:23

## 2018-09-12 RX ADMIN — Medication 3 MILLIGRAM(S): at 21:14

## 2018-09-12 RX ADMIN — LATANOPROST 1 DROP(S): 0.05 SOLUTION/ DROPS OPHTHALMIC; TOPICAL at 21:15

## 2018-09-12 RX ADMIN — Medication 10 MILLIGRAM(S): at 12:57

## 2018-09-12 RX ADMIN — DIVALPROEX SODIUM 250 MILLIGRAM(S): 500 TABLET, DELAYED RELEASE ORAL at 21:15

## 2018-09-12 RX ADMIN — Medication 4 MILLIGRAM(S): at 05:02

## 2018-09-12 RX ADMIN — Medication 20 MILLIGRAM(S): at 05:03

## 2018-09-12 RX ADMIN — Medication 4 MILLIGRAM(S): at 12:57

## 2018-09-12 RX ADMIN — Medication 1 TABLET(S): at 12:56

## 2018-09-12 RX ADMIN — Medication 4 MILLIGRAM(S): at 21:14

## 2018-09-12 RX ADMIN — Medication 1 TABLET(S): at 17:23

## 2018-09-12 RX ADMIN — FAMOTIDINE 20 MILLIGRAM(S): 10 INJECTION INTRAVENOUS at 12:56

## 2018-09-12 RX ADMIN — Medication 10 MILLIGRAM(S): at 16:37

## 2018-09-12 RX ADMIN — TAMSULOSIN HYDROCHLORIDE 0.4 MILLIGRAM(S): 0.4 CAPSULE ORAL at 21:15

## 2018-09-12 RX ADMIN — HEPARIN SODIUM 5000 UNIT(S): 5000 INJECTION INTRAVENOUS; SUBCUTANEOUS at 12:57

## 2018-09-12 RX ADMIN — LEVETIRACETAM 500 MILLIGRAM(S): 250 TABLET, FILM COATED ORAL at 05:03

## 2018-09-12 RX ADMIN — HALOPERIDOL DECANOATE 1 MILLIGRAM(S): 100 INJECTION INTRAMUSCULAR at 17:23

## 2018-09-12 RX ADMIN — LEVETIRACETAM 500 MILLIGRAM(S): 250 TABLET, FILM COATED ORAL at 17:23

## 2018-09-12 RX ADMIN — Medication 10 MILLIGRAM(S): at 05:02

## 2018-09-12 NOTE — PROVIDER CONTACT NOTE (OTHER) - ACTION/TREATMENT ORDERED:
Administer AM BP meds, cont. to monitor pt., re-check in 1hr. BP rechecked in 1hr GM=849/85, PA re-notified at such time. PA evaluated and AH=942/82. BP to be rechecked at 8AM. VINICIO Burt notified. Administer AM BP meds, cont. to monitor pt., re-check in 1hr. BP rechecked in 1hr FC=636/85, PA re-notified at such time. PA evaluated and BT=640/82. BP to be rechecked at 8AM. VINICIO Rodríguez notified.

## 2018-09-12 NOTE — PROVIDER CONTACT NOTE (OTHER) - NAME OF MD/NP/PA/DO NOTIFIED:
BLACK Eaton
BLACK Eaton
BLACK Terrazas
BLACK Whitten
DANNA Alvarenga
DANNA Jones
DANNA Jones
DANNA Mcneill
DANNA Yang
DANNA Yang
Honorio NP
DANNA Davies

## 2018-09-12 NOTE — PROVIDER CONTACT NOTE (OTHER) - RECOMMENDATIONS
Continue to monitor on Telemetry.
1x dose of haldol for agitation. encourage to take meds later.
Administer AM BP meds, cont. to monitor pt.
Administer BP medications and recheck pt.
Cont to monitor pt.; secure IVL when safe.
DANNA Alvarenga notified and recommends EKG.
DANNA Yang notified. EKG.
DANNA Yang notified. No further action at this time.
Do a set of vital signs and EKG hold off on haldol
Place pt on 1:1 CO.
Pt is now sleeping and calm in bed, will try to place access in AM as to not agitate patient further.
Reschedule AM dose of enalapril
haldol
none
recheck

## 2018-09-12 NOTE — PROVIDER CONTACT NOTE (OTHER) - SITUATION
Pt on Telemetry, alerted that pt had 4 beats of IVR
BP= 195/90
BP= 199/90
Patient had 3 beats of IVR hr dropped to 35 patients heart rate currently between 35 to 47
Pt IVl no longer WDL and CDI. Pt combative, unable to get new IVL secured, IVF no longer running.
Pt PIV access is leaking, pt agitated and hitting PCA and RN
Pt combative w/ multiple elopement attempts.
Pt heart rate was 32 on tele
Pt refusing evening meds including keppra, decadron, bentyl, vancomycin. Pt becoming paranoid,making illogical statements,refusing dinner
Tele called - pt HR went down to 35. Unsure whether it was heart block or not.
Tele called - pt HR went down to 36 bpm.
Tele called pt w/ HR down to 47 with second degree type one heart block
Tele reported second degree heart block type 1
pt combative and trying to ambulate with unsteady gait and balance
pt on remote telemonitor had 8 beats of AIVR
pt's blood pressure elevated 191/90 on electronic machine

## 2018-09-12 NOTE — PROGRESS NOTE ADULT - SUBJECTIVE AND OBJECTIVE BOX
Patient is a 86y old  Male who presents with a chief complaint of recurrent c diff (11 Sep 2018 17:44)      INTERVAL HPI/OVERNIGHT EVENTS:    MEDICATIONS  (STANDING):  dexamethasone     Tablet 4 milliGRAM(s) Oral every 6 hours  dicyclomine 10 milliGRAM(s) Oral three times a day before meals  diVALproex  milliGRAM(s) Oral at bedtime  enalapril 20 milliGRAM(s) Oral daily  famotidine    Tablet 20 milliGRAM(s) Oral daily  haloperidol     Tablet 1 milliGRAM(s) Oral two times a day  heparin  Injectable 5000 Unit(s) SubCutaneous every 12 hours  influenza   Vaccine 0.5 milliLiter(s) IntraMuscular once  lactobacillus acidophilus 1 Tablet(s) Oral two times a day with meals  latanoprost 0.005% Ophthalmic Solution 1 Drop(s) Both EYES at bedtime  levETIRAcetam 500 milliGRAM(s) Oral two times a day  LORazepam   Injectable 1 milliGRAM(s) IntraMuscular once  tamsulosin 0.4 milliGRAM(s) Oral at bedtime    MEDICATIONS  (PRN):  aluminum hydroxide/magnesium hydroxide/simethicone Suspension 30 milliLiter(s) Oral four times a day PRN Indigestion  haloperidol    Injectable 1 milliGRAM(s) IV Push every 6 hours PRN agitation  melatonin 3 milliGRAM(s) Oral at bedtime PRN Insomnia  ondansetron Injectable 4 milliGRAM(s) IV Push every 6 hours PRN Nausea  simethicone 80 milliGRAM(s) Chew every 6 hours PRN Gas      Allergies    No Known Allergies    Intolerances        Vital Signs Last 24 Hrs  T(C): 36.4 (12 Sep 2018 06:12), Max: 36.7 (11 Sep 2018 15:22)  T(F): 97.5 (12 Sep 2018 06:12), Max: 98 (11 Sep 2018 15:22)  HR: 51 (12 Sep 2018 06:12) (51 - 55)  BP: 182/82 (12 Sep 2018 06:30) (142/80 - 201/85)  BP(mean): --  RR: 18 (12 Sep 2018 06:12) (18 - 18)  SpO2: 98% (12 Sep 2018 06:12) (97% - 99%)    LABS:                RADIOLOGY & ADDITIONAL TESTS:        Dr Wesley [phone]

## 2018-09-12 NOTE — PROVIDER CONTACT NOTE (OTHER) - REASON
BP= 195/90
BP= 199/90
IVL no longer CDI
Patient had 3 beats of IVR hr dropped to 35
Pt PIV access is leaking, pt agitated and hitting PCA and RN
Pt combative, attempting elopement
Pt heart rate on tele was 32
Pt refusing evening meds including keppra, decadron, bentyl, vancomycin. Pt becoming paranoid,making illogical statements,refusing dinner.
Tele called - pt HR went down to 35. Unsure whether it was heart block or not.
Tele called - pt HR went down to 36 bpm.
Tele called pt w/ HR down to 47 with second degree type one heart block
Tele reported second degree heart block type 1
pt combative and trying to ambulate with unsteady gait and balance
pt on remote telemonitor had 8 beats of AIVR
pt's blood pressure elevated 191/90 on electronic machine
Pt on Telemetry, alerted that pt had 4 beats of IVR

## 2018-09-12 NOTE — PROVIDER CONTACT NOTE (OTHER) - BACKGROUND
Pt 86yoM, admit for diarrhea, brain lesion, cecum lesion w/ PMH of cdiff, osteoarthritis of R-knee, HTN, tremor both hands.
Hx of htn
Hx of osteoarthritis, HTN
Patient admitted for diarrhea
Pt 86yoM, admit for diarrhea w/ brain lesion w/ PMH of HTN, tremor of both hand, osteoarthritis.
Pt admitted with CDiff
Pt admitted with Diarrhea.
Pt came in on 9/2 with diarrhea. EKG done and pt w/ sinus rafael w/ 1st degree HB.
Pt came in on 9/2 with diarrhea. Hx HTN. EKG showed sinus bradycardia w/ 1st degree HB.
Pt came in on 9/2/18 with diarrhea. Bradycardiac on tele w/ first degree heart block.
admitted with diarrhea, hx of CDIFF, agitation, confusion, unsteady gait and balance
admitted with diarrhea, hx of cdiff
admitted with diarrhea, hx of cdiff, agitation and confusion, combative at times
patient admitted with altered mental status

## 2018-09-12 NOTE — PROVIDER CONTACT NOTE (OTHER) - ASSESSMENT
Pt A&Ox3-4,disoriented to situation @times, denies any pain/discomfort. Pt remains on telemetry for bradycardia w/ 1st degree heart block. Pt sitting up eating lunch now.
/67 electronically and 53 auscultated.
Asymptomatic. Pt asleep in bed.
Asymptomatic. Pt asleep in bed. Auscultated HR 52.
BP= 195/90
BP= 199/90
Pt A&Ox3-4,more agitated/paranoid/making illogical statements. Pt denies any pain/discomfort. Walks w/ 1-assist to bathroom.
Pt VSS.
Pt asymptomatic VS as documented
VS noted in flowsheet. Pt walking to bathroom and became agitated with PCA and RN.
VSS
VSS afebrile
Vitals were taken on pt as noted in flow sheet.
pt sleeping soundly at this time
pt unsteady, confused, and combative
vital signs as charted, pt calm and sleeping in bed

## 2018-09-12 NOTE — PROVIDER CONTACT NOTE (OTHER) - DATE AND TIME:
10-Sep-2018 12:29
03-Sep-2018 22:00
03-Sep-2018 22:50
05-Sep-2018 03:30
05-Sep-2018 04:48
05-Sep-2018 17:20
05-Sep-2018 22:35
08-Sep-2018 00:20
08-Sep-2018 06:15
09-Sep-2018 22:00
10-Sep-2018 01:19
10-Sep-2018 01:45
10-Sep-2018 02:54
10-Sep-2018 06:42
11-Sep-2018 05:30
12-Sep-2018 05:00

## 2018-09-13 ENCOUNTER — TRANSCRIPTION ENCOUNTER (OUTPATIENT)
Age: 83
End: 2018-09-13

## 2018-09-13 ENCOUNTER — APPOINTMENT (OUTPATIENT)
Dept: NEUROSURGERY | Facility: CLINIC | Age: 83
End: 2018-09-13

## 2018-09-13 VITALS
HEART RATE: 56 BPM | OXYGEN SATURATION: 99 % | TEMPERATURE: 98 F | RESPIRATION RATE: 18 BRPM | SYSTOLIC BLOOD PRESSURE: 118 MMHG | DIASTOLIC BLOOD PRESSURE: 69 MMHG

## 2018-09-13 PROCEDURE — 82962 GLUCOSE BLOOD TEST: CPT

## 2018-09-13 PROCEDURE — 87040 BLOOD CULTURE FOR BACTERIA: CPT

## 2018-09-13 PROCEDURE — 97162 PT EVAL MOD COMPLEX 30 MIN: CPT

## 2018-09-13 PROCEDURE — 80053 COMPREHEN METABOLIC PANEL: CPT

## 2018-09-13 PROCEDURE — 84443 ASSAY THYROID STIM HORMONE: CPT

## 2018-09-13 PROCEDURE — 84132 ASSAY OF SERUM POTASSIUM: CPT

## 2018-09-13 PROCEDURE — 86780 TREPONEMA PALLIDUM: CPT

## 2018-09-13 PROCEDURE — 93005 ELECTROCARDIOGRAM TRACING: CPT

## 2018-09-13 PROCEDURE — 84295 ASSAY OF SERUM SODIUM: CPT

## 2018-09-13 PROCEDURE — 83605 ASSAY OF LACTIC ACID: CPT

## 2018-09-13 PROCEDURE — 95819 EEG AWAKE AND ASLEEP: CPT

## 2018-09-13 PROCEDURE — 80048 BASIC METABOLIC PNL TOTAL CA: CPT

## 2018-09-13 PROCEDURE — 97116 GAIT TRAINING THERAPY: CPT

## 2018-09-13 PROCEDURE — 85730 THROMBOPLASTIN TIME PARTIAL: CPT

## 2018-09-13 PROCEDURE — 82565 ASSAY OF CREATININE: CPT

## 2018-09-13 PROCEDURE — 74177 CT ABD & PELVIS W/CONTRAST: CPT

## 2018-09-13 PROCEDURE — 84100 ASSAY OF PHOSPHORUS: CPT

## 2018-09-13 PROCEDURE — 96374 THER/PROPH/DIAG INJ IV PUSH: CPT

## 2018-09-13 PROCEDURE — 85014 HEMATOCRIT: CPT

## 2018-09-13 PROCEDURE — 70553 MRI BRAIN STEM W/O & W/DYE: CPT

## 2018-09-13 PROCEDURE — 97530 THERAPEUTIC ACTIVITIES: CPT

## 2018-09-13 PROCEDURE — 81003 URINALYSIS AUTO W/O SCOPE: CPT

## 2018-09-13 PROCEDURE — 88305 TISSUE EXAM BY PATHOLOGIST: CPT

## 2018-09-13 PROCEDURE — 82378 CARCINOEMBRYONIC ANTIGEN: CPT

## 2018-09-13 PROCEDURE — 99285 EMERGENCY DEPT VISIT HI MDM: CPT | Mod: 25

## 2018-09-13 PROCEDURE — 83735 ASSAY OF MAGNESIUM: CPT

## 2018-09-13 PROCEDURE — 82330 ASSAY OF CALCIUM: CPT

## 2018-09-13 PROCEDURE — 82607 VITAMIN B-12: CPT

## 2018-09-13 PROCEDURE — 82435 ASSAY OF BLOOD CHLORIDE: CPT

## 2018-09-13 PROCEDURE — 85027 COMPLETE CBC AUTOMATED: CPT

## 2018-09-13 PROCEDURE — 82803 BLOOD GASES ANY COMBINATION: CPT

## 2018-09-13 PROCEDURE — 71260 CT THORAX DX C+: CPT

## 2018-09-13 PROCEDURE — A9585: CPT

## 2018-09-13 PROCEDURE — 86301 IMMUNOASSAY TUMOR CA 19-9: CPT

## 2018-09-13 PROCEDURE — 82746 ASSAY OF FOLIC ACID SERUM: CPT

## 2018-09-13 PROCEDURE — 87324 CLOSTRIDIUM AG IA: CPT

## 2018-09-13 PROCEDURE — 82947 ASSAY GLUCOSE BLOOD QUANT: CPT

## 2018-09-13 PROCEDURE — 87449 NOS EACH ORGANISM AG IA: CPT

## 2018-09-13 PROCEDURE — 85610 PROTHROMBIN TIME: CPT

## 2018-09-13 PROCEDURE — 70450 CT HEAD/BRAIN W/O DYE: CPT

## 2018-09-13 RX ORDER — LANOLIN ALCOHOL/MO/W.PET/CERES
1 CREAM (GRAM) TOPICAL
Qty: 14 | Refills: 0 | OUTPATIENT
Start: 2018-09-13 | End: 2018-09-26

## 2018-09-13 RX ORDER — HALOPERIDOL DECANOATE 100 MG/ML
1 INJECTION INTRAMUSCULAR
Qty: 0 | Refills: 0 | COMMUNITY
Start: 2018-09-13

## 2018-09-13 RX ORDER — DEXAMETHASONE 0.5 MG/5ML
1 ELIXIR ORAL
Qty: 56 | Refills: 0 | OUTPATIENT
Start: 2018-09-13 | End: 2018-09-26

## 2018-09-13 RX ORDER — DIVALPROEX SODIUM 500 MG/1
1 TABLET, DELAYED RELEASE ORAL
Qty: 14 | Refills: 0 | OUTPATIENT
Start: 2018-09-13 | End: 2018-09-26

## 2018-09-13 RX ORDER — LEVETIRACETAM 250 MG/1
1 TABLET, FILM COATED ORAL
Qty: 28 | Refills: 0 | OUTPATIENT
Start: 2018-09-13 | End: 2018-09-26

## 2018-09-13 RX ADMIN — Medication 10 MILLIGRAM(S): at 05:36

## 2018-09-13 RX ADMIN — Medication 4 MILLIGRAM(S): at 05:37

## 2018-09-13 RX ADMIN — FAMOTIDINE 20 MILLIGRAM(S): 10 INJECTION INTRAVENOUS at 11:53

## 2018-09-13 RX ADMIN — Medication 4 MILLIGRAM(S): at 11:54

## 2018-09-13 RX ADMIN — HALOPERIDOL DECANOATE 1 MILLIGRAM(S): 100 INJECTION INTRAMUSCULAR at 05:37

## 2018-09-13 RX ADMIN — Medication 1 TABLET(S): at 08:31

## 2018-09-13 RX ADMIN — HEPARIN SODIUM 5000 UNIT(S): 5000 INJECTION INTRAVENOUS; SUBCUTANEOUS at 11:54

## 2018-09-13 RX ADMIN — Medication 10 MILLIGRAM(S): at 11:54

## 2018-09-13 RX ADMIN — HALOPERIDOL DECANOATE 1 MILLIGRAM(S): 100 INJECTION INTRAMUSCULAR at 08:44

## 2018-09-13 RX ADMIN — LEVETIRACETAM 500 MILLIGRAM(S): 250 TABLET, FILM COATED ORAL at 05:36

## 2018-09-13 RX ADMIN — Medication 20 MILLIGRAM(S): at 05:37

## 2018-09-13 NOTE — DISCHARGE NOTE ADULT - PLAN OF CARE
resolved pt complete course of po vanco; f/u with PCP pt and family did not wish to proceed with biopsy - f/u with home hospice take prescribed medication; f/u with home hospice take prescribed medication; f/u with PCP take haldol as prescribed; f/u with hospice

## 2018-09-13 NOTE — PROGRESS NOTE ADULT - SUBJECTIVE AND OBJECTIVE BOX
Subjective: Patient seen and examined. No new events except as noted.     SUBJECTIVE/ROS:        MEDICATIONS:  MEDICATIONS  (STANDING):  dexamethasone     Tablet 4 milliGRAM(s) Oral every 6 hours  dicyclomine 10 milliGRAM(s) Oral three times a day before meals  diVALproex  milliGRAM(s) Oral at bedtime  enalapril 20 milliGRAM(s) Oral daily  famotidine    Tablet 20 milliGRAM(s) Oral daily  haloperidol     Tablet 1 milliGRAM(s) Oral two times a day  heparin  Injectable 5000 Unit(s) SubCutaneous every 12 hours  influenza   Vaccine 0.5 milliLiter(s) IntraMuscular once  lactobacillus acidophilus 1 Tablet(s) Oral two times a day with meals  latanoprost 0.005% Ophthalmic Solution 1 Drop(s) Both EYES at bedtime  levETIRAcetam 500 milliGRAM(s) Oral two times a day  LORazepam   Injectable 1 milliGRAM(s) IntraMuscular once  tamsulosin 0.4 milliGRAM(s) Oral at bedtime      PHYSICAL EXAM:  T(C): 36.3 (09-13-18 @ 05:09), Max: 36.7 (09-12-18 @ 13:30)  HR: 49 (09-13-18 @ 05:09) (49 - 56)  BP: 157/61 (09-13-18 @ 05:09) (146/76 - 157/61)  RR: 18 (09-13-18 @ 05:09) (18 - 18)  SpO2: 99% (09-13-18 @ 05:09) (98% - 100%)  Wt(kg): --  I&O's Summary    12 Sep 2018 07:01  -  13 Sep 2018 07:00  --------------------------------------------------------  IN: 960 mL / OUT: 1200 mL / NET: -240 mL          JVP: Normal  Neck: supple  Lung: clear   CV: S1 S2 , Murmur:  Abd: soft  Ext: No edema  neuro: Awake  Psych: flat affect  Skin: normal        LABS/DATA:    CARDIAC MARKERS:                  proBNP:   Lipid Profile:   HgA1c:   TSH:     TELE:  EKG:

## 2018-09-13 NOTE — PROGRESS NOTE ADULT - SUBJECTIVE AND OBJECTIVE BOX
Patient is a 86y old  Male who presents with a chief complaint of recurrent c diff (12 Sep 2018 09:56)      INTERVAL HPI/OVERNIGHT EVENTS:    MEDICATIONS  (STANDING):  dexamethasone     Tablet 4 milliGRAM(s) Oral every 6 hours  dicyclomine 10 milliGRAM(s) Oral three times a day before meals  diVALproex  milliGRAM(s) Oral at bedtime  enalapril 20 milliGRAM(s) Oral daily  famotidine    Tablet 20 milliGRAM(s) Oral daily  haloperidol     Tablet 1 milliGRAM(s) Oral two times a day  heparin  Injectable 5000 Unit(s) SubCutaneous every 12 hours  influenza   Vaccine 0.5 milliLiter(s) IntraMuscular once  lactobacillus acidophilus 1 Tablet(s) Oral two times a day with meals  latanoprost 0.005% Ophthalmic Solution 1 Drop(s) Both EYES at bedtime  levETIRAcetam 500 milliGRAM(s) Oral two times a day  LORazepam   Injectable 1 milliGRAM(s) IntraMuscular once  tamsulosin 0.4 milliGRAM(s) Oral at bedtime    MEDICATIONS  (PRN):  aluminum hydroxide/magnesium hydroxide/simethicone Suspension 30 milliLiter(s) Oral four times a day PRN Indigestion  haloperidol    Injectable 1 milliGRAM(s) IV Push every 6 hours PRN agitation  melatonin 3 milliGRAM(s) Oral at bedtime PRN Insomnia  ondansetron Injectable 4 milliGRAM(s) IV Push every 6 hours PRN Nausea  simethicone 80 milliGRAM(s) Chew every 6 hours PRN Gas      Allergies    No Known Allergies    Intolerances        Vital Signs Last 24 Hrs  T(C): 36.3 (13 Sep 2018 05:09), Max: 36.7 (12 Sep 2018 13:30)  T(F): 97.3 (13 Sep 2018 05:09), Max: 98.1 (12 Sep 2018 13:30)  HR: 49 (13 Sep 2018 05:09) (49 - 56)  BP: 157/61 (13 Sep 2018 05:09) (146/76 - 157/61)  BP(mean): --  RR: 18 (13 Sep 2018 05:09) (18 - 18)  SpO2: 99% (13 Sep 2018 05:09) (98% - 100%)    LABS:                RADIOLOGY & ADDITIONAL TESTS:        Dr Wesley [phone]

## 2018-09-13 NOTE — DISCHARGE NOTE ADULT - MEDICATION SUMMARY - MEDICATIONS TO TAKE
I will START or STAY ON the medications listed below when I get home from the hospital:    dexamethasone 4 mg oral tablet  -- 1 tab(s) by mouth every 6 hours  -- Indication: For Cerebral edema    aspirin 81 mg oral delayed release tablet  -- 2 tab(s) by mouth every 12 hours  -- Indication: For Antiplatelet    enalapril 20 mg oral tablet  -- 1 tab(s) by mouth once a day  -- Indication: For Htn    aluminum hydroxide-magnesium hydroxide 200 mg-200 mg/5 mL oral suspension  -- 30 milliliter(s) by mouth 4 times a day, As needed, Indigestion  -- Indication: For indigestion    tamsulosin 0.4 mg oral capsule  -- 1 cap(s) by mouth once a day (at bedtime)  -- Indication: For     divalproex sodium 250 mg oral delayed release tablet  -- 1 tab(s) by mouth once a day (at bedtime)  -- Indication: For seizure prevention    levETIRAcetam 500 mg oral tablet  -- 1 tab(s) by mouth 2 times a day  -- Indication: For seizure prevention    haloperidol 1 mg oral tablet  -- 1 tab(s) by mouth 2 times a day  -- Indication: For Agitation    dicyclomine 10 mg oral capsule  -- 1 cap(s) by mouth 3 times a day (before meals)  -- Indication: For GI    famotidine 20 mg oral tablet  -- Indication: For GI    simethicone 80 mg oral tablet, chewable  -- 1 tab(s) by mouth every 6 hours, As needed, Gas  -- Indication: For gas    melatonin 3 mg oral tablet  -- 1 tab(s) by mouth once a day (at bedtime), As needed, Insomnia  -- Indication: For sleep aid    latanoprost 0.005% ophthalmic solution  -- 1 drop(s) to each affected eye once a day (at bedtime)  -- Indication: For Eye drops    lactobacillus acidophilus oral capsule  -- 1 tab(s) by mouth 3 times a day  -- Indication: For Bowel storm

## 2018-09-13 NOTE — DISCHARGE NOTE ADULT - CARE PROVIDER_API CALL
Tin Shah (DO), Family Medicine  [address] [place] [id number]  Phone: [phone]  Fax: [phone]

## 2018-09-13 NOTE — PROGRESS NOTE ADULT - CARDIOVASCULAR
Regular rate & rhythm, normal S1, S2; no murmurs, gallops or rubs; no S3, S4
Regular rate & rhythm, normal S1, S2; no murmurs, gallops or rubs; no S3, S4
detailed exam
Regular rate & rhythm, normal S1, S2; no murmurs, gallops or rubs; no S3, S4

## 2018-09-13 NOTE — PROGRESS NOTE ADULT - ASSESSMENT
HTN  Bradycardia  AVB first degree     possible all related to elevated ICP from brain mass   avoid QT prolonging drugs   pt is planned for home hospice

## 2018-09-13 NOTE — DISCHARGE NOTE ADULT - HOSPITAL COURSE
Patient is an 86 year old male with HTN, BPH, recent completion of vancomycin for a cdiff infection (complete 14 days ago) presents to the ED because of recurrent several loose watery stools for the last 2-3 days and also waxing and waning of mentation for the last week, which has become progressively worse in the last 48 hours prior to admission. Patient is an 86 year old male with HTN, BPH, recent completion of vancomycin for a cdiff infection (complete 14 days ago) presents to the ED because of recurrent several loose watery stools for the last 2-3 days and also waxing and waning of mentation for the last week, which has become progressively worse in the last 48 hours prior to admission.the  MR showed right temporal ring enhancing mass with some edema; pt and family do not want to proceed with brain biopsy;pt followed by ID and completed course of po vanco for c diff Patient is an 86 year old male with HTN, BPH, recent completion of vancomycin for a cdiff infection (complete 14 days ago) presents to the ED because of recurrent several loose watery stools for the last 2-3 days and also waxing and waning of mentation for the last week, which has become progressively worse in the last 48 hours prior to admission.the  MR showed right temporal ring enhancing mass with some edema; pt and family do not want to proceed with brain biopsy;pt followed by ID and completed course of po vanco for c diff; CT imaging of chest/abdomen/pelvis was performed showing mural thickening of cecum and rectum. ; pt underwent colonoscopy with no evidence of mailgnancy; pt followed by cardiology for bradycardia which was thought to be 2/2 cerebral edema; plan d/c home with hospice

## 2018-09-13 NOTE — PROGRESS NOTE ADULT - GASTROINTESTINAL
Soft, non-tender, no hepatosplenomegaly, normal bowel sounds

## 2018-09-13 NOTE — DISCHARGE NOTE ADULT - CARE PLAN
Principal Discharge DX:	Clostridium difficile infection  Goal:	resolved  Assessment and plan of treatment:	pt complete course of po vanco; f/u with PCP  Secondary Diagnosis:	Brain mass  Assessment and plan of treatment:	pt and family did not wish to proceed with biopsy - f/u with home hospice  Secondary Diagnosis:	Encephalopathy acute  Assessment and plan of treatment:	take prescribed medication; f/u with home hospice  Secondary Diagnosis:	Hypertension  Assessment and plan of treatment:	take prescribed medication; f/u with PCP  Secondary Diagnosis:	Agitation  Assessment and plan of treatment:	take haldol as prescribed; f/u with hospice

## 2018-09-13 NOTE — PROGRESS NOTE ADULT - PROVIDER SPECIALTY LIST ADULT
Cardiology
Colorectal Surgery
Family Medicine
Heme/Onc
Infectious Disease
Cardiology
Family Medicine
Family Medicine

## 2018-09-13 NOTE — PROGRESS NOTE ADULT - REASON FOR ADMISSION
recurrent c diff

## 2018-09-13 NOTE — DISCHARGE NOTE ADULT - PATIENT PORTAL LINK FT
You can access the [facility] Patient Portal, offered by [facility], by registering with the following website: [email or web address]

## 2018-09-19 ENCOUNTER — APPOINTMENT (OUTPATIENT)
Dept: ORTHOPEDIC SURGERY | Facility: CLINIC | Age: 83
End: 2018-09-19

## 2018-10-22 ENCOUNTER — INPATIENT (INPATIENT)
Facility: HOSPITAL | Age: 83
LOS: 7 days | Discharge: HOSPICE | DRG: 54 | End: 2018-10-30
Attending: INTERNAL MEDICINE | Admitting: HOSPITALIST
Payer: MEDICARE

## 2018-10-22 VITALS — SYSTOLIC BLOOD PRESSURE: 156 MMHG | RESPIRATION RATE: 20 BRPM | DIASTOLIC BLOOD PRESSURE: 77 MMHG | HEART RATE: 68 BPM

## 2018-10-22 DIAGNOSIS — Z71.89 OTHER SPECIFIED COUNSELING: ICD-10-CM

## 2018-10-22 DIAGNOSIS — I10 ESSENTIAL (PRIMARY) HYPERTENSION: ICD-10-CM

## 2018-10-22 DIAGNOSIS — R45.1 RESTLESSNESS AND AGITATION: ICD-10-CM

## 2018-10-22 DIAGNOSIS — C71.9 MALIGNANT NEOPLASM OF BRAIN, UNSPECIFIED: ICD-10-CM

## 2018-10-22 DIAGNOSIS — R46.89 OTHER SYMPTOMS AND SIGNS INVOLVING APPEARANCE AND BEHAVIOR: ICD-10-CM

## 2018-10-22 DIAGNOSIS — Z98.890 OTHER SPECIFIED POSTPROCEDURAL STATES: Chronic | ICD-10-CM

## 2018-10-22 DIAGNOSIS — N40.0 BENIGN PROSTATIC HYPERPLASIA WITHOUT LOWER URINARY TRACT SYMPTOMS: ICD-10-CM

## 2018-10-22 DIAGNOSIS — Z29.9 ENCOUNTER FOR PROPHYLACTIC MEASURES, UNSPECIFIED: ICD-10-CM

## 2018-10-22 DIAGNOSIS — Z96.652 PRESENCE OF LEFT ARTIFICIAL KNEE JOINT: Chronic | ICD-10-CM

## 2018-10-22 DIAGNOSIS — R63.8 OTHER SYMPTOMS AND SIGNS CONCERNING FOOD AND FLUID INTAKE: ICD-10-CM

## 2018-10-22 LAB
APPEARANCE UR: CLEAR — SIGNIFICANT CHANGE UP
BILIRUB UR-MCNC: NEGATIVE — SIGNIFICANT CHANGE UP
COLOR SPEC: YELLOW — SIGNIFICANT CHANGE UP
DIFF PNL FLD: NEGATIVE — SIGNIFICANT CHANGE UP
GLUCOSE UR QL: NEGATIVE MG/DL — SIGNIFICANT CHANGE UP
KETONES UR-MCNC: NEGATIVE — SIGNIFICANT CHANGE UP
LEUKOCYTE ESTERASE UR-ACNC: NEGATIVE — SIGNIFICANT CHANGE UP
NITRITE UR-MCNC: NEGATIVE — SIGNIFICANT CHANGE UP
PH UR: 6.5 — SIGNIFICANT CHANGE UP (ref 5–8)
PROT UR-MCNC: NEGATIVE MG/DL — SIGNIFICANT CHANGE UP
SP GR SPEC: 1.02 — SIGNIFICANT CHANGE UP (ref 1.01–1.02)
UROBILINOGEN FLD QL: NEGATIVE MG/DL — SIGNIFICANT CHANGE UP
VALPROATE SERPL-MCNC: 3 UG/ML — LOW (ref 50–100)

## 2018-10-22 PROCEDURE — 99285 EMERGENCY DEPT VISIT HI MDM: CPT | Mod: GC

## 2018-10-22 PROCEDURE — 99223 1ST HOSP IP/OBS HIGH 75: CPT

## 2018-10-22 RX ORDER — HALOPERIDOL DECANOATE 100 MG/ML
1 INJECTION INTRAMUSCULAR
Qty: 0 | Refills: 0 | Status: DISCONTINUED | OUTPATIENT
Start: 2018-10-22 | End: 2018-10-23

## 2018-10-22 RX ORDER — DEXAMETHASONE 0.5 MG/5ML
4 ELIXIR ORAL EVERY 6 HOURS
Qty: 0 | Refills: 0 | Status: DISCONTINUED | OUTPATIENT
Start: 2018-10-22 | End: 2018-10-23

## 2018-10-22 RX ORDER — LEVETIRACETAM 250 MG/1
500 TABLET, FILM COATED ORAL ONCE
Qty: 0 | Refills: 0 | Status: COMPLETED | OUTPATIENT
Start: 2018-10-22 | End: 2018-10-22

## 2018-10-22 RX ORDER — LANOLIN ALCOHOL/MO/W.PET/CERES
3 CREAM (GRAM) TOPICAL AT BEDTIME
Qty: 0 | Refills: 0 | Status: DISCONTINUED | OUTPATIENT
Start: 2018-10-22 | End: 2018-10-28

## 2018-10-22 RX ORDER — LATANOPROST 0.05 MG/ML
1 SOLUTION/ DROPS OPHTHALMIC; TOPICAL AT BEDTIME
Qty: 0 | Refills: 0 | Status: DISCONTINUED | OUTPATIENT
Start: 2018-10-22 | End: 2018-10-30

## 2018-10-22 RX ORDER — LACTOBACILLUS ACIDOPHILUS 100MM CELL
1 CAPSULE ORAL DAILY
Qty: 0 | Refills: 0 | Status: DISCONTINUED | OUTPATIENT
Start: 2018-10-22 | End: 2018-10-28

## 2018-10-22 RX ORDER — DIVALPROEX SODIUM 500 MG/1
250 TABLET, DELAYED RELEASE ORAL DAILY
Qty: 0 | Refills: 0 | Status: DISCONTINUED | OUTPATIENT
Start: 2018-10-22 | End: 2018-10-23

## 2018-10-22 RX ORDER — HALOPERIDOL DECANOATE 100 MG/ML
2 INJECTION INTRAMUSCULAR EVERY 6 HOURS
Qty: 0 | Refills: 0 | Status: DISCONTINUED | OUTPATIENT
Start: 2018-10-22 | End: 2018-10-22

## 2018-10-22 RX ORDER — FAMOTIDINE 10 MG/ML
20 INJECTION INTRAVENOUS DAILY
Qty: 0 | Refills: 0 | Status: DISCONTINUED | OUTPATIENT
Start: 2018-10-22 | End: 2018-10-25

## 2018-10-22 RX ORDER — DEXAMETHASONE 0.5 MG/5ML
4 ELIXIR ORAL ONCE
Qty: 0 | Refills: 0 | Status: COMPLETED | OUTPATIENT
Start: 2018-10-22 | End: 2018-10-22

## 2018-10-22 RX ORDER — HALOPERIDOL DECANOATE 100 MG/ML
2 INJECTION INTRAMUSCULAR
Qty: 0 | Refills: 0 | Status: DISCONTINUED | OUTPATIENT
Start: 2018-10-22 | End: 2018-10-23

## 2018-10-22 RX ORDER — HALOPERIDOL DECANOATE 100 MG/ML
2.5 INJECTION INTRAMUSCULAR ONCE
Qty: 0 | Refills: 0 | Status: COMPLETED | OUTPATIENT
Start: 2018-10-22 | End: 2018-10-22

## 2018-10-22 RX ORDER — SIMETHICONE 80 MG/1
80 TABLET, CHEWABLE ORAL EVERY 6 HOURS
Qty: 0 | Refills: 0 | Status: DISCONTINUED | OUTPATIENT
Start: 2018-10-22 | End: 2018-10-28

## 2018-10-22 RX ORDER — HALOPERIDOL DECANOATE 100 MG/ML
1 INJECTION INTRAMUSCULAR ONCE
Qty: 0 | Refills: 0 | Status: COMPLETED | OUTPATIENT
Start: 2018-10-22 | End: 2018-10-22

## 2018-10-22 RX ORDER — ACETAMINOPHEN 500 MG
650 TABLET ORAL ONCE
Qty: 0 | Refills: 0 | Status: COMPLETED | OUTPATIENT
Start: 2018-10-22 | End: 2018-10-22

## 2018-10-22 RX ORDER — LEVETIRACETAM 250 MG/1
500 TABLET, FILM COATED ORAL
Qty: 0 | Refills: 0 | Status: DISCONTINUED | OUTPATIENT
Start: 2018-10-22 | End: 2018-10-25

## 2018-10-22 RX ORDER — HALOPERIDOL DECANOATE 100 MG/ML
1 INJECTION INTRAMUSCULAR
Qty: 0 | Refills: 0 | Status: DISCONTINUED | OUTPATIENT
Start: 2018-10-22 | End: 2018-10-22

## 2018-10-22 RX ORDER — TAMSULOSIN HYDROCHLORIDE 0.4 MG/1
0.4 CAPSULE ORAL AT BEDTIME
Qty: 0 | Refills: 0 | Status: DISCONTINUED | OUTPATIENT
Start: 2018-10-22 | End: 2018-10-28

## 2018-10-22 RX ORDER — HALOPERIDOL DECANOATE 100 MG/ML
2.5 INJECTION INTRAMUSCULAR ONCE
Qty: 0 | Refills: 0 | Status: DISCONTINUED | OUTPATIENT
Start: 2018-10-22 | End: 2018-10-23

## 2018-10-22 RX ADMIN — HALOPERIDOL DECANOATE 2.5 MILLIGRAM(S): 100 INJECTION INTRAMUSCULAR at 15:46

## 2018-10-22 RX ADMIN — TAMSULOSIN HYDROCHLORIDE 0.4 MILLIGRAM(S): 0.4 CAPSULE ORAL at 21:22

## 2018-10-22 RX ADMIN — LEVETIRACETAM 500 MILLIGRAM(S): 250 TABLET, FILM COATED ORAL at 21:21

## 2018-10-22 RX ADMIN — HALOPERIDOL DECANOATE 2.5 MILLIGRAM(S): 100 INJECTION INTRAMUSCULAR at 17:04

## 2018-10-22 NOTE — H&P ADULT - PROBLEM SELECTOR PLAN 7
DNR DNI comfort care but would be amenable to antibiotics for simple infection. No escalation of care.

## 2018-10-22 NOTE — ED PROVIDER NOTE - PROGRESS NOTE DETAILS
left message for daughter at home and cell for further information, awaiting response spoke with daughter. pt recently diagnosed with glioblastoma, [place] but became too aggressive to safely transport, so called EMS and brought to ED. Spoke with SW to help facilitate transport to [facility] seen by JOSSELINE. apparently patient had not been accepted yet by [facility]. will [facility] [place] manager, and attempt to have him accepted to their facility as he is well known to them Spoke with SW, who states [facility] has refused admission without "full psychiatric work-up" and now SW and Palliative care here involved, recommending admission to PCU inpatient here; however, no open beds in PCU.  Will admit to medicine service pending PCU bed availability. -Fuentes attempted to contact dr jackson many times with no response

## 2018-10-22 NOTE — ED PROVIDER NOTE - SHIFT CHANGE DETAILS
I have received sign out on this patient, briefly: 86yo M with brain tumor (possible glioblastoma) presenting with worsening agitation, confusion, acting out, stealing car and trying to run away; refusing medication.  DNR / DNI, no treatment for tumor per family / NOK; currently awaiting acceptance for palliative care at [facility]. Kandace

## 2018-10-22 NOTE — H&P ADULT - PROBLEM SELECTOR PLAN 2
Continue decadron 4mg PO Q6hrs for edema   Palliative care, poor prognosis. Continue decadron 4mg PO Q6hrs for edema with Famotidine for GI ppx  Palliative care, poor prognosis.  Melatonin PRN insomnia

## 2018-10-22 NOTE — H&P ADULT - NSHPPHYSICALEXAM_GEN_ALL_CORE
Vital Signs Last 24 Hrs  T(C): --  T(F): --  HR: 70 (22 Oct 2018 12:00) (68 - 70)  BP: 154/86 (22 Oct 2018 18:25) (154/86 - 157/68)  BP(mean): --  RR: 19 (22 Oct 2018 12:00) (19 - 20)  SpO2: 100% (22 Oct 2018 12:00) (100% - 100%)

## 2018-10-22 NOTE — CHART NOTE - NSCHARTNOTEFT_GEN_A_CORE
Call received from Palliative care SW and discussed with ED attending.  Patient well known to palliative from previous admission.    In brief, this is an 86yo M with hx of recurrent Cdiff, HTN, who was recently admitted for recurrent cdiff, and found to have brain mass on CT scan that was obtained 2/2 ongoing delirium. At that time, family had decided to forego aggressive interventions and further work up and patient discharged with home hospice. Patient now in ED with ongoing agitation. Patient would be appropriate for PCU given symptoms and focus of care family has requested. Currently no beds available in PCU, but can transfer when one does become available. Otherwise, full palliative consult to follow 10/23 if patient remains in ED or on medical floor.    Please call with questions.    Sheyla Heart MD  PCU

## 2018-10-22 NOTE — ED PROVIDER NOTE - MEDICAL DECISION MAKING DETAILS
87 M with aggression and agitation, will d/w family and SW 87 M with aggression and agitation, will d/w family and SW    Attending MD Crain: 88 yo male with PMH for HTN, hand termers, and suspected brain tumor brought in my EMS for aggressive behavior.  Per patients daughter and HCP the patient is not safe at home and they have been staying with him 24/7, they decided they do not want any intervention regarding the brain tumor and have been trying to get him in to hospice at [facility] [place].  Patient also refusing to take any of his medications including Keppra, decadron and Haldol.   Patient has no physical complaints and is agitated during history and physical.  Physical exam sign only for hand tremors otherwise exam with no concerning findings.  Plan: contact social work for assistance in placing patient, encourage medication compliance.

## 2018-10-22 NOTE — ED PROVIDER NOTE - OBJECTIVE STATEMENT
87 M recent dx glioblastoma, brought by EMS for aggressive behavior. patient not offering any additional information. Saying he wants to go home.

## 2018-10-22 NOTE — ED ADULT TRIAGE NOTE - CHIEF COMPLAINT QUOTE
history of brain tumor, non compliant with medications. family wanted him brought to a facility, patient became aggressive    picked up by ems today for aggressive behavior

## 2018-10-22 NOTE — H&P ADULT - ATTENDING COMMENTS
Carmel Cuellar DO  Hospitalist  [phone]    Discussed with ED PA  Plan for palliative admission. Carmel Cuellar DO  Hospitalist  [phone]    Discussed with ED PA  Plan for palliative admission.    DNRDNI confirmed with Daughter, documented in alpha and also in palliative care documentation.

## 2018-10-22 NOTE — ED PROVIDER NOTE - ATTENDING CONTRIBUTION TO CARE
Attending MD Crain:  I personally have seen and examined this patient.  Resident note reviewed and agree on plan of care and except where noted.  See MDM for details.

## 2018-10-22 NOTE — H&P ADULT - HISTORY OF PRESENT ILLNESS
86yo M with hx of recurrent Cdiff, HTN, who was recently admitted for recurrent cdiff, and found to have brain mass on CT scan that was obtained 2/2 ongoing delirium. At that time, family had decided to forego aggressive interventions and further work up and patient discharged with home hospice. Patient now in ED with ongoing agitation. He was doing well at home with home hospice and family members taking rotating shifts until Saturday when he stopped taking his medications.  He also took the car and drove to the store.  When the family called the police and he was found, he got out and had to be forcibly brought back to the house.  He is ok when he is on his medications, but gets very agitated when he doesn't take them.  Most history is obtained from daughter at bedside who says they would like to have the patient at home, but at this time he is a risk to himself and others due to his erratic behavior.  He has not had any fevers, chills, vomting, abd pain, dysuira or other signs of infection.  He is agitated on my exam, verbal and articulates but does not provide accurate history and is angry.     In ED:  T needs to be obtained HR 70 /86 RR 19 O2 100% RA  He was given Decadron 4mg, multiple doses of Haldol and his Keppra dose.

## 2018-10-22 NOTE — H&P ADULT - ASSESSMENT
86yo M with hx of recurrent Cdiff, HTN, who was recently admitted for recurrent cdiff, and found to have glioblastoma in 9/2018 elected for symptom management and was placed on hospice here for aggressive behavior.

## 2018-10-22 NOTE — ED ADULT NURSE REASSESSMENT NOTE - NS ED NURSE REASSESS COMMENT FT1
Report received from Mya MOONEY. Pt refusing VS and assessment at this time, mildly combative. Constant observation maintained for safety. 1:1 observer at the bedside. Safety and comfort maintained. Told RN to leave room. pt A&Ox1 reorientated to place and time. Daughter at bedside. Pt and daughter aware of admission, currently admitted awaiting inpatient bed placement. Pt currently seated at the edge of his bed with 1:1 observer standing next to him.

## 2018-10-22 NOTE — H&P ADULT - PROBLEM SELECTOR PLAN 1
Likely 2/2 to not taking his medications that control the symptoms of his glioblastoma, but will do basic infectious work up with CBC and UA to make sure there is no underlying infection.  Restart home meds: Valproic Acid 250mg PO daily (check level), Keppra 500mg PO BID and Haldol 1mg PO BID with 2mg IV Q3hrs PRN agitation.  Consider psych eval to titrate medications as family would ultimately like to take him home but he is not safe due to his erratic behavior.

## 2018-10-22 NOTE — CHART NOTE - NSCHARTNOTEFT_GEN_A_CORE
LCSW contacted by ER JOSSELINE Alas - patient known to palliative SW from prior admission.  Met with dtsilvino Davison - patient has been at home followed by [facility] with a rotating schedule of coverage from his multiple children.  Patient left the home this weekend, took the car LCSW contacted by ER JOSSELINE Alas - patient known to palliative SW from prior admission.  Patient with glioblastoma.  Met with dtr Laney - patient has been at home followed by [facility] with a rotating schedule of coverage from his multiple children.  Patient left the home this weekend, wrapped himself around a stop sign and started doing his physical therapy exercises in his bare feet and shorts and refused to return home, took the car, non compliant with medications since Saturday with escalation of behaviors that could cause harm to patient's or others.  Patient is a flight risk in the ER, ambulatory, with an altered mental status but patient is very verbal and articulate.   Family wants transfer to inpt hospice at [place] for management of symptoms.  If patients can be safely managed in the home setting, family would prefer to take patient back home but not able to keep him safe in this current state.  Patient has been combative with family and curses at them frequently.  Contact card for LCSW provided to dtr. Follow up discussion with Chacha Alas. LCSW contacted by ER JOSSELINE Alas - patient known to palliative SW from prior admission.  Patient with glioblastoma.  Met with dtr Laney - patient has been at home followed by [facility] with a rotating schedule of coverage from his multiple children.  Patient left the home this weekend, wrapped himself around a stop sign and started doing his physical therapy exercises in his bare feet and shorts and refused to return home, took the car, non compliant with medications since Saturday with escalation of behaviors that could cause harm to patient's or others.  Patient is a flight risk in the ER, ambulatory, with an altered mental status but patient is very verbal and articulate.   Family wants transfer to inpt hospice at [facility] for management of symptoms.  If patients can be safely managed in the home setting, family would prefer to take patient back home but not able to keep him safe in this current state.  Patient has been combative with family and curses at them frequently.  Contact card for LCSW provided to dtr. Follow up discussion with Chacha Alas.    4:30 pm  Addendum to above note:  As per Chacha, patient's hospice benefits through [facility] were revoked when he was admitted to the hospital. Spoke with PCU attending, Sheyla Hernandez who knows patient from prior admission.  Spoke with ER attending and PCU attending - patient to be admitted to the PCU for symptom management when bed becomes available.  Patient on 1:1 for supervision at this time. MOLST has been previously completed on prior admission - patient is a DNR/DNI. Family to bring in documentation this evening.  Dtr informed of above and aware that patient may be admitted to another floor prior to  transferring to PCU.  Spoke with Charleen - ER Amador and primary RN re above.  Please consult palliative team. LCSW contacted by ER JOSSELINE Alas - patient known to palliative SW from prior admission.  Patient with glioblastoma.  Met with dtr Laney - patient has been at home followed by [facility] with a rotating schedule of coverage from his multiple children.  Patient left the home this weekend, wrapped himself around a stop sign and started doing his physical therapy exercises in his bare feet and shorts and refused to return home, took the car, non compliant with medications since Saturday with escalation of behaviors that could cause harm to patient or others.  Patient is a flight risk in the ER, ambulatory, with an altered mental status but patient is very verbal and articulate.   Family wants transfer to inpt hospice at [place] for management of symptoms.  If patients can be safely managed in the home setting, family would prefer to take patient back home but not able to keep him safe in this current state.  Patient has been combative with family and curses at them frequently.  Contact card for LCSW provided to dtr. Follow up discussion with Chacha Alas.    4:30 pm  Addendum to above note:  As per Chacha, patient's hospice benefits through [facility] were revoked when he was admitted to the hospital. Spoke with PCU attending, Sheyla Hernandez who knows patient from prior admission.  Spoke with ER attending and PCU attending - patient to be admitted to the PCU for symptom management when bed becomes available.  Patient on 1:1 for supervision at this time. MOLST has been previously completed on prior admission - patient is a DNR/DNI. Family to bring in documentation this evening.  Dtr informed of above and aware that patient may be admitted to another floor prior to  transferring to PCU.  Spoke with Charleen - ER Amador and primary RN re above.  Please consult palliative team.

## 2018-10-22 NOTE — ED ADULT NURSE NOTE - OBJECTIVE STATEMENT
Pt is a 88 yo male sent here by EMS for aggressive behavior, as per EMS pt has a hx of a brain tumor and was being aggressive towards the hospice nurse when she was trying to take him to a hospital in [place]. Pt has not been taking his medications for the past two denies. Pt refuses to answer any questions. Pt denies any CP or SOB no N/V/D no cough fever or chills. Waiting for a daughters arrival.

## 2018-10-22 NOTE — ED CLERICAL - NS ED CLERK NOTE PRE-ARRIVAL INFORMATION; ADDITIONAL PRE-ARRIVAL INFORMATION
This patient is enrolled in the comprehensive joint replacement (CJR) program and has active care navigation.  This patient can be followed up by the care navigation team within 24 hours. To arrange close follow-up or to obtain additional clinical information about this patient, please call the contact number above. Please call the orthopedic resident ([phone]) for ALL patients who are admitted or placed in observation.

## 2018-10-22 NOTE — ED ADULT NURSE NOTE - NSIMPLEMENTINTERV_GEN_ALL_ED
Implemented All Fall with Harm Risk Interventions:  [place] to call system. Call bell, personal items and telephone within reach. Instruct patient to call for assistance. Room bathroom lighting operational. Non-slip footwear when patient is off stretcher. Physically safe environment: no spills, clutter or unnecessary equipment. Stretcher in lowest position, wheels locked, appropriate side rails in place. Provide visual cue, wrist band, yellow gown, etc. Monitor gait and stability. Monitor for mental status changes and reorient to person, place, and time. Review medications for side effects contributing to fall risk. Reinforce activity limits and safety measures with patient and family. Provide visual clues: red socks.

## 2018-10-22 NOTE — ED ADULT NURSE REASSESSMENT NOTE - NS ED NURSE REASSESS COMMENT FT1
Pts daughter at bedside, attempted to give pt his PO medications and pt refused Pts daughter at bedside, attempted to give pt his PO medications and pt refused, MD Crain aware

## 2018-10-22 NOTE — ED ADULT NURSE REASSESSMENT NOTE - NS ED NURSE REASSESS COMMENT FT1
Report given to holding VINICIO Stark . Patient/ Family aware. Patient currently comfortable. refusing VS

## 2018-10-23 DIAGNOSIS — F05 DELIRIUM DUE TO KNOWN PHYSIOLOGICAL CONDITION: ICD-10-CM

## 2018-10-23 LAB
ALBUMIN SERPL ELPH-MCNC: 3.5 G/DL — SIGNIFICANT CHANGE UP (ref 3.3–5)
ALP SERPL-CCNC: 59 U/L — SIGNIFICANT CHANGE UP (ref 40–120)
ALT FLD-CCNC: 31 U/L — SIGNIFICANT CHANGE UP (ref 10–45)
ANION GAP SERPL CALC-SCNC: 12 MMOL/L — SIGNIFICANT CHANGE UP (ref 5–17)
AST SERPL-CCNC: 12 U/L — SIGNIFICANT CHANGE UP (ref 10–40)
BILIRUB SERPL-MCNC: 0.6 MG/DL — SIGNIFICANT CHANGE UP (ref 0.2–1.2)
BUN SERPL-MCNC: 32 MG/DL — HIGH (ref 7–23)
CALCIUM SERPL-MCNC: 9.2 MG/DL — SIGNIFICANT CHANGE UP (ref 8.4–10.5)
CHLORIDE SERPL-SCNC: 101 MMOL/L — SIGNIFICANT CHANGE UP (ref 96–108)
CO2 SERPL-SCNC: 22 MMOL/L — SIGNIFICANT CHANGE UP (ref 22–31)
CREAT SERPL-MCNC: 0.92 MG/DL — SIGNIFICANT CHANGE UP (ref 0.5–1.3)
GLUCOSE SERPL-MCNC: 182 MG/DL — HIGH (ref 70–99)
HCT VFR BLD CALC: 42 % — SIGNIFICANT CHANGE UP (ref 39–50)
HGB BLD-MCNC: 14.3 G/DL — SIGNIFICANT CHANGE UP (ref 13–17)
MCHC RBC-ENTMCNC: 32 PG — SIGNIFICANT CHANGE UP (ref 27–34)
MCHC RBC-ENTMCNC: 34.1 GM/DL — SIGNIFICANT CHANGE UP (ref 32–36)
MCV RBC AUTO: 94 FL — SIGNIFICANT CHANGE UP (ref 80–100)
PLATELET # BLD AUTO: 141 K/UL — LOW (ref 150–400)
POTASSIUM SERPL-MCNC: 4.8 MMOL/L — SIGNIFICANT CHANGE UP (ref 3.5–5.3)
POTASSIUM SERPL-SCNC: 4.8 MMOL/L — SIGNIFICANT CHANGE UP (ref 3.5–5.3)
PROT SERPL-MCNC: 5.9 G/DL — LOW (ref 6–8.3)
RBC # BLD: 4.47 M/UL — SIGNIFICANT CHANGE UP (ref 4.2–5.8)
RBC # FLD: 15.3 % — HIGH (ref 10.3–14.5)
SODIUM SERPL-SCNC: 135 MMOL/L — SIGNIFICANT CHANGE UP (ref 135–145)
WBC # BLD: 9.3 K/UL — SIGNIFICANT CHANGE UP (ref 3.8–10.5)
WBC # FLD AUTO: 9.3 K/UL — SIGNIFICANT CHANGE UP (ref 3.8–10.5)

## 2018-10-23 PROCEDURE — 99223 1ST HOSP IP/OBS HIGH 75: CPT | Mod: GC

## 2018-10-23 PROCEDURE — 93010 ELECTROCARDIOGRAM REPORT: CPT

## 2018-10-23 RX ORDER — HALOPERIDOL DECANOATE 100 MG/ML
2 INJECTION INTRAMUSCULAR
Qty: 0 | Refills: 0 | Status: DISCONTINUED | OUTPATIENT
Start: 2018-10-23 | End: 2018-10-24

## 2018-10-23 RX ORDER — HALOPERIDOL DECANOATE 100 MG/ML
1 INJECTION INTRAMUSCULAR
Qty: 0 | Refills: 0 | Status: DISCONTINUED | OUTPATIENT
Start: 2018-10-23 | End: 2018-10-24

## 2018-10-23 RX ORDER — DIVALPROEX SODIUM 500 MG/1
500 TABLET, DELAYED RELEASE ORAL AT BEDTIME
Qty: 0 | Refills: 0 | Status: DISCONTINUED | OUTPATIENT
Start: 2018-10-23 | End: 2018-10-25

## 2018-10-23 RX ORDER — DEXAMETHASONE 0.5 MG/5ML
4 ELIXIR ORAL EVERY 12 HOURS
Qty: 0 | Refills: 0 | Status: DISCONTINUED | OUTPATIENT
Start: 2018-10-23 | End: 2018-10-25

## 2018-10-23 RX ADMIN — Medication 4 MILLIGRAM(S): at 14:13

## 2018-10-23 RX ADMIN — TAMSULOSIN HYDROCHLORIDE 0.4 MILLIGRAM(S): 0.4 CAPSULE ORAL at 21:12

## 2018-10-23 RX ADMIN — Medication 1 TABLET(S): at 14:11

## 2018-10-23 RX ADMIN — HALOPERIDOL DECANOATE 2 MILLIGRAM(S): 100 INJECTION INTRAMUSCULAR at 08:25

## 2018-10-23 RX ADMIN — FAMOTIDINE 20 MILLIGRAM(S): 10 INJECTION INTRAVENOUS at 14:11

## 2018-10-23 RX ADMIN — Medication 650 MILLIGRAM(S): at 00:45

## 2018-10-23 RX ADMIN — HALOPERIDOL DECANOATE 2 MILLIGRAM(S): 100 INJECTION INTRAMUSCULAR at 03:12

## 2018-10-23 RX ADMIN — HALOPERIDOL DECANOATE 1 MILLIGRAM(S): 100 INJECTION INTRAMUSCULAR at 05:58

## 2018-10-23 RX ADMIN — Medication 4 MILLIGRAM(S): at 00:01

## 2018-10-23 RX ADMIN — LEVETIRACETAM 500 MILLIGRAM(S): 250 TABLET, FILM COATED ORAL at 05:58

## 2018-10-23 RX ADMIN — DIVALPROEX SODIUM 250 MILLIGRAM(S): 500 TABLET, DELAYED RELEASE ORAL at 14:11

## 2018-10-23 RX ADMIN — HALOPERIDOL DECANOATE 2 MILLIGRAM(S): 100 INJECTION INTRAMUSCULAR at 21:13

## 2018-10-23 RX ADMIN — Medication 4 MILLIGRAM(S): at 18:33

## 2018-10-23 RX ADMIN — HALOPERIDOL DECANOATE 2 MILLIGRAM(S): 100 INJECTION INTRAMUSCULAR at 18:33

## 2018-10-23 RX ADMIN — LEVETIRACETAM 500 MILLIGRAM(S): 250 TABLET, FILM COATED ORAL at 18:33

## 2018-10-23 RX ADMIN — DIVALPROEX SODIUM 500 MILLIGRAM(S): 500 TABLET, DELAYED RELEASE ORAL at 21:11

## 2018-10-23 RX ADMIN — Medication 650 MILLIGRAM(S): at 00:01

## 2018-10-23 RX ADMIN — Medication 20 MILLIGRAM(S): at 05:59

## 2018-10-23 RX ADMIN — HALOPERIDOL DECANOATE 2 MILLIGRAM(S): 100 INJECTION INTRAMUSCULAR at 11:27

## 2018-10-23 RX ADMIN — Medication 4 MILLIGRAM(S): at 05:58

## 2018-10-23 NOTE — CONSULT NOTE ADULT - PROBLEM SELECTOR RECOMMENDATION 2
Patient with likely glioma with cerebral edema  Will continue dexamethasone 4 mg q 6 however may be contributing to agitation  Continue Keppra for ppx however will decrease dosage  No need for further imagining as end goal symptomatic relief

## 2018-10-23 NOTE — BEHAVIORAL HEALTH ASSESSMENT NOTE - NSBHCHARTREVIEWIMAGING_PSY_A_CORE FT
< from: MR Head w/wo IV Cont (09.07.18 @ 22:05) >    EXAM:  MR BRAIN WAW IC                            PROCEDURE DATE:  09/07/2018      INTERPRETATION:  Contrast-enhanced MRI of the brain.    CLINICAL INDICATION: Preoperative examination for resection of a right   temporal mass.    TECHNIQUE:  Multiplanar, multisequence MR images of the brain were   obtained before and after the intravenous demonstration of 10 cc of   Gadavist. 0 cc were discarded.    COMPARISON: MRI brain 9/4/2018.    FINDINGS:      Study is limited by motion.    Redemonstration of a 4.5 x 3.4 x 3.3 cm necrotic right temporal lobe mass   with surrounding vasogenic edema, highly suspicious for glioblastoma. No   hydrocephalus, effacement of the basal cisterns, or midline shift.    IMPRESSION:    Redemonstration of necrotic right temporal lobe mass with surrounding   vasogenic edema, highly suspicious for glioblastoma.    No midline shift or hydrocephalus.    Study done for presurgical planning.    < end of copied text >

## 2018-10-23 NOTE — CONSULT NOTE ADULT - ATTENDING COMMENTS
I have personally seen and examined this patient and agree with the above assessment and plan.   1:1 for patient/staff safety.  Depakote, haldol, decadron as prescribed.  Continue to monitor.   Family opting for conservative, comfort management.

## 2018-10-23 NOTE — CONSULT NOTE ADULT - ASSESSMENT
87M with hx of recurrent Cdiff, HTN, who was recently admitted for recurrent cdiff, and found to have glioblastoma in 9/2018 elected for symptom management and was d/carlos home with hospice with [facility] now presenting with agitation and aggressive behavior, admitted to PCU for symptom management

## 2018-10-23 NOTE — CONSULT NOTE ADULT - PROBLEM SELECTOR RECOMMENDATION 3
HCP: Daughter, Bea Parker  Phone #	[phone]  MOLST in chart  From prior conversations, end goal is to take patient home with hospice once agitation and aggression has been managed, however are amendable to inpatient hospice if symptoms are uncontrollable

## 2018-10-23 NOTE — BEHAVIORAL HEALTH ASSESSMENT NOTE - HPI (INCLUDE ILLNESS QUALITY, SEVERITY, DURATION, TIMING, CONTEXT, MODIFYING FACTORS, ASSOCIATED SIGNS AND SYMPTOMS)
Patient is an 87 y/o male who lives alone, retired, , with no PPHx, no substance hx, medical hx significant for HTN, BPH, h/o metabolic encephalopathy and c.diff in Aug 2018 psychiatry consulted for medication management for agitation.    On exam, patient is awake and alert, calm, however is irritable, states "I don't have a name" telling writer to "just get out of here."  Unable to state reason for why he is in the hospital, denies being in pain.  Endorses frustration of answering the name questions over and over.    Per staff, patient was very agitated, threw water at staff last night and pushed side table onto staff member's foot.    Per the patient's daughter Bea Piper ([phone]), reports that prior to his hospitalization over the summer for knee surgery. he developed C. diff and had episodes of agitation and confusion during that Patient is an 85 y/o male who lives alone, retired, , with no PPHx, no substance hx, medical hx significant for HTN, BPH, h/o metabolic encephalopathy and C.diff in Aug 2018, glioblastoma (diagnosed in Aug 2018), admitted for increased agitation at home.  Psychiatry consulted for medication management for agitation.    On exam, patient is awake and alert, calm, however is irritable, states "I don't have a name" telling writer to "just get out of here."  Unable to state reason for why he is in the hospital, denies being in pain.  Endorses frustration of answering the name questions over and over.    Per staff, patient was very agitated, threw water at staff last night and pushed side table onto staff member's foot.    Per the patient's daughter Bea Piper ([phone]), reports that prior to his hospitalization over the summer for knee surgery. he developed C. diff and had episodes of agitation, was believing his children were leaving in the hospital to die, and confusion during that hospitalization and was started on Haldol and Depakote and was discharged to home hospice and was doing relatively well on this regimen up until a week ago.  States that on Sat, patient began to refuse his medications, was becoming more paranoid, at times combative towards family.  States that patient was leaving the house and driving, also going outside in the middle of the street in his underwear.  Reports patient was back at his baseline prior to his knee surgery in the July 2018, was living at home independently alone in a home, playing tennis 3 days a week.  Reports patient is a retired [employer] , was  3 years ago.  Denies patient has any psychiatric history or has seen a psychiatrist in the past.  Reports that on Fri, patient had made a statement wanting "G-d to take me", denies patient ever attempting suicide or endorsing SI in the past.  Reports that patient in the past has claimed "seeing heads on the fold of the curtain" but unsure if it is actually a true hallucination or a visual impairment 2/2 to his glioblastoma.

## 2018-10-23 NOTE — BEHAVIORAL HEALTH ASSESSMENT NOTE - RISK ASSESSMENT
Risk factors: acute/chronic medical conditions, chronic pain, cognitive impairments 2/2 glioblastoma    Protective factors no h/o SA/SIB, no h/o psych admissions, no active substance abuse, no psychosis,  with adult children, domiciled, social supports, positive therapeutic relationship    Overall, pt is a low risk of harm to self/others and does not require psychiatric admission for safety and stabilization.

## 2018-10-23 NOTE — BEHAVIORAL HEALTH ASSESSMENT NOTE - SUMMARY
Patient is an 87 y/o male who lives alone, retired, , with no PPHx, no substance hx, medical hx significant for HTN, BPH, h/o metabolic encephalopathy and C.diff in Aug 2018, glioblastoma (diagnosed in Aug 2018), admitted for increased agitation at home.  Psychiatry consulted for medication management for agitation.  Patient seen and evaluated, irritable, not engaging in conversation, telling writer to leave the room.  Per nursing staff patient was agitated last night, throwing water at staff members, pushing side table at 1:1 staff members, requiring multiple prn medications.  Per daughter, patient responded well to Haldol and Depakote, however stopped taking medications on Sat and became more agitated and hostile towards family.  Dtr in agreement to increase Haldol and Depakote.  Consider taking patient off of Keppra and using Depakote in place of it for seizure control and mood symptoms if neurology is in agreement.

## 2018-10-23 NOTE — BEHAVIORAL HEALTH ASSESSMENT NOTE - NSBHCHARTREVIEWLAB_PSY_A_CORE FT
Urinalysis Basic - ( 22 Oct 2018 22:17 )    Color: Yellow / Appearance: Clear / S.019 / pH: x  Gluc: x / Ketone: Negative  / Bili: Negative / Urobili: Negative mg/dL   Blood: x / Protein: Negative mg/dL / Nitrite: Negative   Leuk Esterase: Negative / RBC: x / WBC x   Sq Epi: x / Non Sq Epi: x / Bacteria: x

## 2018-10-23 NOTE — CONSULT NOTE ADULT - PROBLEM SELECTOR RECOMMENDATION 9
Patient with agitation since initial diagnosis thought to be 2/2 to cerebral mass likely glioma  Home meds: Haldol 1 mg PO BID, Depakote qhs  Will continue PO meds however, contributing factor may be dexamethasone however patient has been on the medication for past month, unlikely   Will continue Haldol 2 mg IV q3 PRN agitation  Will consider switching over to Zyprexa 5 mg IM PRN however may increase baseline tremors   Will discuss with family regarding decreasing steroid dosage  Increase Depakote to 250 BID, decrease Keppra as may contributing to agitation as well

## 2018-10-23 NOTE — BEHAVIORAL HEALTH ASSESSMENT NOTE - NSBHCONSULTRECOMMENDOTHER_PSY_A_CORE FT
1. Check baseline ekg for qtc. Consider switching patient from Keppra to Depakote for seizure control if neurology in agreement.  2. Increase Depakote to 500mg daily (from 250mg); Increase Haldol to 1mg in AM and 2mg at 1800 (monitor qtc <500ms on ekg)  3. PRN: Haldol 2mg PO/IM/IV q6h PRN severe agitation.  Monitor for QTc<500.  Melatonin 3mg PO qHS PRN insomnia.  4. Check: TSH, B12, folate, RPR, UA, U-tox; consider CT head  5. Minimize use of benzos, opioids, anticholinergics, or other deliriogenic agents when possible.  Maintain sleep wake cycle.  Provide frequent reorientation and redirection.  Family member at bedside if possible. Assess for need for glasses and hearing aid (if applicable).  6. Pt does not meet criteria for psychiatric hospitalization.  Recommend outpt psych f/u at [facility] after d/c- [phone].   [facility] Psych will follow.

## 2018-10-23 NOTE — CONSULT NOTE ADULT - SUBJECTIVE AND OBJECTIVE BOX
HPI:  86yo M with hx of recurrent Cdiff, HTN, who was recently admitted for recurrent cdiff, and found to have brain mass on CT scan that was obtained 2/2 ongoing delirium. At that time, family had decided to forego aggressive interventions and further work up and patient discharged with home hospice. Patient now in ED with ongoing agitation. He was doing well at home with home hospice and family members taking rotating shifts until Saturday when he stopped taking his medications.  He also took the car and drove to the store.  When the family called the police and he was found, he got out and had to be forcibly brought back to the house.  He is ok when he is on his medications, but gets very agitated when he doesn't take them.  Most history is obtained from daughter at bedside who says they would like to have the patient at home, but at this time he is a risk to himself and others due to his erratic behavior.  He has not had any fevers, chills, vomting, abd pain, dysuira or other signs of infection.  He is agitated on my exam, verbal and articulates but does not provide accurate history and is angry.     In ED:  T needs to be obtained HR 70 /86 RR 19 O2 100% RA  He was given Decadron 4mg, multiple doses of Haldol and his Keppra dose. (22 Oct 2018 19:07)    PERTINENT PM/SXH:   Osteoarthritis of right knee  Hypertension  Tremor of both hands    History of hernia surgery  History of shoulder surgery  History of knee replacement, total, left    FAMILY HISTORY:  Family history of heart disease (Father)    ITEMS NOT CHECKED ARE NOT PRESENT    SOCIAL HISTORY:   Significant other/partner:  [ ]  Children:  [ ]  Jewish/Spirituality:  Substance hx:  [ ]   Tobacco hx:  [ ]   Alcohol hx: [ ]   Home Opioid hx:  [ ] I-Stop Reference No:  Living Situation: [ ]Home  [ ]Long term care  [ ]Rehab [ ]Other    ADVANCE DIRECTIVES:    DNR  Yes  MOLST  [ ]  Living Will  [ ]   DECISION MAKER(s):  [ ] Health Care Proxy(s)  [ ] Surrogate(s)  [ ] Guardian           Name(s): Phone Number(s):    BASELINE (I)ADL(s) (prior to admission):  [place]: [ ]Total  [ ] Moderate [ ]Dependent    Allergies    No Known Allergies    Intolerances    MEDICATIONS  (STANDING):  dexamethasone     Tablet 4 milliGRAM(s) Oral every 6 hours  diVALproex  milliGRAM(s) Oral daily  enalapril 20 milliGRAM(s) Oral daily  famotidine    Tablet 20 milliGRAM(s) Oral daily  haloperidol     Tablet 1 milliGRAM(s) Oral two times a day  haloperidol    Injectable 2.5 milliGRAM(s) IntraMuscular once  lactobacillus acidophilus 1 Tablet(s) Oral daily  latanoprost 0.005% Ophthalmic Solution 1 Drop(s) Both EYES at bedtime  levETIRAcetam 500 milliGRAM(s) Oral two times a day  tamsulosin 0.4 milliGRAM(s) Oral at bedtime    MEDICATIONS  (PRN):  haloperidol    Injectable 2 milliGRAM(s) IV Push every 3 hours PRN agitation  melatonin 3 milliGRAM(s) Oral at bedtime PRN Insomnia  simethicone 80 milliGRAM(s) Chew every 6 hours PRN Gas    PRESENT SYMPTOMS: [ ]Unable to obtain due to poor mentation   Source if other than patient:  [ ]Family   [ ]Team     Pain (Impact on QOL):    Location -         Minimal acceptable level (0-10 scale):                    Aggrevating factors -  Quality -  Radiation -  Severity (0-10 scale) -    Timing -    PAIN AD Score:     [email or web address] (press ctrl +  left click to view)    Dyspnea:                           [ ]Mild [ ]Moderate [ ]Severe  Anxiety:                             [ ]Mild [ ]Moderate [ ]Severe  Fatigue:                             [ ]Mild [ ]Moderate [ ]Severe  Nausea:                             [ ]Mild [ ]Moderate [ ]Severe  Loss of appetite:              [ ]Mild [ ]Moderate [ ]Severe  Constipation:                    [ ]Mild [ ]Moderate [ ]Severe    Other Symptoms:  [ ]All other review of systems negative     Karnofsky Performance Score/Palliative Performance Status Version 2:         %  PHYSICAL EXAM:  Vital Signs Last 24 Hrs  T(C): 36.1 (23 Oct 2018 00:00), Max: 36.4 (22 Oct 2018 22:18)  T(F): 97 (23 Oct 2018 00:00), Max: 97.6 (22 Oct 2018 22:18)  HR: 71 (23 Oct 2018 00:00) (68 - 74)  BP: 129/78 (23 Oct 2018 00:00) (129/78 - 157/68)  BP(mean): --  RR: 18 (23 Oct 2018 00:00) (18 - 20)  SpO2: 95% (23 Oct 2018 00:00) (95% - 100%) I&O's Summary    22 Oct 2018 07:01  -  23 Oct 2018 07:00  --------------------------------------------------------  IN: 0 mL / OUT: 700 mL / NET: -700 mL    GENERAL:  [ ]Alert  [ ]Oriented x   [ ]Lethargic  [ ]Cachexia  [ ]Unarousable  [ ]Verbal  [ ]Non-Verbal  Behavioral:   [ ] Anxiety  [ ] Delirium [ ] Agitation [ ] Other  HEENT:  [ ]Normal   [ ]Dry mouth   [ ]ET Tube/Trach  [ ]Oral lesions  PULMONARY:   [ ]Clear [ ]Tachypnea  [ ]Audible excessive secretions   [ ]Rhonchi        [ ]Right [ ]Left [ ]Bilateral  [ ]Crackles        [ ]Right [ ]Left [ ]Bilateral  [ ]Wheezing     [ ]Right [ ]Left [ ]Bilateral  CARDIOVASCULAR:    [ ]Regular [ ]Irregular [ ]Tachy  [ ]Satya [ ]Murmur [ ]Other  GASTROINTESTINAL:  [ ]Soft  [ ]Distended   [ ]+BS  [ ]Non tender [ ]Tender  [ ]PEG [ ]OGT/ NGT  Last BM:   GENITOURINARY:  [ ]Normal [ ] Incontinent   [ ]Oliguria/Anuria   [ ]Bartholomew  MUSCULOSKELETAL:   [ ]Normal   [ ]Weakness  [ ]Bed/Wheelchair bound [ ]Edema  NEUROLOGIC:   [ ]No focal deficits  [ ] Cognitive impairment  [ ] Dysphagia [ ]Dysarthria [ ] Paresis [ ]Other   SKIN:   [ ]Normal   [ ]Pressure ulcer(s)  [ ]Rash    CRITICAL CARE:  [ ] Shock Present  [ ]Septic [ ]Cardiogenic [ ]Neurologic [ ]Hypovolemic  [ ]  Vasopressors [ ]  Inotropes   [ ] Respiratory failure present  [ ] Acute  [ ] Chronic [ ] Hypoxic  [ ] Hypercarbic [ ] Other  [ ] Other organ failure     LABS:          Urinalysis Basic - ( 22 Oct 2018 22:17 )    Color: Yellow / Appearance: Clear / S.019 / pH: x  Gluc: x / Ketone: Negative  / Bili: Negative / Urobili: Negative mg/dL   Blood: x / Protein: Negative mg/dL / Nitrite: Negative   Leuk Esterase: Negative / RBC: x / WBC x   Sq Epi: x / Non Sq Epi: x / Bacteria: x      RADIOLOGY & ADDITIONAL STUDIES:    PROTEIN CALORIE MALNUTRITION:   [ ] PPSV2 < or = to 30% [ ] significant weight loss  [ ] poor nutritional intake [ ] catabolic state [ ] anasarca     Artificial Nutrition [ ]     REFERRALS:   [ ]Chaplaincy  [ ] Hospice  [ ]Child Life  [ ]Social Work  [ ]Case management [ ]Holistic Therapy   Goals of Care Discussion Document: Goals of Care Conversation - Personal Advance Directive [MICHAEL Car] (09-10-18 @ 21:16) HPI:  86yo M with hx of recurrent Cdiff, HTN, who was recently admitted for recurrent cdiff, and found to have brain mass on CT scan that was obtained 2/2 ongoing delirium. At that time, family had decided to forego aggressive interventions and further work up and patient discharged with home hospice. Patient now in ED with ongoing agitation. He was doing well at home with home hospice and family members taking rotating shifts until Saturday when he stopped taking his medications.  He also took the car and drove to the store.  When the family called the police and he was found, he got out and had to be forcibly brought back to the house.  He is ok when he is on his medications, but gets very agitated when he doesn't take them.  Most history is obtained from daughter at bedside who says they would like to have the patient at home, but at this time he is a risk to himself and others due to his erratic behavior.  He has not had any fevers, chills, vomting, abd pain, dysuira or other signs of infection.  He is agitated on my exam, verbal and articulates but does not provide accurate history and is angry.     In ED:  T needs to be obtained HR 70 /86 RR 19 O2 100% RA  He was given Decadron 4mg, multiple doses of Haldol and his Keppra dose. (22 Oct 2018 19:07)    PERTINENT PM/SXH:   Osteoarthritis of right knee  Hypertension  Tremor of both hands  Brain mass     History of hernia surgery  History of shoulder surgery  History of knee replacement, total, left    FAMILY HISTORY:  Family history of heart disease (Father)    ITEMS NOT CHECKED ARE NOT PRESENT    SOCIAL HISTORY:   Significant other/partner:  [x]  Children:  [ ]  Methodist/Spirituality:  Substance hx:  [ ]   Tobacco hx:  [ ]   Alcohol hx: [ ]   Home Opioid hx:  [ ] I-Stop Reference No:  Living Situation: [x ]Home  [ ]Long term care  [ ]Rehab [ ]Other    ADVANCE DIRECTIVES:    DNR  Yes  MOLST  [x ]  Living Will  [x ]   DECISION MAKER(s):  [ x] Health Care Proxy(s)  [ ] Surrogate(s)  [ ] Guardian           Name(s): Phone Number(s):   Bea Parker (daughter)	  [phone]	      BASELINE (I)ADL(s) (prior to admission):  [place]: [ ]Total  [ ] Moderate [ ]Dependent    Allergies    No Known Allergies    Intolerances    MEDICATIONS  (STANDING):  dexamethasone     Tablet 4 milliGRAM(s) Oral every 6 hours  diVALproex  milliGRAM(s) Oral daily  enalapril 20 milliGRAM(s) Oral daily  famotidine    Tablet 20 milliGRAM(s) Oral daily  haloperidol     Tablet 1 milliGRAM(s) Oral two times a day  haloperidol    Injectable 2.5 milliGRAM(s) IntraMuscular once  lactobacillus acidophilus 1 Tablet(s) Oral daily  latanoprost 0.005% Ophthalmic Solution 1 Drop(s) Both EYES at bedtime  levETIRAcetam 500 milliGRAM(s) Oral two times a day  tamsulosin 0.4 milliGRAM(s) Oral at bedtime    MEDICATIONS  (PRN):  haloperidol    Injectable 2 milliGRAM(s) IV Push every 3 hours PRN agitation  melatonin 3 milliGRAM(s) Oral at bedtime PRN Insomnia  simethicone 80 milliGRAM(s) Chew every 6 hours PRN Gas    PRESENT SYMPTOMS: [ ]Unable to obtain due to poor mentation   Source if other than patient:  [ ]Family   [ ]Team     Pain (Impact on QOL):    Location -         Minimal acceptable level (0-10 scale):                    Aggrevating factors -  Quality -  Radiation -  Severity (0-10 scale) -    Timing -    PAIN AD Score:     [email or web address] (press ctrl +  left click to view)    Dyspnea:                           [ ]Mild [ ]Moderate [ ]Severe  Anxiety:                             [ ]Mild [ ]Moderate [ ]Severe  Fatigue:                             [ ]Mild [ ]Moderate [ ]Severe  Nausea:                             [ ]Mild [ ]Moderate [ ]Severe  Loss of appetite:              [ ]Mild [ ]Moderate [ ]Severe  Constipation:                    [ ]Mild [ ]Moderate [ ]Severe    Other Symptoms:  [ ]All other review of systems negative     Karnofsky Performance Score/Palliative Performance Status Version 2:        50 %  PHYSICAL EXAM:  Vital Signs Last 24 Hrs  T(C): 36.1 (23 Oct 2018 00:00), Max: 36.4 (22 Oct 2018 22:18)  T(F): 97 (23 Oct 2018 00:00), Max: 97.6 (22 Oct 2018 22:18)  HR: 71 (23 Oct 2018 00:00) (68 - 74)  BP: 129/78 (23 Oct 2018 00:00) (129/78 - 157/68)  BP(mean): --  RR: 18 (23 Oct 2018 00:00) (18 - 20)  SpO2: 95% (23 Oct 2018 00:00) (95% - 100%) I&O's Summary    22 Oct 2018 07:01  -  23 Oct 2018 07:00  --------------------------------------------------------  IN: 0 mL / OUT: 700 mL / NET: -700 mL    GENERAL:  [ x]Alert  [ ]Oriented x   [ ]Lethargic  [ ]Cachexia  [ ]Unarousable  [ ]Verbal  [ ]Non-Verbal  Behavioral:   [ ] Anxiety  [ ] Delirium [x ] Agitation [ ] Other  HEENT:  [x ]Normal   [ ]Dry mouth   [ ]ET Tube/Trach  [ ]Oral lesions  PULMONARY:   [x ]Clear [ ]Tachypnea  [ ]Audible excessive secretions   [ ]Rhonchi        [ ]Right [ ]Left [ ]Bilateral  [ ]Crackles        [ ]Right [ ]Left [ ]Bilateral  [ ]Wheezing     [ ]Right [ ]Left [ ]Bilateral  CARDIOVASCULAR:    [x ]Regular [ ]Irregular [ ]Tachy  [ ]Satya [ ]Murmur [ ]Other  GASTROINTESTINAL:  [x ]Soft  [ ]Distended   [ ]+BS  [x ]Non tender [ ]Tender  [ ]PEG [ ]OGT/ NGT  Last BM:   GENITOURINARY:  [x ]Normal [ ] Incontinent   [ ]Oliguria/Anuria   [ ]Bartholomew  MUSCULOSKELETAL:   [ ]Normal   [ ]Weakness  [ ]Bed/Wheelchair bound [ ]Edema  NEUROLOGIC:   [ ]No focal deficits  [x ] Cognitive impairment  [ ] Dysphagia [ ]Dysarthria [ ] Paresis [ ]Other   SKIN:   [ ]Normal   [ ]Pressure ulcer(s)  [ ]Rash    CRITICAL CARE:  [ ] Shock Present  [ ]Septic [ ]Cardiogenic [ ]Neurologic [ ]Hypovolemic  [ ]  Vasopressors [ ]  Inotropes   [ ] Respiratory failure present  [ ] Acute  [ ] Chronic [ ] Hypoxic  [ ] Hypercarbic [ ] Other  [ ] Other organ failure     LABS: no labs          Urinalysis Basic - ( 22 Oct 2018 22:17 )    Color: Yellow / Appearance: Clear / S.019 / pH: x  Gluc: x / Ketone: Negative  / Bili: Negative / Urobili: Negative mg/dL   Blood: x / Protein: Negative mg/dL / Nitrite: Negative   Leuk Esterase: Negative / RBC: x / WBC x   Sq Epi: x / Non Sq Epi: x / Bacteria: x      RADIOLOGY & ADDITIONAL STUDIES:    PROTEIN CALORIE MALNUTRITION:   [ ] PPSV2 < or = to 30% [ ] significant weight loss  [ ] poor nutritional intake [ ] catabolic state [ ] anasarca     Artificial Nutrition [ ]     REFERRALS:   [ ]Chaplaincy  [ ] Hospice  [ ]Child Life  [ x]Social Work  [ ]Case management [ ]Holistic Therapy   Goals of Care Discussion Document: Goals of Care Conversation - Personal Advance Directive [MICHAEL Car] (09-10-18 @ 21:16)

## 2018-10-23 NOTE — BEHAVIORAL HEALTH ASSESSMENT NOTE - CASE SUMMARY
Patient is an 85 y/o male who lives alone, retired, , with no PPHx, no substance hx, medical hx significant for HTN, BPH, h/o metabolic encephalopathy and C.diff in Aug 2018, glioblastoma (diagnosed in Aug 2018), admitted for increased agitation at home.  Psychiatry consulted for medication management for agitation. Pt poor historian, confused, not cooperative with interview. Pt denies si/hi. as per staff, pt combative, agitated, on haldol, depakote. rec inc haldol to 2mg po q7pm, increase depakote to 500mg po daily, d/c keppra. cont enhanced

## 2018-10-23 NOTE — BEHAVIORAL HEALTH ASSESSMENT NOTE - NSBHCHARTREVIEWVS_PSY_A_CORE FT
Vital Signs Last 24 Hrs  T(C): 36.6 (23 Oct 2018 10:31), Max: 36.6 (23 Oct 2018 10:31)  T(F): 97.8 (23 Oct 2018 10:31), Max: 97.8 (23 Oct 2018 10:31)  HR: 88 (23 Oct 2018 10:31) (71 - 88)  BP: 157/80 (23 Oct 2018 10:31) (129/78 - 157/80)  BP(mean): --  RR: 18 (23 Oct 2018 10:31) (18 - 18)  SpO2: 98% (23 Oct 2018 10:31) (95% - 98%)

## 2018-10-24 DIAGNOSIS — Z51.5 ENCOUNTER FOR PALLIATIVE CARE: ICD-10-CM

## 2018-10-24 DIAGNOSIS — F02.80 DEMENTIA IN OTHER DISEASES CLASSIFIED ELSEWHERE, UNSPECIFIED SEVERITY, WITHOUT BEHAVIORAL DISTURBANCE, PSYCHOTIC DISTURBANCE, MOOD DISTURBANCE, AND ANXIETY: ICD-10-CM

## 2018-10-24 PROCEDURE — 99223 1ST HOSP IP/OBS HIGH 75: CPT

## 2018-10-24 PROCEDURE — 99233 SBSQ HOSP IP/OBS HIGH 50: CPT | Mod: GC

## 2018-10-24 RX ORDER — DIVALPROEX SODIUM 500 MG/1
250 TABLET, DELAYED RELEASE ORAL DAILY
Qty: 0 | Refills: 0 | Status: DISCONTINUED | OUTPATIENT
Start: 2018-10-24 | End: 2018-10-25

## 2018-10-24 RX ORDER — QUETIAPINE FUMARATE 200 MG/1
25 TABLET, FILM COATED ORAL AT BEDTIME
Qty: 0 | Refills: 0 | Status: DISCONTINUED | OUTPATIENT
Start: 2018-10-24 | End: 2018-10-25

## 2018-10-24 RX ADMIN — LEVETIRACETAM 500 MILLIGRAM(S): 250 TABLET, FILM COATED ORAL at 05:36

## 2018-10-24 RX ADMIN — Medication 4 MILLIGRAM(S): at 05:36

## 2018-10-24 RX ADMIN — TAMSULOSIN HYDROCHLORIDE 0.4 MILLIGRAM(S): 0.4 CAPSULE ORAL at 22:30

## 2018-10-24 RX ADMIN — DIVALPROEX SODIUM 500 MILLIGRAM(S): 500 TABLET, DELAYED RELEASE ORAL at 22:30

## 2018-10-24 RX ADMIN — HALOPERIDOL DECANOATE 1 MILLIGRAM(S): 100 INJECTION INTRAMUSCULAR at 05:36

## 2018-10-24 RX ADMIN — HALOPERIDOL DECANOATE 2 MILLIGRAM(S): 100 INJECTION INTRAMUSCULAR at 00:15

## 2018-10-24 RX ADMIN — LEVETIRACETAM 500 MILLIGRAM(S): 250 TABLET, FILM COATED ORAL at 18:04

## 2018-10-24 RX ADMIN — Medication 20 MILLIGRAM(S): at 05:36

## 2018-10-24 RX ADMIN — FAMOTIDINE 20 MILLIGRAM(S): 10 INJECTION INTRAVENOUS at 13:05

## 2018-10-24 RX ADMIN — Medication 1 TABLET(S): at 13:04

## 2018-10-24 RX ADMIN — Medication 4 MILLIGRAM(S): at 18:04

## 2018-10-24 NOTE — PROGRESS NOTE BEHAVIORAL HEALTH - NSBHCHARTREVIEWVS_PSY_A_CORE FT
Vital Signs Last 24 Hrs  T(C): 36.4 (24 Oct 2018 08:58), Max: 36.4 (24 Oct 2018 08:58)  T(F): 97.6 (24 Oct 2018 08:58), Max: 97.6 (24 Oct 2018 08:58)  HR: 56 (24 Oct 2018 08:58) (56 - 56)  BP: 159/78 (24 Oct 2018 08:58) (159/78 - 159/78)  BP(mean): --  RR: 18 (24 Oct 2018 08:58) (18 - 18)  SpO2: 97% (24 Oct 2018 08:58) (97% - 97%)

## 2018-10-24 NOTE — PROGRESS NOTE ADULT - PROBLEM SELECTOR PLAN 2
c/w haldol 1 mg @6am and haldol 2mgs @6pm  c/w haldol 2mgs iv q3h prn (pt required 4 doses overnight)  c/w melatonin 3mg qhs  1:1 @bedside for safety DC haldol due to tremor/rigidity, increase Depakote to 250mg am and 500mg PM, check level in 2-3 days.  Seroquel qhs PRN.    c/w melatonin 3mg qhs  1:1 @bedside for safety

## 2018-10-24 NOTE — PROGRESS NOTE BEHAVIORAL HEALTH - NSBHFUPINTERVALHXFT_PSY_A_CORE
Patient seen and evaluated, awake and alert, oriented to person, place, unable to state the current date.  Patient calm and cooperative, denies having any pain symptoms.  No SI/HI reported.  States he wants "to get up and leave."  Reports good appetite this morning.  Per nursing staff, patient slept poorly last night.  They report over night patient was loud, cursing, grabbed RN staff's ID badge, required 2 doses of PRN Haldol.  This morning, they report patient was seen by the hospital , and became agitated, cursing, however did not require prns at that point.  They report patient is more agitated when family members are present, report patient blames family for him being in the hospital. Patient seen and evaluated, awake and alert, oriented to person, place, unable to state the current date.  Patient calm and cooperative, denies having any pain symptoms.  No SI/HI reported.  States he wants "to get up and leave."  Reports good appetite this morning.  Bilateral hand tremors noted on evaluation.  Per nursing staff, patient slept poorly last night.  They report over night patient was loud, cursing, grabbed RN staff's ID badge, required 2 doses of PRN Haldol.  This morning, they report patient was seen by the hospital , and became agitated, cursing, however did not require prns at that point.  They report patient is more agitated when family members are present, report patient blames family for him being in the hospital.

## 2018-10-24 NOTE — PROGRESS NOTE ADULT - PROBLEM SELECTOR PLAN 3
c/w haldol 1 mg @6am and haldol 2mgs @6pm  c/w haldol 2mgs iv q3h prn (pt required 4 doses overnight)  c/w melatonin 3mg qhs  depakote 500 mg daily  1:1 @bedside for safety see above.  c/w melatonin 3mg qhs

## 2018-10-24 NOTE — PROGRESS NOTE ADULT - ASSESSMENT
87M with hx of recurrent Cdiff, HTN, who was recently admitted for recurrent cdiff, and found to have glioblastoma in 9/2018 elected for symptom management and was d/carlos home with hospice with [facility] now presenting with agitation and aggressive behavior, admitted to PCU for symptom management  and continued care.

## 2018-10-24 NOTE — PROGRESS NOTE BEHAVIORAL HEALTH - NSBHCONSULTRECOMMENDOTHER_PSY_A_CORE FT
1. Check baseline ekg for qtc. Consider switching patient from Keppra to Depakote for seizure control if neurology in agreement.  2. Increase Depakote to 500mg daily (from 250mg); Increase Haldol to 1mg in AM and 2mg at 1800 (monitor qtc <500ms on ekg)  3. PRN: Haldol 2mg PO/IM/IV q6h PRN severe agitation.  Monitor for QTc<500.  Melatonin 3mg PO qHS PRN insomnia.  4. Check: TSH, B12, folate, RPR, UA, U-tox; consider CT head  5. Minimize use of benzos, opioids, anticholinergics, or other deliriogenic agents when possible.  Maintain sleep wake cycle.  Provide frequent reorientation and redirection.  Family member at bedside if possible. Assess for need for glasses and hearing aid (if applicable).  6. Pt does not meet criteria for psychiatric hospitalization.  Recommend outpt psych f/u at [facility] after d/c- [phone].   [facility] Psych will follow. 1. Consider switching patient from Keppra to Depakote for seizure control if neurology in agreement.  2. Increase Depakote to 250mg in AM and 500mg at bedtime. Discontinue standing and PRN Haldol, start Seroquel 25mg po hs (monitor qtc <500ms on ekg)  3. PRN: Zyprexa 2.5mg po/im/iv q6 prn for agitation (monitor qtc 500ms on ekg). Melatonin 3mg PO qHS PRN insomnia.  4. Check: TSH, B12, folate, RPR, UA, U-tox; consider CT head  5. Minimize use of benzos, opioids, anticholinergics, or other deliriogenic agents when possible.  Maintain sleep wake cycle.  Provide frequent reorientation and redirection.  Family member at bedside if possible. Assess for need for glasses and hearing aid (if applicable).  6. Pt does not meet criteria for psychiatric hospitalization.  Recommend outpt psych f/u at [facility] after d/c- [phone].   CL Psych will follow.

## 2018-10-24 NOTE — PROGRESS NOTE BEHAVIORAL HEALTH - NSBHCONSULTMEDS_PSY_A_CORE FT
Increase Depakote to 500mg daily (from 250mg)   Increase Haldol to 1mg in AM and 2mg at 1800 (monitor qtc <500ms on ekg) Increase Depakote to 250mg in AM and 500mg at bedtime   Discontinue Haldol, start Seroquel 25mg po hs (monitor qtc <500ms on ekg) Increase Depakote to 250mg in AM and 500mg at bedtime   Discontinue Haldol, start Seroquel 25mg po q7pm  (monitor qtc <500ms on ekg)

## 2018-10-24 NOTE — PROGRESS NOTE ADULT - SUBJECTIVE AND OBJECTIVE BOX
GAP TEAM PALLIATIVE CARE UNIT PROGRESS NOTE:      [  ] Patient on hospice program.    INDICATION FOR PALLIATIVE CARE UNIT SERVICES: symptom management, agitation    INTERVAL HPI/OVERNIGHT EVENTS:    DNR on chart: Yes    Allergies  No Known Allergies    Intolerances    MEDICATIONS  (STANDING):  dexamethasone     Tablet 4 milliGRAM(s) Oral every 12 hours  diVALproex  milliGRAM(s) Oral at bedtime  enalapril 20 milliGRAM(s) Oral daily  famotidine    Tablet 20 milliGRAM(s) Oral daily  haloperidol     Tablet 1 milliGRAM(s) Oral <User Schedule>  haloperidol     Tablet 2 milliGRAM(s) Oral <User Schedule>  lactobacillus acidophilus 1 Tablet(s) Oral daily  latanoprost 0.005% Ophthalmic Solution 1 Drop(s) Both EYES at bedtime  levETIRAcetam 500 milliGRAM(s) Oral two times a day  tamsulosin 0.4 milliGRAM(s) Oral at bedtime    MEDICATIONS  (PRN):  haloperidol    Injectable 2 milliGRAM(s) IV Push every 3 hours PRN agitation  melatonin 3 milliGRAM(s) Oral at bedtime PRN Insomnia  simethicone 80 milliGRAM(s) Chew every 6 hours PRN Gas    ITEMS UNCHECKED ARE NOT PRESENT    PRESENT SYMPTOMS: [ x]Unable to obtain due to poor mentation     Source if other than patient:  [ ]Family   [ ]Team     Pain (Impact on QOL):    Location:       Minimal acceptable level (0-10 scale):  Aggravating factors:  Quality:  Radiation:  Severity (0-10 scale):     Dyspnea:                           [ ]Mild [ ]Moderate [ ]Severe  Anxiety:                             [ ]Mild [ ]Moderate [ ]Severe  Fatigue:                             [ ]Mild [ ]Moderate [ ]Severe  Nausea:                             [ ]Mild [ ]Moderate [ ]Severe  Loss of appetite:                [ ]Mild [ ]Moderate [ ]Severe  Constipation:                    [ ]Mild [ ]Moderate [ ]Severe    PAINAD Score:    http://geriatrictoolkit.[place].[facility] [place]/cog/painad.pdf (Ctrl +  left click to view)  		  Other Symptoms:  [ ]All other review of systems negative     Karnofsky Performance Score/Palliative Performance Status Version 2:  30  %     http://palliative.info/resource_material/PPSv2.pdf    PHYSICAL EXAM:    Vital Signs Last 24 Hrs  T(C): 36.4 (24 Oct 2018 08:58), Max: 36.4 (24 Oct 2018 08:58)  T(F): 97.6 (24 Oct 2018 08:58), Max: 97.6 (24 Oct 2018 08:58)  HR: 56 (24 Oct 2018 08:58) (56 - 56)  BP: 159/78 (24 Oct 2018 08:58) (159/78 - 159/78)  BP(mean): --  RR: 18 (24 Oct 2018 08:58) (18 - 18)  SpO2: 97% (24 Oct 2018 08:58) (97% - 97%)    GENERAL:  [ ]Alert  [ ]Oriented x   [x ]Lethargic  [ ]Cachexia  [ ]Unarousable  [x ]Verbal  [ ]Non-Verbal    Behavioral:   [ ] Anxiety  [x ] Delirium [x ] Agitation [ ] Other    HEENT:  [ ]Normal  [ x ]Dry mouth   [ ]ET Tube/Trach  [ ]Oral lesions    PULMONARY:   [x ]Clear [ ]Tachypnea  [ ]Audible excessive secretions   [ ]Rhonchi        [ ]Right [ ]Left [ ]Bilateral  [ ]Crackles        [ ]Right [ ]Left [ ]Bilateral  [ ]Wheezing     [ ]Right [ ]Left [ ]Bilateral    CARDIOVASCULAR:    [x ]Regular [ ]Irregular [ ]Tachy  [ x]Satya [ ]Murmur [ ]Other    GASTROINTESTINAL:  [x ]Soft  [ ]Distended   [x ]+BS  [ x]Non tender [ ]Tender  [ ]PEG [ ]OGT/ NGT   Last BM:       GENITOURINARY:  [ ]Normal [x ] Incontinent   [ ]Oliguria/Anuria   [ ]Bartholomew    MUSCULOSKELETAL:   [ ]Normal   [ ]Weakness  [x ]Bed/Wheelchair bound [ ]Edema    NEUROLOGIC:   [ ]No focal deficits  [x] Cognitive impairment  [ ] Dysphagia [ ]Dysarthria [ ] Paresis [ ]Other     SKIN:   [ x]Normal   [ ]Pressure ulcer(s)  [ ]Rash    CRITICAL CARE:  [ ] Shock Present  [ ]xSeptic [ ]Cardiogenic [ ]Neurologic [ ]Hypovolemic  [ ]  Vasopressors [ ]  Inotropes   [ ] Respiratory failure present  [ ] Acute  [ ] Chronic [ ] Hypoxic  [ ] Hypercarbic [ ] Other  [ ] Other organ failure     LABS:                        14.3   9.3   )-----------( 141      ( 23 Oct 2018 22:11 )             42.0   10-23    135  |  101  |  32<H>  ----------------------------<  182<H>  4.8   |  22  |  0.92    Ca    9.2      23 Oct 2018 22:11    TPro  5.9<L>  /  Alb  3.5  /  TBili  0.6  /  DBili  x   /  AST  12  /  ALT  31  /  AlkPhos  59  10-23      Urinalysis Basic - ( 22 Oct 2018 22:17 )    Color: Yellow / Appearance: Clear / S.019 / pH: x  Gluc: x / Ketone: Negative  / Bili: Negative / Urobili: Negative mg/dL   Blood: x / Protein: Negative mg/dL / Nitrite: Negative   Leuk Esterase: Negative / RBC: x / WBC x   Sq Epi: x / Non Sq Epi: x / Bacteria: x      RADIOLOGY & ADDITIONAL STUDIES:    PROTEIN CALORIE MALNUTRITION: [ ] yes   [ ]no  [ ] PPSV2 < or = 30% [ ] significant weight loss [ ] poor nutritional intake [ ] anasarca [ ] catabolic state   Albumin, Serum: 3.5 g/dL (10-23-18 @ 22:11)   Artificial Nutrition [ ]     REFERRALS:   [ ]Chaplaincy  [ ] Hospice  [ ]Child Life  [ ]Social Work  [ ]Case management [ ]Holistic Therapy [ ] Physical Therapy [ ] Dietary   Goals of Care Document: Goals of Care Conversation - Personal Advance Directive [HARVEY Locke] (10-23-18 @ 15:42) GAP TEAM PALLIATIVE CARE UNIT PROGRESS NOTE:      [  ] Patient on hospice program.    INDICATION FOR PALLIATIVE CARE UNIT SERVICES: symptom management, agitation    INTERVAL HPI/OVERNIGHT EVENTS: Behavioral improvement, increased tremor    DNR on chart: Yes    Allergies  No Known Allergies    Intolerances    MEDICATIONS  (STANDING):  dexamethasone     Tablet 4 milliGRAM(s) Oral every 12 hours  diVALproex  milliGRAM(s) Oral at bedtime  enalapril 20 milliGRAM(s) Oral daily  famotidine    Tablet 20 milliGRAM(s) Oral daily  haloperidol     Tablet 1 milliGRAM(s) Oral <User Schedule>  haloperidol     Tablet 2 milliGRAM(s) Oral <User Schedule>  lactobacillus acidophilus 1 Tablet(s) Oral daily  latanoprost 0.005% Ophthalmic Solution 1 Drop(s) Both EYES at bedtime  levETIRAcetam 500 milliGRAM(s) Oral two times a day  tamsulosin 0.4 milliGRAM(s) Oral at bedtime    MEDICATIONS  (PRN):  haloperidol    Injectable 2 milliGRAM(s) IV Push every 3 hours PRN agitation  melatonin 3 milliGRAM(s) Oral at bedtime PRN Insomnia  simethicone 80 milliGRAM(s) Chew every 6 hours PRN Gas    ITEMS UNCHECKED ARE NOT PRESENT    PRESENT SYMPTOMS: [ x]Unable to obtain due to poor mentation     Source if other than patient:  [ ]Family   [ ]Team     Pain (Impact on QOL):    Location:       Minimal acceptable level (0-10 scale):  Aggravating factors:  Quality:  Radiation:  Severity (0-10 scale):     Dyspnea:                           [ ]Mild [ ]Moderate [ ]Severe  Anxiety:                             [ ]Mild [ ]Moderate [ ]Severe  Fatigue:                             [ ]Mild [ ]Moderate [ ]Severe  Nausea:                             [ ]Mild [ ]Moderate [ ]Severe  Loss of appetite:                [ ]Mild [ ]Moderate [ ]Severe  Constipation:                    [ ]Mild [ ]Moderate [ ]Severe    PAINAD Score:    http://geriatrictoolkit.[place].[facility] [place]/cog/painad.pdf (Ctrl +  left click to view)  		  Other Symptoms:  [ ]All other review of systems negative     Karnofsky Performance Score/Palliative Performance Status Version 2:  40  %     http://palliative.info/resource_material/PPSv2.pdf    PHYSICAL EXAM:    Vital Signs Last 24 Hrs  T(C): 36.4 (24 Oct 2018 08:58), Max: 36.4 (24 Oct 2018 08:58)  T(F): 97.6 (24 Oct 2018 08:58), Max: 97.6 (24 Oct 2018 08:58)  HR: 56 (24 Oct 2018 08:58) (56 - 56)  BP: 159/78 (24 Oct 2018 08:58) (159/78 - 159/78)  BP(mean): --  RR: 18 (24 Oct 2018 08:58) (18 - 18)  SpO2: 97% (24 Oct 2018 08:58) (97% - 97%)    GENERAL:  [x ]Alert  [ ]Oriented x   [ ]Lethargic  [ ]Cachexia  [ ]Unarousable  [x ]Verbal  [ ]Non-Verbal    Behavioral:   [ ] Anxiety  [x ] Delirium [x ] Agitation [ ] Other Improving    HEENT:  [ ]Normal  [ x ]Dry mouth   [ ]ET Tube/Trach  [ ]Oral lesions    PULMONARY:   [x ]Clear [ ]Tachypnea  [ ]Audible excessive secretions   [ ]Rhonchi        [ ]Right [ ]Left [ ]Bilateral  [ ]Crackles        [ ]Right [ ]Left [ ]Bilateral  [ ]Wheezing     [ ]Right [ ]Left [ ]Bilateral    CARDIOVASCULAR:    [x ]Regular [ ]Irregular [ ]Tachy  [ x]Satya [ ]Murmur [ ]Other    GASTROINTESTINAL:  [x ]Soft  [ ]Distended   [x ]+BS  [ x]Non tender [ ]Tender  [ ]PEG [ ]OGT/ NGT   Last BM:       GENITOURINARY:  [ ]Normal [x ] Incontinent   [ ]Oliguria/Anuria   [ ]Bartholomew    MUSCULOSKELETAL:   [ ]Normal   [ ]Weakness  [x ]Bed/Wheelchair bound [ ]Edema    NEUROLOGIC:   [ ]No focal deficits  [x] Cognitive impairment  [ ] Dysphagia [ ]Dysarthria [ ] Paresis [ ]Other  Tremor, some rigidity    SKIN:   [ x]Normal   [ ]Pressure ulcer(s)  [ ]Rash    CRITICAL CARE:  [ ] Shock Present  [ ]Septic [ ]Cardiogenic [ ]Neurologic [ ]Hypovolemic  [ ]  Vasopressors [ ]  Inotropes   [ ] Respiratory failure present  [ ] Acute  [ ] Chronic [ ] Hypoxic  [ ] Hypercarbic [ ] Other  [ ] Other organ failure     LABS:                        14.3   9.3   )-----------( 141      ( 23 Oct 2018 22:11 )             42.0   10-23    135  |  101  |  32<H>  ----------------------------<  182<H>  4.8   |  22  |  0.92    Ca    9.2      23 Oct 2018 22:11    TPro  5.9<L>  /  Alb  3.5  /  TBili  0.6  /  DBili  x   /  AST  12  /  ALT  31  /  AlkPhos  59  10-23      Urinalysis Basic - ( 22 Oct 2018 22:17 )    Color: Yellow / Appearance: Clear / S.019 / pH: x  Gluc: x / Ketone: Negative  / Bili: Negative / Urobili: Negative mg/dL   Blood: x / Protein: Negative mg/dL / Nitrite: Negative   Leuk Esterase: Negative / RBC: x / WBC x   Sq Epi: x / Non Sq Epi: x / Bacteria: x      RADIOLOGY & ADDITIONAL STUDIES:    PROTEIN CALORIE MALNUTRITION: [ ] yes   [x ]no  [ ] PPSV2 < or = 30% [ ] significant weight loss [ ] poor nutritional intake [ ] anasarca [ ] catabolic state   Albumin, Serum: 3.5 g/dL (10-23-18 @ 22:11)   Artificial Nutrition [ ]     REFERRALS:   [ ]Chaplaincy  [ ] Hospice  [ ]Child Life  [ ]Social Work  [ ]Case management [ ]Holistic Therapy [ ] Physical Therapy [ ] Dietary   Goals of Care Document: Goals of Care Conversation - Personal Advance Directive [HARVEY Locke] (10-23-18 @ 15:42)

## 2018-10-24 NOTE — PROGRESS NOTE ADULT - PROBLEM SELECTOR PLAN 4
1:1 at bedside for safety. dispo home with hospice pending symptom management.  Will speak with family today

## 2018-10-24 NOTE — PROGRESS NOTE BEHAVIORAL HEALTH - NSBHCONSULTMEDAGITATION_PSY_A_CORE FT
Haldol 2mg po/im/iv q6 prn for agitation (monitor qtc 500ms on ekg) Zyprexa 2.5mg po/im/iv q6 prn for agitation (monitor qtc 500ms on ekg)

## 2018-10-25 LAB
FOLATE SERPL-MCNC: 13 NG/ML — SIGNIFICANT CHANGE UP
T PALLIDUM AB TITR SER: NEGATIVE — SIGNIFICANT CHANGE UP
TSH SERPL-MCNC: 0.84 UIU/ML — SIGNIFICANT CHANGE UP (ref 0.27–4.2)
VIT B12 SERPL-MCNC: 406 PG/ML — SIGNIFICANT CHANGE UP (ref 232–1245)

## 2018-10-25 PROCEDURE — 99233 SBSQ HOSP IP/OBS HIGH 50: CPT

## 2018-10-25 PROCEDURE — 99233 SBSQ HOSP IP/OBS HIGH 50: CPT | Mod: GC

## 2018-10-25 RX ORDER — VALPROIC ACID (AS SODIUM SALT) 250 MG/5ML
500 SOLUTION, ORAL ORAL DAILY
Qty: 0 | Refills: 0 | Status: DISCONTINUED | OUTPATIENT
Start: 2018-10-25 | End: 2018-10-26

## 2018-10-25 RX ORDER — OLANZAPINE 15 MG/1
2.5 TABLET, FILM COATED ORAL THREE TIMES A DAY
Qty: 0 | Refills: 0 | Status: DISCONTINUED | OUTPATIENT
Start: 2018-10-25 | End: 2018-10-25

## 2018-10-25 RX ORDER — FAMOTIDINE 10 MG/ML
20 INJECTION INTRAVENOUS DAILY
Qty: 0 | Refills: 0 | Status: DISCONTINUED | OUTPATIENT
Start: 2018-10-25 | End: 2018-10-25

## 2018-10-25 RX ORDER — FAMOTIDINE 10 MG/ML
20 INJECTION INTRAVENOUS DAILY
Qty: 0 | Refills: 0 | Status: DISCONTINUED | OUTPATIENT
Start: 2018-10-25 | End: 2018-10-30

## 2018-10-25 RX ORDER — DEXAMETHASONE 0.5 MG/5ML
4 ELIXIR ORAL EVERY 12 HOURS
Qty: 0 | Refills: 0 | Status: DISCONTINUED | OUTPATIENT
Start: 2018-10-25 | End: 2018-10-26

## 2018-10-25 RX ORDER — VALPROIC ACID (AS SODIUM SALT) 250 MG/5ML
250 SOLUTION, ORAL ORAL DAILY
Qty: 0 | Refills: 0 | Status: DISCONTINUED | OUTPATIENT
Start: 2018-10-25 | End: 2018-10-26

## 2018-10-25 RX ORDER — MORPHINE SULFATE 50 MG/1
0.5 CAPSULE, EXTENDED RELEASE ORAL EVERY 6 HOURS
Qty: 0 | Refills: 0 | Status: DISCONTINUED | OUTPATIENT
Start: 2018-10-25 | End: 2018-10-28

## 2018-10-25 RX ORDER — OLANZAPINE 15 MG/1
5 TABLET, FILM COATED ORAL EVERY 12 HOURS
Qty: 0 | Refills: 0 | Status: DISCONTINUED | OUTPATIENT
Start: 2018-10-25 | End: 2018-10-28

## 2018-10-25 RX ORDER — LEVETIRACETAM 250 MG/1
500 TABLET, FILM COATED ORAL EVERY 12 HOURS
Qty: 0 | Refills: 0 | Status: DISCONTINUED | OUTPATIENT
Start: 2018-10-25 | End: 2018-10-30

## 2018-10-25 RX ORDER — QUETIAPINE FUMARATE 200 MG/1
25 TABLET, FILM COATED ORAL AT BEDTIME
Qty: 0 | Refills: 0 | Status: DISCONTINUED | OUTPATIENT
Start: 2018-10-25 | End: 2018-10-26

## 2018-10-25 RX ADMIN — LEVETIRACETAM 400 MILLIGRAM(S): 250 TABLET, FILM COATED ORAL at 17:46

## 2018-10-25 RX ADMIN — MORPHINE SULFATE 0.5 MILLIGRAM(S): 50 CAPSULE, EXTENDED RELEASE ORAL at 19:30

## 2018-10-25 RX ADMIN — MORPHINE SULFATE 0.5 MILLIGRAM(S): 50 CAPSULE, EXTENDED RELEASE ORAL at 19:08

## 2018-10-25 RX ADMIN — Medication 0.5 MILLIGRAM(S): at 08:55

## 2018-10-25 RX ADMIN — Medication 0.5 MILLIGRAM(S): at 01:36

## 2018-10-25 RX ADMIN — Medication 4 MILLIGRAM(S): at 05:21

## 2018-10-25 RX ADMIN — QUETIAPINE FUMARATE 25 MILLIGRAM(S): 200 TABLET, FILM COATED ORAL at 22:50

## 2018-10-25 RX ADMIN — Medication 0.5 MILLIGRAM(S): at 11:17

## 2018-10-25 RX ADMIN — LATANOPROST 1 DROP(S): 0.05 SOLUTION/ DROPS OPHTHALMIC; TOPICAL at 23:50

## 2018-10-25 RX ADMIN — Medication 4 MILLIGRAM(S): at 18:53

## 2018-10-25 RX ADMIN — Medication 27.5 MILLIGRAM(S): at 17:46

## 2018-10-25 RX ADMIN — Medication 20 MILLIGRAM(S): at 05:21

## 2018-10-25 RX ADMIN — LEVETIRACETAM 500 MILLIGRAM(S): 250 TABLET, FILM COATED ORAL at 05:21

## 2018-10-25 NOTE — PROGRESS NOTE ADULT - SUBJECTIVE AND OBJECTIVE BOX
GAP TEAM PALLIATIVE CARE UNIT PROGRESS NOTE:      [  ] Patient on hospice program.    INDICATION FOR PALLIATIVE CARE UNIT SERVICES: symptom management, agitation    INTERVAL HPI/OVERNIGHT EVENTS: Patient agitated overnight. Patient required Ativan 0.5 mg IV x 2 overnight PRN. Patient with increased tremors on exam and cogwheel rigidity noted, haldol dc/ed.     ADVANCE DIRECTIVES:   DNR  Yes  MOLST  [x ]  Living Will  [ ]     BASELINE (I)ADL(s) (prior to admission):  [place]: [ ]Total  [ ] Moderate [ ]Dependent    Allergies    No Known Allergies    Intolerances      MEDICATIONS  (STANDING):  dexamethasone     Tablet 4 milliGRAM(s) Oral every 12 hours  diVALproex  milliGRAM(s) Oral daily  diVALproex  milliGRAM(s) Oral at bedtime  enalapril 20 milliGRAM(s) Oral daily  famotidine    Tablet 20 milliGRAM(s) Oral daily  lactobacillus acidophilus 1 Tablet(s) Oral daily  latanoprost 0.005% Ophthalmic Solution 1 Drop(s) Both EYES at bedtime  levETIRAcetam 500 milliGRAM(s) Oral two times a day  tamsulosin 0.4 milliGRAM(s) Oral at bedtime    MEDICATIONS  (PRN):  LORazepam   Injectable 0.5 milliGRAM(s) IV Push every 1 hour PRN Agitation  melatonin 3 milliGRAM(s) Oral at bedtime PRN Insomnia  QUEtiapine 25 milliGRAM(s) Oral at bedtime PRN agitation  simethicone 80 milliGRAM(s) Chew every 6 hours PRN Gas    ITEMS UNCHECKED ARE NOT PRESENT    PRESENT SYMPTOMS: [ x]Unable to obtain due to poor mentation     Source if other than patient:  [ ]Family   [ ]Team     Pain (Impact on QOL):    Location:       Minimal acceptable level (0-10 scale):  Aggravating factors:  Quality:  Radiation:  Severity (0-10 scale):     Dyspnea:                           [ ]Mild [ ]Moderate [ ]Severe  Anxiety:                             [ ]Mild [ ]Moderate [ ]Severe  Fatigue:                             [ ]Mild [ ]Moderate [ ]Severe  Nausea:                             [ ]Mild [ ]Moderate [ ]Severe  Loss of appetite:                [ ]Mild [ ]Moderate [ ]Severe  Constipation:                    [ ]Mild [ ]Moderate [ ]Severe    PAINAD Score:    http://geriatrictoolkit.[facility]/cog/painad.pdf (Ctrl +  left click to view)  		  Other Symptoms:  [ ]All other review of systems negative     Karnofsky Performance Score/Palliative Performance Status Version 2:  40  %     http://palliative.info/resource_material/PPSv2.pdf    PHYSICAL EXAM:    Vital Signs Last 24 Hrs  T(C): 36.4 (24 Oct 2018 08:58), Max: 36.4 (24 Oct 2018 08:58)  T(F): 97.6 (24 Oct 2018 08:58), Max: 97.6 (24 Oct 2018 08:58)  HR: 56 (24 Oct 2018 08:58) (56 - 56)  BP: 159/78 (24 Oct 2018 08:58) (159/78 - 159/78)  BP(mean): --  RR: 18 (24 Oct 2018 08:58) (18 - 18)  SpO2: 97% (24 Oct 2018 08:58) (97% - 97%)    GENERAL:  [x ]Alert  [ ]Oriented x   [ ]Lethargic  [ ]Cachexia  [ ]Unarousable  [x ]Verbal  [ ]Non-Verbal    Behavioral:   [ ] Anxiety  [x ] Delirium [x ] Agitation [ ] Other Improving    HEENT:  [ ]Normal  [ x ]Dry mouth   [ ]ET Tube/Trach  [ ]Oral lesions    PULMONARY:   [x ]Clear [ ]Tachypnea  [ ]Audible excessive secretions   [ ]Rhonchi        [ ]Right [ ]Left [ ]Bilateral  [ ]Crackles        [ ]Right [ ]Left [ ]Bilateral  [ ]Wheezing     [ ]Right [ ]Left [ ]Bilateral    CARDIOVASCULAR:    [x ]Regular [ ]Irregular [ ]Tachy  [ x]Satya [ ]Murmur [ ]Other    GASTROINTESTINAL:  [x ]Soft  [ ]Distended   [x ]+BS  [ x]Non tender [ ]Tender  [ ]PEG [ ]OGT/ NGT   Last BM:       GENITOURINARY:  [ ]Normal [x ] Incontinent   [ ]Oliguria/Anuria   [ ]Bartholomew    MUSCULOSKELETAL:   [ ]Normal   [ ]Weakness  [x ]Bed/Wheelchair bound [ ]Edema    NEUROLOGIC:   [ ]No focal deficits  [x] Cognitive impairment  [ ] Dysphagia [ ]Dysarthria [ ] Paresis [ ]Other  Tremor, some rigidity    SKIN:   [ x]Normal   [ ]Pressure ulcer(s)  [ ]Rash    CRITICAL CARE:  [ ] Shock Present  [ ]Septic [ ]Cardiogenic [ ]Neurologic [ ]Hypovolemic  [ ]  Vasopressors [ ]  Inotropes   [ ] Respiratory failure present  [ ] Acute  [ ] Chronic [ ] Hypoxic  [ ] Hypercarbic [ ] Other  [ ] Other organ failure     LABS:                        14.3   9.3   )-----------( 141      ( 23 Oct 2018 22:11 )             42.0   10-23    135  |  101  |  32<H>  ----------------------------<  182<H>  4.8   |  22  |  0.92    Ca    9.2      23 Oct 2018 22:11    TPro  5.9<L>  /  Alb  3.5  /  TBili  0.6  /  DBili  x   /  AST  12  /  ALT  31  /  AlkPhos  59  10-23      Urinalysis Basic - ( 22 Oct 2018 22:17 )    Color: Yellow / Appearance: Clear / S.019 / pH: x  Gluc: x / Ketone: Negative  / Bili: Negative / Urobili: Negative mg/dL   Blood: x / Protein: Negative mg/dL / Nitrite: Negative   Leuk Esterase: Negative / RBC: x / WBC x   Sq Epi: x / Non Sq Epi: x / Bacteria: x      RADIOLOGY & ADDITIONAL STUDIES:    PROTEIN CALORIE MALNUTRITION: [ ] yes   [x ]no  [ ] PPSV2 < or = 30% [ ] significant weight loss [ ] poor nutritional intake [ ] anasarca [ ] catabolic state   Albumin, Serum: 3.5 g/dL (10-23-18 @ 22:11)   Artificial Nutrition [ ]     REFERRALS:   [ ]Chaplaincy  [ ] Hospice  [ ]Child Life  [ ]Social Work  [ ]Case management [ ]Holistic Therapy [ ] Physical Therapy [ ] Dietary   Goals of Care Document: Goals of Care Conversation - Personal Advance Directive [HARVEY Locke] (10-23-18 @ 15:42)

## 2018-10-25 NOTE — PROGRESS NOTE BEHAVIORAL HEALTH - NSBHCONSULTFOLLOWAFTERCARE_PSY_A_CORE FT
OP psych f/u at [facility]- [phone]
OP psych f/u at [facility]- [phone]

## 2018-10-25 NOTE — PROGRESS NOTE BEHAVIORAL HEALTH - SECONDARY DX1
Dementia associated with other underlying disease without behavioral disturbance
Dementia associated with other underlying disease without behavioral disturbance

## 2018-10-25 NOTE — PROGRESS NOTE BEHAVIORAL HEALTH - SUMMARY
Patient is an 85 y/o male who lives alone, retired, , with no PPHx, no substance hx, medical hx significant for HTN, BPH, h/o metabolic encephalopathy and C.diff in Aug 2018, glioblastoma (diagnosed in Aug 2018), admitted for increased agitation at home.  Psychiatry consulted for medication management for agitation.  Patient seen and evaluated, irritable, not engaging in conversation, telling writer to leave the room.  Per nursing staff patient was agitated last night, throwing water at staff members, pushing side table at 1:1 staff members, requiring multiple prn medications.  Per daughter, patient responded well to Haldol and Depakote, however stopped taking medications on Sat and became more agitated and hostile towards family.  Dtr in agreement to increase Haldol and Depakote.  Consider taking patient off of Keppra and using Depakote in place of it for seizure control and mood symptoms if neurology is in agreement.
Patient is an 85 y/o male who lives alone, retired, , with no PPHx, no substance hx, medical hx significant for HTN, BPH, h/o metabolic encephalopathy and C.diff in Aug 2018, glioblastoma (diagnosed in Aug 2018), admitted for increased agitation at home.  Psychiatry consulted for medication management for agitation.  Patient seen and evaluated, irritable, not engaging in conversation, telling writer to leave the room.  Per nursing staff patient was agitated last night, throwing water at staff members, pushing side table at 1:1 staff members, requiring multiple prn medications.  Per daughter, patient responded well to Haldol and Depakote, however stopped taking medications on Sat and became more agitated and hostile towards family.  Dtr in agreement to increase Haldol and Depakote.  Consider taking patient off of Keppra and using Depakote in place of it for seizure control and mood symptoms if neurology is in agreement.

## 2018-10-25 NOTE — PROVIDER CONTACT NOTE (OTHER) - ASSESSMENT
patient manageable at this time with PRN medication and enhanced supervision, pt near nurses station

## 2018-10-25 NOTE — PROGRESS NOTE ADULT - PROBLEM SELECTOR PLAN 4
1:1 at bedside for safety. dispo home with hospice pending symptom management.   GOC discussion on 10/23, DEAN in chart

## 2018-10-25 NOTE — PROGRESS NOTE BEHAVIORAL HEALTH - RISK ASSESSMENT
Risk factors: acute/chronic medical conditions, chronic pain, cognitive impairments 2/2 glioblastoma    Protective factors no h/o SA/SIB, no h/o psych admissions, no active substance abuse, no psychosis,  with adult children, domiciled, social supports, positive therapeutic relationship    Overall, pt is a low risk of harm to self/others and does not require psychiatric admission for safety and stabilization.
Risk factors: acute/chronic medical conditions, chronic pain, cognitive impairments 2/2 glioblastoma    Protective factors no h/o SA/SIB, no h/o psych admissions, no active substance abuse, no psychosis,  with adult children, domiciled, social supports, positive therapeutic relationship    Overall, pt is a low risk of harm to self/others and does not require psychiatric admission for safety and stabilization.

## 2018-10-25 NOTE — PROGRESS NOTE BEHAVIORAL HEALTH - NSBHCONSULTRECOMMENDOTHER_PSY_A_CORE FT
1. Consider switching patient from Keppra to Depakote for seizure control if neurology in agreement.  2. Increase Depakote to 250mg in AM and 500mg at bedtime. Discontinue standing and PRN Haldol, start Seroquel 25mg po hs (monitor qtc <500ms on ekg)  3. PRN: Zyprexa 2.5mg po/im q6 prn for agitation (monitor qtc 500ms on ekg). Melatonin 3mg PO qHS PRN insomnia.  4. Check: TSH, B12, folate, RPR, UA, U-tox; consider CT head  5. Minimize use of benzos, opioids, anticholinergics, or other deliriogenic agents when possible.  Maintain sleep wake cycle.  Provide frequent reorientation and redirection.  Family member at bedside if possible. Assess for need for glasses and hearing aid (if applicable).  6. Pt does not meet criteria for psychiatric hospitalization.  Recommend outpt psych f/u at [facility] after d/c- [phone].   CL Psych will follow. 1. Consider switching patient from Keppra to Depakote for seizure control if neurology in agreement.  2. Increase Depakote to 500mg po/iv bid and increase Seroquel to 50mg po bid (monitor qtc <500ms on ekg)  3. PRN: Zyprexa 2.5mg po/im q6 prn for agitation (monitor qtc 500ms on ekg). Melatonin 3mg PO qHS PRN insomnia.  4. Check: TSH, B12, folate, RPR, UA, U-tox; consider CT head  5. Minimize use of benzos, opioids, anticholinergics, or other deliriogenic agents when possible.  Maintain sleep wake cycle.  Provide frequent reorientation and redirection.  Family member at bedside if possible. Assess for need for glasses and hearing aid (if applicable).  6. Pt does not meet criteria for psychiatric hospitalization.  Recommend outpt psych f/u at [facility] after d/c- [phone].   CL Psych will follow.

## 2018-10-25 NOTE — PROGRESS NOTE BEHAVIORAL HEALTH - CASE SUMMARY
Patient is an 87 y/o male who lives alone, retired, , with no PPHx, no substance hx, medical hx significant for HTN, BPH, h/o metabolic encephalopathy and C.diff in Aug 2018, glioblastoma (diagnosed in Aug 2018), admitted for increased agitation at home.  Psychiatry consulted for medication management for agitation.. pt uncooperative with interview, guarded, calm. as per staff, pt increasingly combative, agitated, received ativan prns. rec inc depakote to 500mg iv bid, check level in 4 days. inc seroquel to 50mg po bid. cont zyprexa prn.
Patient is an 85 y/o male who lives alone, retired, , with no PPHx, no substance hx, medical hx significant for HTN, BPH, h/o metabolic encephalopathy and C.diff in Aug 2018, glioblastoma (diagnosed in Aug 2018), admitted for increased agitation at home.  Psychiatry consulted for medication management for agitation. pt remains guarded, not answering much questions. Pt oriented to person, place. as per staff, pt agitated last night, received haldol prns. pt has more tremors, cogwheel rigitidity of UE, rec d/c haldol, can increase depakote to 250mg po am, 500mg qhs. start seroquel 25mg po q7pm. zyprexa 2.5mg po/im q6hr prn agitation.

## 2018-10-25 NOTE — PROGRESS NOTE BEHAVIORAL HEALTH - NSBHCONSULTMEDS_PSY_A_CORE FT
Increase Depakote to 250mg in AM and 500mg at bedtime   Discontinue Haldol, start Seroquel 25mg po q7pm  (monitor qtc <500ms on ekg) Increase Depakote to 500mg po/iv bid, check level in 4 days   increase seroquel to 50mg po bid (monitor qtc <500ms on ekg)

## 2018-10-25 NOTE — PROVIDER CONTACT NOTE (OTHER) - SITUATION
patient with periods of agitation and aggression, requiring ativan PRN x1 over night with relief provided

## 2018-10-25 NOTE — PROGRESS NOTE BEHAVIORAL HEALTH - AXIS III
HTN, BPH, h/o metabolic encephalopathy and C.diff in Aug 2018, glioblastoma,
HTN, BPH, h/o metabolic encephalopathy and C.diff in Aug 2018, glioblastoma,

## 2018-10-25 NOTE — PROGRESS NOTE BEHAVIORAL HEALTH - NSBHFUPINTERVALHXFT_PSY_A_CORE
Patient seen and evaluted, awake and alert, noted to have bilateral UE tremors.  Patient selectively speaking, refusing to answer most of writer's questions.  Asked what we could help him with and responds with "I have to go home, I have a dental appointment."  Per nursing staff at bedside, patient extremely agitated, taking Patient seen and evaluated, awake and alert, noted to have bilateral UE tremors.  Patient selectively speaking, refusing to answer most of writer's questions.  Asked what we could help him with and responds with "I have to go home, I have a dental appointment."  Per nursing staff at bedside, patient extremely agitated, grabbed at nursing staff's hand this morning.  Over night, patient required 2 doses of Ativan.

## 2018-10-25 NOTE — PROGRESS NOTE ADULT - PROBLEM SELECTOR PLAN 2
DC haldol due to tremor/rigidity, increase Depakote to 250mg am and 500mg PM, check level in 2-3 days.  Seroquel qhs ATC.  Will start Zyprexa PRN  Will discuss with neurosx regarding discontinuing Keppra and having Depakote for seizure ppx   c/w melatonin 3mg qhs  1:1 @bedside for safety  QTC on 10/23 EKG <460 ms

## 2018-10-25 NOTE — PROGRESS NOTE BEHAVIORAL HEALTH - NSBHCHARTREVIEWLAB_PSY_A_CORE FT
14.3   9.3   )-----------( 141      ( 23 Oct 2018 22:11 )             42.0     10-23    135  |  101  |  32<H>  ----------------------------<  182<H>  4.8   |  22  |  0.92    Ca    9.2      23 Oct 2018 22:11    TPro  5.9<L>  /  Alb  3.5  /  TBili  0.6  /  DBili  x   /  AST  12  /  ALT  31  /  AlkPhos  59  10-23    Urinalysis Basic - ( 22 Oct 2018 22:17 )    Color: Yellow / Appearance: Clear / S.019 / pH: x  Gluc: x / Ketone: Negative  / Bili: Negative / Urobili: Negative mg/dL   Blood: x / Protein: Negative mg/dL / Nitrite: Negative   Leuk Esterase: Negative / RBC: x / WBC x   Sq Epi: x / Non Sq Epi: x / Bacteria: x
14.3   9.3   )-----------( 141      ( 23 Oct 2018 22:11 )             42.0     10-23    135  |  101  |  32<H>  ----------------------------<  182<H>  4.8   |  22  |  0.92    Ca    9.2      23 Oct 2018 22:11    TPro  5.9<L>  /  Alb  3.5  /  TBili  0.6  /  DBili  x   /  AST  12  /  ALT  31  /  AlkPhos  59  10-23

## 2018-10-26 PROCEDURE — 99233 SBSQ HOSP IP/OBS HIGH 50: CPT | Mod: GC

## 2018-10-26 RX ORDER — QUETIAPINE FUMARATE 200 MG/1
25 TABLET, FILM COATED ORAL EVERY 12 HOURS
Qty: 0 | Refills: 0 | Status: DISCONTINUED | OUTPATIENT
Start: 2018-10-26 | End: 2018-10-28

## 2018-10-26 RX ORDER — DEXAMETHASONE 0.5 MG/5ML
2 ELIXIR ORAL EVERY 12 HOURS
Qty: 0 | Refills: 0 | Status: DISCONTINUED | OUTPATIENT
Start: 2018-10-26 | End: 2018-10-30

## 2018-10-26 RX ORDER — VALPROIC ACID (AS SODIUM SALT) 250 MG/5ML
500 SOLUTION, ORAL ORAL EVERY 12 HOURS
Qty: 0 | Refills: 0 | Status: DISCONTINUED | OUTPATIENT
Start: 2018-10-26 | End: 2018-10-30

## 2018-10-26 RX ADMIN — LEVETIRACETAM 400 MILLIGRAM(S): 250 TABLET, FILM COATED ORAL at 17:21

## 2018-10-26 RX ADMIN — Medication 27.5 MILLIGRAM(S): at 17:21

## 2018-10-26 RX ADMIN — FAMOTIDINE 20 MILLIGRAM(S): 10 INJECTION INTRAVENOUS at 11:17

## 2018-10-26 RX ADMIN — Medication 26.25 MILLIGRAM(S): at 09:31

## 2018-10-26 RX ADMIN — LEVETIRACETAM 400 MILLIGRAM(S): 250 TABLET, FILM COATED ORAL at 06:35

## 2018-10-26 RX ADMIN — Medication 4 MILLIGRAM(S): at 06:35

## 2018-10-26 RX ADMIN — OLANZAPINE 5 MILLIGRAM(S): 15 TABLET, FILM COATED ORAL at 07:46

## 2018-10-26 RX ADMIN — Medication 2 MILLIGRAM(S): at 17:21

## 2018-10-26 RX ADMIN — Medication 0.5 MILLIGRAM(S): at 11:17

## 2018-10-26 RX ADMIN — Medication 0.5 MILLIGRAM(S): at 13:30

## 2018-10-26 RX ADMIN — Medication 0.5 MILLIGRAM(S): at 07:50

## 2018-10-26 RX ADMIN — Medication 0.5 MILLIGRAM(S): at 17:29

## 2018-10-26 RX ADMIN — LATANOPROST 1 DROP(S): 0.05 SOLUTION/ DROPS OPHTHALMIC; TOPICAL at 21:24

## 2018-10-26 NOTE — PROGRESS NOTE ADULT - SUBJECTIVE AND OBJECTIVE BOX
GAP TEAM PALLIATIVE CARE UNIT PROGRESS NOTE:      [  ] Patient on hospice program.    INDICATION FOR PALLIATIVE CARE UNIT SERVICES: agitation, symptom management    INTERVAL HPI/OVERNIGHT EVENTS: Patient agitated this morning around 7am and used zyprexa prn and ativan prn. During rounds he was sleeping.    DNR on chart: Yes    Allergies    No Known Allergies    Intolerances    MEDICATIONS  (STANDING):  dexamethasone  Injectable 2 milliGRAM(s) IV Push every 12 hours  enalapril 20 milliGRAM(s) Oral daily  famotidine Injectable 20 milliGRAM(s) IV Push daily  lactobacillus acidophilus 1 Tablet(s) Oral daily  latanoprost 0.005% Ophthalmic Solution 1 Drop(s) Both EYES at bedtime  levETIRAcetam  IVPB 500 milliGRAM(s) IV Intermittent every 12 hours  QUEtiapine 25 milliGRAM(s) Oral every 12 hours  tamsulosin 0.4 milliGRAM(s) Oral at bedtime  valproate sodium IVPB 500 milliGRAM(s) IV Intermittent every 12 hours    MEDICATIONS  (PRN):  LORazepam   Injectable 0.5 milliGRAM(s) IV Push once PRN agiation  LORazepam   Injectable 0.5 milliGRAM(s) IV Push every 2 hours PRN Agitation  melatonin 3 milliGRAM(s) Oral at bedtime PRN Insomnia  morphine  - Injectable 0.5 milliGRAM(s) IV Push every 6 hours PRN Moderate Pain (4 - 6)  OLANZapine Disintegrating Tablet 5 milliGRAM(s) Oral every 12 hours PRN agitation  simethicone 80 milliGRAM(s) Chew every 6 hours PRN Gas    ITEMS UNCHECKED ARE NOT PRESENT    PRESENT SYMPTOMS: [x ]Unable to obtain due to poor mentation   Source if other than patient:  [ ]Family   [ ]Team     Pain (Impact on QOL):    Location:       Minimal acceptable level (0-10 scale):                    Aggravating factors:  Quality:  Radiation:  Severity (0-10 scale):     Dyspnea:                           [ ]Mild [ ]Moderate [ ]Severe  Anxiety:                             [ ]Mild [ ]Moderate [ ]Severe  Fatigue:                             [ ]Mild [ ]Moderate [ ]Severe  Nausea:                             [ ]Mild [ ]Moderate [ ]Severe  Loss of appetite:              [ ]Mild [ ]Moderate [ ]Severe  Constipation:                    [ ]Mild [ ]Moderate [ ]Severe    PAINAD Score: 0    [email or web address] (Ctrl +  left click to view)  		  Other Symptoms:  [ ]All other review of systems negative     Karnofsky Performance Score/Palliative Performance Status Version 2:         %        http://palliative.info/resource_material/[email or web address].pdf  PHYSICAL EXAM:  Vital Signs Last 24 Hrs  T(C): 36.7 (26 Oct 2018 09:27), Max: 36.7 (25 Oct 2018 22:37)  T(F): 98 (26 Oct 2018 09:27), Max: 98 (25 Oct 2018 22:37)  HR: 63 (26 Oct 2018 09:27) (63 - 82)  BP: 123/78 (26 Oct 2018 09:27) (123/78 - 142/79)  BP(mean): --  RR: 18 (26 Oct 2018 09:27) (18 - 18)  SpO2: 96% (26 Oct 2018 09:27) (95% - 96%) I&O's Summary    25 Oct 2018 07:01  -  26 Oct 2018 07:00  --------------------------------------------------------  IN: 220 mL / OUT: 600 mL / NET: -380 mL    GENERAL:  [ ]Alert  [ ]Oriented x   [x ]Lethargic  [ ]Cachexia  [ ]Unarousable  [ ]Verbal  [ ]Non-Verbal  Behavioral:   [ ] Anxiety  [x ] Delirium [ ] Agitation [ ] Other  HEENT:  [ ]Normal   [ x]Dry mouth   [ ]ET Tube/Trach  [ ]Oral lesions  PULMONARY:   [x ]Clear [ ]Tachypnea  [ ]Audible excessive secretions   [ ]Rhonchi        [ ]Right [ ]Left [ ]Bilateral  [ ]Crackles        [ ]Right [ ]Left [ ]Bilateral  [ ]Wheezing     [ ]Right [ ]Left [ ]Bilateral  CARDIOVASCULAR:    [x ]Regular [ ]Irregular [ ]Tachy  [ ]Satya [ ]Murmur [ ]Other  GASTROINTESTINAL:  [x ]Soft  [ ]Distended   [x ]+BS  [ ]Non tender [ ]Tender  [ ]PEG [ ]OGT/ NGT   Last BM:     GENITOURINARY:  [ ]Normal [x ] Incontinent   [ ]Oliguria/Anuria   [ ]Bartholomew  MUSCULOSKELETAL:   [ ]Normal   [ ]Weakness  [ ]Bed/Wheelchair bound [ ]Edema  NEUROLOGIC:   [ ]No focal deficits  [x ] Cognitive impairment  [ ] Dysphagia [ ]Dysarthria [ ] Paresis [ ]Other   SKIN:   [ x]Normal   [ ]Pressure ulcer(s)  [ ]Rash    CRITICAL CARE:  [ ] Shock Present  [ ]Septic [ ]Cardiogenic [ ]Neurologic [ ]Hypovolemic  [ ]  Vasopressors [ ]  Inotropes   [ ] Respiratory failure present  [ ] Acute  [ ] Chronic [ ] Hypoxic  [ ] Hypercarbic [ ] Other  [ ] Other organ failure     LABS:               14.3   9.3   )-----------( 141      ( 23 Oct 2018 22:11 )             42.0   10-23    135  |  101  |  32<H>  ----------------------------<  182<H>  4.8   |  22  |  0.92    Ca    9.2      23 Oct 2018 22:11    TPro  5.9<L>  /  Alb  3.5  /  TBili  0.6  /  DBili  x   /  AST  12  /  ALT  31  /  AlkPhos  59  10-23      Urinalysis Basic - ( 22 Oct 2018 22:17 )    Color: Yellow / Appearance: Clear / S.019 / pH: x  Gluc: x / Ketone: Negative  / Bili: Negative / Urobili: Negative mg/dL   Blood: x / Protein: Negative mg/dL / Nitrite: Negative   Leuk Esterase: Negative / RBC: x / WBC x   Sq Epi: x / Non Sq Epi: x / Bacteria: x        RADIOLOGY & ADDITIONAL STUDIES:    No new studies.    PROTEIN CALORIE MALNUTRITION:   [ ] PPSV2 < or = 30% [ ] significant weight loss [ ] poor nutritional intake [ ] anasarca [ ] catabolic state   Albumin, Serum: 3.5 g/dL (10-23-18 @ 22:11)   Artificial Nutrition [ ]     REFERRALS:   [ ]Chaplaincy  [ ] Hospice  [ ]Child Life  [ ]Social Work  [ ]Case management [ ]Holistic Therapy [ ] Physical Therapy [ ] Dietary   Goals of Care Document: Goals of Care Conversation - Personal Advance Directive [HARVEY Locke] (10-23-18 @ 15:42)

## 2018-10-26 NOTE — PROGRESS NOTE ADULT - ASSESSMENT
86yo M with hx of recurrent Cdiff, HTN, who was recently admitted for recurrent cdiff, and found to have glioblastoma in 9/2018 elected for symptom management and was d/carlos home with hospice with Lukas now presenting with agitation and aggressive behavior, admitted to PCU for symptom management and continued care. 86yo M with hx of recurrent Cdiff, HTN, who was recently admitted for recurrent cdiff, and found to have glioblastoma in 9/2018 elected for symptom management and was d/carlos home with hospice with Lukas now presenting with agitation and aggressive behavior, admitted to PCU for symptom management and continued care.  His agitation has been difficult to manage.

## 2018-10-26 NOTE — PROGRESS NOTE ADULT - PROBLEM SELECTOR PLAN 2
-Depakote 500mg iv bid  -seroquel 25mg po bid  -zyprexa 5mg po q12h prn  decrease dexamethasone to 2mg iv bid  Will discuss with neurosx regarding discontinuing Keppra and having Depakote for seizure ppx   c/w melatonin 3mg qhs  1:1 @bedside for safety  QTC on 10/23 EKG <460 ms -Depakote 500mg iv bid  -seroquel 25mg po bid  -zyprexa 5mg po q12h prn  decrease dexamethasone to 2mg iv bid  Will discuss with neurosx regarding discontinuing Keppra and having Depakote for seizure ppx  and agitation  c/w melatonin 3mg qhs  1:1 @bedside for safety  QTC on 10/23 EKG <460 ms

## 2018-10-27 PROCEDURE — 99233 SBSQ HOSP IP/OBS HIGH 50: CPT

## 2018-10-27 RX ORDER — SODIUM CHLORIDE 9 MG/ML
1000 INJECTION INTRAMUSCULAR; INTRAVENOUS; SUBCUTANEOUS
Qty: 0 | Refills: 0 | Status: DISCONTINUED | OUTPATIENT
Start: 2018-10-27 | End: 2018-10-28

## 2018-10-27 RX ORDER — SODIUM CHLORIDE 9 MG/ML
1000 INJECTION INTRAMUSCULAR; INTRAVENOUS; SUBCUTANEOUS
Qty: 0 | Refills: 0 | Status: DISCONTINUED | OUTPATIENT
Start: 2018-10-27 | End: 2018-10-27

## 2018-10-27 RX ADMIN — LATANOPROST 1 DROP(S): 0.05 SOLUTION/ DROPS OPHTHALMIC; TOPICAL at 23:01

## 2018-10-27 RX ADMIN — MORPHINE SULFATE 0.5 MILLIGRAM(S): 50 CAPSULE, EXTENDED RELEASE ORAL at 23:16

## 2018-10-27 RX ADMIN — Medication 1 MILLIGRAM(S): at 09:57

## 2018-10-27 RX ADMIN — SODIUM CHLORIDE 20 MILLILITER(S): 9 INJECTION INTRAMUSCULAR; INTRAVENOUS; SUBCUTANEOUS at 18:14

## 2018-10-27 RX ADMIN — MORPHINE SULFATE 0.5 MILLIGRAM(S): 50 CAPSULE, EXTENDED RELEASE ORAL at 23:01

## 2018-10-27 RX ADMIN — Medication 2 MILLIGRAM(S): at 18:13

## 2018-10-27 RX ADMIN — Medication 0.5 MILLIGRAM(S): at 06:49

## 2018-10-27 RX ADMIN — Medication 27.5 MILLIGRAM(S): at 06:07

## 2018-10-27 RX ADMIN — LEVETIRACETAM 400 MILLIGRAM(S): 250 TABLET, FILM COATED ORAL at 17:33

## 2018-10-27 RX ADMIN — Medication 27.5 MILLIGRAM(S): at 18:13

## 2018-10-27 RX ADMIN — LEVETIRACETAM 400 MILLIGRAM(S): 250 TABLET, FILM COATED ORAL at 05:22

## 2018-10-27 RX ADMIN — Medication 10 MILLIGRAM(S): at 23:03

## 2018-10-27 RX ADMIN — Medication 2 MILLIGRAM(S): at 05:22

## 2018-10-27 RX ADMIN — FAMOTIDINE 20 MILLIGRAM(S): 10 INJECTION INTRAVENOUS at 11:58

## 2018-10-27 RX ADMIN — OLANZAPINE 5 MILLIGRAM(S): 15 TABLET, FILM COATED ORAL at 07:27

## 2018-10-27 NOTE — PROGRESS NOTE ADULT - PROBLEM SELECTOR PLAN 2
-Depakote 500mg iv bid  -seroquel 25mg po bid (not taking PO currently)  -zyprexa ODT 5mg po q12h prn  decrease dexamethasone to 2mg iv bid  at this stage would c/w keppra and depakote; unclear if in terminal agitation process, would not want to cause seizure  c/w melatonin 3mg qhs  1:1 @bedside for safety  QTC on 10/23 EKG <460 ms

## 2018-10-27 NOTE — PROGRESS NOTE ADULT - ASSESSMENT
88yo M with hx of recurrent Cdiff, HTN, who was recently admitted for recurrent cdiff, and found to have glioblastoma in 9/2018 elected for symptom management and was d/carlos home with hospice with [facility] now presenting with agitation and aggressive behavior, admitted to PCU for symptom management and continued care.  His agitation has been difficult to manage.

## 2018-10-27 NOTE — PROGRESS NOTE ADULT - PROBLEM SELECTOR PLAN 4
1:1 at bedside for safety. disposition today to [facility]  [facility] discussion on 10/23, DEAN in chart

## 2018-10-27 NOTE — PROGRESS NOTE ADULT - SUBJECTIVE AND OBJECTIVE BOX
GAP TEAM PALLIATIVE CARE UNIT PROGRESS NOTE:      [  ] Patient on hospice program.    INDICATION FOR PALLIATIVE CARE UNIT SERVICES: agitation, symptom management    INTERVAL HPI/OVERNIGHT EVENTS: Patient agitated this morning around 7am and used zyprexa prn and ativan prn. Per bedside PCA, ativan lasts around one hour, subsequently gets increasingly combative.     DNR on chart: Yes    Allergies    No Known Allergies    Intolerances    MEDICATIONS  (STANDING):  dexamethasone  Injectable 2 milliGRAM(s) IV Push every 12 hours  enalapril 20 milliGRAM(s) Oral daily  famotidine Injectable 20 milliGRAM(s) IV Push daily  lactobacillus acidophilus 1 Tablet(s) Oral daily  latanoprost 0.005% Ophthalmic Solution 1 Drop(s) Both EYES at bedtime  levETIRAcetam  IVPB 500 milliGRAM(s) IV Intermittent every 12 hours  QUEtiapine 25 milliGRAM(s) Oral every 12 hours  sodium chloride 0.9%. 1000 milliLiter(s) (50 mL/Hr) IV Continuous <Continuous>  tamsulosin 0.4 milliGRAM(s) Oral at bedtime  valproate sodium IVPB 500 milliGRAM(s) IV Intermittent every 12 hours    MEDICATIONS  (PRN):  LORazepam   Injectable 1 milliGRAM(s) IV Push every 2 hours PRN Agitation  melatonin 3 milliGRAM(s) Oral at bedtime PRN Insomnia  morphine  - Injectable 0.5 milliGRAM(s) IV Push every 6 hours PRN Moderate Pain (4 - 6)  OLANZapine Disintegrating Tablet 5 milliGRAM(s) Oral every 12 hours PRN agitation  simethicone 80 milliGRAM(s) Chew every 6 hours PRN Gas    ITEMS UNCHECKED ARE NOT PRESENT    PRESENT SYMPTOMS: [x ]Unable to obtain due to poor mentation   Source if other than patient:  [ ]Family   [ ]Team     Pain (Impact on QOL):    Location:       Minimal acceptable level (0-10 scale):                    Aggravating factors:  Quality:  Radiation:  Severity (0-10 scale):     Dyspnea:                           [ ]Mild [ ]Moderate [ ]Severe  Anxiety:                             [ ]Mild [ ]Moderate [ ]Severe  Fatigue:                             [ ]Mild [ ]Moderate [ ]Severe  Nausea:                             [ ]Mild [ ]Moderate [ ]Severe  Loss of appetite:              [ ]Mild [ ]Moderate [ ]Severe  Constipation:                    [ ]Mild [ ]Moderate [ ]Severe    PAINAD Score: 0    [email or web address] (Ctrl +  left click to view)  		  Other Symptoms:  [ ]All other review of systems negative     Karnofsky Performance Score/Palliative Performance Status Version 2:  10%        http://palliative.info/resource_material/PPSv2.pdf    PHYSICAL EXAM:  Vital Signs Last 24 Hrs  T(C): 36.7 (27 Oct 2018 08:34), Max: 36.7 (27 Oct 2018 08:34)  T(F): 98 (27 Oct 2018 08:34), Max: 98 (27 Oct 2018 08:34)  HR: 60 (27 Oct 2018 08:34) (60 - 60)  BP: 114/69 (27 Oct 2018 08:34) (114/69 - 114/69)  BP(mean): --  RR: 18 (27 Oct 2018 08:34) (18 - 18)  SpO2: 97% (27 Oct 2018 08:34) (97% - 97%)    GENERAL:  [ ]Alert  [ ]Oriented x   [x ]Lethargic  [ ]Cachexia  [ ]Unarousable  [ ]Verbal  [ ]Non-Verbal  Behavioral:   [ ] Anxiety  [x ] Delirium [ ] Agitation [ ] Other  HEENT:  [ ]Normal   [ x]Dry mouth   [ ]ET Tube/Trach  [ ]Oral lesions  PULMONARY:   [x ]Clear [ ]Tachypnea  [ ]Audible excessive secretions   [ ]Rhonchi        [ ]Right [ ]Left [ ]Bilateral  [ ]Crackles        [ ]Right [ ]Left [ ]Bilateral  [ ]Wheezing     [ ]Right [ ]Left [ ]Bilateral  CARDIOVASCULAR:    [x ]Regular [ ]Irregular [ ]Tachy  [ ]Satya [ ]Murmur [ ]Other  GASTROINTESTINAL:  [x ]Soft  [ ]Distended   [x ]+BS  [ ]Non tender [ ]Tender  [ ]PEG [ ]OGT/ NGT   Last BM:   10/24/18  GENITOURINARY:  [ ]Normal [x ] Incontinent   [ ]Oliguria/Anuria   [ ]Bartholomew  MUSCULOSKELETAL:   [ ]Normal   [ ]Weakness  [ ]Bed/Wheelchair bound [ ]Edema  NEUROLOGIC:   [ ]No focal deficits  [x ] Cognitive impairment  [ ] Dysphagia [ ]Dysarthria [ ] Paresis [ ]Other   SKIN:   [ x]Normal   [ ]Pressure ulcer(s)  [ ]Rash    CRITICAL CARE:  [ ] Shock Present  [ ]Septic [ ]Cardiogenic [ ]Neurologic [ ]Hypovolemic  [ ]  Vasopressors [ ]  Inotropes   [ ] Respiratory failure present  [ ] Acute  [ ] Chronic [ ] Hypoxic  [ ] Hypercarbic [ ] Other  [ ] Other organ failure     LABS: no AM labs      RADIOLOGY & ADDITIONAL STUDIES:    No new studies.    PROTEIN CALORIE MALNUTRITION:   [ ] PPSV2 < or = 30% [ ] significant weight loss [ ] poor nutritional intake [ ] anasarca [ ] catabolic state   Albumin, Serum: 3.5 g/dL (10-23-18 @ 22:11)   Artificial Nutrition [ ]     REFERRALS:   [ ]Chaplaincy  [ ] Hospice  [ ]Child Life  [ ]Social Work  [ ]Case management [ ]Holistic Therapy [ ] Physical Therapy [ ] Dietary   Goals of Care Document: Goals of Care Conversation - Personal Advance Directive [HARVEY Locke] (10-23-18 @ 15:42)

## 2018-10-28 PROCEDURE — 99233 SBSQ HOSP IP/OBS HIGH 50: CPT | Mod: GC

## 2018-10-28 RX ORDER — ROBINUL 0.2 MG/ML
0.4 INJECTION INTRAMUSCULAR; INTRAVENOUS EVERY 6 HOURS
Qty: 0 | Refills: 0 | Status: DISCONTINUED | OUTPATIENT
Start: 2018-10-28 | End: 2018-10-30

## 2018-10-28 RX ORDER — MORPHINE SULFATE 50 MG/1
0.5 CAPSULE, EXTENDED RELEASE ORAL
Qty: 0 | Refills: 0 | Status: DISCONTINUED | OUTPATIENT
Start: 2018-10-28 | End: 2018-10-28

## 2018-10-28 RX ORDER — ACETAMINOPHEN 500 MG
650 TABLET ORAL EVERY 6 HOURS
Qty: 0 | Refills: 0 | Status: DISCONTINUED | OUTPATIENT
Start: 2018-10-28 | End: 2018-10-30

## 2018-10-28 RX ORDER — MORPHINE SULFATE 50 MG/1
0.5 CAPSULE, EXTENDED RELEASE ORAL
Qty: 0 | Refills: 0 | Status: DISCONTINUED | OUTPATIENT
Start: 2018-10-28 | End: 2018-10-30

## 2018-10-28 RX ADMIN — Medication 27.5 MILLIGRAM(S): at 17:55

## 2018-10-28 RX ADMIN — LEVETIRACETAM 400 MILLIGRAM(S): 250 TABLET, FILM COATED ORAL at 17:55

## 2018-10-28 RX ADMIN — Medication 1 MILLIGRAM(S): at 23:53

## 2018-10-28 RX ADMIN — Medication 2 MILLIGRAM(S): at 05:19

## 2018-10-28 RX ADMIN — LEVETIRACETAM 400 MILLIGRAM(S): 250 TABLET, FILM COATED ORAL at 05:21

## 2018-10-28 RX ADMIN — Medication 1 MILLIGRAM(S): at 07:31

## 2018-10-28 RX ADMIN — Medication 2 MILLIGRAM(S): at 17:54

## 2018-10-28 RX ADMIN — Medication 27.5 MILLIGRAM(S): at 05:19

## 2018-10-28 RX ADMIN — LATANOPROST 1 DROP(S): 0.05 SOLUTION/ DROPS OPHTHALMIC; TOPICAL at 22:03

## 2018-10-28 RX ADMIN — FAMOTIDINE 20 MILLIGRAM(S): 10 INJECTION INTRAVENOUS at 12:03

## 2018-10-28 NOTE — PROGRESS NOTE ADULT - SUBJECTIVE AND OBJECTIVE BOX
GAP TEAM PALLIATIVE CARE UNIT PROGRESS NOTE:      [  ] Patient on hospice program.    INDICATION FOR PALLIATIVE CARE UNIT SERVICES: agitation, symptom management    INTERVAL HPI/OVERNIGHT EVENTS: Patient appears comfortable, was reportedly calm overnight, discontinued 1:1 observation. Required only 2 rescue doses of Ativan 1mg in the past 24 hours. Received 1 rescue dose Morphine for pain.     DNR on chart: Yes    Allergies    No Known Allergies    Intolerances    MEDICATIONS  (STANDING):  dexamethasone  Injectable 2 milliGRAM(s) IV Push every 12 hours  enalapril 20 milliGRAM(s) Oral daily  famotidine Injectable 20 milliGRAM(s) IV Push daily  lactobacillus acidophilus 1 Tablet(s) Oral daily  latanoprost 0.005% Ophthalmic Solution 1 Drop(s) Both EYES at bedtime  levETIRAcetam  IVPB 500 milliGRAM(s) IV Intermittent every 12 hours  QUEtiapine 25 milliGRAM(s) Oral every 12 hours  tamsulosin 0.4 milliGRAM(s) Oral at bedtime  valproate sodium IVPB 500 milliGRAM(s) IV Intermittent every 12 hours    MEDICATIONS  (PRN):  bisacodyl Suppository 10 milliGRAM(s) Rectal daily PRN Constipation  LORazepam   Injectable 1 milliGRAM(s) IV Push every 2 hours PRN Agitation  melatonin 3 milliGRAM(s) Oral at bedtime PRN Insomnia  morphine  - Injectable 0.5 milliGRAM(s) IV Push every 6 hours PRN Moderate Pain (4 - 6)  OLANZapine Disintegrating Tablet 5 milliGRAM(s) Oral every 12 hours PRN agitation  simethicone 80 milliGRAM(s) Chew every 6 hours PRN Gas    ITEMS UNCHECKED ARE NOT PRESENT    PRESENT SYMPTOMS: [x ]Unable to obtain due to poor mentation   Source if other than patient:  [ ]Family   [x]Team     Pain (Impact on QOL):    Location:       Minimal acceptable level (0-10 scale):                    Aggravating factors:  Quality:  Radiation:  Severity (0-10 scale):     Dyspnea:                           [ ]Mild [ ]Moderate [ ]Severe  Anxiety:                             [ ]Mild [ ]Moderate [ ]Severe  Fatigue:                             [ ]Mild [ ]Moderate [ ]Severe  Nausea:                             [ ]Mild [ ]Moderate [ ]Severe  Loss of appetite:              [ ]Mild [ ]Moderate [ ]Severe  Constipation:                    [ ]Mild [ ]Moderate [ ]Severe    PAINAD Score: 0    http://geriatrictoolkit.[email or web address]/cog/painad.pdf (Ctrl +  left click to view)  		  Other Symptoms:  [ ]All other review of systems negative     Karnofsky Performance Score/Palliative Performance Status Version 2:  10%        http://palliative.info/resource_material/[email or web address].pdf    PHYSICAL EXAM:  Vital Signs Last 24 Hrs  T(C): 37 (28 Oct 2018 07:56), Max: 37.2 (28 Oct 2018 05:15)  T(F): 98.6 (28 Oct 2018 07:56), Max: 98.9 (28 Oct 2018 05:15)  HR: 89 (28 Oct 2018 07:56) (89 - 99)  BP: 125/79 (28 Oct 2018 07:56) (125/79 - 179/103)  BP(mean): --  RR: 18 (28 Oct 2018 07:56) (18 - 20)  SpO2: 92% (28 Oct 2018 07:56) (92% - 93%) I&O's Summary    27 Oct 2018 07:01  -  28 Oct 2018 07:00  --------------------------------------------------------  IN: 390 mL / OUT: 0 mL / NET: 390 mL    GENERAL:  [ ]Alert  [ ]Oriented x   [x ]Lethargic  [ ]Cachexia  [ ]Unarousable  [ ]Verbal  [x]Non-Verbal  Behavioral:   [ ] Anxiety  [x ] Delirium [ ] Agitation [ ] Other  HEENT:  [ ]Normal   [ x]Dry mouth   [ ]ET Tube/Trach  [ ]Oral lesions  PULMONARY:   [x ]Clear [ ]Tachypnea  [ ]Audible excessive secretions   [ ]Rhonchi        [ ]Right [ ]Left [ ]Bilateral  [ ]Crackles        [ ]Right [ ]Left [ ]Bilateral  [ ]Wheezing     [ ]Right [ ]Left [ ]Bilateral  CARDIOVASCULAR:    [x ]Regular [ ]Irregular [ ]Tachy  [ ]Satya [ ]Murmur [ ]Other  GASTROINTESTINAL:  [x ]Soft  [ ]Distended   [x ]+BS  [ ]Non tender [ ]Tender  [ ]PEG [ ]OGT/ NGT   Last BM:   10/24/18  GENITOURINARY:  [ ]Normal [x ] Incontinent   [ ]Oliguria/Anuria   [ ]Bartholomew  MUSCULOSKELETAL:   [ ]Normal   [ ]Weakness  [x]Bed/Wheelchair bound [ ]Edema  NEUROLOGIC:   [ ]No focal deficits  [x ] Cognitive impairment  [ ] Dysphagia [ ]Dysarthria [ ] Paresis [ ]Other   SKIN:   [ x]Normal   [ ]Pressure ulcer(s)  [ ]Rash    CRITICAL CARE:  [ ] Shock Present  [ ]Septic [ ]Cardiogenic [ ]Neurologic [ ]Hypovolemic  [ ]  Vasopressors [ ]  Inotropes   [ ] Respiratory failure present  [ ] Acute  [ ] Chronic [ ] Hypoxic  [ ] Hypercarbic [ ] Other  [ ] Other organ failure     LABS: no labs to reviewed     RADIOLOGY & ADDITIONAL STUDIES: No new studies.    PROTEIN CALORIE MALNUTRITION:   [ ] PPSV2 < or = 30% [ ] significant weight loss [ ] poor nutritional intake [ ] anasarca [ ] catabolic state   Albumin, Serum: 3.5 g/dL (10-23-18 @ 22:11)   Artificial Nutrition [ ]     REFERRALS:   [ ]Chaplaincy  [ ] Hospice  [ ]Child Life  [ ]Social Work  [ ]Case management [ ]Holistic Therapy [ ] Physical Therapy [ ] Dietary   Goals of Care Document: Goals of Care Conversation - Personal Advance Directive [HARVEY Locke] (10-23-18 @ 15:42) GAP TEAM PALLIATIVE CARE UNIT PROGRESS NOTE:      [  ] Patient on hospice program.    INDICATION FOR PALLIATIVE CARE UNIT SERVICES: agitation, symptom management    INTERVAL HPI/OVERNIGHT EVENTS: Patient appears comfortable, was reportedly calm overnight, discontinued 1:1 observation. Required only 2 rescue doses of Ativan 1mg in the past 24 hours. Received 1 rescue dose Morphine for pain.     DNR on chart: Yes    Allergies    No Known Allergies    Intolerances    MEDICATIONS  (STANDING):  dexamethasone  Injectable 2 milliGRAM(s) IV Push every 12 hours  enalapril 20 milliGRAM(s) Oral daily  famotidine Injectable 20 milliGRAM(s) IV Push daily  lactobacillus acidophilus 1 Tablet(s) Oral daily  latanoprost 0.005% Ophthalmic Solution 1 Drop(s) Both EYES at bedtime  levETIRAcetam  IVPB 500 milliGRAM(s) IV Intermittent every 12 hours  QUEtiapine 25 milliGRAM(s) Oral every 12 hours  tamsulosin 0.4 milliGRAM(s) Oral at bedtime  valproate sodium IVPB 500 milliGRAM(s) IV Intermittent every 12 hours    MEDICATIONS  (PRN):  bisacodyl Suppository 10 milliGRAM(s) Rectal daily PRN Constipation  LORazepam   Injectable 1 milliGRAM(s) IV Push every 2 hours PRN Agitation  melatonin 3 milliGRAM(s) Oral at bedtime PRN Insomnia  morphine  - Injectable 0.5 milliGRAM(s) IV Push every 6 hours PRN Moderate Pain (4 - 6)  OLANZapine Disintegrating Tablet 5 milliGRAM(s) Oral every 12 hours PRN agitation  simethicone 80 milliGRAM(s) Chew every 6 hours PRN Gas    ITEMS UNCHECKED ARE NOT PRESENT    PRESENT SYMPTOMS: [x ]Unable to obtain due to poor mentation   Source if other than patient:  [ ]Family   [x]Team     Pain (Impact on QOL):    Location:       Minimal acceptable level (0-10 scale):                    Aggravating factors:  Quality:  Radiation:  Severity (0-10 scale):     Dyspnea:                           [ ]Mild [ ]Moderate [ ]Severe  Anxiety:                             [ ]Mild [ ]Moderate [ ]Severe  Fatigue:                             [ ]Mild [ ]Moderate [ ]Severe  Nausea:                             [ ]Mild [ ]Moderate [ ]Severe  Loss of appetite:              [ ]Mild [ ]Moderate [ ]Severe  Constipation:                    [ ]Mild [ ]Moderate [ ]Severe    PAINAD Score: 0    http://geriatrictoolkit.[employer].edu/cog/painad.pdf (Ctrl +  left click to view)  		  Other Symptoms:  [ ]All other review of systems negative     Karnofsky Performance Score/Palliative Performance Status Version 2:  10%        http://palliative.info/resource_material/[email or web address].pdf    PHYSICAL EXAM:  Vital Signs Last 24 Hrs  T(C): 37 (28 Oct 2018 07:56), Max: 37.2 (28 Oct 2018 05:15)  T(F): 98.6 (28 Oct 2018 07:56), Max: 98.9 (28 Oct 2018 05:15)  HR: 89 (28 Oct 2018 07:56) (89 - 99)  BP: 125/79 (28 Oct 2018 07:56) (125/79 - 179/103)  BP(mean): --  RR: 18 (28 Oct 2018 07:56) (18 - 20)  SpO2: 92% (28 Oct 2018 07:56) (92% - 93%) I&O's Summary    27 Oct 2018 07:01  -  28 Oct 2018 07:00  --------------------------------------------------------  IN: 390 mL / OUT: 0 mL / NET: 390 mL    GENERAL:  [ ]Alert  [ ]Oriented x   [x ]Lethargic  [ ]Cachexia  [ ]Unarousable  [ ]Verbal  [x]Non-Verbal  Behavioral:   [ ] Anxiety  [x ] Delirium [ ] Agitation [ ] Other  HEENT:  [ ]Normal   [ x]Dry mouth   [ ]ET Tube/Trach  [ ]Oral lesions  PULMONARY:   [x ]Clear [ ]Tachypnea  [ ]Audible excessive secretions   [ ]Rhonchi        [ ]Right [ ]Left [ ]Bilateral  [ ]Crackles        [ ]Right [ ]Left [ ]Bilateral  [ ]Wheezing     [ ]Right [ ]Left [ ]Bilateral  CARDIOVASCULAR:    [x ]Regular [ ]Irregular [ ]Tachy  [ ]Satya [ ]Murmur [ ]Other  GASTROINTESTINAL:  [x ]Soft  [ ]Distended   [x ]+BS  [ ]Non tender [ ]Tender  [ ]PEG [ ]OGT/ NGT   Last BM:   10/24/18  GENITOURINARY:  [ ]Normal [x ] Incontinent   [ ]Oliguria/Anuria   [ ]Bartholomew  MUSCULOSKELETAL:   [ ]Normal   [ ]Weakness  [x]Bed/Wheelchair bound [ ]Edema  NEUROLOGIC:   [ ]No focal deficits  [x ] Cognitive impairment  [ ] Dysphagia [ ]Dysarthria [ ] Paresis [ ]Other   SKIN:   [ ]Normal   [ ]Pressure ulcer(s)  [ ]Rash [X] abrasion R big toe     CRITICAL CARE:  [ ] Shock Present  [ ]Septic [ ]Cardiogenic [ ]Neurologic [ ]Hypovolemic  [ ]  Vasopressors [ ]  Inotropes   [ ] Respiratory failure present  [ ] Acute  [ ] Chronic [ ] Hypoxic  [ ] Hypercarbic [ ] Other  [ ] Other organ failure     LABS: no labs to reviewed     RADIOLOGY & ADDITIONAL STUDIES: No new studies.    PROTEIN CALORIE MALNUTRITION:   [ ] PPSV2 < or = 30% [ ] significant weight loss [ ] poor nutritional intake [ ] anasarca [ ] catabolic state   Albumin, Serum: 3.5 g/dL (10-23-18 @ 22:11)   Artificial Nutrition [ ]     REFERRALS:   [ ]Chaplaincy  [ ] Hospice  [ ]Child Life  [ ]Social Work  [ ]Case management [ ]Holistic Therapy [ ] Physical Therapy [ ] Dietary   Goals of Care Document: Goals of Care Conversation - Personal Advance Directive [HARVEY Locke] (10-23-18 @ 15:42)

## 2018-10-28 NOTE — PROGRESS NOTE ADULT - PROBLEM SELECTOR PLAN 4
1:1 at bedside for safety. disposition today to [facility]  [facility] discussion on 10/23, DEAN in chart Patient appears calm- comfortable- 1:1 discontinued. Patient is entering dying patient's -lethargic- not tolerating oral intake. No pain or respiratory symptoms at this time. Will continue supportive care, and comfort measures in the PCU. Patient appears calm- comfortable- 1:1 discontinued. Patient is entering dying process (-lethargic- not tolerating oral intake). No pain or respiratory symptoms at this time. Will continue supportive care, and comfort measures in the PCU.

## 2018-10-28 NOTE — PROGRESS NOTE ADULT - PROBLEM SELECTOR PLAN 3
continue with above as well as ativan, increase to 1mg Q1 in setting of increasing symptoms On Ativan 1mg Q1 in setting of increasing symptoms On Ativan 1mg Q1 terminal delirium.

## 2018-10-28 NOTE — PROGRESS NOTE ADULT - PROBLEM SELECTOR PLAN 2
-Depakote 500mg iv bid  -Seroquel 25mg po bid (not taking PO currently)  -Zyprexa ODT 5mg po q12h prn  -Decreased dexamethasone to 2mg iv bid- to continue taper   at this stage would c/w keppra and depakote; unclear if in terminal agitation process, would not want to cause seizure  c/w melatonin 3mg qhs  1:1 @bedside for safety was discontinued. NO HALDOL.   Not tolerating oral medications.   -Ativan 1mg q1h PRN   -Depakote 500mg iv bid  -Decreased dexamethasone to 2mg iv bid- to continue taper   at this stage would c/w keppra and depakote; unclear if in terminal agitation process, would not want to cause seizure  1:1 @bedside for safety was discontinued. NO HALDOL.   Not tolerating oral medications.   -Ativan 1mg q1h PRN   -Depakote 500mg iv bid  -Dexamethasone decreased to 2mg iv bid- to continue taper   -On Keppra and Depakote; unclear if in terminal agitation process, seizure prophylaxis.   -Patient calm- therefore discontinued 1:1 observation.

## 2018-10-29 LAB — VALPROATE SERPL-MCNC: 54 UG/ML — SIGNIFICANT CHANGE UP (ref 50–100)

## 2018-10-29 PROCEDURE — 99233 SBSQ HOSP IP/OBS HIGH 50: CPT | Mod: GC

## 2018-10-29 RX ADMIN — Medication 1 MILLIGRAM(S): at 13:02

## 2018-10-29 RX ADMIN — FAMOTIDINE 20 MILLIGRAM(S): 10 INJECTION INTRAVENOUS at 14:46

## 2018-10-29 RX ADMIN — LATANOPROST 1 DROP(S): 0.05 SOLUTION/ DROPS OPHTHALMIC; TOPICAL at 22:15

## 2018-10-29 RX ADMIN — Medication 2 MILLIGRAM(S): at 18:11

## 2018-10-29 RX ADMIN — LEVETIRACETAM 400 MILLIGRAM(S): 250 TABLET, FILM COATED ORAL at 17:33

## 2018-10-29 RX ADMIN — Medication 27.5 MILLIGRAM(S): at 18:12

## 2018-10-29 RX ADMIN — Medication 2 MILLIGRAM(S): at 05:23

## 2018-10-29 RX ADMIN — Medication 1 MILLIGRAM(S): at 18:11

## 2018-10-29 RX ADMIN — Medication 27.5 MILLIGRAM(S): at 05:23

## 2018-10-29 RX ADMIN — LEVETIRACETAM 400 MILLIGRAM(S): 250 TABLET, FILM COATED ORAL at 05:23

## 2018-10-29 NOTE — PROGRESS NOTE ADULT - SUBJECTIVE AND OBJECTIVE BOX
GAP TEAM PALLIATIVE CARE UNIT PROGRESS NOTE:      [  ] Patient on hospice program.    INDICATION FOR PALLIATIVE CARE UNIT SERVICES: agitation, symptom management    INTERVAL HPI/OVERNIGHT EVENTS: Patient has been calmer and 1:1 was discontinued over the weekend. He has not been able to tolerate PO meds so he is off antipsychotics now. Used ativan 1mg prn at midnight for agitation. This morning he was sleeping and did not respond to verbal or tactile stimuli.     DNR on chart: Yes    Allergies    No Known Allergies    Intolerances    MEDICATIONS  (STANDING):  dexamethasone  Injectable 2 milliGRAM(s) IV Push every 12 hours  famotidine Injectable 20 milliGRAM(s) IV Push daily  latanoprost 0.005% Ophthalmic Solution 1 Drop(s) Both EYES at bedtime  levETIRAcetam  IVPB 500 milliGRAM(s) IV Intermittent every 12 hours  valproate sodium IVPB 500 milliGRAM(s) IV Intermittent every 12 hours    MEDICATIONS  (PRN):  acetaminophen  Suppository .. 650 milliGRAM(s) Rectal every 6 hours PRN Temp greater or equal to 38C (100.4F)  bisacodyl Suppository 10 milliGRAM(s) Rectal daily PRN Constipation  glycopyrrolate Injectable 0.4 milliGRAM(s) IV Push every 6 hours PRN secretions  LORazepam   Injectable 1 milliGRAM(s) IV Push every 2 hours PRN Agitation  morphine  - Injectable 0.5 milliGRAM(s) IV Push every 2 hours PRN Moderate Pain (4 - 6)  morphine  - Injectable 0.5 milliGRAM(s) IV Push every 2 hours PRN dyspnea    ITEMS UNCHECKED ARE NOT PRESENT    PRESENT SYMPTOMS: [x ]Unable to obtain due to poor mentation   Source if other than patient:  [ ]Family   [ ]Team     Pain (Impact on QOL):    Location:       Minimal acceptable level (0-10 scale):                    Aggravating factors:  Quality:  Radiation:  Severity (0-10 scale):     Dyspnea:                           [ ]Mild [ ]Moderate [ ]Severe  Anxiety:                             [ ]Mild [ ]Moderate [ ]Severe  Fatigue:                             [ ]Mild [ ]Moderate [ ]Severe  Nausea:                             [ ]Mild [ ]Moderate [ ]Severe  Loss of appetite:              [ ]Mild [ ]Moderate [ ]Severe  Constipation:                    [ ]Mild [ ]Moderate [ ]Severe    PAINAD Score: 0    [email or web address] (Ctrl +  left click to view)  		  Other Symptoms:  [ ]All other review of systems negative     Karnofsky Performance Score/Palliative Performance Status Version 2:         %        http://palliative.info/resource_material/[email or web address].pdf  PHYSICAL EXAM:  Vital Signs Last 24 Hrs  T(C): 37 (29 Oct 2018 08:32), Max: 37 (29 Oct 2018 08:32)  T(F): 98.6 (29 Oct 2018 08:32), Max: 98.6 (29 Oct 2018 08:32)  HR: 76 (29 Oct 2018 08:32) (76 - 76)  BP: 170/93 (29 Oct 2018 08:32) (170/93 - 170/93)  BP(mean): --  RR: 18 (29 Oct 2018 08:32) (18 - 18)  SpO2: 93% (29 Oct 2018 08:32) (93% - 93%)    GENERAL:  [ ]Alert  [ ]Oriented x   [x ]Lethargic  [ ]Cachexia  [ ]Unarousable  [ ]Verbal  [ ]Non-Verbal  Behavioral:   [ ] Anxiety  [x ] Delirium [ ] Agitation [ ] Other  HEENT:  [ ]Normal   [ x]Dry mouth   [ ]ET Tube/Trach  [ ]Oral lesions  PULMONARY:   [x ]Clear [ ]Tachypnea  [ ]Audible excessive secretions   [ ]Rhonchi        [ ]Right [ ]Left [ ]Bilateral  [ ]Crackles        [ ]Right [ ]Left [ ]Bilateral  [ ]Wheezing     [ ]Right [ ]Left [ ]Bilateral  CARDIOVASCULAR:    [x ]Regular [ ]Irregular [ ]Tachy  [ ]Satya [ ]Murmur [ ]Other  GASTROINTESTINAL:  [x ]Soft  [ ]Distended   [x ]+BS  [ ]Non tender [ ]Tender  [ ]PEG [ ]OGT/ NGT   Last BM:     GENITOURINARY:  [ ]Normal [x ] Incontinent   [ ]Oliguria/Anuria   [ ]Bartholomew  MUSCULOSKELETAL:   [ ]Normal   [ ]Weakness  [ ]Bed/Wheelchair bound [ ]Edema  NEUROLOGIC:   [ ]No focal deficits  [x ] Cognitive impairment  [ ] Dysphagia [ ]Dysarthria [ ] Paresis [ ]Other   SKIN:   [ x]Normal   [ ]Pressure ulcer(s)  [ ]Rash    CRITICAL CARE:  [ ] Shock Present  [ ]Septic [ ]Cardiogenic [ ]Neurologic [ ]Hypovolemic  [ ]  Vasopressors [ ]  Inotropes   [ ] Respiratory failure present  [ ] Acute  [ ] Chronic [ ] Hypoxic  [ ] Hypercarbic [ ] Other  [ ] Other organ failure     LABS:    Hgb Trend: 14.3<--          Creatinine Trend: 0.92<--        VPA level on 10/29: 54      RADIOLOGY & ADDITIONAL STUDIES:    No new studies.    PROTEIN CALORIE MALNUTRITION:   [ ] PPSV2 < or = 30% [ ] significant weight loss [ ] poor nutritional intake [ ] anasarca [ ] catabolic state   Albumin, Serum: 3.5 g/dL (10-23-18 @ 22:11)   Artificial Nutrition [ ]     REFERRALS:   [ ]Chaplaincy  [ ] Hospice  [ ]Child Life  [ ]Social Work  [ ]Case management [ ]Holistic Therapy [ ] Physical Therapy [ ] Dietary   Goals of Care Document: Goals of Care Conversation - Personal Advance Directive [HARVEY Locke] (10-23-18 @ 15:42) GAP TEAM PALLIATIVE CARE UNIT PROGRESS NOTE:      [  ] Patient on hospice program.    INDICATION FOR PALLIATIVE CARE UNIT SERVICES: agitation, symptom management    INTERVAL HPI/OVERNIGHT EVENTS: Patient has been calmer and 1:1 was discontinued over the weekend. He has not been able to tolerate PO meds so he is off antipsychotics now. Used ativan 1mg prn at midnight for agitation. This morning he was sleeping and did not respond to verbal or tactile stimuli.     DNR on chart: Yes    Allergies    No Known Allergies    Intolerances    MEDICATIONS  (STANDING):  dexamethasone  Injectable 2 milliGRAM(s) IV Push every 12 hours  famotidine Injectable 20 milliGRAM(s) IV Push daily  latanoprost 0.005% Ophthalmic Solution 1 Drop(s) Both EYES at bedtime  levETIRAcetam  IVPB 500 milliGRAM(s) IV Intermittent every 12 hours  valproate sodium IVPB 500 milliGRAM(s) IV Intermittent every 12 hours    MEDICATIONS  (PRN):  acetaminophen  Suppository .. 650 milliGRAM(s) Rectal every 6 hours PRN Temp greater or equal to 38C (100.4F)  bisacodyl Suppository 10 milliGRAM(s) Rectal daily PRN Constipation  glycopyrrolate Injectable 0.4 milliGRAM(s) IV Push every 6 hours PRN secretions  LORazepam   Injectable 1 milliGRAM(s) IV Push every 2 hours PRN Agitation  morphine  - Injectable 0.5 milliGRAM(s) IV Push every 2 hours PRN Moderate Pain (4 - 6)  morphine  - Injectable 0.5 milliGRAM(s) IV Push every 2 hours PRN dyspnea    ITEMS UNCHECKED ARE NOT PRESENT    PRESENT SYMPTOMS: [x ]Unable to obtain due to poor mentation   Source if other than patient:  [ ]Family   [ ]Team     Pain (Impact on QOL):    Location:       Minimal acceptable level (0-10 scale):                    Aggravating factors:  Quality:  Radiation:  Severity (0-10 scale):     Dyspnea:                           [ ]Mild [ ]Moderate [ ]Severe  Anxiety:                             [ ]Mild [ ]Moderate [ ]Severe  Fatigue:                             [ ]Mild [ ]Moderate [ ]Severe  Nausea:                             [ ]Mild [ ]Moderate [ ]Severe  Loss of appetite:              [ ]Mild [ ]Moderate [ ]Severe  Constipation:                    [ ]Mild [ ]Moderate [ ]Severe    PAINAD Score: 0    [email or web address] (Ctrl +  left click to view)  		  Other Symptoms:  [ ]All other review of systems negative     Karnofsky Performance Score/Palliative Performance Status Version 2:   30      %        http://palliative.info/resource_material/[email or web address].pdf  PHYSICAL EXAM:  Vital Signs Last 24 Hrs  T(C): 37 (29 Oct 2018 08:32), Max: 37 (29 Oct 2018 08:32)  T(F): 98.6 (29 Oct 2018 08:32), Max: 98.6 (29 Oct 2018 08:32)  HR: 76 (29 Oct 2018 08:32) (76 - 76)  BP: 170/93 (29 Oct 2018 08:32) (170/93 - 170/93)  BP(mean): --  RR: 18 (29 Oct 2018 08:32) (18 - 18)  SpO2: 93% (29 Oct 2018 08:32) (93% - 93%)    GENERAL:  [ ]Alert  [ ]Oriented x   [x ]Lethargic  [ ]Cachexia  [ ]Unarousable  [ ]Verbal  [ ]Non-Verbal  Behavioral:   [ ] Anxiety  [x ] Delirium [ ] Agitation [ ] Other  HEENT:  [ ]Normal   [ x]Dry mouth   [ ]ET Tube/Trach  [ ]Oral lesions  PULMONARY:   [x ]Clear [ ]Tachypnea  [ ]Audible excessive secretions   [ ]Rhonchi        [ ]Right [ ]Left [ ]Bilateral  [ ]Crackles        [ ]Right [ ]Left [ ]Bilateral  [ ]Wheezing     [ ]Right [ ]Left [ ]Bilateral  CARDIOVASCULAR:    [x ]Regular [ ]Irregular [ ]Tachy  [ ]Satya [ ]Murmur [ ]Other +S1 +S2   GASTROINTESTINAL:  [x ]Soft  [ ]Distended   [x ]+BS  [x ]Non tender [ ]Tender  [ ]PEG [ ]OGT/ NGT   Last BM:   See RN documentation in the EMR which I have reviewed.   GENITOURINARY:  [ ]Normal [x ] Incontinent   [ ]Oliguria/Anuria   [ ]Bartholomew  MUSCULOSKELETAL:   [ ]Normal   [x ]Weakness  [ ]Bed/Wheelchair bound [ ]Edema  NEUROLOGIC:   [ ]No focal deficits  [x ] Cognitive impairment  [ ] Dysphagia [ ]Dysarthria [ ] Paresis [ ]Other   SKIN:   [ x]Normal   [ ]Pressure ulcer(s)  [ ]Rash    CRITICAL CARE:  [ ] Shock Present  [ ]Septic [ ]Cardiogenic [ ]Neurologic [ ]Hypovolemic  [ ]  Vasopressors [ ]  Inotropes   [ ] Respiratory failure present  [ ] Acute  [ ] Chronic [ ] Hypoxic  [ ] Hypercarbic [ ] Other  [ ] Other organ failure     LABS:    Hgb Trend: 14.3<--          Creatinine Trend: 0.92<--        VPA level on 10/29: 54      RADIOLOGY & ADDITIONAL STUDIES:    No new studies.    PROTEIN CALORIE MALNUTRITION:   [ x] PPSV2 < or = 30% [ ] significant weight loss [ ] poor nutritional intake [ ] anasarca [ ] catabolic state   Albumin, Serum: 3.5 g/dL (10-23-18 @ 22:11)   Artificial Nutrition [ ]     REFERRALS:   [ ]Chaplaincy  [ ] Hospice  [ ]Child Life  [ x]Social Work  [ ]Case management [ ]Holistic Therapy [ ] Physical Therapy [ ] Dietary   Goals of Care Document: Goals of Care Conversation - Personal Advance Directive [HARVEY Locke] (10-23-18 @ 15:42)

## 2018-10-29 NOTE — PROGRESS NOTE ADULT - PROBLEM SELECTOR PLAN 2
Depakote 500mg iv bid  dexamethasone 2mg iv bid  c/w melatonin 3mg qhs Depakote 500mg iv bid  dexamethasone 2mg iv bid, dc in AM  c/w melatonin 3mg qhs  Ativan RTC

## 2018-10-30 ENCOUNTER — TRANSCRIPTION ENCOUNTER (OUTPATIENT)
Age: 83
End: 2018-10-30

## 2018-10-30 VITALS
RESPIRATION RATE: 16 BRPM | HEART RATE: 75 BPM | SYSTOLIC BLOOD PRESSURE: 137 MMHG | TEMPERATURE: 98 F | OXYGEN SATURATION: 91 % | DIASTOLIC BLOOD PRESSURE: 75 MMHG

## 2018-10-30 PROCEDURE — 99233 SBSQ HOSP IP/OBS HIGH 50: CPT | Mod: GC

## 2018-10-30 RX ORDER — FAMOTIDINE 10 MG/ML
0 INJECTION INTRAVENOUS
Qty: 0 | Refills: 0 | COMMUNITY

## 2018-10-30 RX ORDER — MORPHINE SULFATE 50 MG/1
0.5 CAPSULE, EXTENDED RELEASE ORAL
Qty: 0 | Refills: 0 | COMMUNITY
Start: 2018-10-30

## 2018-10-30 RX ORDER — VALPROIC ACID (AS SODIUM SALT) 250 MG/5ML
5 SOLUTION, ORAL ORAL
Qty: 0 | Refills: 0 | COMMUNITY
Start: 2018-10-30

## 2018-10-30 RX ORDER — ROBINUL 0.2 MG/ML
0.4 INJECTION INTRAMUSCULAR; INTRAVENOUS
Qty: 0 | Refills: 0 | COMMUNITY
Start: 2018-10-30

## 2018-10-30 RX ORDER — LEVETIRACETAM 250 MG/1
5 TABLET, FILM COATED ORAL
Qty: 0 | Refills: 0 | COMMUNITY
Start: 2018-10-30

## 2018-10-30 RX ORDER — ACETAMINOPHEN 500 MG
1 TABLET ORAL
Qty: 0 | Refills: 0 | COMMUNITY
Start: 2018-10-30

## 2018-10-30 RX ADMIN — Medication 1 MILLIGRAM(S): at 00:26

## 2018-10-30 RX ADMIN — Medication 1 MILLIGRAM(S): at 17:45

## 2018-10-30 RX ADMIN — Medication 1 MILLIGRAM(S): at 05:24

## 2018-10-30 RX ADMIN — Medication 1 MILLIGRAM(S): at 11:53

## 2018-10-30 RX ADMIN — FAMOTIDINE 20 MILLIGRAM(S): 10 INJECTION INTRAVENOUS at 11:52

## 2018-10-30 RX ADMIN — Medication 27.5 MILLIGRAM(S): at 05:24

## 2018-10-30 RX ADMIN — LEVETIRACETAM 400 MILLIGRAM(S): 250 TABLET, FILM COATED ORAL at 17:12

## 2018-10-30 RX ADMIN — Medication 2 MILLIGRAM(S): at 05:24

## 2018-10-30 RX ADMIN — Medication 27.5 MILLIGRAM(S): at 17:44

## 2018-10-30 RX ADMIN — LEVETIRACETAM 400 MILLIGRAM(S): 250 TABLET, FILM COATED ORAL at 05:22

## 2018-10-30 RX ADMIN — MORPHINE SULFATE 0.5 MILLIGRAM(S): 50 CAPSULE, EXTENDED RELEASE ORAL at 06:33

## 2018-10-30 NOTE — DISCHARGE NOTE ADULT - MEDICATION SUMMARY - MEDICATIONS TO TAKE
I will START or STAY ON the medications listed below when I get home from the hospital:    acetaminophen 650 mg rectal suppository  -- 1 suppository(ies) rectally every 6 hours, As needed, Temp greater or equal to 38C (100.4F)  -- Indication: For fever    morphine  -- 0.5 milligram(s) intravenously every 2 hours, As Needed for dyspnea  -- Indication: For pain    morphine  -- 0.5 milligram(s) intravenous every 2 hours, As Needed pain  -- Indication: For Dyspnea    levETIRAcetam 100 mg/mL intravenous solution  -- 5 milliliter(s) intravenous every 12 hours  -- Indication: For seizures    valproic acid (as valproate sodium) 100 mg/mL intravenous solution  -- 5 milliliter(s) intravenous every 12 hours  -- Indication: For Agitation    LORazepam  -- 1 milligram(s) intravenous every 6 hours  -- Indication: For Agitation    LORazepam  -- 1 milligram(s) intravenous every 2 hours, As Needed agitation  -- Indication: For Agitation    glycopyrrolate 0.2 mg/mL injectable solution  -- 0.4 milligram(s) injectable every 6 hours, As Needed secretions  -- Indication: For secretions    Bisac-Evac 10 mg rectal suppository  -- 1 suppository(ies) rectally once a day, As needed, Constipation  -- Indication: For constipation    latanoprost 0.005% ophthalmic solution  -- 1 drop(s) to each affected eye once a day (at bedtime)  -- Indication: For Glaucoma

## 2018-10-30 NOTE — PROGRESS NOTE ADULT - SUBJECTIVE AND OBJECTIVE BOX
GAP TEAM PALLIATIVE CARE UNIT PROGRESS NOTE:      [  ] Patient on hospice program.    INDICATION FOR PALLIATIVE CARE UNIT SERVICES: agitation, symptom management    INTERVAL HPI/OVERNIGHT EVENTS: Patient on standing ativan now for agitation control, with good effect. He was sleeping this morning but as per nursing report, he had time awake yesterday and he was calm. This morning he was sleeping.     DNR on chart: Yes    Allergies    No Known Allergies    Intolerances    MEDICATIONS  (STANDING):  dexamethasone  Injectable 2 milliGRAM(s) IV Push every 12 hours  famotidine Injectable 20 milliGRAM(s) IV Push daily  latanoprost 0.005% Ophthalmic Solution 1 Drop(s) Both EYES at bedtime  levETIRAcetam  IVPB 500 milliGRAM(s) IV Intermittent every 12 hours  LORazepam   Injectable 1 milliGRAM(s) IV Push every 6 hours  valproate sodium IVPB 500 milliGRAM(s) IV Intermittent every 12 hours    MEDICATIONS  (PRN):  acetaminophen  Suppository .. 650 milliGRAM(s) Rectal every 6 hours PRN Temp greater or equal to 38C (100.4F)  bisacodyl Suppository 10 milliGRAM(s) Rectal daily PRN Constipation  glycopyrrolate Injectable 0.4 milliGRAM(s) IV Push every 6 hours PRN secretions  LORazepam   Injectable 1 milliGRAM(s) IV Push every 2 hours PRN Agitation  morphine  - Injectable 0.5 milliGRAM(s) IV Push every 2 hours PRN Moderate Pain (4 - 6)  morphine  - Injectable 0.5 milliGRAM(s) IV Push every 2 hours PRN dyspnea    ITEMS UNCHECKED ARE NOT PRESENT    PRESENT SYMPTOMS: [x ]Unable to obtain due to poor mentation   Source if other than patient:  [ ]Family   [ ]Team     Pain (Impact on QOL):    Location:       Minimal acceptable level (0-10 scale):                    Aggravating factors:  Quality:  Radiation:  Severity (0-10 scale):     Dyspnea:                           [ ]Mild [ ]Moderate [ ]Severe  Anxiety:                             [ ]Mild [ ]Moderate [ ]Severe  Fatigue:                             [ ]Mild [ ]Moderate [ ]Severe  Nausea:                             [ ]Mild [ ]Moderate [ ]Severe  Loss of appetite:              [ ]Mild [ ]Moderate [ ]Severe  Constipation:                    [ ]Mild [ ]Moderate [ ]Severe    PAINAD Score: 0    [email or web address] (Ctrl +  left click to view)  		  Other Symptoms:  [ ]All other review of systems negative     Karnofsky Performance Score/Palliative Performance Status Version 2:   30      %        http://palliative.info/resource_material/[email or web address].pdf  PHYSICAL EXAM:  Vital Signs Last 24 Hrs  T(C): 36.9 (30 Oct 2018 08:25), Max: 36.9 (30 Oct 2018 08:25)  T(F): 98.5 (30 Oct 2018 08:25), Max: 98.5 (30 Oct 2018 08:25)  HR: 77 (30 Oct 2018 08:25) (77 - 77)  BP: 127/85 (30 Oct 2018 08:25) (127/85 - 127/85)  BP(mean): --  RR: 18 (30 Oct 2018 08:25) (18 - 18)  SpO2: 91% (30 Oct 2018 08:25) (91% - 91%)    GENERAL:  [ ]Alert  [ ]Oriented x   [x ]Lethargic  [ ]Cachexia  [ ]Unarousable  [ ]Verbal  [ ]Non-Verbal  Behavioral:   [ ] Anxiety  [x ] Delirium [ ] Agitation [ ] Other  HEENT:  [x ]Normal   [ ]Dry mouth   [ ]ET Tube/Trach  [ ]Oral lesions  PULMONARY:   [x ]Clear [ ]Tachypnea  [ ]Audible excessive secretions   [ ]Rhonchi        [ ]Right [ ]Left [ ]Bilateral  [ ]Crackles        [ ]Right [ ]Left [ ]Bilateral  [ ]Wheezing     [ ]Right [ ]Left [ ]Bilateral  CARDIOVASCULAR:    [x ]Regular [ ]Irregular [ ]Tachy  [ ]Satya [ ]Murmur [ ]Other +S1 +S2   GASTROINTESTINAL:  [x ]Soft  [ ]Distended   [x ]+BS  [x ]Non tender [ ]Tender  [ ]PEG [ ]OGT/ NGT   Last BM:   See RN documentation in the EMR which I have reviewed.   GENITOURINARY:  [ ]Normal [x ] Incontinent   [ ]Oliguria/Anuria   [ ]Bartholomew  MUSCULOSKELETAL:   [ ]Normal   [x ]Weakness  [ ]Bed/Wheelchair bound [ ]Edema  NEUROLOGIC:   [ ]No focal deficits  [x ] Cognitive impairment  [ ] Dysphagia [ ]Dysarthria [ ] Paresis [ ]Other   SKIN:   [ x]Normal   [ ]Pressure ulcer(s)  [ ]Rash    CRITICAL CARE:  [ ] Shock Present  [ ]Septic [ ]Cardiogenic [ ]Neurologic [ ]Hypovolemic  [ ]  Vasopressors [ ]  Inotropes   [ ] Respiratory failure present  [ ] Acute  [ ] Chronic [ ] Hypoxic  [ ] Hypercarbic [ ] Other  [ ] Other organ failure     LABS:    Hgb Trend: 14.3<--          Creatinine Trend: 0.92<--        VPA level on 10/29: 54    No new labs.       RADIOLOGY & ADDITIONAL STUDIES:    No new studies.    PROTEIN CALORIE MALNUTRITION:   [ x] PPSV2 < or = 30% [ ] significant weight loss [ ] poor nutritional intake [ ] anasarca [ ] catabolic state   Albumin, Serum: 3.5 g/dL (10-23-18 @ 22:11)   Artificial Nutrition [ ]     REFERRALS:   [ ]Chaplaincy  [ ] Hospice  [ ]Child Life  [ x]Social Work  [ ]Case management [ ]Holistic Therapy [ ] Physical Therapy [ ] Dietary   Goals of Care Document: Goals of Care Conversation - Personal Advance Directive [HARVEY Locke] (10-23-18 @ 15:42) GAP TEAM PALLIATIVE CARE UNIT PROGRESS NOTE:      [  ] Patient on hospice program.    INDICATION FOR PALLIATIVE CARE UNIT SERVICES: agitation, symptom management    INTERVAL HPI/OVERNIGHT EVENTS: Patient on standing ativan now for agitation control, with good effect. He was sleeping this morning but as per nursing report, he had time awake yesterday and he was calm. This morning he was sleeping.     DNR on chart: Yes    Allergies    No Known Allergies    Intolerances    MEDICATIONS  (STANDING):  dexamethasone  Injectable 2 milliGRAM(s) IV Push every 12 hours  famotidine Injectable 20 milliGRAM(s) IV Push daily  latanoprost 0.005% Ophthalmic Solution 1 Drop(s) Both EYES at bedtime  levETIRAcetam  IVPB 500 milliGRAM(s) IV Intermittent every 12 hours  LORazepam   Injectable 1 milliGRAM(s) IV Push every 6 hours  valproate sodium IVPB 500 milliGRAM(s) IV Intermittent every 12 hours    MEDICATIONS  (PRN):  acetaminophen  Suppository .. 650 milliGRAM(s) Rectal every 6 hours PRN Temp greater or equal to 38C (100.4F)  bisacodyl Suppository 10 milliGRAM(s) Rectal daily PRN Constipation  glycopyrrolate Injectable 0.4 milliGRAM(s) IV Push every 6 hours PRN secretions  LORazepam   Injectable 1 milliGRAM(s) IV Push every 2 hours PRN Agitation  morphine  - Injectable 0.5 milliGRAM(s) IV Push every 2 hours PRN Moderate Pain (4 - 6)  morphine  - Injectable 0.5 milliGRAM(s) IV Push every 2 hours PRN dyspnea    ITEMS UNCHECKED ARE NOT PRESENT    PRESENT SYMPTOMS: [x ]Unable to obtain due to poor mentation   Source if other than patient:  [ ]Family   [ ]Team     Pain (Impact on QOL):    Location:       Minimal acceptable level (0-10 scale):                    Aggravating factors:  Quality:  Radiation:  Severity (0-10 scale):     Dyspnea:                           [ ]Mild [ ]Moderate [ ]Severe  Anxiety:                             [ ]Mild [ ]Moderate [ ]Severe  Fatigue:                             [ ]Mild [ ]Moderate [ ]Severe  Nausea:                             [ ]Mild [ ]Moderate [ ]Severe  Loss of appetite:              [ ]Mild [ ]Moderate [ ]Severe  Constipation:                    [ ]Mild [ ]Moderate [ ]Severe    PAINAD Score: 0    [email or web address] (Ctrl +  left click to view)  		  Other Symptoms:  [ ]All other review of systems negative     Karnofsky Performance Score/Palliative Performance Status Version 2:   30      %        http://palliative.info/resource_material/[email or web address].pdf  PHYSICAL EXAM:  Vital Signs Last 24 Hrs  T(C): 36.9 (30 Oct 2018 08:25), Max: 36.9 (30 Oct 2018 08:25)  T(F): 98.5 (30 Oct 2018 08:25), Max: 98.5 (30 Oct 2018 08:25)  HR: 77 (30 Oct 2018 08:25) (77 - 77)  BP: 127/85 (30 Oct 2018 08:25) (127/85 - 127/85)  BP(mean): --  RR: 18 (30 Oct 2018 08:25) (18 - 18)  SpO2: 91% (30 Oct 2018 08:25) (91% - 91%)    GENERAL:  [ ]Alert  [ ]Oriented x   [x ]Lethargic  [ ]Cachexia  [ ]Unarousable  [ ]Verbal  [x ]Non-Verbal  Behavioral:   [ ] Anxiety  [x ] Delirium [ ] Agitation [ ] Other  HEENT:  [x ]Normal   [ ]Dry mouth   [ ]ET Tube/Trach  [ ]Oral lesions  PULMONARY:   [x ]Clear [ ]Tachypnea  [ x]Audible excessive secretions   [ ]Rhonchi        [ ]Right [ ]Left [ ]Bilateral  [ ]Crackles        [ ]Right [ ]Left [ ]Bilateral  [ ]Wheezing     [ ]Right [ ]Left [ ]Bilateral  CARDIOVASCULAR:    [x ]Regular [ ]Irregular [ ]Tachy  [ ]Satya [ ]Murmur [ ]Other +S1 +S2   GASTROINTESTINAL:  [x ]Soft  [ ]Distended   [x ]+BS  [x ]Non tender [ ]Tender  [ ]PEG [ ]OGT/ NGT   Last BM:   See RN documentation in the EMR which I have reviewed.   GENITOURINARY:  [ ]Normal [x ] Incontinent   [ ]Oliguria/Anuria   [ ]Bartholomew  MUSCULOSKELETAL:   [ ]Normal   [x ]Weakness  [ ]Bed/Wheelchair bound [ ]Edema  NEUROLOGIC:   [ ]No focal deficits  [x ] Cognitive impairment  [ ] Dysphagia [ ]Dysarthria [ ] Paresis [ ]Other   SKIN:   [ x]Normal   [ ]Pressure ulcer(s)  [ ]Rash    CRITICAL CARE:  [ ] Shock Present  [ ]Septic [ ]Cardiogenic [ ]Neurologic [ ]Hypovolemic  [ ]  Vasopressors [ ]  Inotropes   [ ] Respiratory failure present  [ ] Acute  [ ] Chronic [ ] Hypoxic  [ ] Hypercarbic [ ] Other  [ ] Other organ failure     LABS:    Hgb Trend: 14.3<--          Creatinine Trend: 0.92<--        VPA level on 10/29: 54    No new labs.       RADIOLOGY & ADDITIONAL STUDIES:    No new studies.    PROTEIN CALORIE MALNUTRITION:   [ x] PPSV2 < or = 30% [ ] significant weight loss [x ] poor nutritional intake [ ] anasarca [ ] catabolic state   Albumin, Serum: 3.5 g/dL (10-23-18 @ 22:11)   Artificial Nutrition [ ]     REFERRALS:   [ ]Chaplaincy  [ ] Hospice  [ ]Child Life  [ x]Social Work  [ ]Case management [ ]Holistic Therapy [ ] Physical Therapy [ ] Dietary   Goals of Care Document: Goals of Care Conversation - Personal Advance Directive [HARVEY Locke] (10-23-18 @ 15:42)

## 2018-10-30 NOTE — PROGRESS NOTE ADULT - PROBLEM SELECTOR PROBLEM 2
Delirium due to another medical condition

## 2018-10-30 NOTE — DISCHARGE NOTE ADULT - MEDICATION SUMMARY - MEDICATIONS TO STOP TAKING
I will STOP taking the medications listed below when I get home from the hospital:    enalapril 20 mg oral tablet  -- 1 tab(s) by mouth once a day    famotidine 20 mg oral tablet    tamsulosin 0.4 mg oral capsule  -- 1 cap(s) by mouth once a day (at bedtime)    aluminum hydroxide-magnesium hydroxide 200 mg-200 mg/5 mL oral suspension  -- 30 milliliter(s) by mouth 4 times a day, As needed, Indigestion    lactobacillus acidophilus oral capsule  -- 1 tab(s) by mouth 3 times a day    simethicone 80 mg oral tablet, chewable  -- 1 tab(s) by mouth every 6 hours, As needed, Gas    dexamethasone 4 mg oral tablet  -- 1 tab(s) by mouth every 6 hours    divalproex sodium 250 mg oral delayed release tablet  -- 1 tab(s) by mouth once a day (at bedtime)    levETIRAcetam 500 mg oral tablet  -- 1 tab(s) by mouth 2 times a day    haloperidol 1 mg oral tablet  -- 1 tab(s) by mouth 2 times a day    melatonin 3 mg oral tablet  -- 1 tab(s) by mouth once a day (at bedtime), As needed, Insomnia

## 2018-10-30 NOTE — DISCHARGE NOTE ADULT - PATIENT PORTAL LINK FT
You can access the [facility], offered by [facility], by registering with the following website: [email or web address]

## 2018-10-30 NOTE — PROGRESS NOTE ADULT - ATTENDING COMMENTS
I have personally seen and examined this patient and agree with the above assessment and plan.   Patient to be tranferred to hospice inpatient.
I have personally seen and examined this patient and agree with the above assessment and plan.   Difficult to manage delirium/agitation.  Slow steroid taper, tristin-psych input appreciated.  Haldol was helpful but pt developed increased tremors and rigidity.  May need to discuss using more sedating medications with family.
I have personally seen and examined this patient and agree with the above assessment and plan.    Met with son at bedside, noted improvement.
I have personally seen and examined this patient and agree with the above assessment and plan.   Family wants him comfortable.  Family members educated as to what to expect.  Questions answered.  Emotional support provided.
I was physically present for the key portions of the evaluation and management (E/M) service provided.  I agree with the above history, physical, and plan which I have reviewed and edited where appropriate. Plan discussed with team.

## 2018-10-30 NOTE — DISCHARGE NOTE ADULT - CARE PLAN
Principal Discharge DX:	Glioblastoma  Goal:	full comfort care  Assessment and plan of treatment:	no disease directed therapies  Secondary Diagnosis:	Agitation  Goal:	comfort  Assessment and plan of treatment:	benzodiazepines, depakote and hospice care

## 2018-10-30 NOTE — DISCHARGE NOTE ADULT - CARE PROVIDER_API CALL
Scott Felix (NP; RN), NP in Adult Health  [address] [place] [address]  Phone: [phone]  Fax: [phone]

## 2018-10-30 NOTE — DISCHARGE NOTE ADULT - COMMUNITY RESOURCES
[facility]: [phone]  [facility] ([address], [place] [address], ph:[phone])

## 2018-10-30 NOTE — GOALS OF CARE CONVERSATION - PERSONAL ADVANCE DIRECTIVE - CONVERSATION DETAILS
Met with pt's daughter at bedside, consents for hospice care signed.  Family requested inpt level of hospice care at [facility].  Reviewed pt assessment with [facility] physician, pt approved for inpt care at [facility], bed available today.  Pt's dtr/HCP Bea Belle agreed with transfer this afternoon.  Antonella Barlow MSW aware and will arrange for transportation.
Lengthy discussion with patient's daughter and HCP, Bea. (HCP in chart) to discuss goals of care. 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 Patient lacks capacity at this time. Family not interested in any surgical intervention. Came in to hospital due to severeity of agitation and aggressive behavior that could not be managed at home. Patient took last medications on Saturday day and then refused.     Family interested in taking patient home as quickly and for as long as possible. Patient originally with [facility] hospice services at home that have been revoked in light of hospitalization. If agitation improves and manageable, would like to take patient back home.     Confirmed original [facility] requests including DNR/DNI. Form in chart.

## 2018-10-30 NOTE — PROGRESS NOTE ADULT - PROBLEM SELECTOR PLAN 4
dispo home with hospice pending symptom management.   GOC discussion on 10/23, DEAN in chart  Family meeting with two daughters held on 10/29.  Explained challenges to managing his behavior, likely related to brain mass.  Prior to his illness was high functioning, no signs of disease.

## 2018-10-30 NOTE — PROGRESS NOTE ADULT - PROBLEM SELECTOR PROBLEM 4
Encounter for palliative care

## 2018-11-11 PROCEDURE — 99285 EMERGENCY DEPT VISIT HI MDM: CPT | Mod: 25

## 2018-11-11 PROCEDURE — 93005 ELECTROCARDIOGRAM TRACING: CPT

## 2018-11-11 PROCEDURE — 82607 VITAMIN B-12: CPT

## 2018-11-11 PROCEDURE — 85027 COMPLETE CBC AUTOMATED: CPT

## 2018-11-11 PROCEDURE — 86780 TREPONEMA PALLIDUM: CPT

## 2018-11-11 PROCEDURE — 96372 THER/PROPH/DIAG INJ SC/IM: CPT

## 2018-11-11 PROCEDURE — 84443 ASSAY THYROID STIM HORMONE: CPT

## 2018-11-11 PROCEDURE — 81003 URINALYSIS AUTO W/O SCOPE: CPT

## 2018-11-11 PROCEDURE — 80053 COMPREHEN METABOLIC PANEL: CPT

## 2018-11-11 PROCEDURE — 82746 ASSAY OF FOLIC ACID SERUM: CPT

## 2018-11-11 PROCEDURE — 80164 ASSAY DIPROPYLACETIC ACD TOT: CPT

## 2018-11-13 NOTE — PROGRESS NOTE BEHAVIORAL HEALTH - NSBHCONSULTWORKUP_PSY_A_CORE
Number Of Freeze-Thaw Cycles: 1 freeze-thaw cycle
Consent: The patient's consent was obtained including but not limited to risks of crusting, blistering, scarring, pigmentary change.
Render Post-Care Instructions In Note?: no
Detail Level: Simple
Post-Care Instructions: Pt may apply Vaseline to crusted or scabbing areas.
Duration Of Freeze Thaw-Cycle (Seconds): 5
yes
yes

## 2019-02-27 NOTE — CHART NOTE - NSCHARTNOTEFT_GEN_A_CORE
CC: Agitation     Notified by RN; pt. is agitated and attempting to elope out of room. Pt. seen and examined at bedside. Originally admitted w/ C.diff infection. Pt. reportedly has a history of waxing and waning mentation for last week likely exacerbated by current infection. Pt. has repeatedly gotten out of isolation room and has forcibly removed his IV access. Pt. reports that he is trying to leave room to obtain his personal belongings including his wallet and clothes. Pt. would like to go upstairs to find materials. Attempted to redirect patient several times throughout the conversation. Discussed w/ Bill, pt.'s son, who also attempted to redirect patient over cellphone for approximately 1-2 hours.     Allergies  No Known Allergies    FAMILY HISTORY:  Family history of heart disease (Father)    PAST MEDICAL & SURGICAL HISTORY:  Osteoarthritis of right knee  Hypertension  Tremor of both hands  History of hernia surgery: X3 1950&#x27;s-1970&#x27;s  History of shoulder surgery: right, 2012  History of knee replacement, total, left: 2007    REVIEW OF SYSTEMS:  Unable to obtain ROS     Vital Signs Last 24 Hrs  T(C): 36.8 (03 Sep 2018 21:02), Max: 36.8 (03 Sep 2018 21:02)  T(F): 98.2 (03 Sep 2018 21:02), Max: 98.2 (03 Sep 2018 21:02)  HR: 80 (03 Sep 2018 21:02) (80 - 85)  BP: 110/69 (03 Sep 2018 21:02) (110/69 - 114/64)  RR: 18 (03 Sep 2018 21:02) (16 - 18)  SpO2: 98% (03 Sep 2018 21:02) (95% - 98%)    PHYSICAL EXAM:  GENERAL: NAD, well-groomed, well-developed  HEAD:  Atraumatic, Normocephalic  EYES: EOMI, PERRLA, conjunctiva and sclera clear  ENMT: No tonsillar erythema, exudates, or enlargement; Moist mucous membranes, Good dentition, No lesions  NECK: Supple, No JVD, Normal thyroid  NERVOUS SYSTEM:  Alert & Oriented X3, Good concentration; Motor Strength 5/5 B/L upper and lower extremities; DTRs 2+ intact and symmetric  CHEST/LUNG: Clear to percussion bilaterally; No rales, rhonchi, wheezing, or rubs  HEART: Regular rate and rhythm; No murmurs, rubs, or gallops  ABDOMEN: Soft, Nontender, Nondistended; Bowel sounds present  EXTREMITIES:  2+ Peripheral Pulses, No clubbing, cyanosis, or edema  LYMPH: No lymphadenopathy noted  SKIN: No rashes or lesions    LABS:                        10.4   8.18  )-----------( 249      ( 03 Sep 2018 09:43 )             32.3     09-03    138  |  105  |  24<H>  ----------------------------<  92  3.9   |  22  |  0.97    Ca    9.1      03 Sep 2018 07:11      Culture - Blood (collected 02 Sep 2018 21:33)  Source: .Blood Blood  Preliminary Report (03 Sep 2018 22:01):    No growth to date.      ASSESSMENT/PLAN  Pt. is an 86 year old male with HTN, BPH, recent completion of vancomycin for a cdiff infection (complete 14 days ago) presents to the ED secondary to C. diff infection is now being seen for AMS    1)AMS; possibly encephalopathy secondary to C. diff infection    -Formal psych evaluation in AM  -Consider neuro evaluation       -Honorio Sol PA-C. #[id number] CHIEF COMPLAINT:    Interval Events: No overnight events    REVIEW OF SYSTEMS:  Constitutional:   Eyes:  ENT:  CV:  Resp:  GI:  :  MSK:  Integumentary:  Neurological:  Psychiatric:  Endocrine:  Hematologic/Lymphatic:  Allergic/Immunologic:  [ ] All other systems negative  [ ] Unable to assess ROS because ________    OBJECTIVE:  ICU Vital Signs Last 24 Hrs  T(C): 37.1 (27 Feb 2019 00:00), Max: 37.2 (26 Feb 2019 16:00)  T(F): 98.8 (27 Feb 2019 00:00), Max: 99 (26 Feb 2019 16:00)  HR: 85 (27 Feb 2019 07:00) (74 - 99)  BP: 137/83 (27 Feb 2019 07:00) (90/62 - 145/90)  BP(mean): 99 (27 Feb 2019 07:00) (70 - 108)  ABP: --  ABP(mean): --  RR: 19 (27 Feb 2019 07:00) (15 - 25)  SpO2: 96% (27 Feb 2019 07:00) (92% - 99%)        02-26 @ 07:01  -  02-27 @ 07:00  --------------------------------------------------------  IN: 439.2 mL / OUT: 1565 mL / NET: -1125.8 mL      CAPILLARY BLOOD GLUCOSE      POCT Blood Glucose.: 103 mg/dL (27 Feb 2019 05:05)      PHYSICAL EXAM:  General:   HEENT:   Lymph Nodes:  Neck:   Respiratory:   Cardiovascular:   Abdomen:   Extremities:   Skin:   Neurological:  Psychiatry:    LINES:    HOSPITAL MEDICATIONS:  MEDICATIONS  (STANDING):  collagenase Ointment 1 Application(s) Topical daily  dexmedetomidine Infusion 0.2 MICROgram(s)/kG/Hr (5.61 mL/Hr) IV Continuous <Continuous>  dextrose 5%. 1000 milliLiter(s) (50 mL/Hr) IV Continuous <Continuous>  dextrose 50% Injectable 12.5 Gram(s) IV Push once  dextrose 50% Injectable 25 Gram(s) IV Push once  dextrose 50% Injectable 25 Gram(s) IV Push once  ertapenem  IVPB 1000 milliGRAM(s) IV Intermittent every 24 hours  fentaNYL   Infusion. 0.5 MICROgram(s)/kG/Hr (5.61 mL/Hr) IV Continuous <Continuous>  fentaNYL   Patch  50 MICROgram(s)/Hr 1 Patch Transdermal every 72 hours  heparin  Injectable 7500 Unit(s) SubCutaneous every 8 hours  influenza   Vaccine 0.5 milliLiter(s) IntraMuscular once  insulin glargine Injectable (LANTUS) 18 Unit(s) SubCutaneous at bedtime  insulin lispro (HumaLOG) corrective regimen sliding scale   SubCutaneous every 6 hours  levothyroxine Injectable 100 MICROGram(s) IV Push at bedtime  methylPREDNISolone sodium succinate Injectable 10 milliGRAM(s) IV Push daily  pantoprazole  Injectable 40 milliGRAM(s) IV Push daily  potassium chloride    Tablet ER 40 milliEquivalent(s) Oral once  sildenafil (REVATIO) 20 milliGRAM(s) Oral three times a day  simvastatin 20 milliGRAM(s) Oral at bedtime    MEDICATIONS  (PRN):  acetaminophen    Suspension .. 650 milliGRAM(s) Oral every 6 hours PRN Temp greater or equal to 38C (100.4F), Mild Pain (1 - 3), Moderate Pain (4 - 6)  dextrose 40% Gel 15 Gram(s) Oral once PRN Blood Glucose LESS THAN 70 milliGRAM(s)/deciliter  glucagon  Injectable 1 milliGRAM(s) IntraMuscular once PRN Glucose LESS THAN 70 milligrams/deciliter  glucagon  Injectable 1 milliGRAM(s) IntraMuscular once PRN Glucose LESS THAN 70 milligrams/deciliter      LABS:                        9.0    9.93  )-----------( 418      ( 27 Feb 2019 04:40 )             31.8     02-27    147<H>  |  104  |  37<H>  ----------------------------<  108<H>  3.4<L>   |  25  |  0.84    Ca    9.6      27 Feb 2019 04:40  Phos  3.1     02-27  Mg     2.1     02-27    TPro  7.3  /  Alb  3.5  /  TBili  0.5  /  DBili  x   /  AST  27  /  ALT  24  /  AlkPhos  123<H>  02-27              MICROBIOLOGY:     RADIOLOGY:  [ ] Reviewed and interpreted by me    EKG: CC: Agitation     Notified by RN; pt. is agitated and attempting to elope out of room. Pt. seen and examined at bedside. Originally admitted w/ C.diff infection. Pt. reportedly has a history of waxing and waning mentation for last week likely exacerbated by current infection. Pt. has repeatedly gotten out of isolation room and has forcibly removed his IV access. Pt. reports that he is trying to leave room to obtain his personal belongings including his wallet and clothes. Pt. would like to go upstairs to find materials. Attempted to redirect patient several times throughout the conversation. Discussed w/ Bill, pt.'s son, who also attempted to redirect patient over cellphone for approximately 1-2 hours. Pt. is currently A&Ox1-2.     Allergies  No Known Allergies    FAMILY HISTORY:  Family history of heart disease (Father)    PAST MEDICAL & SURGICAL HISTORY:  Osteoarthritis of right knee  Hypertension  Tremor of both hands  History of hernia surgery: X3 1950&#x27;s-1970&#x27;s  History of shoulder surgery: right, 2012  History of knee replacement, total, left: 2007    REVIEW OF SYSTEMS:  Unable to obtain ROS     Vital Signs Last 24 Hrs  T(C): 36.8 (03 Sep 2018 21:02), Max: 36.8 (03 Sep 2018 21:02)  T(F): 98.2 (03 Sep 2018 21:02), Max: 98.2 (03 Sep 2018 21:02)  HR: 80 (03 Sep 2018 21:02) (80 - 85)  BP: 110/69 (03 Sep 2018 21:02) (110/69 - 114/64)  RR: 18 (03 Sep 2018 21:02) (16 - 18)  SpO2: 98% (03 Sep 2018 21:02) (95% - 98%)    PHYSICAL EXAM:  GENERAL: NAD, well-groomed, well-developed  HEAD:  Atraumatic, Normocephalic  EYES: EOMI, PERRLA, conjunctiva and sclera clear  ENMT: No tonsillar erythema, exudates, or enlargement; Moist mucous membranes, Good dentition, No lesions  NECK: Supple, No JVD, Normal thyroid  NERVOUS SYSTEM:  B/L tremors in UE. A&Ox1-2. Good concentration; Motor Strength 5/5 B/L upper and lower extremities; DTRs 2+ intact and symmetric  CHEST/LUNG: Clear to percussion bilaterally; No rales, rhonchi, wheezing, or rubs  HEART: Regular rate and rhythm; No murmurs, rubs, or gallops  ABDOMEN: Soft, Nontender, Nondistended; Bowel sounds present  EXTREMITIES:  B/L tremors in arms. 2+ Peripheral Pulses, No clubbing, cyanosis, or edema  LYMPH: No lymphadenopathy noted  SKIN: No rashes or lesions    LABS:                        10.4   8.18  )-----------( 249      ( 03 Sep 2018 09:43 )             32.3     09-03    138  |  105  |  24<H>  ----------------------------<  92  3.9   |  22  |  0.97    Ca    9.1      03 Sep 2018 07:11      Culture - Blood (collected 02 Sep 2018 21:33)  Source: .Blood Blood  Preliminary Report (03 Sep 2018 22:01):    No growth to date.      ASSESSMENT/PLAN  Pt. is an 86 year old male with HTN, BPH, recent completion of vancomycin for a cdiff infection (complete 14 days ago) presents to the ED secondary to C. diff infection is now being seen for AMS    1)AMS; possibly encephalopathy secondary to C. diff infection  -Pt. acutely agitated w/ QTC of 453. No recorded history of Parkinson's disease but resting tremors on physical exam. Pt. received IV Haldol 1mg earlier but IV infiltrated secondary to combative pt. Will order Haldol 1mg IM x 1.  -Constant observation ordered to maintain safety and for elopement risk.   -CTH ordered  -CBC, BMP, Mg, P, TSH, B12, RPR, Folate, UA ordered.   -Formal psych evaluation in AM  -Consider neuro evaluation  -Notified son, Reji, who is aware and agrees w/ plan.   -Continue to monitor pt. throughout the night.  -F/u w/ primary team in AM.    2) C. diff   -Continue PO [facility]     -TANISHA MerinoC. #[id number] CC: Agitation     Notified by RN; pt. is agitated and attempting to elope out of room. Pt. seen and examined at bedside. Originally admitted w/ C.diff infection. Pt. reportedly has a history of waxing and waning mentation for last week likely exacerbated by current infection. Pt. has repeatedly gotten out of isolation room and has forcibly removed his IV access. Pt. reports that he is trying to leave room to obtain his personal belongings including his wallet and clothes. Pt. would like to go upstairs to find materials. Attempted to redirect patient several times throughout the conversation. Discussed w/ Bill, pt.'s son, who also attempted to redirect patient over cellphone for approximately 1-2 hours. Pt. is currently A&Ox1-2.     Allergies  No Known Allergies    FAMILY HISTORY:  Family history of heart disease (Father)    PAST MEDICAL & SURGICAL HISTORY:  Osteoarthritis of right knee  Hypertension  Tremor of both hands  History of hernia surgery: X3 1950&#x27;s-1970&#x27;s  History of shoulder surgery: right, 2012  History of knee replacement, total, left: 2007    REVIEW OF SYSTEMS:  Unable to obtain ROS     Vital Signs Last 24 Hrs  T(C): 36.8 (03 Sep 2018 21:02), Max: 36.8 (03 Sep 2018 21:02)  T(F): 98.2 (03 Sep 2018 21:02), Max: 98.2 (03 Sep 2018 21:02)  HR: 80 (03 Sep 2018 21:02) (80 - 85)  BP: 110/69 (03 Sep 2018 21:02) (110/69 - 114/64)  RR: 18 (03 Sep 2018 21:02) (16 - 18)  SpO2: 98% (03 Sep 2018 21:02) (95% - 98%)    PHYSICAL EXAM:  GENERAL: NAD, well-groomed, well-developed  HEAD:  Atraumatic, Normocephalic  EYES: EOMI, PERRLA, conjunctiva and sclera clear  ENMT: No tonsillar erythema, exudates, or enlargement; Moist mucous membranes, Good dentition, No lesions  NECK: Supple, No JVD, Normal thyroid  NERVOUS SYSTEM:  B/L tremors in UE. A&Ox1-2. Good concentration; Motor Strength 5/5 B/L upper and lower extremities; DTRs 2+ intact and symmetric  CHEST/LUNG: Clear to percussion bilaterally; No rales, rhonchi, wheezing, or rubs  HEART: Regular rate and rhythm; No murmurs, rubs, or gallops  ABDOMEN: Soft, Nontender, Nondistended; Bowel sounds present  EXTREMITIES:  B/L tremors in arms. 2+ Peripheral Pulses, No clubbing, cyanosis, or edema  LYMPH: No lymphadenopathy noted  SKIN: No rashes or lesions    LABS:                        10.4   8.18  )-----------( 249      ( 03 Sep 2018 09:43 )             32.3     09-03    138  |  105  |  24<H>  ----------------------------<  92  3.9   |  22  |  0.97    Ca    9.1      03 Sep 2018 07:11      Culture - Blood (collected 02 Sep 2018 21:33)  Source: .Blood Blood  Preliminary Report (03 Sep 2018 22:01):    No growth to date.      ASSESSMENT/PLAN  Pt. is an 86 year old male with HTN, BPH, recent completion of vancomycin for a cdiff infection (complete 14 days ago) presents to the ED secondary to C. diff infection is now being seen for AMS    1)AMS; possibly encephalopathy secondary to C. diff infection  -Pt. acutely agitated w/ QTC of 453. No recorded history of Parkinson's disease but resting tremors on physical exam. Pt. received IV Haldol 1mg earlier but IV infiltrated secondary to combative pt. Will order Haldol 1mg IM x 1.  -Constant observation ordered to maintain safety and for elopement risk.   -CTH ordered  -STAT fingerstick   -CBC, BMP, Mg, P, TSH, B12, RPR, Folate, UA ordered.   -Formal psych evaluation in AM  -Consider neuro evaluation  -Notified son, Reji, who is aware and agrees w/ plan.   -Continue to monitor pt. throughout the night.  -F/u w/ primary team in AM.    2) C. diff   -Continue PO [facility]     -Honorio Sol PA-C. #[id number] CHIEF COMPLAINT:    Interval Events: No overnight events. AxOx3 this AM. Comfortable. Has had lower extremity pain and abdominal distension. Continued stool output and urine output.     REVIEW OF SYSTEMS:  Constitutional:   Eyes:  ENT:  CV:  Resp:  GI:  :  MSK:  Integumentary:  Neurological:  Psychiatric:  Endocrine:  Hematologic/Lymphatic:  Allergic/Immunologic:  [ ] All other systems negative  [ ] Unable to assess ROS because ________    OBJECTIVE:  ICU Vital Signs Last 24 Hrs  T(C): 37.1 (27 Feb 2019 00:00), Max: 37.2 (26 Feb 2019 16:00)  T(F): 98.8 (27 Feb 2019 00:00), Max: 99 (26 Feb 2019 16:00)  HR: 85 (27 Feb 2019 07:00) (74 - 99)  BP: 137/83 (27 Feb 2019 07:00) (90/62 - 145/90)  BP(mean): 99 (27 Feb 2019 07:00) (70 - 108)  ABP: --  ABP(mean): --  RR: 19 (27 Feb 2019 07:00) (15 - 25)  SpO2: 96% (27 Feb 2019 07:00) (92% - 99%)        02-26 @ 07:01  -  02-27 @ 07:00  --------------------------------------------------------  IN: 439.2 mL / OUT: 1565 mL / NET: -1125.8 mL      CAPILLARY BLOOD GLUCOSE      POCT Blood Glucose.: 103 mg/dL (27 Feb 2019 05:05)      PHYSICAL EXAM:  General:   HEENT:   Lymph Nodes:  Neck:   Respiratory:   Cardiovascular:   Abdomen:   Extremities:   Skin:   Neurological:  Psychiatry:    LINES:    HOSPITAL MEDICATIONS:  MEDICATIONS  (STANDING):  collagenase Ointment 1 Application(s) Topical daily  dexmedetomidine Infusion 0.2 MICROgram(s)/kG/Hr (5.61 mL/Hr) IV Continuous <Continuous>  dextrose 5%. 1000 milliLiter(s) (50 mL/Hr) IV Continuous <Continuous>  dextrose 50% Injectable 12.5 Gram(s) IV Push once  dextrose 50% Injectable 25 Gram(s) IV Push once  dextrose 50% Injectable 25 Gram(s) IV Push once  ertapenem  IVPB 1000 milliGRAM(s) IV Intermittent every 24 hours  fentaNYL   Infusion. 0.5 MICROgram(s)/kG/Hr (5.61 mL/Hr) IV Continuous <Continuous>  fentaNYL   Patch  50 MICROgram(s)/Hr 1 Patch Transdermal every 72 hours  heparin  Injectable 7500 Unit(s) SubCutaneous every 8 hours  influenza   Vaccine 0.5 milliLiter(s) IntraMuscular once  insulin glargine Injectable (LANTUS) 18 Unit(s) SubCutaneous at bedtime  insulin lispro (HumaLOG) corrective regimen sliding scale   SubCutaneous every 6 hours  levothyroxine Injectable 100 MICROGram(s) IV Push at bedtime  methylPREDNISolone sodium succinate Injectable 10 milliGRAM(s) IV Push daily  pantoprazole  Injectable 40 milliGRAM(s) IV Push daily  potassium chloride    Tablet ER 40 milliEquivalent(s) Oral once  sildenafil (REVATIO) 20 milliGRAM(s) Oral three times a day  simvastatin 20 milliGRAM(s) Oral at bedtime    MEDICATIONS  (PRN):  acetaminophen    Suspension .. 650 milliGRAM(s) Oral every 6 hours PRN Temp greater or equal to 38C (100.4F), Mild Pain (1 - 3), Moderate Pain (4 - 6)  dextrose 40% Gel 15 Gram(s) Oral once PRN Blood Glucose LESS THAN 70 milliGRAM(s)/deciliter  glucagon  Injectable 1 milliGRAM(s) IntraMuscular once PRN Glucose LESS THAN 70 milligrams/deciliter  glucagon  Injectable 1 milliGRAM(s) IntraMuscular once PRN Glucose LESS THAN 70 milligrams/deciliter      LABS:                        9.0    9.93  )-----------( 418      ( 27 Feb 2019 04:40 )             31.8     02-27    147<H>  |  104  |  37<H>  ----------------------------<  108<H>  3.4<L>   |  25  |  0.84    Ca    9.6      27 Feb 2019 04:40  Phos  3.1     02-27  Mg     2.1     02-27    TPro  7.3  /  Alb  3.5  /  TBili  0.5  /  DBili  x   /  AST  27  /  ALT  24  /  AlkPhos  123<H>  02-27              MICROBIOLOGY:     RADIOLOGY:  [ ] Reviewed and interpreted by me    EKG: CHIEF COMPLAINT:    Interval Events: No overnight events. AxOx3 this AM. Comfortable. Has had lower extremity pain and abdominal distension. Continued stool output and urine output. Continued tremors    REVIEW OF SYSTEMS:  Constitutional: NAD  Resp: Continues to use BIPAP  GI: has stool output  : iraheta  Neurological: continued tremors  [x] All other systems negative    OBJECTIVE:  ICU Vital Signs Last 24 Hrs  T(C): 37.1 (27 Feb 2019 00:00), Max: 37.2 (26 Feb 2019 16:00)  T(F): 98.8 (27 Feb 2019 00:00), Max: 99 (26 Feb 2019 16:00)  HR: 85 (27 Feb 2019 07:00) (74 - 99)  BP: 137/83 (27 Feb 2019 07:00) (90/62 - 145/90)  BP(mean): 99 (27 Feb 2019 07:00) (70 - 108)  ABP: --  ABP(mean): --  RR: 19 (27 Feb 2019 07:00) (15 - 25)  SpO2: 96% (27 Feb 2019 07:00) (92% - 99%)        02-26 @ 07:01  -  02-27 @ 07:00  --------------------------------------------------------  IN: 439.2 mL / OUT: 1565 mL / NET: -1125.8 mL      CAPILLARY BLOOD GLUCOSE      POCT Blood Glucose.: 103 mg/dL (27 Feb 2019 05:05)      PHYSICAL EXAM:  General: NAD  Respiratory: On BIPAP, regular respirations  Cardiovascular: RRR  Abdomen: distended, soft, gauze c/d/i  Extremities: improved swelling  Skin: old scars, gauze c/d/i  Neurological: calm    LINES:    HOSPITAL MEDICATIONS:  MEDICATIONS  (STANDING):  collagenase Ointment 1 Application(s) Topical daily  dexmedetomidine Infusion 0.2 MICROgram(s)/kG/Hr (5.61 mL/Hr) IV Continuous <Continuous>  dextrose 5%. 1000 milliLiter(s) (50 mL/Hr) IV Continuous <Continuous>  dextrose 50% Injectable 12.5 Gram(s) IV Push once  dextrose 50% Injectable 25 Gram(s) IV Push once  dextrose 50% Injectable 25 Gram(s) IV Push once  ertapenem  IVPB 1000 milliGRAM(s) IV Intermittent every 24 hours  fentaNYL   Infusion. 0.5 MICROgram(s)/kG/Hr (5.61 mL/Hr) IV Continuous <Continuous>  fentaNYL   Patch  50 MICROgram(s)/Hr 1 Patch Transdermal every 72 hours  heparin  Injectable 7500 Unit(s) SubCutaneous every 8 hours  influenza   Vaccine 0.5 milliLiter(s) IntraMuscular once  insulin glargine Injectable (LANTUS) 18 Unit(s) SubCutaneous at bedtime  insulin lispro (HumaLOG) corrective regimen sliding scale   SubCutaneous every 6 hours  levothyroxine Injectable 100 MICROGram(s) IV Push at bedtime  methylPREDNISolone sodium succinate Injectable 10 milliGRAM(s) IV Push daily  pantoprazole  Injectable 40 milliGRAM(s) IV Push daily  potassium chloride    Tablet ER 40 milliEquivalent(s) Oral once  sildenafil (REVATIO) 20 milliGRAM(s) Oral three times a day  simvastatin 20 milliGRAM(s) Oral at bedtime    MEDICATIONS  (PRN):  acetaminophen    Suspension .. 650 milliGRAM(s) Oral every 6 hours PRN Temp greater or equal to 38C (100.4F), Mild Pain (1 - 3), Moderate Pain (4 - 6)  dextrose 40% Gel 15 Gram(s) Oral once PRN Blood Glucose LESS THAN 70 milliGRAM(s)/deciliter  glucagon  Injectable 1 milliGRAM(s) IntraMuscular once PRN Glucose LESS THAN 70 milligrams/deciliter  glucagon  Injectable 1 milliGRAM(s) IntraMuscular once PRN Glucose LESS THAN 70 milligrams/deciliter      LABS:                        9.0    9.93  )-----------( 418      ( 27 Feb 2019 04:40 )             31.8     02-27    147<H>  |  104  |  37<H>  ----------------------------<  108<H>  3.4<L>   |  25  |  0.84    Ca    9.6      27 Feb 2019 04:40  Phos  3.1     02-27  Mg     2.1     02-27    TPro  7.3  /  Alb  3.5  /  TBili  0.5  /  DBili  x   /  AST  27  /  ALT  24  /  AlkPhos  123<H>  02-27              MICROBIOLOGY:     RADIOLOGY:  [ ] Reviewed and interpreted by me    EKG:

## 2019-04-16 NOTE — PATIENT PROFILE ADULT. - SITE
Addended by: BRANDON COMBS on: 4/16/2019 09:22 AM     Modules accepted: Level of Service, SmartSet     right knee

## 2019-05-10 NOTE — PROVIDER CONTACT NOTE (CRITICAL VALUE NOTIFICATION) - NAME OF MD/NP/PA/DO NOTIFIED:
Spoke with Rebecca MRI, pt had an allergic reaction and was in the ED on 5/8. Notes from visit, \"Pt started taking ELiquis and Cardizem at the same time she started to break out in rash Friday. Pt was told to stop taking Eliquis but rash continues to spread and is over entire body pt states it eaches as well. \"  Her MRI has been rescheduled for two weeks Dr Graham

## 2019-05-22 NOTE — PATIENT PROFILE ADULT. - FUNCTIONAL SCREEN CURRENT LEVEL: TRANSFERRING, MLM
History and Physical    Patient:                 MRN#: [id number] FIN: [id number]  MARIA ELENA ROSAS  Age:         89         Sex: F          : 1920  Author:      Fatuma Stewart M.D.    DATE OF ADMISSION: May 21, 2019.    ADMITTING DIAGNOSIS: Chest congestion and shortness of breath.    HISTORY OF PRESENT ILLNESS: History was given by her daughter.  She is a 89-year-old female, who had a hip fracture and a left hip arthroplasty in February, homebound, with past medical history significant for congestive heart failure, mild CVA, advanced dementia, and essential hypertension, was brought to the emergency room by the family member because of her cough and shortness of breath.  As per family, she has a mild fever 24-48 hours with  the chest congestion.  So, she was evaluated in the ER and had a chest x-ray, which was showing borderline cardiomegaly without any sign of congestive heart failure or other acute cardiopulmonary disease.  The patient has a lab done in the ER and lab was showing a mild elevated troponin and elevated NT-proBNP that was 1683.  She has a past medical history of aortic stenosis and mitral stenosis and diastolic congestive heart failure.  She was on  lowered dose of Lasix and ARB and beta-blocker for treatment of congestive heart failure.  She had a recent left hip arthroplasty by Dr. Pretty and a followup x-ray was showing the arthroplasty device in position.  Currently, she is getting medical care from home doctor as she is homebound.    PAST MEDICAL HISTORY:   1. CVA with no focal deficit.  2. History of diastolic CHF.  3. Advanced dementia secondary to Alzheimer's disease.  4. Essential hypertension.    FAMILY AND SOCIAL HISTORY: She has been staying with her daughter with home support.    MEDICATIONS: Prior to admission, she was on amlodipine 5 mg, carvedilol 6.25 mg b.i.d., furosemide 20 mg daily, Namenda 5 mg daily, risperidone 3 mg half tablet b.i.d., and valsartan 80 mg  daily.    ALLERGIES: She is allergic to penicillin.    PHYSICAL EXAMINATION: GENERAL   She is alert but noncommunicative.     NECK   Tenderness in the C-spine.     CHEST   Clear to auscultation, but reduced.     CVS   S1, S2 heard with a systolic ejection murmur.     ABDOMEN   Soft.     SKIN   Ischemic ulcer noted in a pan-sacral area as per notes.     EXTREMITIES   Limited movement secondary to advanced DJD.     Rest of the examinations are unremarkable.    LABS: Done this morning, normal metabolic panel.  Troponin 0.10 and 0.12.  NT-proBNP 1683.  Calcium 8.2.  White blood count 5.2, hemoglobin 10.6, and platelet 169.    INITIAL IMPRESSION:   1. Chest congestion with fever and chills as per doctor.  We did empirically treat with oral antibiotic.  2. Diastolic congestive heart failure with history of aortic and mitral stenosis.  We will consider change oral Lasix to intravenous Lasix, slow diuresis, followup echo, and Cardiology consult.  3. Essential hypertension, well controlled.  4. Elevated troponin, which is most likely secondary to troponin leak, but because of her advanced age, she is not a candidate for any cardiac cath or a stress test.  We will wait for Dr. Gibbons's consult for any further cardiac workup.        _________________________________  Fatuma Stewart M.D.          JANICE/Lissa  DP:  [id number]  DD:  05/21/2019 23:24:15  DT:  05/22/2019 00:55:03  Job #:  [id number]       (2) assistive person

## 2019-07-24 NOTE — BEHAVIORAL HEALTH ASSESSMENT NOTE - NS ED BHA MED ROS CARDIOVASCULAR
Destinee with [facility] home [facility], pt not answering phone today, plan was to visit pt today, will cancel appointment today and see pt tomorrow, FYI to TIMOTEO Strong RN, BSN  Message handled by Nurse Triage.     No complaints

## 2020-04-09 NOTE — BEHAVIORAL HEALTH ASSESSMENT NOTE - NSBHCHARTREVIEWLAB_PSY_A_CORE FT
yes 10.1   5.62  )-----------( 218      ( 04 Sep 2018 07:56 )             29.7     Hgb Trend: 10.1<--, 10.4<--, 10.0<--, 12.8<--  09-04    138  |  107  |  16  ----------------------------<  89  3.5   |  20<L>  |  0.89    Ca    8.9      04 Sep 2018 06:26  Phos  3.2     09-04  Mg     1.6     09-04    TPro  6.1  /  Alb  3.3  /  TBili  0.3  /  DBili  x   /  AST  16  /  ALT  9<L>  /  AlkPhos  58  09-04    Creatinine Trend: 0.89<--, 0.97<--, 1.21<--, 1.30<--    RPR negative

## 2020-07-24 NOTE — ED PROVIDER NOTE - CPE EDP SKIN NORM
Your Child's Health  Girls over 12      Yulissa Bee  July 24, 2020    Visit Vitals  /74 (BP Location: RUE - Right upper extremity, Patient Position: Sitting, Cuff Size: Regular)   Pulse 98   Temp 99 °F (37.2 °C) (Tympanic)   Resp 18   Ht 5' 1\" (1.549 m)   Wt 56.7 kg   LMP 07/06/2020   SpO2 98%   BMI 23.62 kg/m²     Weight: 125 lbs      Adolescence begins about age 10 in girls and ends somewhere in the early twenties.  During this time many changes occur.    BODY CHANGES:  There is no other time of life, except in the first year, when your body grows so rapidly.  This is called a \"growth spurt\" and usually precedes and accompanies changes in your body shape as well as changes in your sexual organs.  Bones and muscles grow rapidly, fat is added and distributed in an adult manner, and the result is a rapid increase in height and weight.  It may even take a while for your coordination and skills to catch up with your rapidly changing body.  Your hands and feet are the first to grow, followed by lengthening of your arms and legs.  Finally, your spine grows, and your rib cage and your pelvis widen.    Various hormones secreted by the endocrine glands lead to development.  The first major change is breast development.  The second is usually pubic hair (hair between the legs), followed by body odor, hair in the armpits, and finally menstruation (periodic bleeding or periods).  In some families, the hair and odor precede the development of the breast tissue.  The whole process takes about two years, although in some cases it may take as long as four or five years.    SEXUAL DEVELOPMENT AND MENSTRUATION:  Breast development may normally begin at any time after the age of eight.  One side may begin to grow several months before the other, and unevenness is common, especially in the first year.  About two-thirds of adult women are somewhat unequal from one side to the other; small differences are insignificant,  but large differences may cause considerable worry later.  Bloody or milky discharge from the breast is never normal.  Please let us know if this occurs.    Girls whose breasts begin to develop early will usually grow early, as compared to their friends. Girls who begin to develop later than average (14 years and up) will continue to grow after their friends have stopped.    The second sign of external development is growth of pubic hair.  The third is growth of underarm hair, and the last is menstruation.  Menstruation occurs about two years after breast development starts.  It may begin earlier or, in rare cases, as much as five years later.  Let us know if you have monthly lower abdominal discomfort or pain without any blood.  Many girls will have a clear mucous vaginal discharge prior to their periods.  This is normal, as long as it doesn't itch, burn, or have a strong odor.    Often, menstrual periods occur irregularly at first.  With time, they usually become more regular.  \"Cramps\" can occur in the lower abdomen or in the back.  They are usually mild, and sometimes increase in severity with age. Cramps are the result of an excess of the muscle contraction chemical in the uterus (womb).  They are not a sign of weakness or low pain tolerance.  Acetaminophen or ibuprofen are often helpful.  The newer ibuprofen medicines help 85 percent of girls.  Sometimes a prescribed medication may be needed if these over-the-counter medications are not adequate.     Some women experience Premenstrual Syndrome (PMS), which is characterized by bloating, swollen or tender breasts, headaches, depression, or irritability.  These symptoms occur prior to and at the beginning of a menstrual period.  This is unusual in teens and should be discussed with your doctor if it occurs.    Tampons are generally safe, comfortable, and easy to use, but adolescents who use tampons should be aware of the small risk of Toxic Shock Syndrome.  This  results from a bacterial infection that may occur in tampon users who leave tampons in for more than four hours.  Symptoms include high fever, headache, vomiting, diarrhea, rash, and shock.  Should these symptoms occur while you are using a tampon, you should remove it and notify your doctor immediately.  If you would like to use tampons but feel uncomfortable asking your mother how they are properly used, a female healthcare worker can demonstrate this for you.    There is a lot of normal variability in period timing.  However, you should make an appointment if the time between the start of one period and the start of the next is less than three weeks, or if the time between periods is more than 3 months.  Other signs that an exam may be needed are very short periods of two days or less, uncontrollable cramps, or any risk of pregnancy or venereal disease.    Once you start having periods, it is physically possible to get pregnant and have a baby.  Becoming pregnant is not a decision to be taken lightly.  There is no sure way to avoid pregnancy or venereal disease except to avoid sexual contact.  If 100 girls take the best known birth control and follow the directions perfectly, one will get pregnant each year.     Girls get venereal diseases from boys more easily than boys get them from girls, so girls have to be more careful.  No matter what anybody says, AIDS is fatal and a cure in the near future is not likely.  Please don't take chances.    SKIN PROBLEMS:  Acne affects 90 percent of teens.  It often starts earlier in girls than in boys, and it is generally at its worst around 15-16 years of age.  There are a lot of exceptions.  Call us if the over-the-counter medications are not helping.  Remember that washing too frequently, scrubbing, or drying vigorously may convert blackheads into white or red pimples.  Benzoyl peroxide is the most effective ingredient of those available over the counter.    Most of us are  born without brown spots on our skin (also called moles or wens).  By the time we are 20 years old, the average person has about 40 such spots.  They are normal as long as they remain brown, have a sharp edge, and remain smaller than the diameter of a pencil eraser.  If you have moles with any other characteristics, please show them to your doctor.  We would especially like to see moles that have been present since birth.  Please also let us know if there is skin cancer in your family.  Skin cancer almost never occurs under ten years of age, but there are a few cases in the teens.  The most dangerous skin cancers occur in people who have had sunburn, especially repeatedly, which is why we recommend sunscreens and protective clothing.     DIET:  It is important to maintain a balanced diet.  Calcium, most readily available in dairy products, is particularly important for strong bones.  If you cannot drink 3 glasses of milk a day without stomach aches, please tell us.  Limit your intake of fatty snacks (chips, fries, etc.).  Avoid eating in front of the TV or while preoccupied with music or videos.  You often eat more than you really want when distracted.  It is always important to eat as wide a variety of fruits and vegetables as you can.  If you drink less than 32 oz of milk per day, you should take a multivitamin with 600 International Units vitamin D.    Adolescent girls also need iron in their diets.  Menstruation depletes your iron supply.  Iron, found in red meats and dark green vegetables, is necessary as part of your daily diet to prevent anemia.    SAFETY:  Car accidents and gunshot wounds are major killers in your age range.  Your reflexes are quick, but seatbelts are still necessary when you ride in a car.  \"Friends\" who drive recklessly, drink and drive, use drugs, or play with guns or knives are being unreasonable and dangerous.  No one wants to be called a \"chicken\", but standing up to being called a  chicken in front of others is braver and safer.    Backpacks may cause neck strain or weakening of the arms if they exceed 15 percent  of your weight or are carried for long periods over just one shoulder.    SUBSTANCE ABUSE:  [place] unfortunately leads the nation in drinking and binge drinking.  Remember that every beer is 120-150 calories.  If you want information about alcohol, tobacco, or drugs for yourself or a friend, please ask us.  We will be glad to talk with you or mail information to you.  It is very hard to stop smoking once you start.  Please don't start.    RESPECT AND DATING:  When boys and girls get together, both the girls and the boys are trying to make a good impression.  Some people can make a good impression just by being themselves, while others may try a little too hard to please.  When you begin dating, you will probably want to date with a group of friends or date where an adult is within calling distance.  You are entitled to choose whom you would like to date and where.  At your age, most boys are becoming physically stronger.  Some have come to believe from stories they have heard that girls like to be treated roughly.  NO ONE HAS THE RIGHT TO HURT YOU.    Watch how your date treats his friends and family.  If he is mean to everyone else, be careful.  Despite what you see on TV or hear from your friends, most girls do not spend a night alone with a boy until after high school.  You are a good person who should have the confidence to expect friends (male or female) to treat you with respect.  Please tell your parents or tell us if someone has hurt you.    MEDICATIONS:  Once you are 12 or older, you can generally take adult doses of over-the-counter medications. However, you should not take extra-strength doses of acetaminophen (for example, 500 mg Tylenol tablets) unless you weigh more than 140 pounds.    MEDICATION FOR FEVER OR PAIN:   Acetaminophen liquid (e.g., Tylenol or Tempra) may  be given every four hours as needed for pain or fever.  Acetaminophen liquid is less concentrated than the infant dropper bottle type.  Be sure to check which product CONCENTRATION you are using.      CHILDREN’S Tylenol/Acetaminophen  (160 MG/5 mL)    Child’s Weight:  Dose:  36 - 47 pounds:    240 mg (7.5 mL (1 1/2 Teaspoons))  48 - 59 pounds:    320 mg (10.0 mL (2 Teaspoons))  60 - 71 pounds:    400 mg (12.5 mL (2 1/2 Teaspoons))  72 - 95 pounds:    480 mg (15.0 mL (3 Teaspoons))  Greater than 96 pounds:   640 mg (20.0 mL (4 Teaspoons))    CHILDREN’S Tylenol/Acetaminophen MELTAWAYS ( 80 MG tablets)    Child’s Weight:  Dose:  36 - 47 pounds:    240 mg (3 meltaway tablets)  48 - 59 pounds:    320 mg (4 meltaway tablets)  60 - 71 pounds:    400 mg (5 meltaway tablets)  72 - 95 pounds:    480 mg (6 meltaway tablets)  Greater than 96 pounds:   640 mg (8 meltaway tablets)    Fermin (Jr) Tylenol/Acetaminophen MELTAWAYS (160 MG tablets)    Child’s Weight:  Dose:  36 - 47 pounds:    240 mg (1 1/2 meltaway tablets)  48 - 59 pounds:    320 mg (2 meltaway tablets)  60 - 71 pounds:    400 mg (2 1/2 meltaway tablets)  72 - 95 pounds:    480 mg (3 meltaway tablets)  Greater than 96 pounds:   640 mg (4 meltaway tablets)    CHILDREN'S Ibuprofen (e.g., Advil or Motrin) may be given every six hours as needed for pain or fever.    48 - 59 pounds:                       200 mg (2 Teaspoons)  60 - 71 pounds:                       250 mg (2 1/2 Teaspoons)  72 - 95 pounds:                       300 mg (3 Teaspoons)    NEXT VISIT: 1 year      Thank you for entrusting your care to [facility].   normal...

## 2020-08-04 NOTE — ED ADULT TRIAGE NOTE - SOURCE OF INFORMATION
TRANSFER - IN REPORT: 
 
Verbal report received from Cliff Caban on [place]  being received from [facility] for routine progression of care Report consisted of patients Situation, Background, Assessment and  
Recommendations(SBAR). Information from the following report(s) SBAR, Kardex, MAR, Recent Results, Med Rec Status and Alarm Parameters  was reviewed with the receiving nurse. Opportunity for questions and clarification was provided. [phone] Patient arrived on unit. Assessment completed upon patients arrival to unit and care assumed. Patient

## 2020-11-03 NOTE — ED ADULT NURSE NOTE - NS ED NURSE LEVEL OF CONSCIOUSNESS ORIENTATION
PT IS GETTING HER OLMESARTAN FROM [facility] AND IT WAS APPROVED ALREADY PER [employer].11/3/2020/0917/SF   Disoriented

## 2021-04-17 NOTE — H&P ADULT - EYES
Per chart review, patient is a 13yoF w/hx of anxiety and depression presenting as a tx from [place] 2/2 intentional overdose of approximately 20 tables of 50mg sertraline.     SW met with mother and patient at bedside. Patient reported that she used to have a therapist through mother's work, however, has been inactive for the past 2-3 months. Currently gets support through . SW explained the next steps including getting medical cleared, obtain psych eval and likely disposition of inpatient hospitalization due to intentional overdose. SW then answered several questions about inpatient hospitalization such as programming and daily schedule. All questions were answered.     When this writer verified insurance, mother reported that patient has [facility] SW explained that since patient has HMO site, will need to find placement at [facility] prior to finding placement elsewhere. Mother reported that she understood. Mother was unable to provide this writer with an updated insurance card, due to her not having it with her persons at the time. SW informed mother to provide insurance card to either psychiatry or CAT when becomes available. Mother reported that she understood.     No other SW needs warranted at this time.     Claudia Eddy LCSW  Pediatric Emergency Department  Phone:      EOMI; PERRL; no drainage or redness

## 2021-09-16 NOTE — CONSULT NOTE ADULT - SUBJECTIVE AND OBJECTIVE BOX
Nephrology Consultation: MD ORION Verduzco, KAUSHIK  86y    86 white male with a history of severe OA, now S/P RT TKR transferred to the ICU after an episode of pre syncopal episode with severe hypotension. Patient is now S/P 2.5 IVF supplementation. Denies any prior history of NSAIDS usage. He also has rafael cardia and was evaluated by cardiology. Denies any chest pain or shortness of breath. Denies any fever. He does not feels good overall. The SBP has improved from 80 to 110 at this time. Denies any prior history of renal disease. Has been on ACEI at home. 	      PAST MEDICAL & SURGICAL HISTORY:  Osteoarthritis of right knee  Hypertension  Tremor of both hands  History of hernia surgery: X3   History of shoulder surgery: right, 2012  History of knee replacement, total, left: 2007      Allergies    No Known Allergies    Intolerances        Home Medications:  enalapril 20 mg oral tablet: 1 tab(s) orally once a day (24 Jul 2018 09:09)  famotidine 20 mg oral tablet:  (24 Jul 2018 09:09)        FAMILY HISTORY:  Family history of heart disease (Father)      SOCIAL HISTORY:    REVIEW OF SYSTEMS:    Constitutional: No fever, weight loss or fatigue  Eyes: No eye pain, visual disturbances, or discharge  ENT:  No difficulty hearing, tinnitus, vertigo; No sinus or throat pain  Neck: No pain or stiffness  Breasts: No pain, masses or nipple discharge  Respiratory: No cough, wheezing, chills or hemoptysis  Cardiovascular: No chest pain, palpitations, shortness of breath, dizziness or leg swelling  Gastrointestinal: No abdominal or epigastric pain. No nausea, vomiting or hematemesis; No diarrhea or constipation. No melena or hematochezia.  Genitourinary: No dysuria, frequency, hematuria or incontinence  Rectal: No pain, hemorrhoids or incontinence  Neurological: No headaches, memory loss, loss of strength, numbness or tremors  Skin: No itching, burning, rashes or lesions   Lymph Nodes: No enlarged glands  Endocrine: No heat or cold intolerance; No hair loss  Musculoskeletal: No joint pain or swelling; No muscle, back or extremity pain  Psychiatric: No depression, anxiety, mood swings or difficulty sleeping  Heme/Lymph: No easy bruising or bleeding gums  Allergy and Immunologic: No hives or eczema    acetaminophen   Tablet. 1000 milliGRAM(s) Oral every 8 hours  aluminum hydroxide/magnesium hydroxide/simethicone Suspension 30 milliLiter(s) Oral four times a day PRN  aspirin enteric coated 162 milliGRAM(s) Oral every 12 hours  bisacodyl Suppository 10 milliGRAM(s) Rectal daily PRN  docusate sodium 100 milliGRAM(s) Oral three times a day  ferrous    sulfate 325 milliGRAM(s) Oral daily  latanoprost 0.005% Ophthalmic Solution 1 Drop(s) Both EYES at bedtime  magnesium hydroxide Suspension 30 milliLiter(s) Oral daily PRN  midodrine 5 milliGRAM(s) Oral three times a day  ondansetron Injectable 4 milliGRAM(s) IV Push every 6 hours PRN  pantoprazole    Tablet 40 milliGRAM(s) Oral before breakfast  polyethylene glycol 3350 17 Gram(s) Oral daily PRN  senna 2 Tablet(s) Oral at bedtime  sodium chloride 0.9%. 1000 milliLiter(s) IV Continuous <Continuous>  traMADol 25 milliGRAM(s) Oral every 4 hours PRN  traMADol 50 milliGRAM(s) Oral every 4 hours PRN      Vital Signs Last 24 Hrs  T(C): 36.4 (26 Jul 2018 03:15), Max: 37.2 (25 Jul 2018 19:00)  T(F): 97.5 (26 Jul 2018 03:15), Max: 98.9 (25 Jul 2018 19:00)  HR: 70 (26 Jul 2018 12:32) (55 - 79)  BP: 95/42 (26 Jul 2018 12:32) (76/52 - 126/63)  BP(mean): 57 (26 Jul 2018 12:32) (57 - 73)  RR: 14 (26 Jul 2018 12:32) (13 - 29)  SpO2: 100% (26 Jul 2018 12:32) (93% - 100%)    PHYSICAL EXAM:    Constitutional: NAD, well-groomed, well-developed  HEENT: PERRLA, EOMI, Normal Hearing, MMM  Neck: No LAD, No JVD  Back: Normal spine flexure, No CVA tenderness  Respiratory: CTAB/L   Cardiovascular: S1 and S2, RRR, no M/G/R  Gastrointestinal: BS+, soft, NT/ND  Extremities: No peripheral edema  Vascular: 2+ peripheral pulses  Neurological: A/O x 3, no focal deficits  Skin: No rashes      LABS:                        9.7    14.22 )-----------( 94       ( 26 Jul 2018 10:09 )             28.7     07-26    137  |  107  |  29<H>  ----------------------------<  101<H>  3.9   |  24  |  1.56<H>    Ca    7.9<L>      26 Jul 2018 10:09            MICROBIOLOGY:  RECENT CULTURES:        RADIOLOGY & ADDITIONAL STUDIES:    < from: Xray Chest 1 View-PORTABLE IMMEDIATE (07.26.18 @ 10:32) >  EXAM:  XR CHEST PORTABLE IMMED 1V                                  PROCEDURE DATE:  07/26/2018          INTERPRETATION:  Low blood pressure.    AP chest.    Heart not accurately evaluated on this projection. Unfolding calcination   thoracic aorta. Discoid atelectasis left base. No consolidation or   effusion. Suture anchor projects over the right shoulder.    Impression: As above GOAL: Pt will perform sit to/from stand transfers independently with RW within 2-3 weeks. GOALS: Pt will perform all transfers with RW & modA in 4wks

## 2021-12-05 NOTE — GOALS OF CARE CONVERSATION - PERSONAL ADVANCE DIRECTIVE - TREATMENT GUIDELINES
DNR Order dc instructions  [facility], #[id number] dc instructions  [facility], #[id number]  dr kike rodriguez rx changed to augmentin bid, 225mg bid

## 2022-09-08 NOTE — PHYSICAL THERAPY INITIAL EVALUATION ADULT - MD/RN NOTIFIED
----- Message from Jazmyn Cavazos sent at 9/8/2022  3:10 PM CDT -----  Regarding: orders request  Contact: [phone]  Patient is requesting orders, patient asking to speak with someone in office so that she can give location where orders has to be sent. Patient has an appt on 9/9. Please call to discuss further.    
No answer. Left message for Patient to return call to clinic.   
yes

## 2023-03-21 NOTE — PROGRESS NOTE ADULT - PROBLEM SELECTOR PLAN 4
yes dispo home with hospice pending symptom management.   [facility] discussion on 10/23, DEAN in chart  Plan for family meeting to discuss disposition. dispo home with hospice pending symptom management.   [facility] discussion on 10/23, DEAN in chart  Family meeting with two daughters.  Explained challenges to managing his behavior, likely related to brain mass.  Prior to his illness was high functioning, no signs of disease.

## 2023-07-14 NOTE — PROGRESS NOTE ADULT - SUBJECTIVE AND OBJECTIVE BOX
[facility] ambulatory encounter  FAMILY MEDICINE OFFICE VISIT    CHIEF COMPLAINT:    Chief Complaint   Patient presents with   • Diabetes   • Office Visit   • Medicare Wellness Visit     Subsequent.        SUBJECTIVE:  Dameon Mcghee is a 53 year old male who presented requesting evaluation for follow up on chronic medical issues.    Patient has history of type 2 diabetes mellitus, hypertension, hyperlipidemia, peripheral arterial disease, stage IIIA chronic kidney disease, peripheral arterial disease, generalized anxiety disorder, and history of pancytopenia.      Patient is compliant with all medications and follows with Hematology for pancytopenia.      A1c is 6.7% in office today, improved from 8.4% six months ago.  He attributes to stopping junk food intake; he stopped using Lantus and glipizide due to recurring low blood sugars.      He reports stopping Seroquel due to side effect of low bp at night, and he has not noticed any change in mood.  Citalopram is continued with good effect.       Review of systems:    Constitutional: Negative for fever and chills.   Skin: Negative for rash.   HEENT: Negative for eye drainage, rhinorrhea, ear pain or sore throat.  Respiratory: Negative for cough, wheezing or shortness of breath.    Cardiovascular: Negative for chest pain, chest pressure, palpitations or diaphoresis.   Gastrointestinal: Negative for nausea, vomiting, diarrhea or abdominal pain.   Genitourinary: Negative for dysuria, urgency, frequency, hematuria or flank pain.  Extremities: Negative for joint swelling or joint pain.  Neurologic: Negative for change in sensory or motor function.  Negative for headache.  Endocrine: Negative for heat or cold intolerance, weight loss or gain.  Hematological: Negative for bleeding, bruising or adenopathy.  Psychiatric: Negative for change in affect, change in mentation or sleep disturbance.     OBJECTIVE:  PROBLEM LIST:    Patient Active Problem List   Diagnosis   •  Degeneration of lumbar or lumbosacral intervertebral disc   • Psychogenic pain, site unspecified   • Hypertension associated with diabetes (CMS/HCC)   • Hyperlipidemia associated with type 2 diabetes mellitus (CMS/HCC)   • Class 2 severe obesity due to excess calories with serious comorbidity and body mass index (BMI) of 37.0 to 37.9 in adult (CMD)   • Tobacco abuse   • Gastroesophageal reflux disease without esophagitis   • Lumbar spinal stenosis   • PAD (peripheral artery disease) (CMD)   • Other chronic pain   • Pancytopenia (CMD)   • Obstructive sleep apnea syndrome   • Type 2 diabetes mellitus with diabetic peripheral angiopathy without gangrene, with long-term current use of insulin (CMD)   • Memory loss   • TOMÁS (generalized anxiety disorder)       PAST HISTORIES:  ALLERGIES:   Allergen Reactions   • Seasonal Other (See Comments)     Sinus congestion   • Tramadol NAUSEA     Flu like symptoms (Was the 80 mg dose)   • Wellbutrin [Bupropion] Other (See Comments)     Urine frequency     Current Outpatient Medications   Medication Sig Dispense Refill   • spironolactone (ALDACTONE) 50 MG tablet TAKE 1 TABLET BY MOUTH DAILY 90 tablet 3   • fenofibrate (TRICOR) 145 MG tablet Take 1 tablet by mouth daily. 90 tablet 3   • Continuous Blood Gluc Sensor (FreeStyle Yohannes 2 Sensor) Misc USE TO CHECK BLOOD SUGAR 3 OR MORE TIMES DAILY AND CHANGE EVERY 14 DAYS 2 each 11   • zinc sulfate (ZINCATE) 220 (50 Zn) MG capsule Take 1 capsule by mouth daily. 90 capsule 3   • omeprazole (PriLOSEC) 40 MG capsule TAKE 1 CAPSULE BY MOUTH TWICE DAILY 180 capsule 3   • aspirin (Aspirin Childrens) 81 MG chewable tablet Chew 1 tablet by mouth daily. 90 tablet 3   • lisinopril (ZESTRIL) 20 MG tablet Take 1 tablet by mouth daily. 90 tablet 3   • metformin (GLUCOPHAGE) 1000 MG tablet Take 1 tablet by mouth in the morning and 1 tablet in the evening. Take with meals. 180 tablet 1   • simvastatin (ZOCOR) 40 MG tablet Take 1 tablet by mouth at  Patient is a 86y old  Male who presents with a chief complaint of "Dr Cuellar is going to replace my RIGHT knee" (25 Jul 2018 12:46)        HPI: 87 yo male presenting with right total knee replacement day, complicated with diarrhea/dehydration/ARF secondary to c.diff collitis, diarrhe subsiding, but still feels upset stomach         SUBJECTIVE & OBJECTIVE: Pt seen and examined at bedside, blating and gas     PHYSICAL EXAM:  T(C): 36.1 (07-31-18 @ 08:34), Max: 36.4 (07-30-18 @ 20:00)  HR: 62 (07-31-18 @ 10:02) (48 - 62)  BP: 143/77 (07-31-18 @ 10:02) (117/60 - 151/81)  RR: 15 (07-31-18 @ 10:02) (14 - 20)  SpO2: 100% (07-31-18 @ 10:02) (91% - 100%)  Wt(kg): --   GENERAL: NAD, well-groomed, well-developed  HEAD:  Atraumatic, Normocephalic  EYES: EOMI, PERRLA, conjunctiva and sclera clear  ENMT: Moist mucous membranes  NECK: Supple, No JVD  NERVOUS SYSTEM:  Alert & Oriented X3,   CHEST/LUNG: decrease air entry at the bases   HEART: Regular rate and rhythm; No murmurs, rubs, or gallops  ABDOMEN: Soft, Nontender, Nondistended; Bowel sounds present  EXTREMITIES:  2+ Peripheral Pulses, No clubbing, cyanosis, or edema        MEDICATIONS  (STANDING):  acetaminophen   Tablet. 1000 milliGRAM(s) Oral every 8 hours  aspirin enteric coated 162 milliGRAM(s) Oral every 12 hours  dextrose 5% + sodium chloride 0.45%. 1000 milliLiter(s) (50 mL/Hr) IV Continuous <Continuous>  dicyclomine 10 milliGRAM(s) Oral three times a day before meals  famotidine    Tablet 20 milliGRAM(s) Oral daily  ferrous    sulfate 325 milliGRAM(s) Oral daily  lactobacillus acidophilus 1 Tablet(s) Oral two times a day with meals  latanoprost 0.005% Ophthalmic Solution 1 Drop(s) Both EYES at bedtime  metroNIDAZOLE  IVPB 500 milliGRAM(s) IV Intermittent every 8 hours  tamsulosin 0.4 milliGRAM(s) Oral at bedtime  vancomycin    Solution 125 milliGRAM(s) Oral every 6 hours    MEDICATIONS  (PRN):  aluminum hydroxide/magnesium hydroxide/simethicone Suspension 30 milliLiter(s) Oral four times a day PRN Indigestion  ondansetron Injectable 4 milliGRAM(s) IV Push every 6 hours PRN Nausea and/or Vomiting  polyethylene glycol 3350 17 Gram(s) Oral daily PRN Constipation  simethicone 80 milliGRAM(s) Chew every 6 hours PRN Gas  sodium chloride 0.65% Nasal 1 Spray(s) Both Nostrils every 4 hours PRN Nasal Congestion  traMADol 25 milliGRAM(s) Oral every 4 hours PRN Mild Pain (1 - 3)  traMADol 50 milliGRAM(s) Oral every 4 hours PRN Moderate Pain (4 - 6)      LABS:                        11.4   13.11 )-----------( 213      ( 31 Jul 2018 12:07 )             32.4     07-30    140  |  105  |  16  ----------------------------<  120<H>  3.4<L>   |  24  |  1.06    Ca    8.0<L>      30 Jul 2018 06:46  Phos  3.0     07-30  Mg     1.6     07-30            CAPILLARY BLOOD GLUCOSE          CAPILLARY BLOOD GLUCOSE        CAPILLARY BLOOD GLUCOSE                RECENT CULTURES:      RADIOLOGY & ADDITIONAL TESTS:                        DVT/GI ppx  Discussed with pt @ bedside bedtime. 90 tablet 3   • Multiple Vitamins-Minerals (One-A-Day Mens 50+) Tab Take 1 tablet by mouth daily.     • citalopram (CeleXA) 40 MG tablet Take 1 tablet by mouth daily. 90 tablet 3   • Continuous Blood Gluc  (FreeStyle Yohannes 2 [place]) Device 1 each 1 time. Use to check blood sugar 3 or more times daily. 1 each 0   • fluticasone (FLONASE) 50 MCG/ACT nasal spray USE 1 SPRAY IN EACH NOSTRIL DAILY (Patient taking differently: Spray 1 spray in each nostril daily as needed.) 16 g 5   • albuterol 108 (90 Base) MCG/ACT inhaler Inhale 2 puffs into the lungs every 4 hours as needed for Shortness of Breath or Wheezing. 1 each 3   • intrathecal implanted pain pump/refill 1 Syringe by Intrathecal route 1 time. Pain Pump  Managed by Dr Hartley at [facility] [phone]  Hydromorphone 6 mg/mL, rate 2.3 mg/day  Bupivacaine 21 mg/mL, rate 8.05 mg/day  Gabapentin 3 mg/mL, rate 1.15 mg/day  Last filled unsure - please confirm  Next Fill: 12/6/22       No current facility-administered medications for this visit.     Past Medical History:   Diagnosis Date   • Allergic Rhinitis    • Anxiety Disorder    • Chest pain 12/4/2022   • Chronic kidney disease     kidney stones   • Chronic pain     low back, buttock, bilat hips   • Depression    • Diabetes (CMD)    • DJD (degenerative joint disease), lumbosacral    • Dyslipidemia    • Dyslipidemia    • Erectile dysfunction    • Fracture 1986    Left tibia   • GERD    • Hypertension    • Insomnia    • Kidney stones    • Obesity    • Postlaminectomy syndrome    • Radiculopathy 12/20/2017   • Spinal stenosis 12/20/2017    lumbar     Past Surgical History:   Procedure Laterality Date   • Cyst removal  8/9/12    coccyx   • Hernia repair  1996    Left inguinal   • Home infusion therapy implanted pump pain mgmt admin services per ariane  01/01/2018   • Spine surgery procedure unlisted  11/12/2010    L5-S1 TLIF     Social History     Tobacco Use   • Smoking status: Every  Day     Current packs/day: 1.50     Average packs/day: 1.5 packs/day for 38.0 years (57.0 ttl pk-yrs)     Types: Cigarettes   • Smokeless tobacco: Never   Vaping Use   • Vaping Use: Former   • Substances: Nicotine   Substance Use Topics   • Alcohol use: No   • Drug use: No     Family History   Problem Relation Age of Onset   • Diabetes Father    • Hypertension Mother    • Patient is unaware of any medical problems Sister    • Obesity Brother      I have reviewed the past medical history, family history, social history, medications and allergies listed in the medical record as well as the nursing notes for this encounter.    Physical Exam:    Vital Signs:    Visit Vitals  /72 (BP Location: LUE - Left upper extremity, Patient Position: Sitting, Cuff Size: Regular)   Pulse (!) 58   Resp 14   Ht 5' 11\" (1.803 m)   Wt 123.4 kg (272 lb)   SpO2 94%   BMI 37.94 kg/m²     Pulse Ox Interpretation:  Within normal limits.  General:  Awake, alert.  No acute distress.  Obese.  Skin:  Warm and dry without rash.    Head:  Normocephalic, atraumatic.   Neck:  Trachea is midline. No adenopathy.  Normal thyroid without mass or tenderness..   Eyes:  Normal conjunctivae and sclerae.  Pupils equal, round and reactive to light.  Extraocular movements intact.  ENT:  Mucous membranes are moist.  Normal tympanic membranes and external auditory canals bilaterally.  No pharyngeal erythema or exudate.    Cardiovascular:  Symmetrical pulses.  Regular rate and rhythm without murmur.  Respiratory:  Normal respiratory effort.  Clear to auscultation.  No wheezes, rales or rhonchi.  Gastrointestinal:  Soft and non tender.  Normal bowel sounds.  No hepatomegaly or splenomegaly.   Musculoskeletal:  No deformity or edema.  No tenderness to palpation.  Back:   Normal alignment.  No costovertebral angle tenderness or midline bony tenderness.  Neurologic:   Oriented x4.  Cranial nerves II-XII are intact.  No focal deficits or lateralizing  signs.  Psychiatric:   Cooperative.  Appropriate mood and affect.    LAB RESULTS:  All pertinent laboratory results were reviewed.    Assessment:   1. Type 2 diabetes mellitus with diabetic peripheral angiopathy without gangrene, with long-term current use of insulin (CMD)    2. Hypertension associated with diabetes (CMD)    3. Hyperlipidemia associated with type 2 diabetes mellitus (CMD)    4. PAD (peripheral artery disease) (CMD)    5. Class 2 severe obesity due to excess calories with serious comorbidity and body mass index (BMI) of 37.0 to 37.9 in adult (CMD)    6. Moderate episode of recurrent major depressive disorder (CMD)    7. Tobacco abuse    8. Prostate cancer screening    9. Medicare annual wellness visit, subsequent        PLAN:  Orders Placed This Encounter   • ANNUAL WELLNESS VISIT SUBSEQUENT VISIT W PPS   • POCT Glycohemoglobin Analyzer           Instructions provided as documented in the AVS.    Patient is counseled on proper nutrition and importance of routine physical activity.    Check cbc for h/o pancytopenia.    Patient will have metabolic panel and lipid panel updated for associated hypertension and hyperlipidemia.      Renal function will be evaluated, and I removed stage IIIA chronic kidney disease from his problem list.  He is encouraged to maintain hydration and avoid nephrotoxins.      He will continue on omeprazole for management of GERD.      He continues on citalopram for mood, which is stable; no more Seroquel for now.    Counseled on quitting smoking entirely, happy that he has cut back to half pack daily.    Follow-up 6 months.        MEDICARE WELLNESS VISIT NOTE    HISTORY OF PRESENT ILLNESS:   Dameon Mcghee presents for his Subsequent Annual Medicare Wellness Visit.   He has no current complaints or concerns.      Patient Care Team:  Taco Lucas MD as PCP - General (Family Practice)  [facility]  [place], Marco A GARCÍA DPM as Podiatry (Podiatry - Foot &  Ankle Surgery)        Patient Active Problem List   Diagnosis   • Degeneration of lumbar or lumbosacral intervertebral disc   • Psychogenic pain, site unspecified   • Hypertension associated with diabetes (CMS/HCC)   • Hyperlipidemia associated with type 2 diabetes mellitus (CMS/HCC)   • Class 2 severe obesity due to excess calories with serious comorbidity and body mass index (BMI) of 37.0 to 37.9 in adult (CMD)   • Tobacco abuse   • Gastroesophageal reflux disease without esophagitis   • Lumbar spinal stenosis   • PAD (peripheral artery disease) (CMD)   • Other chronic pain   • Pancytopenia (CMD)   • Obstructive sleep apnea syndrome   • Anemia   • Type 2 diabetes mellitus with diabetic peripheral angiopathy without gangrene, with long-term current use of insulin (CMD)   • Memory loss   • TOMÁS (generalized anxiety disorder)   • Chest pain   • Stage 3a chronic kidney disease (CMD)         Past Medical History:   Diagnosis Date   • Allergic Rhinitis    • Anxiety Disorder    • Chronic kidney disease     kidney stones   • Chronic pain     low back, buttock, bilat hips   • Depression    • Diabetes (CMD)    • DJD (degenerative joint disease), lumbosacral    • Dyslipidemia    • Dyslipidemia    • Erectile dysfunction    • Fracture 1986    Left tibia   • GERD    • Hypertension    • Insomnia    • Kidney stones    • Obesity    • Postlaminectomy syndrome    • Radiculopathy 12/20/2017   • Spinal stenosis 12/20/2017    lumbar         Past Surgical History:   Procedure Laterality Date   • Cyst removal  8/9/12    coccyx   • Hernia repair  1996    Left inguinal   • Home infusion therapy implanted pump pain mgmt admin services per ariane  01/01/2018   • Spine surgery procedure unlisted  11/12/2010    L5-S1 TLIF         Social History     Tobacco Use   • Smoking status: Every Day     Current packs/day: 1.50     Average packs/day: 1.5 packs/day for 38.0 years (57.0 ttl pk-yrs)     Types: Cigarettes   • Smokeless tobacco: Never   Vaping  Use   • Vaping Use: Former   • Substances: Nicotine   Substance Use Topics   • Alcohol use: No   • Drug use: No     Drug use:    Drug Use:    No              Family History   Problem Relation Age of Onset   • Diabetes Father    • Hypertension Mother    • Patient is unaware of any medical problems Sister    • Obesity Brother        Current Outpatient Medications   Medication Sig Dispense Refill   • spironolactone (ALDACTONE) 50 MG tablet TAKE 1 TABLET BY MOUTH DAILY 90 tablet 3   • fenofibrate (TRICOR) 145 MG tablet Take 1 tablet by mouth daily. 90 tablet 3   • Continuous Blood Gluc Sensor (FreeStyle Yohannes 2 Sensor) Misc USE TO CHECK BLOOD SUGAR 3 OR MORE TIMES DAILY AND CHANGE EVERY 14 DAYS 2 each 11   • zinc sulfate (ZINCATE) 220 (50 Zn) MG capsule Take 1 capsule by mouth daily. 90 capsule 3   • omeprazole (PriLOSEC) 40 MG capsule TAKE 1 CAPSULE BY MOUTH TWICE DAILY 180 capsule 3   • aspirin (Aspirin Childrens) 81 MG chewable tablet Chew 1 tablet by mouth daily. 90 tablet 3   • glipiZIDE (GLUCOTROL XL) 10 MG 24 hr tablet Take 1 tablet by mouth in the morning and 1 tablet in the evening. Take with meals. 180 tablet 3   • lisinopril (ZESTRIL) 20 MG tablet Take 1 tablet by mouth daily. 90 tablet 3   • metformin (GLUCOPHAGE) 1000 MG tablet Take 1 tablet by mouth in the morning and 1 tablet in the evening. Take with meals. 180 tablet 1   • QUEtiapine (SEROquel) 200 MG tablet Take 1 tablet by mouth nightly. 90 tablet 3   • simvastatin (ZOCOR) 40 MG tablet Take 1 tablet by mouth at bedtime. 90 tablet 3   • insulin glargine (Lantus SoloStar) 100 UNIT/ML pen-injector Inject 60 Units into the skin in the morning and 60 Units in the evening. Prime 2 units before each dose. 30 mL 5   • Multiple Vitamins-Minerals (One-A-Day Mens 50+) Tab Take 1 tablet by mouth daily.     • citalopram (CeleXA) 40 MG tablet Take 1 tablet by mouth daily. 90 tablet 3   • Insulin Pen Needle (B-D U/F PEN NEEDLE 5/16\") 31G X 8 MM Misc Use to inject  insulin 2 times daily. Remove needle cover(s) to expose needle before injecting. 100 each 0   • Continuous Blood Gluc  (FreeStyle Yohannes 2 [place]) Device 1 each 1 time. Use to check blood sugar 3 or more times daily. 1 each 0   • fluticasone (FLONASE) 50 MCG/ACT nasal spray USE 1 SPRAY IN EACH NOSTRIL DAILY (Patient taking differently: Spray 1 spray in each nostril daily as needed.) 16 g 5   • albuterol 108 (90 Base) MCG/ACT inhaler Inhale 2 puffs into the lungs every 4 hours as needed for Shortness of Breath or Wheezing. 1 each 3   • intrathecal implanted pain pump/refill 1 Syringe by Intrathecal route 1 time. Pain Pump  Managed by [facility] Hartley at [facility] [phone]  Hydromorphone 6 mg/mL, rate 2.3 mg/day  Bupivacaine 21 mg/mL, rate 8.05 mg/day  Gabapentin 3 mg/mL, rate 1.15 mg/day  Last filled unsure - please confirm  Next Fill: 12/6/22       No current facility-administered medications for this visit.        The following items on the Medicare Health Risk Assessment were found to be positive  1.) Do you have an Advance directive, living will, or power of  for health care document that contains your wishes for end of life care?: No     6 a.) How many servings of Fruits and Vegetables do you have each day ( 1 serving = 1 piece of fruit, 1/2 cup fruits or vegetables): 1 per day     6 b.) How many servings of High Fiber / Whole Grain Foods to you have each day ( 1 serving = 1 cup cold cereal, 1/2 cup cooked cereal, 1 slice bread): None     6 d.) How many servings of Sugar Sweetened Beverages do you have each day ( 1 serving = 1 can or 12 oz cup of sode or juice): 3 per day     7b.) Do you feel unsteady when standing or walking?: Yes     8.) During the past 4 weeks, has your physical and emotional health limited your social activities with family, friends, neighbors, or other groups?: Moderately     11d.) Bodily pain: Often     11l.) Sexual Problems: Always         Vision and Hearing  screens: Not performed    Advance care planning documents on file - no     Cognitive/Functional Status: preexisting cognitive issues - stable    Opioid Review: Dameon is not taking opioid medications.    Recent PHQ 2/9 Score:    PHQ 2:  PHQ 2 Score Adult PHQ 2 Score Adult PHQ 2 Interpretation Little interest or pleasure in activity?   7/14/2023   9:17 AM 0 No further screening needed 0        DEPRESSION ASSESSMENT/PLAN:  Start and/or continue medication.  See orders for details.      Body mass index is 37.94 kg/m².    BMI ASSESSMENT/PLAN:  Patient is obese.    Caloric restriction and Low carbohydrate diet        Needed Screening/Treatment:   None  Needed follow up:  None    See orders.   See Patient Instructions section.   Return in about 1 year (around 7/14/2024) for Medicare Wellness Visit.

## 2023-08-22 NOTE — DISCHARGE NOTE ADULT - HOSPITAL COURSE
86yo M with hx of recurrent Cdiff, HTN, who was recently admitted for recurrent cdiff, and found to have glioblastoma in 9/2018 elected for symptom management and was d/carlos home with hospice with Lukas now presenting with agitation and aggressive behavior, admitted to PCU for symptom management and continued care.  His agitation has been difficult to manage despite multiple treatment regimens.  He is presently on ativan RTC and depakote.  He has no oral route.  Patient has been accepted to inpatient hospice and will be discharged today. 4 = No assist / stand by assistance

## 2024-05-01 NOTE — PROGRESS NOTE BEHAVIORAL HEALTH - ORIENTED TO TIME
[Normal] : patient in no apparent distress, no dysmorphic features [External ears normal] : external ears normal [Toe-Walking] : toe-walking [Easily Distracted] : easily distracted No [Needs frequent redirecting] : needs frequent redirecting [Fidgets] : fidgets [Well-behaved during visit] : well-behaved during visit [de-identified] : He thought it was hilarious to turn the lights off and on. "It's sleepy time!" Some articulation difficulties and low tone which affected intelligibility. He declined blood pressure check. Started to become agitated so deferred.

## 2024-05-07 NOTE — BEHAVIORAL HEALTH ASSESSMENT NOTE - NSBHCONSULTMEDS_PSY_A_CORE FT
Lov 03-   Check baseline ekg   Increase Depakote to 500mg daily (from 250mg)   Increase Haldol to 1mg in AM and 2mg at 1800 (monitor qtc <500ms on ekg)

## 2025-02-11 NOTE — PATIENT PROFILE ADULT - NSPROGENBLOODRESTRICT_GEN_A_NUR
[id number] [FreeTextEntry4] : 6:35pm [FreeTextEntry2] : 9/28/22 none [FreeTextEntry1] : Psychotherapy follow up [FreeTextEntry3] : home  [FreeTextEntry5] : [address]

## 2025-06-10 NOTE — DISCHARGE NOTE ADULT - ADMISSION DATE +STARTOFVISITDATE
Show Aperture Variable?: Yes Consent: The patient's consent was obtained including but not limited to risks of crusting, scabbing, blistering, scarring, darker or lighter pigmentary change, recurrence, incomplete removal and infection. Render Note In Bullet Format When Appropriate: No Detail Level: Detailed Number Of Freeze-Thaw Cycles: 1 freeze-thaw cycle Duration Of Freeze Thaw-Cycle (Seconds): 3 Post-Care Instructions: I reviewed with the patient in detail post-care instructions. Patient is to wear sunprotection, and avoid picking at any of the treated lesions. Pt may apply Vaseline to crusted or scabbing areas. Statement Selected Spray Paint Text: The liquid nitrogen was applied to the skin utilizing a spray paint frosting technique. Number Of Freeze-Thaw Cycles: 3 freeze-thaw cycles Medical Necessity Clause: This procedure was medically necessary because the lesions that were treated were: Medical Necessity Information: It is in your best interest to select a reason for this procedure from the list below. All of these items fulfill various CMS LCD requirements except the new and changing color options.